# Patient Record
Sex: FEMALE | Race: WHITE | NOT HISPANIC OR LATINO | Employment: FULL TIME | URBAN - METROPOLITAN AREA
[De-identification: names, ages, dates, MRNs, and addresses within clinical notes are randomized per-mention and may not be internally consistent; named-entity substitution may affect disease eponyms.]

---

## 2019-10-07 ENCOUNTER — APPOINTMENT (OUTPATIENT)
Dept: CT IMAGING | Facility: HOSPITAL | Age: 34
End: 2019-10-07

## 2019-10-07 ENCOUNTER — HOSPITAL ENCOUNTER (EMERGENCY)
Facility: HOSPITAL | Age: 34
Discharge: HOME | End: 2019-10-07
Attending: EMERGENCY MEDICINE | Admitting: EMERGENCY MEDICINE

## 2019-10-07 VITALS
WEIGHT: 119.71 LBS | RESPIRATION RATE: 15 BRPM | TEMPERATURE: 98.5 F | DIASTOLIC BLOOD PRESSURE: 68 MMHG | SYSTOLIC BLOOD PRESSURE: 110 MMHG | HEIGHT: 62 IN | OXYGEN SATURATION: 100 % | BODY MASS INDEX: 22.03 KG/M2 | HEART RATE: 65 BPM

## 2019-10-07 DIAGNOSIS — N20.1 URETEROLITHIASIS: Primary | ICD-10-CM

## 2019-10-07 LAB
ALBUMIN SERPL-MCNC: 4.3 G/DL (ref 3.5–4.8)
ALBUMIN/GLOB SERPL: 1.7 G/DL (ref 1–1.7)
ALP SERPL-CCNC: 36 U/L (ref 32–91)
ALT SERPL W P-5'-P-CCNC: 16 U/L (ref 14–54)
ANION GAP SERPL CALCULATED.3IONS-SCNC: 12.6 MMOL/L (ref 5–15)
AST SERPL-CCNC: 21 U/L (ref 15–41)
B-HCG UR QL: NEGATIVE
BACTERIA UR QL AUTO: ABNORMAL /HPF
BASOPHILS # BLD AUTO: 0 10*3/MM3 (ref 0–0.2)
BASOPHILS NFR BLD AUTO: 0.4 % (ref 0–1.5)
BILIRUB SERPL-MCNC: 0.4 MG/DL (ref 0.3–1.2)
BILIRUB UR QL STRIP: NEGATIVE
BUN BLD-MCNC: 10 MG/DL (ref 8–20)
BUN/CREAT SERPL: 14.3 (ref 5.4–26.2)
CALCIUM SPEC-SCNC: 9.2 MG/DL (ref 8.9–10.3)
CHLORIDE SERPL-SCNC: 101 MMOL/L (ref 101–111)
CLARITY UR: ABNORMAL
CO2 SERPL-SCNC: 26 MMOL/L (ref 22–32)
COLOR UR: YELLOW
CREAT BLD-MCNC: 0.7 MG/DL (ref 0.4–1)
DEPRECATED RDW RBC AUTO: 42 FL (ref 37–54)
EOSINOPHIL # BLD AUTO: 0 10*3/MM3 (ref 0–0.4)
EOSINOPHIL NFR BLD AUTO: 0.5 % (ref 0.3–6.2)
ERYTHROCYTE [DISTWIDTH] IN BLOOD BY AUTOMATED COUNT: 13.9 % (ref 12.3–15.4)
GFR SERPL CREATININE-BSD FRML MDRD: 98 ML/MIN/1.73
GLOBULIN UR ELPH-MCNC: 2.6 GM/DL (ref 2.5–3.8)
GLUCOSE BLD-MCNC: 116 MG/DL (ref 65–99)
GLUCOSE UR STRIP-MCNC: NEGATIVE MG/DL
HCT VFR BLD AUTO: 36.1 % (ref 34–46.6)
HGB BLD-MCNC: 12.2 G/DL (ref 12–15.9)
HGB UR QL STRIP.AUTO: ABNORMAL
HYALINE CASTS UR QL AUTO: ABNORMAL /LPF
KETONES UR QL STRIP: NEGATIVE
LEUKOCYTE ESTERASE UR QL STRIP.AUTO: NEGATIVE
LIPASE SERPL-CCNC: 34 U/L (ref 22–51)
LYMPHOCYTES # BLD AUTO: 1.4 10*3/MM3 (ref 0.7–3.1)
LYMPHOCYTES NFR BLD AUTO: 13.5 % (ref 19.6–45.3)
MCH RBC QN AUTO: 29.4 PG (ref 26.6–33)
MCHC RBC AUTO-ENTMCNC: 33.7 G/DL (ref 31.5–35.7)
MCV RBC AUTO: 87.1 FL (ref 79–97)
MONOCYTES # BLD AUTO: 0.6 10*3/MM3 (ref 0.1–0.9)
MONOCYTES NFR BLD AUTO: 5.6 % (ref 5–12)
NEUTROPHILS # BLD AUTO: 8.2 10*3/MM3 (ref 1.7–7)
NEUTROPHILS NFR BLD AUTO: 80 % (ref 42.7–76)
NITRITE UR QL STRIP: NEGATIVE
NRBC BLD AUTO-RTO: 0.1 /100 WBC (ref 0–0.2)
PH UR STRIP.AUTO: 7.5 [PH] (ref 5–8)
PLATELET # BLD AUTO: 268 10*3/MM3 (ref 140–450)
PMV BLD AUTO: 7.6 FL (ref 6–12)
POTASSIUM BLD-SCNC: 3.6 MMOL/L (ref 3.6–5.1)
PROT SERPL-MCNC: 6.9 G/DL (ref 6.1–7.9)
PROT UR QL STRIP: NEGATIVE
RBC # BLD AUTO: 4.14 10*6/MM3 (ref 3.77–5.28)
RBC # UR: ABNORMAL /HPF
REF LAB TEST METHOD: ABNORMAL
SODIUM BLD-SCNC: 136 MMOL/L (ref 136–144)
SP GR UR STRIP: 1.02 (ref 1–1.03)
SQUAMOUS #/AREA URNS HPF: ABNORMAL /HPF
UROBILINOGEN UR QL STRIP: ABNORMAL
WBC NRBC COR # BLD: 10.2 10*3/MM3 (ref 3.4–10.8)
WBC UR QL AUTO: ABNORMAL /HPF

## 2019-10-07 PROCEDURE — 25010000002 KETOROLAC TROMETHAMINE PER 15 MG: Performed by: EMERGENCY MEDICINE

## 2019-10-07 PROCEDURE — 85025 COMPLETE CBC W/AUTO DIFF WBC: CPT | Performed by: EMERGENCY MEDICINE

## 2019-10-07 PROCEDURE — 96374 THER/PROPH/DIAG INJ IV PUSH: CPT

## 2019-10-07 PROCEDURE — 96376 TX/PRO/DX INJ SAME DRUG ADON: CPT

## 2019-10-07 PROCEDURE — 25010000002 CEFTRIAXONE PER 250 MG: Performed by: EMERGENCY MEDICINE

## 2019-10-07 PROCEDURE — 99283 EMERGENCY DEPT VISIT LOW MDM: CPT

## 2019-10-07 PROCEDURE — 25010000002 MORPHINE PER 10 MG: Performed by: EMERGENCY MEDICINE

## 2019-10-07 PROCEDURE — 96375 TX/PRO/DX INJ NEW DRUG ADDON: CPT

## 2019-10-07 PROCEDURE — 81001 URINALYSIS AUTO W/SCOPE: CPT | Performed by: EMERGENCY MEDICINE

## 2019-10-07 PROCEDURE — 74176 CT ABD & PELVIS W/O CONTRAST: CPT

## 2019-10-07 PROCEDURE — 80053 COMPREHEN METABOLIC PANEL: CPT | Performed by: EMERGENCY MEDICINE

## 2019-10-07 PROCEDURE — 83690 ASSAY OF LIPASE: CPT | Performed by: EMERGENCY MEDICINE

## 2019-10-07 PROCEDURE — 81025 URINE PREGNANCY TEST: CPT | Performed by: EMERGENCY MEDICINE

## 2019-10-07 PROCEDURE — 25010000002 ONDANSETRON PER 1 MG: Performed by: EMERGENCY MEDICINE

## 2019-10-07 RX ORDER — MORPHINE SULFATE 4 MG/ML
4 INJECTION, SOLUTION INTRAMUSCULAR; INTRAVENOUS ONCE
Status: COMPLETED | OUTPATIENT
Start: 2019-10-07 | End: 2019-10-07

## 2019-10-07 RX ORDER — CEFDINIR 300 MG/1
300 CAPSULE ORAL 2 TIMES DAILY
Qty: 10 CAPSULE | Refills: 0 | Status: SHIPPED | OUTPATIENT
Start: 2019-10-07 | End: 2019-11-18

## 2019-10-07 RX ORDER — ONDANSETRON 2 MG/ML
4 INJECTION INTRAMUSCULAR; INTRAVENOUS ONCE
Status: COMPLETED | OUTPATIENT
Start: 2019-10-07 | End: 2019-10-07

## 2019-10-07 RX ORDER — TAMSULOSIN HYDROCHLORIDE 0.4 MG/1
1 CAPSULE ORAL DAILY
Qty: 5 CAPSULE | Refills: 0 | Status: SHIPPED | OUTPATIENT
Start: 2019-10-07 | End: 2019-11-18

## 2019-10-07 RX ORDER — SODIUM CHLORIDE 0.9 % (FLUSH) 0.9 %
10 SYRINGE (ML) INJECTION AS NEEDED
Status: DISCONTINUED | OUTPATIENT
Start: 2019-10-07 | End: 2019-10-07 | Stop reason: HOSPADM

## 2019-10-07 RX ORDER — HYDROCODONE BITARTRATE AND ACETAMINOPHEN 5; 325 MG/1; MG/1
1 TABLET ORAL EVERY 6 HOURS PRN
Qty: 12 TABLET | Refills: 0 | Status: SHIPPED | OUTPATIENT
Start: 2019-10-07 | End: 2019-11-18

## 2019-10-07 RX ORDER — KETOROLAC TROMETHAMINE 15 MG/ML
15 INJECTION, SOLUTION INTRAMUSCULAR; INTRAVENOUS ONCE
Status: COMPLETED | OUTPATIENT
Start: 2019-10-07 | End: 2019-10-07

## 2019-10-07 RX ADMIN — KETOROLAC TROMETHAMINE 15 MG: 15 INJECTION, SOLUTION INTRAMUSCULAR; INTRAVENOUS at 12:04

## 2019-10-07 RX ADMIN — MORPHINE SULFATE 4 MG: 4 INJECTION INTRAVENOUS at 14:22

## 2019-10-07 RX ADMIN — ONDANSETRON 4 MG: 2 INJECTION INTRAMUSCULAR; INTRAVENOUS at 12:04

## 2019-10-07 RX ADMIN — MORPHINE SULFATE 4 MG: 4 INJECTION INTRAVENOUS at 12:04

## 2019-10-07 RX ADMIN — CEFTRIAXONE SODIUM 1 G: 10 INJECTION, POWDER, FOR SOLUTION INTRAVENOUS at 13:25

## 2019-10-07 NOTE — ED PROVIDER NOTES
Subjective   Chief complaint left flank pain    History of present illness 33-year-old female complains sudden onset left flank pain radiates to her left lower abdomen and started last night and is gradually gotten worse.  Admits to moderate to severe nausea but no vomiting no urinary problems.  Nothing makes it better nothing makes it worse continuous.  No injury.  No foreign travels leg pain or swelling no dysuria frequency no vaginal discharge.  Menstrual cycles have been normal.  No ill exposures.  No chest pain or shortness of breath.  Severe nothing makes better worse continuous since last night sharp and stabbing in nature            Review of Systems   Constitutional: Negative for chills and fever.   HENT: Negative for congestion and sinus pressure.    Eyes: Negative for photophobia and visual disturbance.   Respiratory: Negative for chest tightness and shortness of breath.    Cardiovascular: Negative for chest pain and leg swelling.   Gastrointestinal: Positive for abdominal pain and nausea.   Endocrine: Negative for cold intolerance and heat intolerance.   Genitourinary: Negative for difficulty urinating and dysuria.   Musculoskeletal: Positive for back pain. Negative for arthralgias.   Skin: Negative for color change and pallor.   Neurological: Negative for dizziness and headaches.   Psychiatric/Behavioral: Negative for agitation and behavioral problems.       No past medical history on file.  Negatives  No Known Allergies    No past surgical history on file.    No family history on file.    Social History     Socioeconomic History   • Marital status:      Spouse name: Not on file   • Number of children: Not on file   • Years of education: Not on file   • Highest education level: Not on file           Objective   Physical Exam  33-year-old awake alert no acute distress HEENT extraocular metastatic sclera clear pupils equal round reactive neck is supple no adenopathy no meningeal signs lungs clear  no retractions no CVA tenderness no spinal tenderness heart regular without murmur and was soft nontender good bowel sounds no peritoneal findings or masses.  Extremities no edema cords or Homans sign no evidence of DVT no cellulitic changes.  No rash throughout the flank area.  No signs of infection.  Patient's awake alert and follows commands motor strength normal without focal weakness.  Procedures        Prior to Admission medications    Not on File   Adderall        ED Course      Results for orders placed or performed during the hospital encounter of 10/07/19   Comprehensive Metabolic Panel   Result Value Ref Range    Glucose 116 (H) 65 - 99 mg/dL    BUN 10 8 - 20 mg/dL    Creatinine 0.70 0.40 - 1.00 mg/dL    Sodium 136 136 - 144 mmol/L    Potassium 3.6 3.6 - 5.1 mmol/L    Chloride 101 101 - 111 mmol/L    CO2 26.0 22.0 - 32.0 mmol/L    Calcium 9.2 8.9 - 10.3 mg/dL    Total Protein 6.9 6.1 - 7.9 g/dL    Albumin 4.30 3.50 - 4.80 g/dL    ALT (SGPT) 16 14 - 54 U/L    AST (SGOT) 21 15 - 41 U/L    Alkaline Phosphatase 36 32 - 91 U/L    Total Bilirubin 0.4 0.3 - 1.2 mg/dL    eGFR Non African Amer 98 >60 mL/min/1.73    Globulin 2.6 2.5 - 3.8 gm/dL    A/G Ratio 1.7 1.0 - 1.7 g/dL    BUN/Creatinine Ratio 14.3 5.4 - 26.2    Anion Gap 12.6 5.0 - 15.0 mmol/L   Lipase   Result Value Ref Range    Lipase 34 22 - 51 U/L   Urinalysis With Microscopic If Indicated (No Culture) - Urine, Clean Catch   Result Value Ref Range    Color, UA Yellow Yellow, Straw    Appearance, UA Hazy (A) Clear    pH, UA 7.5 5.0 - 8.0    Specific Gravity, UA 1.023 1.005 - 1.030    Glucose, UA Negative Negative    Ketones, UA Negative Negative    Bilirubin, UA Negative Negative    Blood, UA Trace (A) Negative    Protein, UA Negative Negative    Leuk Esterase, UA Negative Negative    Nitrite, UA Negative Negative    Urobilinogen, UA 0.2 E.U./dL 0.2 - 1.0 E.U./dL   Pregnancy, Urine - Urine, Clean Catch   Result Value Ref Range    HCG, Urine QL Negative  Negative   CBC Auto Differential   Result Value Ref Range    WBC 10.20 3.40 - 10.80 10*3/mm3    RBC 4.14 3.77 - 5.28 10*6/mm3    Hemoglobin 12.2 12.0 - 15.9 g/dL    Hematocrit 36.1 34.0 - 46.6 %    MCV 87.1 79.0 - 97.0 fL    MCH 29.4 26.6 - 33.0 pg    MCHC 33.7 31.5 - 35.7 g/dL    RDW 13.9 12.3 - 15.4 %    RDW-SD 42.0 37.0 - 54.0 fl    MPV 7.6 6.0 - 12.0 fL    Platelets 268 140 - 450 10*3/mm3    Neutrophil % 80.0 (H) 42.7 - 76.0 %    Lymphocyte % 13.5 (L) 19.6 - 45.3 %    Monocyte % 5.6 5.0 - 12.0 %    Eosinophil % 0.5 0.3 - 6.2 %    Basophil % 0.4 0.0 - 1.5 %    Neutrophils, Absolute 8.20 (H) 1.70 - 7.00 10*3/mm3    Lymphocytes, Absolute 1.40 0.70 - 3.10 10*3/mm3    Monocytes, Absolute 0.60 0.10 - 0.90 10*3/mm3    Eosinophils, Absolute 0.00 0.00 - 0.40 10*3/mm3    Basophils, Absolute 0.00 0.00 - 0.20 10*3/mm3    nRBC 0.1 0.0 - 0.2 /100 WBC   Urinalysis, Microscopic Only - Urine, Clean Catch   Result Value Ref Range    RBC, UA 6-12 (A) None Seen /HPF    WBC, UA 3-5 (A) None Seen /HPF    Bacteria, UA 1+ (A) None Seen /HPF    Squamous Epithelial Cells, UA 3-6 (A) None Seen, 0-2 /HPF    Hyaline Casts, UA 3-6 None Seen /LPF    Methodology Automated Microscopy      Ct Abdomen Pelvis Stone Protocol    Result Date: 10/7/2019   1. 3 mm stone at the left ureterovesical junction causing mild to moderate obstruction. Several very small nonobstructing bilateral upper tract renal stones are noted. 2. Constipation.  Electronically Signed By-Zoltan Lopez On:10/7/2019 2:13 PM This report was finalized on [id number] by  Zoltan Schrodt, .    Medications   sodium chloride 0.9 % flush 10 mL (not administered)   ketorolac (TORADOL) injection 15 mg (15 mg Intravenous Given 10/7/19 1204)   Morphine sulfate (PF) injection 4 mg (4 mg Intravenous Given 10/7/19 1204)   ondansetron (ZOFRAN) injection 4 mg (4 mg Intravenous Given 10/7/19 1204)   cefTRIAXone (ROCEPHIN) in SWFI 1 gram/10ml IV PUSH syringe (1 g Intravenous Given 10/7/19  1325)   Morphine sulfate (PF) injection 4 mg (4 mg Intravenous Given 10/7/19 1422)                   MDM  Number of Diagnoses or Management Options  Ureterolithiasis:   Diagnosis management comments: Medical decision making.  Patient had IV established placed on a monitor placed on saline patient was given morphine for IV Toradol 15 mg IV and Zofran 4 mg IV.  Had morphine repeated 4 mg IV.  CBC and electrolytes unremarkable.  Urine was 1+ bacteria 5 white cells.  The patient CT scan shows a 3 mm distal stone.  She remained stable she was pain-free on repeat exam.  We talked about the findings we talked about what to return for.  And she will be discharged home for outpatient follow-up.  Inspect reviewed       Amount and/or Complexity of Data Reviewed  Clinical lab tests: reviewed  Tests in the radiology section of CPT®: reviewed        Final diagnoses:   Ureterolithiasis              Kiran Barrientos MD  10/07/19 1501       Kiran Barrientos MD  10/07/19 1506

## 2019-10-07 NOTE — DISCHARGE INSTRUCTIONS
Rest plenty fluids drink a lot of water strain urine and save stone.  Follow-up with Dr. Moreno [id number] this week for recheck.   return for high fever vomiting unable to urinate uncontrolled pain or any other new responds or concerns.  Norco Omnicef Flomax

## 2019-11-18 ENCOUNTER — HOSPITAL ENCOUNTER (INPATIENT)
Facility: HOSPITAL | Age: 34
LOS: 2 days | Discharge: HOME | End: 2019-11-20
Attending: INTERNAL MEDICINE | Admitting: INTERNAL MEDICINE

## 2019-11-18 DIAGNOSIS — R23.3 PETECHIAL RASH: ICD-10-CM

## 2019-11-18 DIAGNOSIS — R58 ECCHYMOSIS: ICD-10-CM

## 2019-11-18 DIAGNOSIS — D69.6 THROMBOCYTOPENIA (HCC): Primary | ICD-10-CM

## 2019-11-18 LAB
ABO GROUP BLD: NORMAL
ALBUMIN SERPL-MCNC: 4.5 G/DL (ref 3.5–5.2)
ALBUMIN/GLOB SERPL: 1.7 G/DL
ALP SERPL-CCNC: 36 U/L (ref 39–117)
ALT SERPL W P-5'-P-CCNC: 13 U/L (ref 1–33)
ANION GAP SERPL CALCULATED.3IONS-SCNC: 9 MMOL/L (ref 5–15)
APTT PPP: 23.5 SECONDS (ref 24–31)
AST SERPL-CCNC: 17 U/L (ref 1–32)
BASOPHILS # BLD AUTO: 0 10*3/MM3 (ref 0–0.2)
BASOPHILS NFR BLD AUTO: 0.6 % (ref 0–1.5)
BILIRUB SERPL-MCNC: 0.3 MG/DL (ref 0.2–1.2)
BLD GP AB SCN SERPL QL: NEGATIVE
BUN BLD-MCNC: 16 MG/DL (ref 6–20)
BUN/CREAT SERPL: 26.7 (ref 7–25)
CALCIUM SPEC-SCNC: 9.5 MG/DL (ref 8.6–10.5)
CHLORIDE SERPL-SCNC: 105 MMOL/L (ref 98–107)
CO2 SERPL-SCNC: 26 MMOL/L (ref 22–29)
CREAT BLD-MCNC: 0.6 MG/DL (ref 0.57–1)
DEPRECATED RDW RBC AUTO: 41.6 FL (ref 37–54)
DEPRECATED RDW RBC AUTO: 42 FL (ref 37–54)
EOSINOPHIL # BLD AUTO: 0.1 10*3/MM3 (ref 0–0.4)
EOSINOPHIL NFR BLD AUTO: 0.9 % (ref 0.3–6.2)
ERYTHROCYTE [DISTWIDTH] IN BLOOD BY AUTOMATED COUNT: 13.9 % (ref 12.3–15.4)
ERYTHROCYTE [DISTWIDTH] IN BLOOD BY AUTOMATED COUNT: 14 % (ref 12.3–15.4)
GFR SERPL CREATININE-BSD FRML MDRD: 116 ML/MIN/1.73
GLOBULIN UR ELPH-MCNC: 2.6 GM/DL
GLUCOSE BLD-MCNC: 102 MG/DL (ref 65–99)
GLUCOSE BLDC GLUCOMTR-MCNC: 173 MG/DL (ref 70–105)
HCT VFR BLD AUTO: 36.5 % (ref 34–46.6)
HCT VFR BLD AUTO: 37.7 % (ref 34–46.6)
HGB BLD-MCNC: 12.5 G/DL (ref 12–15.9)
HGB BLD-MCNC: 12.8 G/DL (ref 12–15.9)
INR PPP: 1 (ref 0.9–1.1)
LAB AP CASE REPORT: NORMAL
LYMPHOCYTES # BLD AUTO: 1.7 10*3/MM3 (ref 0.7–3.1)
LYMPHOCYTES NFR BLD AUTO: 30 % (ref 19.6–45.3)
MCH RBC QN AUTO: 28.9 PG (ref 26.6–33)
MCH RBC QN AUTO: 29.1 PG (ref 26.6–33)
MCHC RBC AUTO-ENTMCNC: 33.9 G/DL (ref 31.5–35.7)
MCHC RBC AUTO-ENTMCNC: 34.1 G/DL (ref 31.5–35.7)
MCV RBC AUTO: 84.8 FL (ref 79–97)
MCV RBC AUTO: 85.8 FL (ref 79–97)
MONOCYTES # BLD AUTO: 0.5 10*3/MM3 (ref 0.1–0.9)
MONOCYTES NFR BLD AUTO: 9.3 % (ref 5–12)
NEUTROPHILS # BLD AUTO: 3.4 10*3/MM3 (ref 1.7–7)
NEUTROPHILS NFR BLD AUTO: 59.2 % (ref 42.7–76)
NRBC BLD AUTO-RTO: 0 /100 WBC (ref 0–0.2)
PATH REPORT.FINAL DX SPEC: NORMAL
PATHOLOGY REVIEW: YES
PLATELET # BLD AUTO: 5 10*3/MM3 (ref 140–450)
PLATELET # BLD AUTO: 9 10*3/MM3 (ref 140–450)
PMV BLD AUTO: 8.9 FL (ref 6–12)
PMV BLD AUTO: 9.4 FL (ref 6–12)
POTASSIUM BLD-SCNC: 3.8 MMOL/L (ref 3.5–5.2)
PROT SERPL-MCNC: 7.1 G/DL (ref 6–8.5)
PROTHROMBIN TIME: 10.5 SECONDS (ref 9.6–11.7)
RBC # BLD AUTO: 4.31 10*6/MM3 (ref 3.77–5.28)
RBC # BLD AUTO: 4.39 10*6/MM3 (ref 3.77–5.28)
RBC MORPH BLD: NORMAL
RH BLD: POSITIVE
SMALL PLATELETS BLD QL SMEAR: NORMAL
SODIUM BLD-SCNC: 140 MMOL/L (ref 136–145)
T&S EXPIRATION DATE: NORMAL
WBC MORPH BLD: NORMAL
WBC NRBC COR # BLD: 5.7 10*3/MM3 (ref 3.4–10.8)
WBC NRBC COR # BLD: 9.2 10*3/MM3 (ref 3.4–10.8)

## 2019-11-18 PROCEDURE — 85027 COMPLETE CBC AUTOMATED: CPT | Performed by: INTERNAL MEDICINE

## 2019-11-18 PROCEDURE — 85610 PROTHROMBIN TIME: CPT | Performed by: NURSE PRACTITIONER

## 2019-11-18 PROCEDURE — 63710000001 DEXAMETHASONE PER 0.25 MG: Performed by: NURSE PRACTITIONER

## 2019-11-18 PROCEDURE — 99255 IP/OBS CONSLTJ NEW/EST HI 80: CPT | Performed by: NURSE PRACTITIONER

## 2019-11-18 PROCEDURE — 99284 EMERGENCY DEPT VISIT MOD MDM: CPT

## 2019-11-18 PROCEDURE — 36415 COLL VENOUS BLD VENIPUNCTURE: CPT

## 2019-11-18 PROCEDURE — 86850 RBC ANTIBODY SCREEN: CPT | Performed by: NURSE PRACTITIONER

## 2019-11-18 PROCEDURE — P9035 PLATELET PHERES LEUKOREDUCED: HCPCS

## 2019-11-18 PROCEDURE — 85025 COMPLETE CBC W/AUTO DIFF WBC: CPT | Performed by: NURSE PRACTITIONER

## 2019-11-18 PROCEDURE — 80053 COMPREHEN METABOLIC PANEL: CPT | Performed by: NURSE PRACTITIONER

## 2019-11-18 PROCEDURE — 25010000002 METHYLPREDNISOLONE PER 125 MG: Performed by: NURSE PRACTITIONER

## 2019-11-18 PROCEDURE — 36430 TRANSFUSION BLD/BLD COMPNT: CPT

## 2019-11-18 PROCEDURE — 86900 BLOOD TYPING SEROLOGIC ABO: CPT

## 2019-11-18 PROCEDURE — 85007 BL SMEAR W/DIFF WBC COUNT: CPT | Performed by: NURSE PRACTITIONER

## 2019-11-18 PROCEDURE — 82962 GLUCOSE BLOOD TEST: CPT

## 2019-11-18 PROCEDURE — P9100 PATHOGEN TEST FOR PLATELETS: HCPCS

## 2019-11-18 PROCEDURE — 85730 THROMBOPLASTIN TIME PARTIAL: CPT | Performed by: NURSE PRACTITIONER

## 2019-11-18 PROCEDURE — 99223 1ST HOSP IP/OBS HIGH 75: CPT | Performed by: NURSE PRACTITIONER

## 2019-11-18 PROCEDURE — 86900 BLOOD TYPING SEROLOGIC ABO: CPT | Performed by: NURSE PRACTITIONER

## 2019-11-18 PROCEDURE — 86901 BLOOD TYPING SEROLOGIC RH(D): CPT | Performed by: NURSE PRACTITIONER

## 2019-11-18 PROCEDURE — 86901 BLOOD TYPING SEROLOGIC RH(D): CPT

## 2019-11-18 RX ORDER — NITROGLYCERIN 0.4 MG/1
0.4 TABLET SUBLINGUAL
Status: DISCONTINUED | OUTPATIENT
Start: 2019-11-18 | End: 2019-11-20 | Stop reason: HOSPADM

## 2019-11-18 RX ORDER — METHYLPREDNISOLONE SODIUM SUCCINATE 125 MG/2ML
125 INJECTION, POWDER, LYOPHILIZED, FOR SOLUTION INTRAMUSCULAR; INTRAVENOUS EVERY 6 HOURS
Status: DISCONTINUED | OUTPATIENT
Start: 2019-11-18 | End: 2019-11-20

## 2019-11-18 RX ORDER — BISACODYL 10 MG
10 SUPPOSITORY, RECTAL RECTAL DAILY PRN
Status: DISCONTINUED | OUTPATIENT
Start: 2019-11-18 | End: 2019-11-20 | Stop reason: HOSPADM

## 2019-11-18 RX ORDER — SODIUM CHLORIDE 0.9 % (FLUSH) 0.9 %
10 SYRINGE (ML) INJECTION AS NEEDED
Status: DISCONTINUED | OUTPATIENT
Start: 2019-11-18 | End: 2019-11-20 | Stop reason: HOSPADM

## 2019-11-18 RX ORDER — DEXAMETHASONE 4 MG/1
10 TABLET ORAL EVERY 6 HOURS
Status: DISCONTINUED | OUTPATIENT
Start: 2019-11-18 | End: 2019-11-18

## 2019-11-18 RX ORDER — ONDANSETRON 4 MG/1
4 TABLET, FILM COATED ORAL EVERY 6 HOURS PRN
Status: DISCONTINUED | OUTPATIENT
Start: 2019-11-18 | End: 2019-11-20 | Stop reason: HOSPADM

## 2019-11-18 RX ORDER — CHOLECALCIFEROL (VITAMIN D3) 125 MCG
5 CAPSULE ORAL NIGHTLY PRN
Status: DISCONTINUED | OUTPATIENT
Start: 2019-11-18 | End: 2019-11-20 | Stop reason: HOSPADM

## 2019-11-18 RX ORDER — ALUMINA, MAGNESIA, AND SIMETHICONE 2400; 2400; 240 MG/30ML; MG/30ML; MG/30ML
15 SUSPENSION ORAL EVERY 6 HOURS PRN
Status: DISCONTINUED | OUTPATIENT
Start: 2019-11-18 | End: 2019-11-20 | Stop reason: HOSPADM

## 2019-11-18 RX ORDER — PANTOPRAZOLE SODIUM 40 MG/1
40 TABLET, DELAYED RELEASE ORAL
Status: DISCONTINUED | OUTPATIENT
Start: 2019-11-19 | End: 2019-11-20 | Stop reason: HOSPADM

## 2019-11-18 RX ORDER — ACETAMINOPHEN 325 MG/1
650 TABLET ORAL EVERY 4 HOURS PRN
Status: DISCONTINUED | OUTPATIENT
Start: 2019-11-18 | End: 2019-11-20 | Stop reason: HOSPADM

## 2019-11-18 RX ORDER — ONDANSETRON 2 MG/ML
4 INJECTION INTRAMUSCULAR; INTRAVENOUS EVERY 6 HOURS PRN
Status: DISCONTINUED | OUTPATIENT
Start: 2019-11-18 | End: 2019-11-20 | Stop reason: HOSPADM

## 2019-11-18 RX ORDER — ACETAMINOPHEN 160 MG/5ML
650 SOLUTION ORAL EVERY 4 HOURS PRN
Status: DISCONTINUED | OUTPATIENT
Start: 2019-11-18 | End: 2019-11-20 | Stop reason: HOSPADM

## 2019-11-18 RX ORDER — ACETAMINOPHEN 650 MG/1
650 SUPPOSITORY RECTAL EVERY 4 HOURS PRN
Status: DISCONTINUED | OUTPATIENT
Start: 2019-11-18 | End: 2019-11-20 | Stop reason: HOSPADM

## 2019-11-18 RX ORDER — SODIUM CHLORIDE 0.9 % (FLUSH) 0.9 %
10 SYRINGE (ML) INJECTION EVERY 12 HOURS SCHEDULED
Status: DISCONTINUED | OUTPATIENT
Start: 2019-11-18 | End: 2019-11-20 | Stop reason: HOSPADM

## 2019-11-18 RX ADMIN — ACETAMINOPHEN 650 MG: 325 TABLET ORAL at 13:00

## 2019-11-18 RX ADMIN — METHYLPREDNISOLONE SODIUM SUCCINATE 125 MG: 125 INJECTION, POWDER, FOR SOLUTION INTRAMUSCULAR; INTRAVENOUS at 16:43

## 2019-11-18 RX ADMIN — Medication 10 ML: at 20:49

## 2019-11-18 RX ADMIN — DEXAMETHASONE 10 MG: 4 TABLET ORAL at 13:00

## 2019-11-18 RX ADMIN — METHYLPREDNISOLONE SODIUM SUCCINATE 125 MG: 125 INJECTION, POWDER, FOR SOLUTION INTRAMUSCULAR; INTRAVENOUS at 22:53

## 2019-11-18 RX ADMIN — ACETAMINOPHEN 650 MG: 325 TABLET ORAL at 18:29

## 2019-11-18 RX ADMIN — Medication 10 ML: at 12:31

## 2019-11-18 RX ADMIN — MELATONIN TAB 5 MG 5 MG: 5 TAB at 22:53

## 2019-11-18 NOTE — H&P
[facility]        Patient Name:  Tiny Akhtar  YOB: 1985  MRN:  [id number]  Admit Date:  11/18/2019    Patient Care Team:  Jose Ingram APRN as PCP - General (Family Medicine)            History Present Illness     Chief Complaint   Patient presents with   • Abnormal Lab       This is a 34-year-old  female with a history of iron deficiency anemia and kidney stone who presented to the ED on 11/18/2019 with complaint of low platelet count, petechial rash on her lower extremities, and diffuse bruising. The patient states she noticed the rash and bruising yesterday. 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 She was advised to go to the ER for further evaluation. She also reports bruising inside her mouth. She denies a history of thrombocytopenia. Review of records from her ER visit here on 10/07/19 showed normal platelet count of 268. She states she was on cefdinir for 10 days at that time, but has not been on any antibiotics or other new medicines recently. She states she takes a hormone supplement daily, but has been on it for over 1 year. She denies any exacerbating or alleviating factors. She denies any other complaints.     The patient is a nonsmoker. She denies alcohol or illicit drug use. She reports a family history of ITP in her nephew, but states it was believed to be caused by his MMR vaccination.     In the ER the patient had labs obtained. Her platelet count is 5. She will be transfused with 1 unit of platelets. Hematology was consulted. She will be admitted for further evaluation and close monitoring.         Review of Systems   Hematologic/Lymphatic:        Petechial rash on both lower extremities. Diffuse bruising on both legs, face, and inside mouth.    All other systems reviewed and are negative.          Personal  History     Past Medical History:   Diagnosis Date   • Anemia    • Kidney stone 10/2019     History reviewed. No pertinent surgical history.  Family History   Problem Relation Age of Onset   • Thrombocytopenia Nephew         ITP     Social History     Tobacco Use   • Smoking status: Never Smoker   • Smokeless tobacco: Never Used   Substance Use Topics   • Alcohol use: No     Frequency: Never   • Drug use: No     Prior to Admission medications    Medication Sig Start Date End Date Taking? Authorizing Provider   NON FORMULARY Take 1 tablet by mouth Daily. Balance OTC   Yes Provider, MD lE   cefdinir (OMNICEF) 300 MG capsule Take 1 capsule by mouth 2 (Two) Times a Day. 10/7/19 11/18/19  Kiran Barrientos MD   HYDROcodone-acetaminophen (NORCO) 5-325 MG per tablet Take 1 tablet by mouth Every 6 (Six) Hours As Needed for Severe Pain . 10/7/19 11/18/19  Kiran Barrientos MD   tamsulosin (FLOMAX) 0.4 MG capsule 24 hr capsule Take 1 capsule by mouth Daily. 10/7/19 11/18/19  Kiran Barrientos MD     Allergies:  No Known Allergies      Objective     Vital Signs  Temp:  [98.2 °F (36.8 °C)] 98.2 °F (36.8 °C)  Heart Rate:  [75-83] 79  Resp:  [16] 16  BP: (115-120)/(61-75) 120/75  SpO2:  [96 %-100 %] 100 %  on   ;   Device (Oxygen Therapy): room air  Body mass index is 21.21 kg/m².    Physical Exam   Constitutional: She is oriented to person, place, and time. She appears well-developed and well-nourished. No distress.   HENT:   Head: Normocephalic and atraumatic.   Bruising inside mouth   Eyes: Conjunctivae and EOM are normal. Pupils are equal, round, and reactive to light.   Neck: Normal range of motion. Neck supple.   Cardiovascular: Normal rate, regular rhythm, normal heart sounds and intact distal pulses.   Pulmonary/Chest: Effort normal and breath sounds normal.   Abdominal: Soft. Bowel sounds are normal. She exhibits no distension. There is no tenderness.   Musculoskeletal: Normal range of motion. She exhibits no edema.    Neurological: She is alert and oriented to person, place, and time.   Skin: Skin is warm and dry. Capillary refill takes less than 2 seconds. Bruising, ecchymosis and petechiae noted.   Petechial rash on BLE. Diffuse bruising/ecchymosis on BLE and face.   Psychiatric: She has a normal mood and affect. Her behavior is normal. Judgment and thought content normal.   Vitals reviewed.      Results Review:  I reviewed the patient's new clinical results.  I reviewed the patient's new imaging results and agree with the interpretation.  I reviewed the patient's other test results and agree with the interpretation  I personally viewed and interpreted the patient's EKG/Telemetry data  Discussed with ED provider.    Results from last 7 days   Lab Units 11/18/19  0937   WBC 10*3/mm3 5.70   HEMOGLOBIN g/dL 12.8   HEMATOCRIT % 37.7   PLATELETS 10*3/mm3 5*     Results from last 7 days   Lab Units 11/18/19  0918   SODIUM mmol/L 140   POTASSIUM mmol/L 3.8   CHLORIDE mmol/L 105   CO2 mmol/L 26.0   BUN mg/dL 16   CREATININE mg/dL 0.60   GLUCOSE mg/dL 102*   CALCIUM mg/dL 9.5     Results from last 7 days   Lab Units 11/18/19  0918   SODIUM mmol/L 140   POTASSIUM mmol/L 3.8   CHLORIDE mmol/L 105   CO2 mmol/L 26.0   BUN mg/dL 16   CREATININE mg/dL 0.60   CALCIUM mg/dL 9.5   BILIRUBIN mg/dL 0.3   ALK PHOS U/L 36*   ALT (SGPT) U/L 13   AST (SGOT) U/L 17   GLUCOSE mg/dL 102*         Lab Results   Component Value Date    CALCIUM 9.5 11/18/2019     No results found for: HGBA1C  No results found for: CHOL, CHLPL, TRIG, HDL, LDL, LDLDIRECT  Lab Results   Component Value Date    LIPASE 34 10/07/2019             Microbiology Results (last 10 days)     ** No results found for the last 240 hours. **          ECG/EMG Results (most recent)     None                    No radiology results for the last 7 days      Estimated Creatinine Clearance: 111.8 mL/min (by C-G formula based on SCr of 0.6 mg/dL).      Assessment/Plan     Active Hospital Problems     Diagnosis POA   • **Thrombocytopenia (CMS/HCC) [D69.6] Yes     Priority: High   • Petechial rash [R23.3] Yes     Priority: Medium   • Ecchymosis [R58] Yes     Priority: Medium       Thrombocytopenia   -etiology unclear  -Plt 268 on 10/7/19; Plt 5 on admission  -transfuse 1 unit Plt  -monitor CBC  -start Decadron PO  -consult hematology     Petechial rash and Ecchymosis secondary to thrombocytopenia      · I discussed the patient's findings and my recommendations with the patient and family at the bedside. They verbalize understanding and are agreeable to the plan of care.       VTE Prophylaxis - none (low risk), encourage ambulation      Code Status -  Code Status and Medical Interventions:   Ordered at: 11/18/19 1048     Level Of Support Discussed With:    Patient     Code Status:    CPR     Medical Interventions (Level of Support Prior to Arrest):    Full          FARZAD Johnson  [facility] Hospitalist Team  11/18/19  10:49 AM

## 2019-11-18 NOTE — ED PROVIDER NOTES
Subjective   34-year-old female presents with complaint of multiple bruises of her lower extremities with petechial rash on her ankles and on buccal mucosa she reports that she noticed this yesterday.  Denies known injury.  Patient denies abdominal pain nausea vomiting diarrhea.  Denies fever sweats or chills.  Denies any tick bites.  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  She does have a history of iron deficiency anemia.    1. Location: Denies pain  2. Quality: None  3. Severity: 0  4. Worsening factors: None  5. Alleviating factors: None  6. Onset: Yesterday  7. Radiation: None  8. Frequency: Constant  9. Co-morbidities: iron deficiency anemia  10. Source: pateint              Review of Systems   Constitutional: Negative for chills, diaphoresis, fatigue and fever.   Respiratory: Negative for chest tightness and shortness of breath.    Cardiovascular: Negative for chest pain and palpitations.   Gastrointestinal: Negative for abdominal pain, diarrhea, nausea and vomiting.   Genitourinary: Negative for flank pain and hematuria.   Musculoskeletal: Negative for arthralgias and myalgias.   Skin: Positive for rash. Negative for color change, pallor and wound.   Allergic/Immunologic: Negative for immunocompromised state.   Neurological: Negative for dizziness, weakness and headaches.   Hematological: Does not bruise/bleed easily.   All other systems reviewed and are negative.      Past Medical History:   Diagnosis Date   • Anemia        No Known Allergies    History reviewed. No pertinent surgical history.    History reviewed. No pertinent family history.    Social History     Socioeconomic History   • Marital status:      Spouse name: Not on file   • Number of children: Not on file   • Years of education: Not on file   • Highest education level: Not on file   Substance and Sexual Activity   • Alcohol use: No     Frequency: Never   • Drug  use: No           Objective   Physical Exam   Constitutional: She is oriented to person, place, and time. She appears well-developed and well-nourished. No distress.   HENT:   Head: Normocephalic and atraumatic.   Right Ear: External ear normal.   Left Ear: External ear normal.   Nose: Nose normal.   Mouth/Throat: Oropharynx is clear and moist.   Eyes: Conjunctivae and EOM are normal. Pupils are equal, round, and reactive to light.   Neck: Normal range of motion. Neck supple.   Cardiovascular: Normal rate, regular rhythm, normal heart sounds and intact distal pulses. Exam reveals no gallop and no friction rub.   No murmur heard.  Pulmonary/Chest: Effort normal and breath sounds normal. No stridor. No respiratory distress. She has no wheezes. She has no rales. She exhibits no tenderness.   Abdominal: Soft. Bowel sounds are normal. She exhibits no distension and no mass. There is no tenderness. There is no rebound and no guarding. No hernia.   Musculoskeletal: Normal range of motion.   Neurological: She is alert and oriented to person, place, and time.   Skin: Skin is warm and dry. Capillary refill takes less than 2 seconds. Petechiae and rash noted.   Patient noted to have widespread area of ecchymosis to the bilateral lower extremities with a petechial rash of her ankles.    Psychiatric: She has a normal mood and affect. Her behavior is normal. Judgment and thought content normal.   Nursing note and vitals reviewed.      Procedures           ED Course  ED Course as of Nov 18 0959   Mon Nov 18, 2019   0949 Awaiting CBC results.  [AL]   0951 Lab called these results @ this time.  Platelets: (!!) 5 [AL]      ED Course User Index  [AL] Dee Guevara, NP      No radiology results for the last day  Medications   sodium chloride 0.9 % flush 10 mL (not administered)     Labs Reviewed   COMPREHENSIVE METABOLIC PANEL - Abnormal; Notable for the following components:       Result Value    Glucose 102 (*)     Alkaline  Phosphatase 36 (*)     BUN/Creatinine Ratio 26.7 (*)     All other components within normal limits    Narrative:     GFR Normal >60  Chronic Kidney Disease <60  Kidney Failure <15   APTT - Abnormal; Notable for the following components:    PTT 23.5 (*)     All other components within normal limits   CBC WITH AUTO DIFFERENTIAL - Abnormal; Notable for the following components:    Platelets 5 (*)     All other components within normal limits   PROTIME-INR - Normal   SCAN SLIDE   PREPARE PLATELET PHERESIS   TYPE AND SCREEN   CBC AND DIFFERENTIAL    Narrative:     The following orders were created for panel order CBC & Differential.  Procedure                               Abnormality         Status                     ---------                               -----------         ------                     CBC Auto Differential[[id number]]        Abnormal            Preliminary result           Please view results for these tests on the individual orders.                 MDM  Number of Diagnoses or Management Options  Ecchymosis:   Petechial rash:   Thrombocytopenia (CMS/HCC):   Diagnosis management comments: Chart Review: 10/7/2019 patient was seen for flank pain.  At that time patient's platelets were 268.  Comorbidity: Iron deficiency anemia  Imaging: Was interpreted by physician and reviewed by myself:  Disposition/Treatment: Discussed results with patient, verbalized understanding.  Agreeable with plan of care.    Patient undressed and placed in gown for exam. 34-year-old female presents with complaint of multiple bruises of her lower extremities with petechial rash on her ankles and on buccal mucosa. she reports that she noticed this yesterday.  Denies known injury.  Patient denies abdominal pain nausea vomiting diarrhea.  Denies fever sweats or chills.  Denies any tick bites.  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  She does have a history of  iron deficiency anemia.  IV established and labs obtained.  Patient noted to have platelet count of 5.  Type and screen obtained and patient was prepared for platelet transfusion.  Hospitalist paged for admission.  Spoke with FARZAD Up who accepted admission on behalf of Dr. Batres.  Hematology consult has been requested.           Amount and/or Complexity of Data Reviewed  Clinical lab tests: reviewed        Final diagnoses:   Thrombocytopenia (CMS/HCC)   Petechial rash   Ecchymosis              Dee Guevara, NP  11/18/19 8280

## 2019-11-18 NOTE — CONSULTS
Hematology/Oncology Inpatient Consultation    Patient name: Tiny Akhtar  : 1985  MRN: [id number]  Referring Provider: Dr. Batres  Reason for Consultation: thrombocytopenia    Chief complaint: bruising    History of present illness:    34 y.o. female who was admitted through the ER on 2019 reporting bruising on her ankles and in her mouth.  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  CBC on arrival showed a platelet count of 5000.  She was ordered to receive 1 unit of platelets and started on dexamethasone.  CBC on ER visit in 2019 showed normal platelet count.    19  Hematology/Oncology was consulted acute thrombocytopenia.    PCP: Jose Ingram APRN    History:  Past Medical History:   Diagnosis Date   • Anemia    • Kidney stone 10/2019   , History reviewed. No pertinent surgical history.,   Family History   Problem Relation Age of Onset   • Thrombocytopenia Nephew         ITP   • Diabetes Mother    • Diabetes Father    • Cancer Paternal Grandmother    ,   Social History     Tobacco Use   • Smoking status: Never Smoker   • Smokeless tobacco: Never Used   Substance Use Topics   • Alcohol use: No     Frequency: Never   • Drug use: No   ,   Medications Prior to Admission   Medication Sig Dispense Refill Last Dose   • NON FORMULARY Take 1 tablet by mouth Daily. Balance OTC   2019 at Unknown time   , Scheduled Meds:      methylPREDNISolone sodium succinate 125 mg Intravenous Q6H   [START ON 2019] pantoprazole 40 mg Oral Q AM   sodium chloride 10 mL Intravenous Q12H   , Continuous Infusions:   , PRN Meds:  •  acetaminophen **OR** acetaminophen **OR** acetaminophen  •  aluminum-magnesium hydroxide-simethicone  •  bisacodyl  •  magnesium hydroxide  •  melatonin  •  nitroglycerin  •  ondansetron **OR** ondansetron  •  [COMPLETED] Insert peripheral IV **AND** sodium chloride  •  sodium chloride   Allergies:  Patient has no known  "allergies.    ROS:  Review of Systems   Constitutional: Positive for unexpected weight change (wt. loss of 8-10 lb in 2 weeks due to stress of moving and not eating very much. ). Negative for diaphoresis, fatigue and fever.        Denies any recent illnesses.    HENT: Positive for mouth sores. Negative for congestion and nosebleeds.    Eyes: Negative.    Respiratory: Negative for cough and shortness of breath.    Cardiovascular: Negative for chest pain and leg swelling.   Gastrointestinal: Negative for abdominal pain, blood in stool, constipation, diarrhea, nausea and vomiting.   Endocrine: Negative for cold intolerance and heat intolerance.   Genitourinary: Negative for dysuria and hematuria.   Musculoskeletal: Negative for arthralgias and joint swelling.   Skin: Negative for rash and wound.   Allergic/Immunologic: Negative.    Neurological: Negative for numbness and headaches.   Hematological: Bruises/bleeds easily.   Psychiatric/Behavioral: Negative for confusion. The patient is nervous/anxious.    All other systems reviewed and are negative.       Objective     Vital Signs:   /70 (BP Location: Left leg, Patient Position: Lying)   Pulse 75   Temp 98.4 °F (36.9 °C) (Oral)   Resp 15   Ht 157.5 cm (62\")   Wt 51.3 kg (113 lb 1.5 oz)   SpO2 100%   BMI 20.69 kg/m²     Physical Exam:  Physical Exam   Constitutional: She is oriented to person, place, and time. She appears well-developed and well-nourished.   Thin.    HENT:   Head: Normocephalic and atraumatic.   Mouth/Throat: Oropharynx is clear and moist.   Scattered purpura hard palate and buccal mucosa.    Eyes: Conjunctivae, EOM and lids are normal. Pupils are equal, round, and reactive to light.   Neck: Normal range of motion. Neck supple. No thyromegaly present.   Cardiovascular: Normal rate, regular rhythm and normal heart sounds.   No murmur heard.  Pulmonary/Chest: Effort normal and breath sounds normal. No respiratory distress.   Abdominal: Soft. " Normal appearance and bowel sounds are normal. She exhibits no distension.   Genitourinary:   Genitourinary Comments: Deferred.   Musculoskeletal: Normal range of motion. She exhibits no edema.   Lymphadenopathy:     She has no cervical adenopathy.     She has no axillary adenopathy.        Right: No supraclavicular adenopathy present.        Left: No supraclavicular adenopathy present.   Neurological: She is alert and oriented to person, place, and time.   Skin: Skin is warm and dry. Capillary refill takes less than 2 seconds. No bruising, no petechiae and no rash noted.   Few petechiae on lower legs and scattered ecchymosis on legs.    Psychiatric: She has a normal mood and affect. Her speech is normal and behavior is normal. Judgment and thought content normal. Cognition and memory are normal.   Nursing note and vitals reviewed.       Results Review:  Lab Results (last 48 hours)     Procedure Component Value Units Date/Time    Pathology Consultation [[id number]] Collected:  11/18/19 0937    Specimen:  Blood, Venous Line Updated:  11/18/19 1118    Scan Slide [[id number]] Collected:  11/18/19 0937    Specimen:  Blood from Arm, Right Updated:  11/18/19 1011     RBC Morphology Normal     WBC Morphology Normal     Platelet Estimate Decreased    Narrative:       Slide Reviewed    Path Consult Reflex [[id number]] Collected:  11/18/19 0937    Specimen:  Blood from Arm, Right Updated:  11/18/19 1011     Pathology Review Yes    CBC & Differential [[id number]] Collected:  11/18/19 0937    Specimen:  Blood Updated:  11/18/19 1011    Narrative:       The following orders were created for panel order CBC & Differential.  Procedure                               Abnormality         Status                     ---------                               -----------         ------                     CBC Auto Differential[[id number]]        Abnormal            Final result                 Please view results for these tests on the  individual orders.    CBC Auto Differential [[id number]]  (Abnormal) Collected:  11/18/19 0937    Specimen:  Blood from Arm, Right Updated:  11/18/19 1011     WBC 5.70 10*3/mm3      RBC 4.39 10*6/mm3      Hemoglobin 12.8 g/dL      Hematocrit 37.7 %      MCV 85.8 fL      MCH 29.1 pg      MCHC 33.9 g/dL      RDW 13.9 %      RDW-SD 41.6 fl      MPV 9.4 fL      Platelets 5 10*3/mm3      Neutrophil % 59.2 %      Lymphocyte % 30.0 %      Monocyte % 9.3 %      Eosinophil % 0.9 %      Basophil % 0.6 %      Neutrophils, Absolute 3.40 10*3/mm3      Lymphocytes, Absolute 1.70 10*3/mm3      Monocytes, Absolute 0.50 10*3/mm3      Eosinophils, Absolute 0.10 10*3/mm3      Basophils, Absolute 0.00 10*3/mm3      nRBC 0.0 /100 WBC     Narrative:       Appended report. These results have been appended to a previously verified report.    Comprehensive Metabolic Panel [[id number]]  (Abnormal) Collected:  11/18/19 0918    Specimen:  Blood Updated:  11/18/19 0946     Glucose 102 mg/dL      BUN 16 mg/dL      Creatinine 0.60 mg/dL      Sodium 140 mmol/L      Potassium 3.8 mmol/L      Chloride 105 mmol/L      CO2 26.0 mmol/L      Calcium 9.5 mg/dL      Total Protein 7.1 g/dL      Albumin 4.50 g/dL      ALT (SGPT) 13 U/L      AST (SGOT) 17 U/L      Alkaline Phosphatase 36 U/L      Total Bilirubin 0.3 mg/dL      eGFR Non African Amer 116 mL/min/1.73      Globulin 2.6 gm/dL      A/G Ratio 1.7 g/dL      BUN/Creatinine Ratio 26.7     Anion Gap 9.0 mmol/L     Narrative:       GFR Normal >60  Chronic Kidney Disease <60  Kidney Failure <15    aPTT [[id number]]  (Abnormal) Collected:  11/18/19 0918    Specimen:  Blood Updated:  11/18/19 0941     PTT 23.5 seconds     Protime-INR [[id number]]  (Normal) Collected:  11/18/19 0918    Specimen:  Blood Updated:  11/18/19 0941     Protime 10.5 Seconds      INR 1.00           Pending Results:     Imaging Reviewed:   No radiology results for the last 7 days    I have reviewed the patient's labs, imaging, reports,  and other clinician documentation.         Assessment/Plan       ASSESSMENT  1. Acute thrombocytopenia-likely due to ITP.  Coags and LFTs normal.  No history of alcohol abuse.  Transfused with 1 unit platelets on 11/18.  Continue dexamethasone-added Protonix.  2. History of HAILE-hemoglobin and MCV both normal.  3. Left renal stone- seen on CT A/P 1 month ago.  Asymptomatic.  4. Anxiety/wt. Loss-per PMD.     PLAN  1. Change dexamethasone to solumedrol 125 mg IV Q 6hr.  When plt start to rise will transition to prednisone 1mg/kg daily.   2. Start Protonix  3. Monitor platelet count and response to treatment.    Thank you for this consultation.  We will be glad to follow her along with you.   Electronically signed by FARZAD Ray, 11/18/19, 2:21 PM.    I have personally performed a face-to-face diagnostic evaluation on this patient.  I have reviewed and agreed with the care plan.  Patient seen and examined nurse practitioner note reviewed updated discussed with nurse practitioner.  Reviewed the chart reviewed the CBC results.  Etiology of thrombocytopenia is unclear.  No new medications has some upper respiratory infections recently.  Likely ITP.  Patient will be on high-dose steroids with the Solu-Medrol 125 mg every 6 hours.  When the platelets start trending up we will switch Solu-Medrol to prednisone 1 mg/kg daily.  WBC count and hemoglobin and differential count are normal but if there is any change in that we will obtain bone marrow biopsy  I discussed the patients findings and my recommendations with patient.    Thank you for this consult.  We will be happy to follow along in the care of this patient.     SAJAN Sagastume MD  11/18/19  7:42 PM

## 2019-11-18 NOTE — ED NOTES
Pt has multiple bruises on lower extremities and petechiae on legs and in mouth. Pt reports no injury or trauma. Pt reports she did a fingerstick CBC at work yesterday and her platelet count was unreadable.     Kerline Chaney RN  11/18/19 5063

## 2019-11-18 NOTE — PLAN OF CARE
Problem: Patient Care Overview  Goal: Discharge Needs Assessment  Outcome: Ongoing (interventions implemented as appropriate)      Problem: Health Knowledge, Opportunity to Enhance (Adult,Obstetrics,Pediatric)  Goal: Identify Related Risk Factors and Signs and Symptoms  Outcome: Outcome(s) achieved Date Met: 11/18/19    Goal: Knowledgeable about Health Subject/Topic  Outcome: Ongoing (interventions implemented as appropriate)

## 2019-11-19 ENCOUNTER — APPOINTMENT (OUTPATIENT)
Dept: INTERVENTIONAL RADIOLOGY/VASCULAR | Facility: HOSPITAL | Age: 34
End: 2019-11-19

## 2019-11-19 LAB
ABO + RH BLD: NORMAL
BASOPHILS # BLD AUTO: 0 10*3/MM3 (ref 0–0.2)
BASOPHILS NFR BLD AUTO: 0 % (ref 0–1.5)
BH BB BLOOD EXPIRATION DATE: NORMAL
BH BB BLOOD TYPE BARCODE: 6200
BH BB DISPENSE STATUS: NORMAL
BH BB PRODUCT CODE: NORMAL
BH BB UNIT NUMBER: NORMAL
DEPRECATED RDW RBC AUTO: 40.7 FL (ref 37–54)
DEPRECATED RDW RBC AUTO: 42 FL (ref 37–54)
EOSINOPHIL # BLD AUTO: 0 10*3/MM3 (ref 0–0.4)
EOSINOPHIL NFR BLD AUTO: 0 % (ref 0.3–6.2)
ERYTHROCYTE [DISTWIDTH] IN BLOOD BY AUTOMATED COUNT: 13.6 % (ref 12.3–15.4)
ERYTHROCYTE [DISTWIDTH] IN BLOOD BY AUTOMATED COUNT: 14 % (ref 12.3–15.4)
GLUCOSE BLDC GLUCOMTR-MCNC: 150 MG/DL (ref 70–105)
GLUCOSE BLDC GLUCOMTR-MCNC: 158 MG/DL (ref 70–105)
HCT VFR BLD AUTO: 33.2 % (ref 34–46.6)
HCT VFR BLD AUTO: 36.5 % (ref 34–46.6)
HGB BLD-MCNC: 11.3 G/DL (ref 12–15.9)
HGB BLD-MCNC: 12.3 G/DL (ref 12–15.9)
LYMPHOCYTES # BLD AUTO: 0.7 10*3/MM3 (ref 0.7–3.1)
LYMPHOCYTES NFR BLD AUTO: 6.6 % (ref 19.6–45.3)
MCH RBC QN AUTO: 28.9 PG (ref 26.6–33)
MCH RBC QN AUTO: 29.3 PG (ref 26.6–33)
MCHC RBC AUTO-ENTMCNC: 33.7 G/DL (ref 31.5–35.7)
MCHC RBC AUTO-ENTMCNC: 34.1 G/DL (ref 31.5–35.7)
MCV RBC AUTO: 85.8 FL (ref 79–97)
MCV RBC AUTO: 85.9 FL (ref 79–97)
MONOCYTES # BLD AUTO: 0.1 10*3/MM3 (ref 0.1–0.9)
MONOCYTES NFR BLD AUTO: 1.2 % (ref 5–12)
NEUTROPHILS # BLD AUTO: 9.3 10*3/MM3 (ref 1.7–7)
NEUTROPHILS NFR BLD AUTO: 92.2 % (ref 42.7–76)
NRBC BLD AUTO-RTO: 0 /100 WBC (ref 0–0.2)
PLATELET # BLD AUTO: 13 10*3/MM3 (ref 140–450)
PLATELET # BLD AUTO: 5 10*3/MM3 (ref 140–450)
PMV BLD AUTO: 11.1 FL (ref 6–12)
PMV BLD AUTO: 9.4 FL (ref 6–12)
RBC # BLD AUTO: 3.87 10*6/MM3 (ref 3.77–5.28)
RBC # BLD AUTO: 4.25 10*6/MM3 (ref 3.77–5.28)
UNIT  ABO: NORMAL
UNIT  RH: NORMAL
WBC NRBC COR # BLD: 10.1 10*3/MM3 (ref 3.4–10.8)
WBC NRBC COR # BLD: 17.8 10*3/MM3 (ref 3.4–10.8)

## 2019-11-19 PROCEDURE — 85097 BONE MARROW INTERPRETATION: CPT | Performed by: NURSE PRACTITIONER

## 2019-11-19 PROCEDURE — 99153 MOD SED SAME PHYS/QHP EA: CPT

## 2019-11-19 PROCEDURE — 77002 NEEDLE LOCALIZATION BY XRAY: CPT

## 2019-11-19 PROCEDURE — 07DR3ZX EXTRACTION OF ILIAC BONE MARROW, PERCUTANEOUS APPROACH, DIAGNOSTIC: ICD-10-PCS | Performed by: RADIOLOGY

## 2019-11-19 PROCEDURE — 85027 COMPLETE CBC AUTOMATED: CPT | Performed by: INTERNAL MEDICINE

## 2019-11-19 PROCEDURE — 88264 CHROMOSOME ANALYSIS 20-25: CPT

## 2019-11-19 PROCEDURE — 85025 COMPLETE CBC W/AUTO DIFF WBC: CPT | Performed by: NURSE PRACTITIONER

## 2019-11-19 PROCEDURE — 88185 FLOWCYTOMETRY/TC ADD-ON: CPT

## 2019-11-19 PROCEDURE — 25010000002 MIDAZOLAM PER 1 MG: Performed by: RADIOLOGY

## 2019-11-19 PROCEDURE — P9100 PATHOGEN TEST FOR PLATELETS: HCPCS

## 2019-11-19 PROCEDURE — 88311 DECALCIFY TISSUE: CPT | Performed by: NURSE PRACTITIONER

## 2019-11-19 PROCEDURE — P9035 PLATELET PHERES LEUKOREDUCED: HCPCS

## 2019-11-19 PROCEDURE — 36430 TRANSFUSION BLD/BLD COMPNT: CPT

## 2019-11-19 PROCEDURE — 25010000002 METHYLPREDNISOLONE PER 125 MG: Performed by: NURSE PRACTITIONER

## 2019-11-19 PROCEDURE — 99233 SBSQ HOSP IP/OBS HIGH 50: CPT | Performed by: INTERNAL MEDICINE

## 2019-11-19 PROCEDURE — 88313 SPECIAL STAINS GROUP 2: CPT | Performed by: NURSE PRACTITIONER

## 2019-11-19 PROCEDURE — 88184 FLOWCYTOMETRY/ TC 1 MARKER: CPT

## 2019-11-19 PROCEDURE — 88237 TISSUE CULTURE BONE MARROW: CPT

## 2019-11-19 PROCEDURE — 25010000002 FENTANYL CITRATE (PF) 100 MCG/2ML SOLUTION: Performed by: RADIOLOGY

## 2019-11-19 PROCEDURE — 99152 MOD SED SAME PHYS/QHP 5/>YRS: CPT

## 2019-11-19 PROCEDURE — P9037 PLATE PHERES LEUKOREDU IRRAD: HCPCS

## 2019-11-19 PROCEDURE — 63710000001 DIPHENHYDRAMINE PER 50 MG: Performed by: NURSE PRACTITIONER

## 2019-11-19 PROCEDURE — 82962 GLUCOSE BLOOD TEST: CPT

## 2019-11-19 PROCEDURE — 99232 SBSQ HOSP IP/OBS MODERATE 35: CPT | Performed by: INTERNAL MEDICINE

## 2019-11-19 PROCEDURE — 88305 TISSUE EXAM BY PATHOLOGIST: CPT | Performed by: NURSE PRACTITIONER

## 2019-11-19 RX ORDER — FENTANYL CITRATE 50 UG/ML
INJECTION, SOLUTION INTRAMUSCULAR; INTRAVENOUS
Status: COMPLETED | OUTPATIENT
Start: 2019-11-19 | End: 2019-11-19

## 2019-11-19 RX ORDER — MIDAZOLAM HYDROCHLORIDE 1 MG/ML
INJECTION INTRAMUSCULAR; INTRAVENOUS
Status: COMPLETED | OUTPATIENT
Start: 2019-11-19 | End: 2019-11-19

## 2019-11-19 RX ORDER — ACETAMINOPHEN 650 MG/1
650 SUPPOSITORY RECTAL ONCE
Status: COMPLETED | OUTPATIENT
Start: 2019-11-19 | End: 2019-11-19

## 2019-11-19 RX ORDER — ACETAMINOPHEN 325 MG/1
650 TABLET ORAL ONCE
Status: COMPLETED | OUTPATIENT
Start: 2019-11-19 | End: 2019-11-19

## 2019-11-19 RX ORDER — ALPRAZOLAM 0.5 MG/1
0.5 TABLET ORAL 2 TIMES DAILY PRN
Status: DISCONTINUED | OUTPATIENT
Start: 2019-11-19 | End: 2019-11-20 | Stop reason: HOSPADM

## 2019-11-19 RX ORDER — DIPHENHYDRAMINE HCL 25 MG
25 TABLET ORAL ONCE
Status: COMPLETED | OUTPATIENT
Start: 2019-11-19 | End: 2019-11-19

## 2019-11-19 RX ADMIN — FENTANYL CITRATE 100 MCG: 50 INJECTION, SOLUTION INTRAMUSCULAR; INTRAVENOUS at 15:10

## 2019-11-19 RX ADMIN — METHYLPREDNISOLONE SODIUM SUCCINATE 125 MG: 125 INJECTION, POWDER, FOR SOLUTION INTRAMUSCULAR; INTRAVENOUS at 04:57

## 2019-11-19 RX ADMIN — ACETAMINOPHEN 650 MG: 325 TABLET ORAL at 05:18

## 2019-11-19 RX ADMIN — MIDAZOLAM 2 MG: 1 INJECTION INTRAMUSCULAR; INTRAVENOUS at 15:00

## 2019-11-19 RX ADMIN — METHYLPREDNISOLONE SODIUM SUCCINATE 125 MG: 125 INJECTION, POWDER, FOR SOLUTION INTRAMUSCULAR; INTRAVENOUS at 22:02

## 2019-11-19 RX ADMIN — METHYLPREDNISOLONE SODIUM SUCCINATE 125 MG: 125 INJECTION, POWDER, FOR SOLUTION INTRAMUSCULAR; INTRAVENOUS at 10:40

## 2019-11-19 RX ADMIN — Medication 10 ML: at 22:02

## 2019-11-19 RX ADMIN — DIPHENHYDRAMINE HCL 25 MG: 25 TABLET ORAL at 05:19

## 2019-11-19 RX ADMIN — ALPRAZOLAM 0.5 MG: 0.5 TABLET ORAL at 12:09

## 2019-11-19 RX ADMIN — FENTANYL CITRATE 100 MCG: 50 INJECTION, SOLUTION INTRAMUSCULAR; INTRAVENOUS at 15:00

## 2019-11-19 RX ADMIN — Medication 10 ML: at 09:29

## 2019-11-19 RX ADMIN — MIDAZOLAM 1 MG: 1 INJECTION INTRAMUSCULAR; INTRAVENOUS at 15:10

## 2019-11-19 RX ADMIN — METHYLPREDNISOLONE SODIUM SUCCINATE 125 MG: 125 INJECTION, POWDER, FOR SOLUTION INTRAMUSCULAR; INTRAVENOUS at 16:34

## 2019-11-19 RX ADMIN — PANTOPRAZOLE SODIUM 40 MG: 40 TABLET, DELAYED RELEASE ORAL at 05:20

## 2019-11-19 RX ADMIN — ALPRAZOLAM 0.5 MG: 0.5 TABLET ORAL at 22:02

## 2019-11-19 NOTE — PLAN OF CARE
Problem: Patient Care Overview  Goal: Plan of Care Review  Outcome: Ongoing (interventions implemented as appropriate)   11/19/19 0840   Coping/Psychosocial   Plan of Care Reviewed With patient   Plan of Care Review   Progress no change       Problem: Health Knowledge, Opportunity to Enhance (Adult,Obstetrics,Pediatric)  Goal: Knowledgeable about Health Subject/Topic  Outcome: Ongoing (interventions implemented as appropriate)   11/19/19 0600   Health Knowledge, Opportunity to Enhance (Adult,Obstetrics,Pediatric)   Knowledgeable about Health Subject/Topic making progress toward outcome

## 2019-11-19 NOTE — NURSING NOTE
Flora Shell NP returned call and informed of Platelet level of 5 this morning with order noted to give one unit of Platelets now.

## 2019-11-19 NOTE — PROGRESS NOTES
[facility] Medicine Services Daily Progress Note      Hospitalist Team  LOS 1 days      Patient Care Team:  Jose Ingram APRN as PCP - General (Family Medicine)    Patient Location: 115/1    Chief Complaint / Subjective  Chief Complaint   Patient presents with   • Abnormal Lab       Present on Admission:  • Thrombocytopenia (CMS/HCC)  • Petechial rash  • Ecchymosis      Brief Synopsis of Hospital Course/HPI       34-year-old  female with a history of iron deficiency anemia and kidney stone who presented to the ED on 11/18/2019 with complaint of low platelet count, petechial rash on her lower extremities, and diffuse bruising. The patient states she noticed the rash and bruising yesterday. 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 She was advised to go to the ER for further evaluation. She also reports bruising inside her mouth. She denies a history of thrombocytopenia. Review of records from her ER visit here on 10/07/19 showed normal platelet count of 268. She states she was on cefdinir for 10 days at that time, but has not been on any antibiotics or other new medicines recently. She states she takes a hormone supplement daily, but has been on it for over 1 year. She denies any exacerbating or alleviating factors. She denies any other complaints.      The patient is a nonsmoker. She denies alcohol or illicit drug use. She reports a family history of ITP in her nephew, but states it was believed to be caused by his MMR vaccination.      In the ER the patient had labs obtained. Her platelet count is 5. She will be transfused with 1 unit of platelets. Hematology was consulted. She will be admitted for further evaluation and close monitoring.              Date: 11/19/19  :          Review of Systems   Constitution: Negative.   HENT: Negative.    Eyes: Negative.   "  Cardiovascular: Negative.    Respiratory: Negative.    Endocrine: Negative.    Hematologic/Lymphatic: Negative.    Skin: Negative.    Musculoskeletal: Negative.    Gastrointestinal: Negative.    Genitourinary: Negative.    Neurological: Negative.    Psychiatric/Behavioral: Negative.    Allergic/Immunologic: Negative.    All other systems reviewed and are negative.          11/19/2019.  Patient had worsening her platelets after transfusion.  Back to 5 again  Denies any new ecchymotic patches or bleeding  Going for bone marrow biopsy today and asking for an anxiety medicine for that.  Xanax given as needed      Objective      Vital Signs  Temp:  [98 °F (36.7 °C)-98.6 °F (37 °C)] 98.3 °F (36.8 °C)  Heart Rate:  [67-85] 85  Resp:  [14-16] 14  BP: (103-123)/(60-71) 123/64  Oxygen Therapy  SpO2: 100 %  Pulse Oximetry Type: Intermittent  Device (Oxygen Therapy): room air  Flowsheet Rows      First Filed Value   Admission Height  157.5 cm (62\") Documented at 11/18/2019 0831   Admission Weight  52.6 kg (115 lb 15.4 oz) Documented at 11/18/2019 0831        Intake & Output (last 3 days)       11/16 0701 - 11/17 0700 11/17 0701 - 11/18 0700 11/18 0701 - 11/19 0700 11/19 0701 - 11/20 0700    P.O.   960     Blood   677     Total Intake(mL/kg)   1637 (31.9)     Urine (mL/kg/hr)   1     Total Output   1     Net   +1636             Urine Unmeasured Occurrence   1 x         Lines, Drains & Airways    Active LDAs     Name:   Placement date:   Placement time:   Site:   Days:    Peripheral IV 11/18/19 0920 Right Antecubital   11/18/19 0920    Antecubital   1                  Physical Exam:    Physical Exam   Constitutional: She is oriented to person, place, and time. She appears well-developed and well-nourished. No distress.   HENT:   Head: Normocephalic and atraumatic.   Right Ear: External ear normal.   Left Ear: External ear normal.   Nose: Nose normal.   Mouth/Throat: Oropharynx is clear and moist. No oropharyngeal exudate. "   Eyes: Conjunctivae and EOM are normal. Pupils are equal, round, and reactive to light. Right eye exhibits no discharge. Left eye exhibits no discharge. No scleral icterus.   Neck: Normal range of motion. No JVD present. No tracheal deviation present. No thyromegaly present.   Cardiovascular: Normal rate, regular rhythm, normal heart sounds and intact distal pulses. Exam reveals no gallop and no friction rub.   No murmur heard.  Pulmonary/Chest: Effort normal and breath sounds normal. No stridor. No respiratory distress. She has no wheezes. She has no rales. She exhibits no tenderness.   Abdominal: Soft. Bowel sounds are normal. She exhibits no distension and no mass. There is no tenderness. There is no rebound and no guarding. No hernia.   Musculoskeletal: Normal range of motion. She exhibits no edema, tenderness or deformity.   Lymphadenopathy:     She has no cervical adenopathy.   Neurological: She is alert and oriented to person, place, and time. No cranial nerve deficit or sensory deficit. She exhibits normal muscle tone. Coordination normal.   Skin: Skin is warm and dry. Purpura and rash noted. She is not diaphoretic. No erythema.   Psychiatric: She has a normal mood and affect. Her behavior is normal.   Nursing note and vitals reviewed.        Procedures:              Results Review:     I reviewed the patient's new clinical results.      Lab Results (last 24 hours)     Procedure Component Value Units Date/Time    POC Glucose Once [[id number]]  (Abnormal) Collected:  11/19/19 1127    Specimen:  Blood Updated:  11/19/19 1134     Glucose 158 mg/dL      Comment: Serial Number: [id number]:  [id number]       POC Glucose Once [[id number]]  (Abnormal) Collected:  11/19/19 0756    Specimen:  Blood Updated:  11/19/19 0757     Glucose 150 mg/dL      Comment: Serial Number: [id number]:  [id number]       CBC & Differential [[id number]] Collected:  11/19/19 0350    Specimen:  Blood Updated:  11/19/19 0504     Narrative:       The following orders were created for panel order CBC & Differential.  Procedure                               Abnormality         Status                     ---------                               -----------         ------                     CBC Auto Differential[[id number]]        Abnormal            Final result                 Please view results for these tests on the individual orders.    CBC Auto Differential [[id number]]  (Abnormal) Collected:  11/19/19 0350    Specimen:  Blood Updated:  11/19/19 0504     WBC 10.10 10*3/mm3      RBC 4.25 10*6/mm3      Hemoglobin 12.3 g/dL      Hematocrit 36.5 %      MCV 85.8 fL      MCH 28.9 pg      MCHC 33.7 g/dL      RDW 13.6 %      RDW-SD 40.7 fl      MPV 11.1 fL      Platelets 5 10*3/mm3      Comment: Reviewed by Pathologist within the past 30 days on 11.18.19.         Neutrophil % 92.2 %      Lymphocyte % 6.6 %      Monocyte % 1.2 %      Eosinophil % 0.0 %      Basophil % 0.0 %      Neutrophils, Absolute 9.30 10*3/mm3      Lymphocytes, Absolute 0.70 10*3/mm3      Monocytes, Absolute 0.10 10*3/mm3      Eosinophils, Absolute 0.00 10*3/mm3      Basophils, Absolute 0.00 10*3/mm3      nRBC 0.0 /100 WBC     Narrative:       Appended report. These results have been appended to a previously verified report.    POC Glucose Once [[id number]]  (Abnormal) Collected:  11/18/19 2301    Specimen:  Blood Updated:  11/18/19 2302     Glucose 173 mg/dL      Comment: Serial Number: [id number]:  [id number]       CBC (No Diff) [[id number]]  (Abnormal) Collected:  11/18/19 1702    Specimen:  Blood Updated:  11/18/19 1739     WBC 9.20 10*3/mm3      RBC 4.31 10*6/mm3      Hemoglobin 12.5 g/dL      Hematocrit 36.5 %      MCV 84.8 fL      MCH 28.9 pg      MCHC 34.1 g/dL      RDW 14.0 %      RDW-SD 42.0 fl      MPV 8.9 fL      Platelets 9 10*3/mm3     Pathology Consultation [[id number]] Collected:  11/18/19 0937    Specimen:  Blood, Venous Line Updated:  11/18/19 1629     Final  Diagnosis --     Thrombocytopenia.  No blasts identified.       Case Report --     Surgical Pathology Report                         Case: [id number]                                  Authorizing Provider:  Dee Guevara NP         Collected:           11/18/2019 09:37 AM          Ordering Location:     [facility]       Received:            11/18/2019 11:18 AM                                 EMERGENCY DEPARTMENT                                                         Pathologist:           Reji Collado MD                                                           Specimen:    Blood, Venous Line                                                                             No results found for: HGBA1C  Results from last 7 days   Lab Units 11/18/19  0918   INR  1.00               Microbiology Results (last 10 days)     ** No results found for the last 240 hours. **          ECG/EMG Results (most recent)     None                    No radiology results for the last 7 days    Xrays, labs reviewed personally by physician.    Medication Review:   I have reviewed the patient's current medication list      Scheduled Meds    methylPREDNISolone sodium succinate 125 mg Intravenous Q6H   pantoprazole 40 mg Oral Q AM   sodium chloride 10 mL Intravenous Q12H       Meds Infusions       Meds PRN  •  acetaminophen **OR** acetaminophen **OR** acetaminophen  •  ALPRAZolam  •  aluminum-magnesium hydroxide-simethicone  •  bisacodyl  •  magnesium hydroxide  •  melatonin  •  nitroglycerin  •  ondansetron **OR** ondansetron  •  [COMPLETED] Insert peripheral IV **AND** sodium chloride  •  sodium chloride      Assessment / Plan    Active Hospital Problems    Diagnosis  POA   • **Thrombocytopenia (CMS/HCC) [D69.6]  Yes   • Petechial rash [R23.3]  Yes   • Ecchymosis [R58]  Yes      Resolved Hospital Problems   No resolved problems to display.         Thrombocytopenia -likely ITP  -Plt 268 on 10/7/19; Plt 5 on admission  -transfuse 1  unit Plt  -monitor CBC  -start Decadron PO  -consult hematology   Bone marrow pending      VTE Prophylaxis - SCDs.      Code Status -   Code Status and Medical Interventions:   Ordered at: 11/18/19 1048     Level Of Support Discussed With:    Patient     Code Status:    CPR     Medical Interventions (Level of Support Prior to Arrest):    Full       Discharge Planning    Destination      No service coordination in this encounter.      Durable Medical Equipment      No service coordination in this encounter.      Dialysis/Infusion      No service coordination in this encounter.      Home Medical Care      No service coordination in this encounter.      Therapy      No service coordination in this encounter.      Community Resources      No service coordination in this encounter.          Villa Batres MD  11/19/19  12:01 PM

## 2019-11-19 NOTE — PROGRESS NOTES
Discharge Planning Assessment  [facility]     Patient Name: Tiny Akhtar  MRN: [id number]  Today's Date: 11/19/2019    Admit Date: 11/18/2019    Discharge Needs Assessment     Row Name 11/19/19 1259       Living Environment    Lives With  significant other    Current Living Arrangements  home/apartment/condo    Primary Care Provided by  self    Able to Return to Prior Arrangements  yes       Resource/Environmental Concerns    Resource/Environmental Concerns  none    Transportation Concerns  car, none       Transition Planning    Patient/Family Anticipates Transition to  home with family    Patient/Family Anticipated Services at Transition  none    Transportation Anticipated  car, drives self;family or friend will provide       Discharge Needs Assessment    Readmission Within the Last 30 Days  no previous admission in last 30 days    Concerns to be Addressed  no discharge needs identified    Equipment Currently Used at Home  none    Anticipated Changes Related to Illness  none    Equipment Needed After Discharge  none        Discharge Plan     Row Name 11/19/19 1300       Plan    Plan  Routine d/c to home                Demographic Summary     Row Name 11/19/19 1258       General Information    Admission Type  inpatient    Arrived From  emergency department    Referral Source  admission list    Reason for Consult  discharge planning    Preferred Language  English        Functional Status     Row Name 11/19/19 1258       Functional Status, IADL    Medications  independent    Meal Preparation  independent    Housekeeping  independent    Laundry  independent    Shopping  independent        DC Barriers - Acute thrombocytopenia, IV Solumedrol. Platelet transfusion    Sherrie Barragan RN, CM  Office Phone [phone]  Cell [phone]

## 2019-11-19 NOTE — PROGRESS NOTES
Hematology/Oncology Inpatient Progress Note    PATIENT NAME: Tiny Akhtar  : 1985  MRN: [id number]    CHIEF COMPLAINT: thrombocytopenia    HISTORY OF PRESENT ILLNESS:  34 y.o. female who was admitted through the ER on 2019 reporting bruising on her ankles and in her mouth.  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  CBC on arrival showed a platelet count of 5000.  She was ordered to receive 1 unit of platelets and started on dexamethasone.  CBC on ER visit in 2019 showed normal platelet count.     19  Hematology/Oncology was consulted acute thrombocytopenia.     PCP: Jose Ingram APRN    Subjective   Tolerated bone marrow procedure well.  Was able to sleep with melatonin help last night.  ROS:  Review of Systems   Constitutional: Positive for unexpected weight change. Negative for diaphoresis, fatigue and fever.   HENT: Positive for mouth sores. Negative for congestion and nosebleeds.    Eyes: Negative.    Respiratory: Negative for cough and shortness of breath.    Cardiovascular: Negative for chest pain and leg swelling.   Gastrointestinal: Negative for abdominal pain, blood in stool, constipation, diarrhea, nausea and vomiting.   Endocrine: Negative for cold intolerance and heat intolerance.   Genitourinary: Negative for dysuria and hematuria.   Musculoskeletal: Negative for arthralgias and joint swelling.   Skin: Negative for rash and wound.   Neurological: Negative for numbness and headaches.   Hematological: Bruises/bleeds easily.   Psychiatric/Behavioral: Negative for confusion. The patient is nervous/anxious.    All other systems reviewed and are negative.     MEDICATIONS:    Scheduled Meds:    methylPREDNISolone sodium succinate 125 mg Intravenous Q6H   pantoprazole 40 mg Oral Q AM   sodium chloride 10 mL Intravenous Q12H      Continuous Infusions:      PRN Meds:  •  acetaminophen **OR** acetaminophen **OR** acetaminophen  •   "aluminum-magnesium hydroxide-simethicone  •  bisacodyl  •  magnesium hydroxide  •  melatonin  •  nitroglycerin  •  ondansetron **OR** ondansetron  •  [COMPLETED] Insert peripheral IV **AND** sodium chloride  •  sodium chloride     ALLERGIES:  No Known Allergies    Objective    VITALS:   /64 (BP Location: Left arm, Patient Position: Sitting)   Pulse 85   Temp 98.3 °F (36.8 °C) (Oral)   Resp 14   Ht 157.5 cm (62\")   Wt 51.3 kg (113 lb 1.5 oz)   SpO2 100%   BMI 20.69 kg/m²     PHYSICAL EXAM:  Physical Exam   Constitutional: She is oriented to person, place, and time.   thin   HENT:   Head: Normocephalic and atraumatic.   Mouth/Throat: Oropharynx is clear and moist.   Scattered purpura on hard palate and buccal mucosa.    Eyes: Conjunctivae, EOM and lids are normal. Pupils are equal, round, and reactive to light.   Neck: Normal range of motion. Neck supple. No thyromegaly present.   Cardiovascular: Normal rate, regular rhythm and normal heart sounds.   No murmur heard.  Pulmonary/Chest: Effort normal and breath sounds normal. No respiratory distress.   Abdominal: Soft. Normal appearance and bowel sounds are normal. She exhibits no distension.   Genitourinary:   Genitourinary Comments: Deferred.   Musculoskeletal: Normal range of motion. She exhibits no edema.   Lymphadenopathy:     She has no cervical adenopathy.     She has no axillary adenopathy.        Right: No supraclavicular adenopathy present.        Left: No supraclavicular adenopathy present.   Neurological: She is alert and oriented to person, place, and time.   Skin: Skin is warm and dry. Capillary refill takes less than 2 seconds. No bruising, no petechiae and no rash noted.   Few petechiae on lower legs and scattered ecchymosis on legs.    Psychiatric: She has a normal mood and affect. Her speech is normal and behavior is normal. Judgment and thought content normal. Cognition and memory are normal.   Nursing note and vitals reviewed.    "   RECENT LABS:  Lab Results (last 24 hours)     Procedure Component Value Units Date/Time    POC Glucose Once [[id number]]  (Abnormal) Collected:  11/19/19 0756    Specimen:  Blood Updated:  11/19/19 0757     Glucose 150 mg/dL      Comment: Serial Number: [id number]:  [id number]       CBC & Differential [[id number]] Collected:  11/19/19 0350    Specimen:  Blood Updated:  11/19/19 0504    Narrative:       The following orders were created for panel order CBC & Differential.  Procedure                               Abnormality         Status                     ---------                               -----------         ------                     CBC Auto Differential[[id number]]        Abnormal            Final result                 Please view results for these tests on the individual orders.    CBC Auto Differential [[id number]]  (Abnormal) Collected:  11/19/19 0350    Specimen:  Blood Updated:  11/19/19 0504     WBC 10.10 10*3/mm3      RBC 4.25 10*6/mm3      Hemoglobin 12.3 g/dL      Hematocrit 36.5 %      MCV 85.8 fL      MCH 28.9 pg      MCHC 33.7 g/dL      RDW 13.6 %      RDW-SD 40.7 fl      MPV 11.1 fL      Platelets 5 10*3/mm3      Comment: Reviewed by Pathologist within the past 30 days on 11.18.19.         Neutrophil % 92.2 %      Lymphocyte % 6.6 %      Monocyte % 1.2 %      Eosinophil % 0.0 %      Basophil % 0.0 %      Neutrophils, Absolute 9.30 10*3/mm3      Lymphocytes, Absolute 0.70 10*3/mm3      Monocytes, Absolute 0.10 10*3/mm3      Eosinophils, Absolute 0.00 10*3/mm3      Basophils, Absolute 0.00 10*3/mm3      nRBC 0.0 /100 WBC     Narrative:       Appended report. These results have been appended to a previously verified report.    POC Glucose Once [[id number]]  (Abnormal) Collected:  11/18/19 2301    Specimen:  Blood Updated:  11/18/19 2302     Glucose 173 mg/dL      Comment: Serial Number: [id number]:  [id number]       CBC (No Diff) [[id number]]  (Abnormal) Collected:  11/18/19 1702     Specimen:  Blood Updated:  11/18/19 1739     WBC 9.20 10*3/mm3      RBC 4.31 10*6/mm3      Hemoglobin 12.5 g/dL      Hematocrit 36.5 %      MCV 84.8 fL      MCH 28.9 pg      MCHC 34.1 g/dL      RDW 14.0 %      RDW-SD 42.0 fl      MPV 8.9 fL      Platelets 9 10*3/mm3     Pathology Consultation [[id number]] Collected:  11/18/19 0937    Specimen:  Blood, Venous Line Updated:  11/18/19 1625     Final Diagnosis --     Thrombocytopenia.  No blasts identified.       Case Report --     Surgical Pathology Report                         Case: [id number]                                  Authorizing Provider:  Dee Guevara NP         Collected:           11/18/2019 09:37 AM          Ordering Location:     [facility]       Received:            11/18/2019 11:18 AM                                 EMERGENCY DEPARTMENT                                                         Pathologist:           Reji Collado MD                                                           Specimen:    Blood, Venous Line                                                                         Scan Slide [[id number]] Collected:  11/18/19 0937    Specimen:  Blood from Arm, Right Updated:  11/18/19 1011     RBC Morphology Normal     WBC Morphology Normal     Platelet Estimate Decreased    Narrative:       Slide Reviewed    Path Consult Reflex [[id number]] Collected:  11/18/19 0937    Specimen:  Blood from Arm, Right Updated:  11/18/19 1011     Pathology Review Yes    CBC & Differential [[id number]] Collected:  11/18/19 0937    Specimen:  Blood Updated:  11/18/19 1011    Narrative:       The following orders were created for panel order CBC & Differential.  Procedure                               Abnormality         Status                     ---------                               -----------         ------                     CBC Auto Differential[[id number]]        Abnormal            Final result                 Please view results for these  tests on the individual orders.    CBC Auto Differential [[id number]]  (Abnormal) Collected:  11/18/19 0937    Specimen:  Blood from Arm, Right Updated:  11/18/19 1011     WBC 5.70 10*3/mm3      RBC 4.39 10*6/mm3      Hemoglobin 12.8 g/dL      Hematocrit 37.7 %      MCV 85.8 fL      MCH 29.1 pg      MCHC 33.9 g/dL      RDW 13.9 %      RDW-SD 41.6 fl      MPV 9.4 fL      Platelets 5 10*3/mm3      Neutrophil % 59.2 %      Lymphocyte % 30.0 %      Monocyte % 9.3 %      Eosinophil % 0.9 %      Basophil % 0.6 %      Neutrophils, Absolute 3.40 10*3/mm3      Lymphocytes, Absolute 1.70 10*3/mm3      Monocytes, Absolute 0.50 10*3/mm3      Eosinophils, Absolute 0.10 10*3/mm3      Basophils, Absolute 0.00 10*3/mm3      nRBC 0.0 /100 WBC     Narrative:       Appended report. These results have been appended to a previously verified report.    Comprehensive Metabolic Panel [[id number]]  (Abnormal) Collected:  11/18/19 0918    Specimen:  Blood Updated:  11/18/19 0946     Glucose 102 mg/dL      BUN 16 mg/dL      Creatinine 0.60 mg/dL      Sodium 140 mmol/L      Potassium 3.8 mmol/L      Chloride 105 mmol/L      CO2 26.0 mmol/L      Calcium 9.5 mg/dL      Total Protein 7.1 g/dL      Albumin 4.50 g/dL      ALT (SGPT) 13 U/L      AST (SGOT) 17 U/L      Alkaline Phosphatase 36 U/L      Total Bilirubin 0.3 mg/dL      eGFR Non African Amer 116 mL/min/1.73      Globulin 2.6 gm/dL      A/G Ratio 1.7 g/dL      BUN/Creatinine Ratio 26.7     Anion Gap 9.0 mmol/L     Narrative:       GFR Normal >60  Chronic Kidney Disease <60  Kidney Failure <15    aPTT [[id number]]  (Abnormal) Collected:  11/18/19 0918    Specimen:  Blood Updated:  11/18/19 0941     PTT 23.5 seconds     Protime-INR [[id number]]  (Normal) Collected:  11/18/19 0918    Specimen:  Blood Updated:  11/18/19 0941     Protime 10.5 Seconds      INR 1.00          PENDING RESULTS: BM results.    IMAGING REVIEWED:  No radiology results for the last day    I have reviewed the patient's labs,  imaging, reports, and other clinician documentation.    Assessment/Plan   ASSESSMENT:  1. Acute thrombocytopenia-likely due to ITP.  Coags and LFTs normal.  No history of alcohol abuse.  Transfused with 1 unit platelets on 11/18 and 11/19.  Started HD solumedrol on 11/18 with plan to transition to Prednisone 1mg/kg daily when response noted.  Too early in treatment to expect rise in plt ct.  BM per IR today to evaluate for other etiologies. On protonix.   2. History of HAILE-hemoglobin and MCV both normal.  3. Left renal stone- seen on CT A/P 1 month ago.  Asymptomatic.  4. Anxiety/wt. Loss-per PMD.      PLAN  1. Continue solumedrol 125 mg IV Q 6hr.  When plt start to rise will transition to prednisone 1mg/kg daily.   2. Continue Protonix  3. Monitor platelet count and response to treatment.  4. BM in IR.   5. Likely will not respond to plt transfusions.      Electronically signed by FARZAD Ray, 11/19/19, 9:24 AM.      Patient seen and examined nurse practitioner note reviewed updated discussed with nurse practitioner platelet count improved after transfusions.  Continue Solu-Medrol 125 mg every 6 hours at this time.  Continue melatonin at bedtime to help her sleep.  Follow CBC if the platelet count trending up we will discharge her home on prednisone 1 mg/kg await bone marrow biopsy report  I have personally performed a face-to-face diagnostic evaluation on this patient.  I have reviewed and agree with the care plan.    I discussed the patients findings and my recommendations with patient    SAJAN Sagastume MD  11/19/19  9:21 AM

## 2019-11-20 VITALS
SYSTOLIC BLOOD PRESSURE: 109 MMHG | TEMPERATURE: 97.8 F | DIASTOLIC BLOOD PRESSURE: 64 MMHG | WEIGHT: 113.1 LBS | OXYGEN SATURATION: 98 % | BODY MASS INDEX: 20.81 KG/M2 | RESPIRATION RATE: 14 BRPM | HEIGHT: 62 IN | HEART RATE: 50 BPM

## 2019-11-20 LAB
ABO + RH BLD: NORMAL
BASOPHILS # BLD AUTO: 0 10*3/MM3 (ref 0–0.2)
BASOPHILS NFR BLD AUTO: 0 % (ref 0–1.5)
BH BB BLOOD EXPIRATION DATE: NORMAL
BH BB BLOOD TYPE BARCODE: 6200
BH BB DISPENSE STATUS: NORMAL
BH BB PRODUCT CODE: NORMAL
BH BB UNIT NUMBER: NORMAL
DEPRECATED RDW RBC AUTO: 42.4 FL (ref 37–54)
EOSINOPHIL # BLD AUTO: 0 10*3/MM3 (ref 0–0.4)
EOSINOPHIL NFR BLD AUTO: 0 % (ref 0.3–6.2)
ERYTHROCYTE [DISTWIDTH] IN BLOOD BY AUTOMATED COUNT: 14 % (ref 12.3–15.4)
GLUCOSE BLDC GLUCOMTR-MCNC: 139 MG/DL (ref 70–105)
GLUCOSE BLDC GLUCOMTR-MCNC: 149 MG/DL (ref 70–105)
GLUCOSE BLDC GLUCOMTR-MCNC: 156 MG/DL (ref 70–105)
HCT VFR BLD AUTO: 32.7 % (ref 34–46.6)
HGB BLD-MCNC: 11 G/DL (ref 12–15.9)
LYMPHOCYTES # BLD AUTO: 0.8 10*3/MM3 (ref 0.7–3.1)
LYMPHOCYTES NFR BLD AUTO: 5.5 % (ref 19.6–45.3)
MCH RBC QN AUTO: 29.1 PG (ref 26.6–33)
MCHC RBC AUTO-ENTMCNC: 33.6 G/DL (ref 31.5–35.7)
MCV RBC AUTO: 86.4 FL (ref 79–97)
MONOCYTES # BLD AUTO: 0.4 10*3/MM3 (ref 0.1–0.9)
MONOCYTES NFR BLD AUTO: 2.8 % (ref 5–12)
NEUTROPHILS # BLD AUTO: 12.4 10*3/MM3 (ref 1.7–7)
NEUTROPHILS NFR BLD AUTO: 91.7 % (ref 42.7–76)
NRBC BLD AUTO-RTO: 0 /100 WBC (ref 0–0.2)
PLATELET # BLD AUTO: 17 10*3/MM3 (ref 140–450)
PMV BLD AUTO: 9.8 FL (ref 6–12)
RBC # BLD AUTO: 3.79 10*6/MM3 (ref 3.77–5.28)
RBC MORPH BLD: NORMAL
SMALL PLATELETS BLD QL SMEAR: NORMAL
TOXIC GRANULATION: NORMAL
UNIT  ABO: NORMAL
UNIT  RH: NORMAL
WBC NRBC COR # BLD: 13.6 10*3/MM3 (ref 3.4–10.8)

## 2019-11-20 PROCEDURE — 85007 BL SMEAR W/DIFF WBC COUNT: CPT | Performed by: NURSE PRACTITIONER

## 2019-11-20 PROCEDURE — 99233 SBSQ HOSP IP/OBS HIGH 50: CPT | Performed by: INTERNAL MEDICINE

## 2019-11-20 PROCEDURE — 99239 HOSP IP/OBS DSCHRG MGMT >30: CPT | Performed by: INTERNAL MEDICINE

## 2019-11-20 PROCEDURE — 25010000002 METHYLPREDNISOLONE PER 125 MG: Performed by: NURSE PRACTITIONER

## 2019-11-20 PROCEDURE — 82962 GLUCOSE BLOOD TEST: CPT

## 2019-11-20 PROCEDURE — 85025 COMPLETE CBC W/AUTO DIFF WBC: CPT | Performed by: NURSE PRACTITIONER

## 2019-11-20 RX ORDER — PREDNISONE 50 MG/1
50 TABLET ORAL
Status: DISCONTINUED | OUTPATIENT
Start: 2019-11-21 | End: 2019-11-20 | Stop reason: HOSPADM

## 2019-11-20 RX ORDER — PREDNISONE 50 MG/1
50 TABLET ORAL
Qty: 30 TABLET | Refills: 0 | Status: SHIPPED | OUTPATIENT
Start: 2019-11-21 | End: 2019-12-20

## 2019-11-20 RX ORDER — PANTOPRAZOLE SODIUM 20 MG/1
20 TABLET, DELAYED RELEASE ORAL DAILY
Qty: 30 TABLET | Refills: 3 | Status: SHIPPED | OUTPATIENT
Start: 2019-11-20 | End: 2019-12-20

## 2019-11-20 RX ORDER — PREDNISONE 10 MG/1
50 TABLET ORAL DAILY
Qty: 150 TABLET | Refills: 1 | Status: SHIPPED | OUTPATIENT
Start: 2019-11-20 | End: 2019-11-20 | Stop reason: HOSPADM

## 2019-11-20 RX ORDER — OXYCODONE HYDROCHLORIDE 5 MG/1
5 TABLET ORAL EVERY 6 HOURS PRN
Status: DISCONTINUED | OUTPATIENT
Start: 2019-11-20 | End: 2019-11-20 | Stop reason: HOSPADM

## 2019-11-20 RX ADMIN — METHYLPREDNISOLONE SODIUM SUCCINATE 125 MG: 125 INJECTION, POWDER, FOR SOLUTION INTRAMUSCULAR; INTRAVENOUS at 10:20

## 2019-11-20 RX ADMIN — METHYLPREDNISOLONE SODIUM SUCCINATE 125 MG: 125 INJECTION, POWDER, FOR SOLUTION INTRAMUSCULAR; INTRAVENOUS at 04:08

## 2019-11-20 RX ADMIN — Medication 10 ML: at 08:45

## 2019-11-20 RX ADMIN — ACETAMINOPHEN 650 MG: 325 TABLET ORAL at 12:36

## 2019-11-20 RX ADMIN — PANTOPRAZOLE SODIUM 40 MG: 40 TABLET, DELAYED RELEASE ORAL at 06:31

## 2019-11-20 RX ADMIN — OXYCODONE HYDROCHLORIDE 5 MG: 5 TABLET ORAL at 11:53

## 2019-11-20 NOTE — NURSING NOTE
Sent message to Talisha Araujo NP with oncology group to see if patient was ok to discharge. She said it was ok to discharge patient now on PO prednisone and protonix. Will discharge patient per orders.

## 2019-11-20 NOTE — DISCHARGE SUMMARY
[facility] Medicine Services  DISCHARGE SUMMARY        Prepared For PCP:  Jose Ingram APRN    Patient Name: Tiny Akhtar  : 1985  MRN: [id number]      Date of Admission:   2019    Date of Discharge:  2019    Length of stay:  LOS: 2 days     Hospital Course     Presenting Problem:   Ecchymosis [R58]  Thrombocytopenia (CMS/HCC) [D69.6]  Petechial rash [R23.3]    Active Hospital Problems:  No notes have been filed under this hospital service.  Service: Hospitalist      Resolved Hospital Problems:  No notes have been filed under this hospital service.  Service: Hospitalist        Hospital Course:  Tiny Akhtar is a 34 y.o. female petechiae and ecchymosis and found to be thrombocytopenic thought to be related to ITP.  Seen by hematology was received steroids and platelets.  May discharge later today for repeat platelets improving.  She had some improvement in her platelet level from 5- on admission to 17 this morning.  Patient had bone marrow biopsy and other immunologic studies pending  May discharge if above 20 or okay with heme-onc.            Recommendation for Outpatient Providers:     Hematologist with CBC in 1 week  Primary care physician        Reasons For Change In Medications and Indications for New Medications:        Day of Discharge     HPI:       Patient is sore at the pulmonary biopsy site    Vital Signs:   Temp:  [98.2 °F (36.8 °C)-98.5 °F (36.9 °C)] 98.3 °F (36.8 °C)  Heart Rate:  [] 58  Resp:  [10-20] 20  BP: ()/(40-60) 101/50     Physical Exam:  Mild ecchymotic patches      Pertinent  and/or Most Recent Results     Results from last 7 days   Lab Units 19  0349 19  1716 19  0350 19  1702 19  0937 19  0918   WBC 10*3/mm3 13.60* 17.80* 10.10 9.20 5.70  --    HEMOGLOBIN g/dL 11.0* 11.3* 12.3 12.5 12.8  --    HEMATOCRIT % 32.7* 33.2* 36.5 36.5 37.7  --    PLATELETS 10*3/mm3 17* 13* 5* 9* 5*  --     SODIUM mmol/L  --   --   --   --   --  140   POTASSIUM mmol/L  --   --   --   --   --  3.8   CHLORIDE mmol/L  --   --   --   --   --  105   CO2 mmol/L  --   --   --   --   --  26.0   BUN mg/dL  --   --   --   --   --  16   CREATININE mg/dL  --   --   --   --   --  0.60   GLUCOSE mg/dL  --   --   --   --   --  102*   CALCIUM mg/dL  --   --   --   --   --  9.5     Results from last 7 days   Lab Units 11/18/19  0918   BILIRUBIN mg/dL 0.3   ALK PHOS U/L 36*   ALT (SGPT) U/L 13   AST (SGOT) U/L 17   PROTIME Seconds 10.5   INR  1.00   APTT seconds 23.5*           Invalid input(s): TG, LDLCALC, LDLREALC        Brief Urine Lab Results  (Last result in the past 365 days)      Color   Clarity   Blood   Leuk Est   Nitrite   Protein   CREAT   Urine HCG        10/07/19 1209 Yellow Hazy  Comment:  Result checked  Trace Negative Negative Negative         10/07/19 1209               Negative           Microbiology Results Abnormal     None          Ir Bone Marrow Biopsy And Aspiration    Result Date: 11/19/2019  Impression: IMPRESSION : Technically successful bone marrow aspiration and biopsy under fluoroscopy  Electronically Signed By-Kiran Lopez On:11/19/2019 3:33 PM This report was finalized on [id number] by  Kiran Lopez, .                     Home or Self Care          Test Results Pending at Discharge   Order Current Status    Bone Marrow Exam In process    Bone Marrow Exam In process    Chromosome Analysis, Bone Marrow In process    Flow Cytometry In process            Procedures Performed           Consults:   Consults     Date and Time Order Name Status Description    11/18/2019 0955 Hematology and Oncology (on-call MD unless specified) Completed     11/18/2019 0955 Hospitalist (on-call MD unless specified) Completed             Discharge Details        Discharge Medications      New Medications      Instructions Start Date   predniSONE 50 MG tablet  Commonly known as:  DELTASONE   50 mg, Oral, Daily With Breakfast    Start Date:  11/21/2019        Continue These Medications      Instructions Start Date   NON FORMULARY   1 tablet, Oral, Daily, Balance OTC              No Known Allergies      Discharge Disposition:  Home or Self Care    Diet:  Hospital:  Diet Order   Procedures   • Diet Regular         Discharge Activity:         CODE STATUS:    Code Status and Medical Interventions:   Ordered at: 11/18/19 1048     Level Of Support Discussed With:    Patient     Code Status:    CPR     Medical Interventions (Level of Support Prior to Arrest):    Full         No future appointments.    Additional Instructions for the Follow-ups that You Need to Schedule     Discharge Follow-up with Specialty: Hematology; 1 Week   As directed      Specialty:  Hematology    Follow Up:  1 Week                 Condition on Discharge:      Stable      Follow-up Appointments  No future appointments.  Additional Instructions for the Follow-ups that You Need to Schedule     Discharge Follow-up with Specialty: Hematology; 1 Week   As directed      Specialty:  Hematology    Follow Up:  1 Week                 Electronically signed by Villa Batres MD, 11/20/19, 2:46 PM.    Time: I spent 35minutes on this discharge activity which included face-to-face encounter with the patient/reviewing the data in the system/coordination of the care with the nursing staff as well as consultants/documentation/entering orders.

## 2019-11-20 NOTE — PLAN OF CARE
Problem: Patient Care Overview  Goal: Plan of Care Review  Outcome: Ongoing (interventions implemented as appropriate)   11/20/19 0335   Coping/Psychosocial   Plan of Care Reviewed With patient;mother   Plan of Care Review   Progress no change       Problem: Health Knowledge, Opportunity to Enhance (Adult,Obstetrics,Pediatric)  Goal: Knowledgeable about Health Subject/Topic  Outcome: Ongoing (interventions implemented as appropriate)   11/20/19 0335   Health Knowledge, Opportunity to Enhance (Adult,Obstetrics,Pediatric)   Knowledgeable about Health Subject/Topic making progress toward outcome

## 2019-11-20 NOTE — PROGRESS NOTES
Hematology/Oncology Inpatient Progress Note    PATIENT NAME: Tiny Akhtar  : 1985  MRN: [id number]    CHIEF COMPLAINT: thrombocytopenia    HISTORY OF PRESENT ILLNESS:  34 y.o. female who was admitted through the ER on 2019 reporting bruising on her ankles and in her mouth.  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  CBC on arrival showed a platelet count of 5000.  She was ordered to receive 1 unit of platelets and started on dexamethasone.  CBC on ER visit in 2019 showed normal platelet count.     19  Hematology/Oncology was consulted acute thrombocytopenia.     PCP: Jose Ingram APRN    Subjective   Tolerated bone marrow procedure well.  Was able to sleep with melatonin help last night.  ROS:  Review of Systems   Constitutional: Positive for unexpected weight change. Negative for diaphoresis, fatigue and fever.   HENT: Positive for mouth sores. Negative for congestion and nosebleeds.    Eyes: Negative.    Respiratory: Negative for cough and shortness of breath.    Cardiovascular: Negative for chest pain and leg swelling.   Gastrointestinal: Negative for abdominal pain, blood in stool, constipation, diarrhea, nausea and vomiting.   Endocrine: Negative for cold intolerance and heat intolerance.   Genitourinary: Negative for dysuria and hematuria.   Musculoskeletal: Negative for arthralgias and joint swelling.   Skin: Negative for rash and wound.   Neurological: Negative for numbness and headaches.   Hematological: Bruises/bleeds easily.   Psychiatric/Behavioral: Negative for confusion. The patient is nervous/anxious.    All other systems reviewed and are negative.     MEDICATIONS:    Scheduled Meds:      pantoprazole 40 mg Oral Q AM   [START ON 2019] predniSONE 50 mg Oral Daily With Breakfast   sodium chloride 10 mL Intravenous Q12H      Continuous Infusions:      PRN Meds:  •  acetaminophen **OR** acetaminophen **OR**  "acetaminophen  •  ALPRAZolam  •  aluminum-magnesium hydroxide-simethicone  •  bisacodyl  •  magnesium hydroxide  •  melatonin  •  nitroglycerin  •  ondansetron **OR** ondansetron  •  oxyCODONE  •  [COMPLETED] Insert peripheral IV **AND** sodium chloride  •  sodium chloride     ALLERGIES:  No Known Allergies    Objective    VITALS:   /64 (BP Location: Left arm, Patient Position: Lying)   Pulse 50   Temp 97.8 °F (36.6 °C) (Oral)   Resp 14   Ht 157.5 cm (62\")   Wt 51.3 kg (113 lb 1.5 oz)   SpO2 98%   BMI 20.69 kg/m²     PHYSICAL EXAM:  Physical Exam   Constitutional: She is oriented to person, place, and time.   thin   HENT:   Head: Normocephalic and atraumatic.   Mouth/Throat: Oropharynx is clear and moist.   Scattered purpura on hard palate and buccal mucosa.    Eyes: Conjunctivae, EOM and lids are normal. Pupils are equal, round, and reactive to light.   Neck: Normal range of motion. Neck supple. No thyromegaly present.   Cardiovascular: Normal rate, regular rhythm and normal heart sounds.   No murmur heard.  Pulmonary/Chest: Effort normal and breath sounds normal. No respiratory distress.   Abdominal: Soft. Normal appearance and bowel sounds are normal. She exhibits no distension.   Genitourinary:   Genitourinary Comments: Deferred.   Musculoskeletal: Normal range of motion. She exhibits no edema.   Lymphadenopathy:     She has no cervical adenopathy.     She has no axillary adenopathy.        Right: No supraclavicular adenopathy present.        Left: No supraclavicular adenopathy present.   Neurological: She is alert and oriented to person, place, and time.   Skin: Skin is warm and dry. Capillary refill takes less than 2 seconds. No bruising, no petechiae and no rash noted.   Few petechiae on lower legs and scattered ecchymosis on legs.    Psychiatric: She has a normal mood and affect. Her speech is normal and behavior is normal. Judgment and thought content normal. Cognition and memory are normal. "   Nursing note and vitals reviewed.      RECENT LABS:  Lab Results (last 24 hours)     Procedure Component Value Units Date/Time    POC Glucose Once [[id number]]  (Abnormal) Collected:  11/20/19 1621    Specimen:  Blood Updated:  11/20/19 1623     Glucose 149 mg/dL      Comment: Serial Number: [id number]:  [id number]       POC Glucose Once [[id number]]  (Abnormal) Collected:  11/20/19 1123    Specimen:  Blood Updated:  11/20/19 1126     Glucose 156 mg/dL      Comment: Serial Number: [id number]:  [id number]       POC Glucose Once [[id number]]  (Abnormal) Collected:  11/20/19 0745    Specimen:  Blood Updated:  11/20/19 0745     Glucose 139 mg/dL      Comment: Serial Number: [id number]:  [id number]       Scan Slide [[id number]] Collected:  11/20/19 0349    Specimen:  Blood Updated:  11/20/19 0538     RBC Morphology Normal     Toxic Granulation Slight/1+     Platelet Estimate Decreased    CBC & Differential [[id number]] Collected:  11/20/19 0349    Specimen:  Blood Updated:  11/20/19 0538    Narrative:       The following orders were created for panel order CBC & Differential.  Procedure                               Abnormality         Status                     ---------                               -----------         ------                     CBC Auto Differential[[id number]]        Abnormal            Final result                 Please view results for these tests on the individual orders.    CBC Auto Differential [[id number]]  (Abnormal) Collected:  11/20/19 0349    Specimen:  Blood Updated:  11/20/19 0538     WBC 13.60 10*3/mm3      RBC 3.79 10*6/mm3      Hemoglobin 11.0 g/dL      Hematocrit 32.7 %      MCV 86.4 fL      MCH 29.1 pg      MCHC 33.6 g/dL      RDW 14.0 %      RDW-SD 42.4 fl      MPV 9.8 fL      Platelets 17 10*3/mm3      Comment: Reviewed by Pathologist within the past 30 days on 11/18/19 .         Neutrophil % 91.7 %      Lymphocyte % 5.5 %      Monocyte % 2.8 %      Eosinophil % 0.0 %       Basophil % 0.0 %      Neutrophils, Absolute 12.40 10*3/mm3      Lymphocytes, Absolute 0.80 10*3/mm3      Monocytes, Absolute 0.40 10*3/mm3      Eosinophils, Absolute 0.00 10*3/mm3      Basophils, Absolute 0.00 10*3/mm3      nRBC 0.0 /100 WBC     Narrative:       Appended report. These results have been appended to a previously verified report.    CBC (No Diff) [[id number]]  (Abnormal) Collected:  11/19/19 1716    Specimen:  Blood Updated:  11/19/19 1838     WBC 17.80 10*3/mm3      RBC 3.87 10*6/mm3      Hemoglobin 11.3 g/dL      Hematocrit 33.2 %      MCV 85.9 fL      MCH 29.3 pg      MCHC 34.1 g/dL      RDW 14.0 %      RDW-SD 42.0 fl      MPV 9.4 fL      Platelets 13 10*3/mm3           PENDING RESULTS: BM results.    IMAGING REVIEWED:  Ir Bone Marrow Biopsy And Aspiration    Result Date: 11/19/2019  IMPRESSION : Technically successful bone marrow aspiration and biopsy under fluoroscopy  Electronically Signed By-Kiran Lopez On:11/19/2019 3:33 PM This report was finalized on [id number] by  Kiran Lopez, .      I have reviewed the patient's labs, imaging, reports, and other clinician documentation.    Assessment/Plan   ASSESSMENT:  1. Acute thrombocytopenia-due to ITP.  Coags and LFTs normal.  No history of alcohol abuse.  Transfused with 1 unit platelets on 11/18 and 11/19.  Started HD solumedrol on 11/18 with plan to transition to Prednisone 1mg/kg daily when response noted.  BM per IR on today to evaluate for other etiologies. On protonix.   2. History of HAILE-hemoglobin and MCV both normal.  3. Left renal stone- seen on CT A/P 1 month ago.  Asymptomatic.  4. Anxiety/wt. Loss-per PMD.      PLAN  1. Will change solumedrol to prednisone 1mg/kg daily-RX escribed.   2. Continue Protonix  3. Monitor platelet count and response to treatment.  4. Await BM results.      Electronically signed by FARZAD Ray, 11/20/19, 2:06 PM.        Patient seen and examined nurse practitioner note reviewed updated discussed  with nurse practitioner platelet count improved after transfusions.    will discharge her home on prednisone 1 mg/kg await bone marrow biopsy report  I have personally performed a face-to-face diagnostic evaluation on this patient.  I have reviewed and agree with the care plan.  Follow-up at [facility] on 11/22  [facility] will call her with appointment time  I discussed the patients findings and my recommendations with patient    SAJAN Sagastume MD  11/20/19  6:35 PM

## 2019-11-20 NOTE — PLAN OF CARE
Problem: Patient Care Overview  Goal: Plan of Care Review  Outcome: Ongoing (interventions implemented as appropriate)   11/20/19 9833   Coping/Psychosocial   Plan of Care Reviewed With patient;family   Plan of Care Review   Progress improving   OTHER   Outcome Summary Platelet count up to 17 this morning. IV solu-medrol continued. Will reassess platelet count later today. Awaiting Hemo/Onc. to visit patient.     Goal: Individualization and Mutuality  Outcome: Ongoing (interventions implemented as appropriate)    Goal: Discharge Needs Assessment  Outcome: Ongoing (interventions implemented as appropriate)      Problem: Health Knowledge, Opportunity to Enhance (Adult,Obstetrics,Pediatric)  Goal: Knowledgeable about Health Subject/Topic  Outcome: Ongoing (interventions implemented as appropriate)

## 2019-11-21 ENCOUNTER — TELEPHONE (OUTPATIENT)
Dept: ONCOLOGY | Facility: CLINIC | Age: 34
End: 2019-11-21

## 2019-11-21 ENCOUNTER — TRANSCRIBE ORDERS (OUTPATIENT)
Dept: ADMINISTRATIVE | Facility: HOSPITAL | Age: 34
End: 2019-11-21

## 2019-11-21 ENCOUNTER — LAB (OUTPATIENT)
Dept: LAB | Facility: HOSPITAL | Age: 34
End: 2019-11-21

## 2019-11-21 ENCOUNTER — READMISSION MANAGEMENT (OUTPATIENT)
Dept: CALL CENTER | Facility: HOSPITAL | Age: 34
End: 2019-11-21

## 2019-11-21 DIAGNOSIS — D69.6 THROMBOCYTOPENIA, UNSPECIFIED (HCC): ICD-10-CM

## 2019-11-21 DIAGNOSIS — D69.6 THROMBOCYTOPENIA, UNSPECIFIED (HCC): Primary | ICD-10-CM

## 2019-11-21 LAB
DEPRECATED RDW RBC AUTO: 43.3 FL (ref 37–54)
ERYTHROCYTE [DISTWIDTH] IN BLOOD BY AUTOMATED COUNT: 13.4 % (ref 12.3–15.4)
HCT VFR BLD AUTO: 35.4 % (ref 34–46.6)
HGB BLD-MCNC: 11.3 G/DL (ref 12–15.9)
LYMPHOCYTES # BLD MANUAL: 3.06 10*3/MM3 (ref 0.7–3.1)
LYMPHOCYTES NFR BLD MANUAL: 24 % (ref 19.6–45.3)
LYMPHOCYTES NFR BLD MANUAL: 3 % (ref 5–12)
MCH RBC QN AUTO: 28.8 PG (ref 26.6–33)
MCHC RBC AUTO-ENTMCNC: 31.9 G/DL (ref 31.5–35.7)
MCV RBC AUTO: 90.1 FL (ref 79–97)
MONOCYTES # BLD AUTO: 0.38 10*3/MM3 (ref 0.1–0.9)
NEUTROPHILS # BLD AUTO: 9.29 10*3/MM3 (ref 1.7–7)
NEUTROPHILS NFR BLD MANUAL: 73 % (ref 42.7–76)
PLAT MORPH BLD: NORMAL
PLATELET # BLD AUTO: 17 10*3/MM3 (ref 140–450)
PMV BLD AUTO: 12.2 FL (ref 6–12)
RBC # BLD AUTO: 3.93 10*6/MM3 (ref 3.77–5.28)
RBC MORPH BLD: NORMAL
REF LAB TEST METHOD: NORMAL
WBC MORPH BLD: NORMAL
WBC NRBC COR # BLD: 12.73 10*3/MM3 (ref 3.4–10.8)

## 2019-11-21 PROCEDURE — 85025 COMPLETE CBC W/AUTO DIFF WBC: CPT

## 2019-11-21 PROCEDURE — 36415 COLL VENOUS BLD VENIPUNCTURE: CPT

## 2019-11-21 PROCEDURE — 85007 BL SMEAR W/DIFF WBC COUNT: CPT

## 2019-11-21 NOTE — TELEPHONE ENCOUNTER
Dee w/ [facility] left message 11/20 to schedule one week follow up visit w/ Dr. Loyola  Called patient today to schedule appt.

## 2019-11-22 ENCOUNTER — TRANSCRIBE ORDERS (OUTPATIENT)
Dept: ADMINISTRATIVE | Facility: HOSPITAL | Age: 34
End: 2019-11-22

## 2019-11-22 ENCOUNTER — HOSPITAL ENCOUNTER (OUTPATIENT)
Facility: HOSPITAL | Age: 34
Setting detail: OBSERVATION
Discharge: HOME | End: 2019-11-25
Attending: EMERGENCY MEDICINE | Admitting: INTERNAL MEDICINE

## 2019-11-22 ENCOUNTER — LAB (OUTPATIENT)
Dept: LAB | Facility: HOSPITAL | Age: 34
End: 2019-11-22

## 2019-11-22 DIAGNOSIS — D69.6 ANEMIA WITH LOW PLATELET COUNT (HCC): ICD-10-CM

## 2019-11-22 DIAGNOSIS — D69.6 ANEMIA WITH LOW PLATELET COUNT (HCC): Primary | ICD-10-CM

## 2019-11-22 DIAGNOSIS — D69.6 THROMBOCYTOPENIA (HCC): Primary | ICD-10-CM

## 2019-11-22 LAB
ABO GROUP BLD: NORMAL
ALBUMIN SERPL-MCNC: 4.1 G/DL (ref 3.5–5.2)
ALBUMIN/GLOB SERPL: 1.6 G/DL
ALP SERPL-CCNC: 36 U/L (ref 39–117)
ALT SERPL W P-5'-P-CCNC: 38 U/L (ref 1–33)
ANION GAP SERPL CALCULATED.3IONS-SCNC: 13 MMOL/L (ref 5–15)
AST SERPL-CCNC: 16 U/L (ref 1–32)
B-HCG UR QL: NEGATIVE
BASOPHILS # BLD AUTO: 0 10*3/MM3 (ref 0–0.2)
BASOPHILS NFR BLD AUTO: 0.1 % (ref 0–1.5)
BILIRUB SERPL-MCNC: 0.3 MG/DL (ref 0.2–1.2)
BLD GP AB SCN SERPL QL: NEGATIVE
BUN BLD-MCNC: 17 MG/DL (ref 6–20)
BUN/CREAT SERPL: 31.5 (ref 7–25)
CALCIUM SPEC-SCNC: 9.1 MG/DL (ref 8.6–10.5)
CHLORIDE SERPL-SCNC: 103 MMOL/L (ref 98–107)
CO2 SERPL-SCNC: 23 MMOL/L (ref 22–29)
CREAT BLD-MCNC: 0.54 MG/DL (ref 0.57–1)
DEPRECATED RDW RBC AUTO: 44.2 FL (ref 37–54)
EOSINOPHIL # BLD AUTO: 0 10*3/MM3 (ref 0–0.4)
EOSINOPHIL NFR BLD AUTO: 0.1 % (ref 0.3–6.2)
ERYTHROCYTE [DISTWIDTH] IN BLOOD BY AUTOMATED COUNT: 14.4 % (ref 12.3–15.4)
GFR SERPL CREATININE-BSD FRML MDRD: 131 ML/MIN/1.73
GLOBULIN UR ELPH-MCNC: 2.5 GM/DL
GLUCOSE BLD-MCNC: 153 MG/DL (ref 65–99)
HCT VFR BLD AUTO: 36.7 % (ref 34–46.6)
HGB BLD-MCNC: 12.4 G/DL (ref 12–15.9)
LYMPHOCYTES # BLD AUTO: 1 10*3/MM3 (ref 0.7–3.1)
LYMPHOCYTES NFR BLD AUTO: 12.3 % (ref 19.6–45.3)
MCH RBC QN AUTO: 29.3 PG (ref 26.6–33)
MCHC RBC AUTO-ENTMCNC: 33.6 G/DL (ref 31.5–35.7)
MCV RBC AUTO: 87.3 FL (ref 79–97)
MONOCYTES # BLD AUTO: 0.1 10*3/MM3 (ref 0.1–0.9)
MONOCYTES NFR BLD AUTO: 1.4 % (ref 5–12)
NEUTROPHILS # BLD AUTO: 6.7 10*3/MM3 (ref 1.7–7)
NEUTROPHILS NFR BLD AUTO: 86.1 % (ref 42.7–76)
NRBC BLD AUTO-RTO: 0 /100 WBC (ref 0–0.2)
PLATELET # BLD AUTO: 9 10*3/MM3 (ref 140–450)
PMV BLD AUTO: 9.6 FL (ref 6–12)
POTASSIUM BLD-SCNC: 3.5 MMOL/L (ref 3.5–5.2)
PROT SERPL-MCNC: 6.6 G/DL (ref 6–8.5)
RBC # BLD AUTO: 4.21 10*6/MM3 (ref 3.77–5.28)
RETICS # AUTO: 0.06 10*6/MM3 (ref 0.02–0.13)
RETICS/RBC NFR AUTO: 1.53 % (ref 0.7–1.9)
RH BLD: POSITIVE
SODIUM BLD-SCNC: 139 MMOL/L (ref 136–145)
T&S EXPIRATION DATE: NORMAL
WBC NRBC COR # BLD: 7.8 10*3/MM3 (ref 3.4–10.8)

## 2019-11-22 PROCEDURE — 85025 COMPLETE CBC W/AUTO DIFF WBC: CPT

## 2019-11-22 PROCEDURE — P9035 PLATELET PHERES LEUKOREDUCED: HCPCS

## 2019-11-22 PROCEDURE — G0378 HOSPITAL OBSERVATION PER HR: HCPCS

## 2019-11-22 PROCEDURE — 80053 COMPREHEN METABOLIC PANEL: CPT | Performed by: NURSE PRACTITIONER

## 2019-11-22 PROCEDURE — 85045 AUTOMATED RETICULOCYTE COUNT: CPT | Performed by: EMERGENCY MEDICINE

## 2019-11-22 PROCEDURE — 81025 URINE PREGNANCY TEST: CPT | Performed by: NURSE PRACTITIONER

## 2019-11-22 PROCEDURE — 99218 PR INITIAL OBSERVATION CARE/DAY 30 MINUTES: CPT | Performed by: NURSE PRACTITIONER

## 2019-11-22 PROCEDURE — 86901 BLOOD TYPING SEROLOGIC RH(D): CPT | Performed by: EMERGENCY MEDICINE

## 2019-11-22 PROCEDURE — 86900 BLOOD TYPING SEROLOGIC ABO: CPT | Performed by: EMERGENCY MEDICINE

## 2019-11-22 PROCEDURE — 25010000002 DIPHENHYDRAMINE PER 50 MG: Performed by: EMERGENCY MEDICINE

## 2019-11-22 PROCEDURE — 96375 TX/PRO/DX INJ NEW DRUG ADDON: CPT

## 2019-11-22 PROCEDURE — 86850 RBC ANTIBODY SCREEN: CPT | Performed by: EMERGENCY MEDICINE

## 2019-11-22 PROCEDURE — 36415 COLL VENOUS BLD VENIPUNCTURE: CPT

## 2019-11-22 PROCEDURE — 99284 EMERGENCY DEPT VISIT MOD MDM: CPT

## 2019-11-22 PROCEDURE — 36430 TRANSFUSION BLD/BLD COMPNT: CPT

## 2019-11-22 PROCEDURE — 25010000002 METHYLPREDNISOLONE PER 125 MG: Performed by: EMERGENCY MEDICINE

## 2019-11-22 RX ORDER — SODIUM CHLORIDE 9 MG/ML
50 INJECTION, SOLUTION INTRAVENOUS CONTINUOUS
Status: DISCONTINUED | OUTPATIENT
Start: 2019-11-22 | End: 2019-11-25 | Stop reason: HOSPADM

## 2019-11-22 RX ORDER — SODIUM CHLORIDE 0.9 % (FLUSH) 0.9 %
10 SYRINGE (ML) INJECTION EVERY 12 HOURS SCHEDULED
Status: DISCONTINUED | OUTPATIENT
Start: 2019-11-23 | End: 2019-11-25 | Stop reason: HOSPADM

## 2019-11-22 RX ORDER — PANTOPRAZOLE SODIUM 20 MG/1
20 TABLET, DELAYED RELEASE ORAL EVERY MORNING
Status: DISCONTINUED | OUTPATIENT
Start: 2019-11-23 | End: 2019-11-22

## 2019-11-22 RX ORDER — PANTOPRAZOLE SODIUM 40 MG/1
40 TABLET, DELAYED RELEASE ORAL EVERY MORNING
Status: DISCONTINUED | OUTPATIENT
Start: 2019-11-23 | End: 2019-11-25 | Stop reason: HOSPADM

## 2019-11-22 RX ORDER — SODIUM CHLORIDE 0.9 % (FLUSH) 0.9 %
10 SYRINGE (ML) INJECTION AS NEEDED
Status: DISCONTINUED | OUTPATIENT
Start: 2019-11-22 | End: 2019-11-25 | Stop reason: HOSPADM

## 2019-11-22 RX ORDER — ONDANSETRON 2 MG/ML
4 INJECTION INTRAMUSCULAR; INTRAVENOUS EVERY 6 HOURS PRN
Status: DISCONTINUED | OUTPATIENT
Start: 2019-11-22 | End: 2019-11-25 | Stop reason: HOSPADM

## 2019-11-22 RX ORDER — ONDANSETRON 4 MG/1
4 TABLET, FILM COATED ORAL EVERY 6 HOURS PRN
Status: DISCONTINUED | OUTPATIENT
Start: 2019-11-22 | End: 2019-11-25 | Stop reason: HOSPADM

## 2019-11-22 RX ORDER — CHOLECALCIFEROL (VITAMIN D3) 125 MCG
5 CAPSULE ORAL NIGHTLY PRN
Status: DISCONTINUED | OUTPATIENT
Start: 2019-11-22 | End: 2019-11-25 | Stop reason: HOSPADM

## 2019-11-22 RX ORDER — HYDROCODONE BITARTRATE AND ACETAMINOPHEN 5; 325 MG/1; MG/1
1 TABLET ORAL ONCE AS NEEDED
Status: COMPLETED | OUTPATIENT
Start: 2019-11-22 | End: 2019-11-23

## 2019-11-22 RX ORDER — METHYLPREDNISOLONE SODIUM SUCCINATE 125 MG/2ML
125 INJECTION, POWDER, LYOPHILIZED, FOR SOLUTION INTRAMUSCULAR; INTRAVENOUS EVERY 6 HOURS
Status: DISCONTINUED | OUTPATIENT
Start: 2019-11-23 | End: 2019-11-25 | Stop reason: HOSPADM

## 2019-11-22 RX ORDER — METHYLPREDNISOLONE SODIUM SUCCINATE 125 MG/2ML
125 INJECTION, POWDER, LYOPHILIZED, FOR SOLUTION INTRAMUSCULAR; INTRAVENOUS ONCE
Status: COMPLETED | OUTPATIENT
Start: 2019-11-22 | End: 2019-11-22

## 2019-11-22 RX ORDER — DIPHENHYDRAMINE HYDROCHLORIDE 50 MG/ML
25 INJECTION INTRAMUSCULAR; INTRAVENOUS ONCE
Status: COMPLETED | OUTPATIENT
Start: 2019-11-22 | End: 2019-11-22

## 2019-11-22 RX ADMIN — DIPHENHYDRAMINE HYDROCHLORIDE 25 MG: 50 INJECTION, SOLUTION INTRAMUSCULAR; INTRAVENOUS at 18:29

## 2019-11-22 RX ADMIN — METHYLPREDNISOLONE SODIUM SUCCINATE 125 MG: 125 INJECTION, POWDER, FOR SOLUTION INTRAMUSCULAR; INTRAVENOUS at 18:29

## 2019-11-22 RX ADMIN — SODIUM CHLORIDE 50 ML/HR: 900 INJECTION, SOLUTION INTRAVENOUS at 22:59

## 2019-11-22 RX ADMIN — SODIUM CHLORIDE 500 ML: 900 INJECTION, SOLUTION INTRAVENOUS at 18:31

## 2019-11-22 NOTE — OUTREACH NOTE
Prep Survey      Responses   Facility patient discharged from?  [facility]   Is patient eligible?  Yes   Discharge diagnosis  Thrombocytopenia   petechiae and ecchymosis    Does the patient have one of the following disease processes/diagnoses(primary or secondary)?  Other   Does the patient have Home health ordered?  No   Is there a DME ordered?  No   Prep survey completed?  Yes          Addis Jain RN

## 2019-11-23 LAB
ANION GAP SERPL CALCULATED.3IONS-SCNC: 12 MMOL/L (ref 5–15)
BASOPHILS # BLD AUTO: 0 10*3/MM3 (ref 0–0.2)
BASOPHILS NFR BLD AUTO: 0 % (ref 0–1.5)
BUN BLD-MCNC: 16 MG/DL (ref 6–20)
BUN/CREAT SERPL: 30.8 (ref 7–25)
CALCIUM SPEC-SCNC: 9.7 MG/DL (ref 8.6–10.5)
CHLORIDE SERPL-SCNC: 102 MMOL/L (ref 98–107)
CO2 SERPL-SCNC: 25 MMOL/L (ref 22–29)
CREAT BLD-MCNC: 0.52 MG/DL (ref 0.57–1)
DEPRECATED RDW RBC AUTO: 42.9 FL (ref 37–54)
EOSINOPHIL # BLD AUTO: 0 10*3/MM3 (ref 0–0.4)
EOSINOPHIL NFR BLD AUTO: 0 % (ref 0.3–6.2)
ERYTHROCYTE [DISTWIDTH] IN BLOOD BY AUTOMATED COUNT: 14.1 % (ref 12.3–15.4)
FERRITIN SERPL-MCNC: 15.38 NG/ML (ref 13–150)
GFR SERPL CREATININE-BSD FRML MDRD: 137 ML/MIN/1.73
GLUCOSE BLD-MCNC: 137 MG/DL (ref 65–99)
HAV IGM SERPL QL IA: NORMAL
HBV CORE IGM SERPL QL IA: NORMAL
HBV SURFACE AG SERPL QL IA: NORMAL
HBV SURFACE AG SERPL QL IA: NORMAL
HCT VFR BLD AUTO: 34 % (ref 34–46.6)
HCV AB SER DONR QL: NORMAL
HGB BLD-MCNC: 11.4 G/DL (ref 12–15.9)
HIV1+2 AB SER QL: NORMAL
IRON 24H UR-MRATE: 40 MCG/DL (ref 37–145)
IRON SATN MFR SERPL: 9 % (ref 20–50)
LDH SERPL-CCNC: 123 U/L (ref 135–214)
LYMPHOCYTES # BLD AUTO: 0.6 10*3/MM3 (ref 0.7–3.1)
LYMPHOCYTES NFR BLD AUTO: 8.4 % (ref 19.6–45.3)
MCH RBC QN AUTO: 29 PG (ref 26.6–33)
MCHC RBC AUTO-ENTMCNC: 33.6 G/DL (ref 31.5–35.7)
MCV RBC AUTO: 86.1 FL (ref 79–97)
MONOCYTES # BLD AUTO: 0.2 10*3/MM3 (ref 0.1–0.9)
MONOCYTES NFR BLD AUTO: 2.5 % (ref 5–12)
NEUTROPHILS # BLD AUTO: 6.2 10*3/MM3 (ref 1.7–7)
NEUTROPHILS NFR BLD AUTO: 89.1 % (ref 42.7–76)
NRBC BLD AUTO-RTO: 0 /100 WBC (ref 0–0.2)
PLATELET # BLD AUTO: 13 10*3/MM3 (ref 140–450)
PMV BLD AUTO: 9.4 FL (ref 6–12)
POTASSIUM BLD-SCNC: 3.9 MMOL/L (ref 3.5–5.2)
RBC # BLD AUTO: 3.95 10*6/MM3 (ref 3.77–5.28)
SODIUM BLD-SCNC: 139 MMOL/L (ref 136–145)
TIBC SERPL-MCNC: 462 MCG/DL (ref 298–536)
TRANSFERRIN SERPL-MCNC: 310 MG/DL (ref 200–360)
TSH SERPL DL<=0.05 MIU/L-ACNC: 0.34 UIU/ML (ref 0.27–4.2)
WBC NRBC COR # BLD: 7 10*3/MM3 (ref 3.4–10.8)

## 2019-11-23 PROCEDURE — 86038 ANTINUCLEAR ANTIBODIES: CPT | Performed by: NURSE PRACTITIONER

## 2019-11-23 PROCEDURE — 83540 ASSAY OF IRON: CPT | Performed by: NURSE PRACTITIONER

## 2019-11-23 PROCEDURE — 83615 LACTATE (LD) (LDH) ENZYME: CPT | Performed by: NURSE PRACTITIONER

## 2019-11-23 PROCEDURE — 86704 HEP B CORE ANTIBODY TOTAL: CPT | Performed by: NURSE PRACTITIONER

## 2019-11-23 PROCEDURE — 80048 BASIC METABOLIC PNL TOTAL CA: CPT | Performed by: NURSE PRACTITIONER

## 2019-11-23 PROCEDURE — 84443 ASSAY THYROID STIM HORMONE: CPT | Performed by: NURSE PRACTITIONER

## 2019-11-23 PROCEDURE — 90472 IMMUNIZATION ADMIN EACH ADD: CPT

## 2019-11-23 PROCEDURE — 25010000002 MENINGOCOCCAL B SUSPENSION PREFILLED SYRINGE: Performed by: NURSE PRACTITIONER

## 2019-11-23 PROCEDURE — 84466 ASSAY OF TRANSFERRIN: CPT | Performed by: NURSE PRACTITIONER

## 2019-11-23 PROCEDURE — 82607 VITAMIN B-12: CPT | Performed by: NURSE PRACTITIONER

## 2019-11-23 PROCEDURE — 83010 ASSAY OF HAPTOGLOBIN QUANT: CPT | Performed by: NURSE PRACTITIONER

## 2019-11-23 PROCEDURE — 86677 HELICOBACTER PYLORI ANTIBODY: CPT | Performed by: NURSE PRACTITIONER

## 2019-11-23 PROCEDURE — 25010000002 METHYLPREDNISOLONE PER 125 MG: Performed by: NURSE PRACTITIONER

## 2019-11-23 PROCEDURE — 90620 MENB-4C VACCINE IM: CPT | Performed by: NURSE PRACTITIONER

## 2019-11-23 PROCEDURE — 25010000002 PNEUMOCOCCAL CONJ. 13-VALENT SUSPENSION: Performed by: NURSE PRACTITIONER

## 2019-11-23 PROCEDURE — 82746 ASSAY OF FOLIC ACID SERUM: CPT | Performed by: NURSE PRACTITIONER

## 2019-11-23 PROCEDURE — 25010000002 IRON SUCROSE PER 1 MG: Performed by: NURSE PRACTITIONER

## 2019-11-23 PROCEDURE — G0378 HOSPITAL OBSERVATION PER HR: HCPCS

## 2019-11-23 PROCEDURE — 90471 IMMUNIZATION ADMIN: CPT

## 2019-11-23 PROCEDURE — 86706 HEP B SURFACE ANTIBODY: CPT | Performed by: NURSE PRACTITIONER

## 2019-11-23 PROCEDURE — 87340 HEPATITIS B SURFACE AG IA: CPT | Performed by: NURSE PRACTITIONER

## 2019-11-23 PROCEDURE — 90734 MENACWYD/MENACWYCRM VACC IM: CPT | Performed by: NURSE PRACTITIONER

## 2019-11-23 PROCEDURE — 85025 COMPLETE CBC W/AUTO DIFF WBC: CPT | Performed by: NURSE PRACTITIONER

## 2019-11-23 PROCEDURE — 80074 ACUTE HEPATITIS PANEL: CPT | Performed by: NURSE PRACTITIONER

## 2019-11-23 PROCEDURE — 96365 THER/PROPH/DIAG IV INF INIT: CPT

## 2019-11-23 PROCEDURE — 84165 PROTEIN E-PHORESIS SERUM: CPT | Performed by: NURSE PRACTITIONER

## 2019-11-23 PROCEDURE — 82728 ASSAY OF FERRITIN: CPT | Performed by: NURSE PRACTITIONER

## 2019-11-23 PROCEDURE — 96376 TX/PRO/DX INJ SAME DRUG ADON: CPT

## 2019-11-23 PROCEDURE — 25010000002 MENINGOCOCCAL RECONSTITUTED SOLUTION: Performed by: NURSE PRACTITIONER

## 2019-11-23 PROCEDURE — 99225 PR SBSQ OBSERVATION CARE/DAY 25 MINUTES: CPT | Performed by: HOSPITALIST

## 2019-11-23 PROCEDURE — 84155 ASSAY OF PROTEIN SERUM: CPT | Performed by: NURSE PRACTITIONER

## 2019-11-23 PROCEDURE — G0008 ADMIN INFLUENZA VIRUS VAC: HCPCS | Performed by: NURSE PRACTITIONER

## 2019-11-23 PROCEDURE — 99220 PR INITIAL OBSERVATION CARE/DAY 70 MINUTES: CPT | Performed by: INTERNAL MEDICINE

## 2019-11-23 PROCEDURE — G0432 EIA HIV-1/HIV-2 SCREEN: HCPCS | Performed by: NURSE PRACTITIONER

## 2019-11-23 PROCEDURE — G0009 ADMIN PNEUMOCOCCAL VACCINE: HCPCS | Performed by: NURSE PRACTITIONER

## 2019-11-23 PROCEDURE — 90670 PCV13 VACCINE IM: CPT | Performed by: NURSE PRACTITIONER

## 2019-11-23 RX ORDER — DIPHENHYDRAMINE HYDROCHLORIDE 50 MG/ML
50 INJECTION INTRAMUSCULAR; INTRAVENOUS AS NEEDED
Status: DISCONTINUED | OUTPATIENT
Start: 2019-11-23 | End: 2019-11-25 | Stop reason: HOSPADM

## 2019-11-23 RX ORDER — MEGESTROL ACETATE 40 MG/ML
40 SUSPENSION ORAL 2 TIMES DAILY
Status: DISCONTINUED | OUTPATIENT
Start: 2019-11-30 | End: 2019-11-23

## 2019-11-23 RX ORDER — MEGESTROL ACETATE 40 MG/ML
40 SUSPENSION ORAL DAILY
Status: DISCONTINUED | OUTPATIENT
Start: 2019-12-07 | End: 2019-11-23

## 2019-11-23 RX ORDER — MEGESTROL ACETATE 40 MG/ML
40 SUSPENSION ORAL DAILY
Status: DISCONTINUED | OUTPATIENT
Start: 2019-12-08 | End: 2019-11-25 | Stop reason: HOSPADM

## 2019-11-23 RX ORDER — FAMOTIDINE 10 MG/ML
20 INJECTION, SOLUTION INTRAVENOUS AS NEEDED
Status: DISCONTINUED | OUTPATIENT
Start: 2019-11-23 | End: 2019-11-25 | Stop reason: HOSPADM

## 2019-11-23 RX ORDER — HYDROCODONE BITARTRATE AND ACETAMINOPHEN 5; 325 MG/1; MG/1
1 TABLET ORAL ONCE AS NEEDED
Status: COMPLETED | OUTPATIENT
Start: 2019-11-23 | End: 2019-11-23

## 2019-11-23 RX ORDER — MEGESTROL ACETATE 40 MG/ML
40 SUSPENSION ORAL 4 TIMES DAILY
Status: DISCONTINUED | OUTPATIENT
Start: 2019-11-23 | End: 2019-11-23

## 2019-11-23 RX ORDER — MEGESTROL ACETATE 40 MG/ML
40 SUSPENSION ORAL 2 TIMES DAILY
Status: DISCONTINUED | OUTPATIENT
Start: 2019-12-01 | End: 2019-11-25 | Stop reason: HOSPADM

## 2019-11-23 RX ORDER — SODIUM CHLORIDE 9 MG/ML
250 INJECTION, SOLUTION INTRAVENOUS ONCE
Status: DISCONTINUED | OUTPATIENT
Start: 2019-11-23 | End: 2019-11-24

## 2019-11-23 RX ORDER — MEGESTROL ACETATE 40 MG/ML
40 SUSPENSION ORAL 4 TIMES DAILY
Status: DISCONTINUED | OUTPATIENT
Start: 2019-11-23 | End: 2019-11-25 | Stop reason: HOSPADM

## 2019-11-23 RX ORDER — ACETAMINOPHEN 325 MG/1
650 TABLET ORAL ONCE
Status: DISCONTINUED | OUTPATIENT
Start: 2019-11-23 | End: 2019-11-24

## 2019-11-23 RX ORDER — MEPERIDINE HYDROCHLORIDE 50 MG/ML
25 INJECTION INTRAMUSCULAR; INTRAVENOUS; SUBCUTANEOUS
Status: ACTIVE | OUTPATIENT
Start: 2019-11-23 | End: 2019-11-24

## 2019-11-23 RX ADMIN — MELATONIN TAB 5 MG 5 MG: 5 TAB at 00:38

## 2019-11-23 RX ADMIN — HAEMOPHILUS B POLYSACCHARIDE CONJUGATE VACCINE FOR INJ 0.5 ML: RECON SOLN at 16:47

## 2019-11-23 RX ADMIN — METHYLPREDNISOLONE SODIUM SUCCINATE 125 MG: 125 INJECTION, POWDER, FOR SOLUTION INTRAMUSCULAR; INTRAVENOUS at 06:37

## 2019-11-23 RX ADMIN — PNEUMOCOCCAL 13-VALENT CONJUGATE VACCINE 0.5 ML: 2.2; 2.2; 2.2; 2.2; 2.2; 4.4; 2.2; 2.2; 2.2; 2.2; 2.2; 2.2; 2.2 INJECTION, SUSPENSION INTRAMUSCULAR at 16:36

## 2019-11-23 RX ADMIN — METHYLPREDNISOLONE SODIUM SUCCINATE 125 MG: 125 INJECTION, POWDER, FOR SOLUTION INTRAMUSCULAR; INTRAVENOUS at 00:38

## 2019-11-23 RX ADMIN — HYDROCODONE BITARTRATE AND ACETAMINOPHEN 1 TABLET: 5; 325 TABLET ORAL at 00:38

## 2019-11-23 RX ADMIN — MENINGOCOCCAL CYW-135 LIQUID DILUENT 0.5 ML: RECON SOLN at 16:49

## 2019-11-23 RX ADMIN — NEISSERIA MENINGITIDIS SEROGROUP B NHBA FUSION PROTEIN ANTIGEN, NEISSERIA MENINGITIDIS SEROGROUP B FHBP FUSION PROTEIN ANTIGEN AND NEISSERIA MENINGITIDIS SEROGROUP B NADA PROTEIN ANTIGEN 0.5 ML: 50; 50; 50; 25 INJECTION, SUSPENSION INTRAMUSCULAR at 16:41

## 2019-11-23 RX ADMIN — MEGESTROL ACETATE 40 MG: 40 SUSPENSION ORAL at 20:42

## 2019-11-23 RX ADMIN — HYDROCODONE BITARTRATE AND ACETAMINOPHEN 1 TABLET: 5; 325 TABLET ORAL at 20:41

## 2019-11-23 RX ADMIN — MELATONIN TAB 5 MG 5 MG: 5 TAB at 23:33

## 2019-11-23 RX ADMIN — IRON SUCROSE 400 MG: 20 INJECTION, SOLUTION INTRAVENOUS at 18:02

## 2019-11-23 RX ADMIN — PANTOPRAZOLE SODIUM 40 MG: 40 TABLET, DELAYED RELEASE ORAL at 06:38

## 2019-11-23 RX ADMIN — METHYLPREDNISOLONE SODIUM SUCCINATE 125 MG: 125 INJECTION, POWDER, FOR SOLUTION INTRAMUSCULAR; INTRAVENOUS at 12:28

## 2019-11-23 RX ADMIN — METHYLPREDNISOLONE SODIUM SUCCINATE 125 MG: 125 INJECTION, POWDER, FOR SOLUTION INTRAMUSCULAR; INTRAVENOUS at 23:33

## 2019-11-23 RX ADMIN — Medication 10 ML: at 00:45

## 2019-11-23 RX ADMIN — METHYLPREDNISOLONE SODIUM SUCCINATE 125 MG: 125 INJECTION, POWDER, FOR SOLUTION INTRAMUSCULAR; INTRAVENOUS at 19:46

## 2019-11-23 RX ADMIN — SODIUM CHLORIDE 50 ML/HR: 900 INJECTION, SOLUTION INTRAVENOUS at 23:32

## 2019-11-23 NOTE — PROGRESS NOTES
"      [facility] Medicine Services Daily Progress Note      Hospitalist Team  LOS 0 days      Patient Care Team:  Jose Ingram APRN as PCP - General (Family Medicine)    Patient Location: 102/1      Subjective   Subjective     Chief Complaint / Subjective  Chief Complaint   Patient presents with   • abnormal       Examined the patient in the morning of 11/23/2019 in the presence of her family.  She denies bleeding.  Went to her PCP on 11/22/2019 before because of fatigue.    Present on Admission:  • Thrombocytopenia (CMS/HCC)  • Ecchymosis      Brief Synopsis of Hospital Course/HPI    The patient is a 34 years old female with recent admission for work-up of thrombocytopenia and was diagnosed with ITP. She was admitted 11/18/19 with PLTS of 5 and was followed by hematology and had a bone marrow biopsy. The patient was discharged on 50 mg of prednisone.  She followed up with her PCP on 11/22/2019 and Platelets were found to be 9 thus was sent to the ED. She was given 1 unit platelets and a dose of IV solu medrol in ED and hematology was consulted. She was here in October in ED for a kidney stone and PLTS were normal.       Review of Systems   All other systems reviewed and are negative.        Objective   Objective      Vital Signs  Temp:  [97.6 °F (36.4 °C)-98.4 °F (36.9 °C)] 97.9 °F (36.6 °C)  Heart Rate:  [50-88] 50  Resp:  [15-18] 15  BP: (109-134)/(52-80) 109/52  Oxygen Therapy  SpO2: 98 %  Pulse Oximetry Type: Intermittent  Device (Oxygen Therapy): room air  Flowsheet Rows      First Filed Value   Admission Height  157.5 cm (62\") Documented at 11/22/2019 1743   Admission Weight  51.3 kg (113 lb) Documented at 11/22/2019 1743        Intake & Output (last 3 days)       11/20 0701 - 11/21 0700 11/21 0701 - 11/22 0700 11/22 0701 - 11/23 0700 11/23 0701 - 11/24 0700    P.O.    240    Blood   459.5     IV Piggyback   500     Total Intake(mL/kg)   959.5 (19.9) 240 (5)    Net   +959.5 +240 "            Urine Unmeasured Occurrence    1 x        Lines, Drains & Airways    Active LDAs     Name:   Placement date:   Placement time:   Site:   Days:    Peripheral IV 11/22/19 1834 Right Antecubital   11/22/19 1834    Antecubital   less than 1              Physical Exam:    Physical Exam   Constitutional: She is oriented to person, place, and time. She appears well-developed and well-nourished.   Appears younger than stated age.   HENT:   Head: Normocephalic and atraumatic.   Mouth/Throat: Oropharynx is clear and moist and mucous membranes are normal. Normal dentition.   Eyes: Conjunctivae and EOM are normal. Pupils are equal, round, and reactive to light. No scleral icterus.   Neck: Normal range of motion. Neck supple.   Cardiovascular: Normal rate and normal heart sounds.   Pulmonary/Chest: Effort normal and breath sounds normal.   Abdominal: Soft. Bowel sounds are normal.   Musculoskeletal: Normal range of motion.   Neurological: She is alert and oriented to person, place, and time.   Skin: Skin is warm and dry. Ecchymosis noted.        Psychiatric: She has a normal mood and affect.     Procedures: none    Results Review:     I reviewed the patient's new clinical results.      Lab Results (last 24 hours)     Procedure Component Value Units Date/Time    Iron Profile [[id number]] Collected:  11/23/19 0618    Specimen:  Blood Updated:  11/23/19 1015    Ferritin [[id number]] Collected:  11/23/19 0618    Specimen:  Blood Updated:  11/23/19 1015    Lactate Dehydrogenase [[id number]] Collected:  11/23/19 0618    Specimen:  Blood Updated:  11/23/19 1015    Basic Metabolic Panel [[id number]]  (Abnormal) Collected:  11/23/19 0618    Specimen:  Blood Updated:  11/23/19 0726     Glucose 137 mg/dL      BUN 16 mg/dL      Creatinine 0.52 mg/dL      Sodium 139 mmol/L      Potassium 3.9 mmol/L      Chloride 102 mmol/L      CO2 25.0 mmol/L      Calcium 9.7 mg/dL      eGFR Non African Amer 137 mL/min/1.73      BUN/Creatinine  Ratio 30.8     Anion Gap 12.0 mmol/L     Narrative:       GFR Normal >60  Chronic Kidney Disease <60  Kidney Failure <15    CBC Auto Differential [[id number]]  (Abnormal) Collected:  11/23/19 0618    Specimen:  Blood Updated:  11/23/19 0708     WBC 7.00 10*3/mm3      RBC 3.95 10*6/mm3      Hemoglobin 11.4 g/dL      Hematocrit 34.0 %      MCV 86.1 fL      MCH 29.0 pg      MCHC 33.6 g/dL      RDW 14.1 %      RDW-SD 42.9 fl      MPV 9.4 fL      Platelets 13 10*3/mm3      Comment: Reviewed by Pathologist within the past 30 days on 11.18.19.        Neutrophil % 89.1 %      Lymphocyte % 8.4 %      Monocyte % 2.5 %      Eosinophil % 0.0 %      Basophil % 0.0 %      Neutrophils, Absolute 6.20 10*3/mm3      Lymphocytes, Absolute 0.60 10*3/mm3      Monocytes, Absolute 0.20 10*3/mm3      Eosinophils, Absolute 0.00 10*3/mm3      Basophils, Absolute 0.00 10*3/mm3      nRBC 0.0 /100 WBC     Comprehensive Metabolic Panel [[id number]]  (Abnormal) Collected:  11/22/19 2248    Specimen:  Blood from Cannula Updated:  11/22/19 2341     Glucose 153 mg/dL      BUN 17 mg/dL      Creatinine 0.54 mg/dL      Sodium 139 mmol/L      Potassium 3.5 mmol/L      Chloride 103 mmol/L      CO2 23.0 mmol/L      Calcium 9.1 mg/dL      Total Protein 6.6 g/dL      Albumin 4.10 g/dL      ALT (SGPT) 38 U/L      AST (SGOT) 16 U/L      Alkaline Phosphatase 36 U/L      Total Bilirubin 0.3 mg/dL      eGFR Non African Amer 131 mL/min/1.73      Globulin 2.5 gm/dL      A/G Ratio 1.6 g/dL      BUN/Creatinine Ratio 31.5     Anion Gap 13.0 mmol/L     Narrative:       GFR Normal >60  Chronic Kidney Disease <60  Kidney Failure <15    Pregnancy, Urine - Urine, Clean Catch [[id number]]  (Normal) Collected:  11/22/19 2153    Specimen:  Urine, Clean Catch Updated:  11/22/19 2205     HCG, Urine QL Negative    Reticulocytes [[id number]]  (Normal) Collected:  11/22/19 1834    Specimen:  Blood Updated:  11/22/19 1842     Reticulocyte % 1.53 %      Reticulocyte Absolute 0.0633  10*6/mm3         No results found for: HGBA1C  Results from last 7 days   Lab Units 11/18/19  0918   INR  1.00             Microbiology Results (last 10 days)     ** No results found for the last 240 hours. **          ECG/EMG Results (most recent)     None                  Ir Bone Marrow Biopsy And Aspiration    Result Date: 11/19/2019  IMPRESSION : Technically successful bone marrow aspiration and biopsy under fluoroscopy  Electronically Signed By-Kiran Lopez On:11/19/2019 3:33 PM This report was finalized on [id number] by  Kiran Lopez, .      Xrays, labs reviewed personally by physician.    Medication Review:   I have reviewed the patient's current medication list      Scheduled Meds    methylPREDNISolone sodium succinate 125 mg Intravenous Q6H   pantoprazole 40 mg Oral QAM   sodium chloride 10 mL Intravenous Q12H       Meds Infusions    sodium chloride 50 mL/hr Last Rate: 50 mL/hr (11/23/19 1011)       Meds PRN  •  haemophilus B polysac-tetanus toxoid (ActHIB)  •  influenza vaccine  •  melatonin  •  Meningococcal A C Y&W-135 Poly  •  ondansetron **OR** ondansetron  •  pneumococcal conj. 13-valent  •  sodium chloride      Assessment/Plan   Assessment/Plan     Active Hospital Problems:  Ecchymosis- (present on admission)    Secondary to above - Bone marrow biopsy site bruised and patient  reports pain. No erythema or discharge 1x dose Norco 5 so patient can lie on her back to sleep tonight       Thrombocytopenia (CMS/HCC)- (present on admission)    Etiology yet undetermined - followed by Hematology.  Received 1 unit PLTS in ED and IV steroids, repeat CBC in am Hold PO prednisone add IV Solumedrol 125 q 6 hrs            VTE Prophylaxis - SCDs.      Code Status -   Code Status and Medical Interventions:   Ordered at: 11/22/19 2135     Code Status:    CPR     Medical Interventions (Level of Support Prior to Arrest):    Full       Discharge Planning    Destination      No service coordination in this encounter.       Durable Medical Equipment      No service coordination in this encounter.      Dialysis/Infusion      No service coordination in this encounter.      Home Medical Care      No service coordination in this encounter.      Therapy      No service coordination in this encounter.      Community Resources      No service coordination in this encounter.              Tony Rider DO, 11/23/19 10:42 AM

## 2019-11-23 NOTE — NURSING NOTE
Call out to Dr Llanes in regards to what med he is planning on starting pt on and if ok to give immunizations that were ordered or if needed to put on hold til treatment completed. Waiting on call back

## 2019-11-23 NOTE — CONSULTS
Hematology/Oncology Inpatient Consultation    Patient name: Tiny Akhtar  : 1985  MRN: [id number]  Referring Provider: Flora Shell APRN  Reason for Consultation: Thrombocytopenia    Chief complaint: Thrombocytopenia    History of present illness:    Tiny Akhtar is a 34 y.o. female with a past medical history of thrombocytopenia, anemia, and kidney stones.  She presented to [facility] Emergency Department on 2019 after she had blood work drawn earlier in the day that showed a platelet count of 9,000 at this facility.  She had previously been admitted through the ED on 2019 reporting bruising on her ankles and in her mouth.  She received 2 units of platelets and started on steroids. She was discharged on 19.The patient's last platelet count was 17,000 on 19 after a platelet transfusion.  She had been on steroids for four days.  The patient reported continued  subcutaneous bruising. She denied any headaches, nosebleeds, or gingival bleeding.  She was admitted for IV steroids and further management.     19  Hematology/Oncology was consulted on this patient known to our service and previously consulted for acute thrombocytopenia on 19. She was followed by Uri Sagastume MD for ITP. The patient had been transfused and started on IV steroids while inpatient 19 to 19.  She was transitioned to oral steroids at discharge.   · 19  Bone marrow biopsy - Smears of bone marrow aspirate with cell block (clot) preparation and marrow biopsy:  Hypocellular for age bone marrow (50%),  Adequate numbers of unremarkable megakaryocytes,  Absent iron stores,  Negative for involvement by malignant lymphoma.  Flow cytometry-CD 56 expression on granulocytes and monocytes.  Chromosomal analysis - pending.    PCP: Jose Ingram APRN    History:  Past Medical History:   Diagnosis Date   • Anxiety    • Iron deficiency anemia    • Kidney stone  10/2019   , History reviewed. No pertinent surgical history.,   Family History   Problem Relation Age of Onset   • Thrombocytopenia Nephew         ITP   • Diabetes Mother    • Diabetes Father    • Cancer Paternal Grandmother    ,   Social History     Tobacco Use   • Smoking status: Never Smoker   • Smokeless tobacco: Never Used   Substance Use Topics   • Alcohol use: No     Frequency: Never   • Drug use: No   ,   Medications Prior to Admission   Medication Sig Dispense Refill Last Dose   • NON FORMULARY Take 1 tablet by mouth Daily. Balance OTC   11/17/2019 at Unknown time   • pantoprazole (PROTONIX) 20 MG EC tablet Take 1 tablet by mouth Daily. 30 tablet 3    • predniSONE (DELTASONE) 50 MG tablet Take 1 tablet by mouth Daily With Breakfast for 30 days. 30 tablet 0    , Scheduled Meds:      acetaminophen 650 mg Oral Once   diphenhydrAMINE 50 mg Intravenous Once   iron sucrose 400 mg Intravenous Once   [START ON 12/1/2019] megestrol acetate 40 mg Oral BID   Followed by      megestrol acetate 40 mg Oral 4x Daily   Followed by      [START ON 12/8/2019] megestrol acetate 40 mg Oral Daily   methylPREDNISolone sodium succinate 125 mg Intravenous Q6H   pantoprazole 40 mg Oral QAM   riTUXimab (RITUXAN) IVPB 375 mg/m2 (Treatment Plan Recorded) Intravenous Once   sodium chloride 10 mL Intravenous Q12H   sodium chloride 250 mL Intravenous Once   , Continuous Infusions:      sodium chloride 50 mL/hr Last Rate: 50 mL/hr (11/23/19 1430)   , PRN Meds:  diphenhydrAMINE  •  famotidine  •  hydrocortisone sodium succinate  •  influenza vaccine  •  melatonin  •  meperidine  •  ondansetron **OR** ondansetron  •  sodium chloride   Allergies:  Patient has no known allergies.    ROS:  Review of Systems   Constitutional: Negative for chills and fever.   HENT: Negative for ear pain, mouth sores, nosebleeds and sore throat.    Eyes: Positive for itching. Negative for photophobia and visual disturbance.   Respiratory: Negative for wheezing and  "stridor.    Cardiovascular: Negative for chest pain and palpitations.   Gastrointestinal: Negative for abdominal pain, diarrhea, nausea and vomiting.   Endocrine: Negative for cold intolerance and heat intolerance.   Genitourinary: Positive for vaginal bleeding (menses started ). Negative for dysuria and hematuria.   Musculoskeletal: Negative for joint swelling and neck stiffness.   Skin: Negative for color change and rash.   Neurological: Negative for seizures and syncope.   Hematological: Negative for adenopathy. Bruises/bleeds easily (bruising ).        No obvious bleeding   Psychiatric/Behavioral: Negative for agitation, confusion and hallucinations.   All other systems reviewed and are negative.     Subjective:  Patient has started her menstrual cycle today.     Objective     Vital Signs:   /55 (Patient Position: Lying)   Pulse 61   Temp 98.1 °F (36.7 °C) (Oral)   Resp 14   Ht 157.5 cm (62\")   Wt 48.1 kg (106 lb 0.7 oz)   SpO2 99%   BMI 19.40 kg/m²     Physical Exam:  Physical Exam   Constitutional: She is oriented to person, place, and time. No distress.   HENT:   Head: Normocephalic and atraumatic.   Eyes: Conjunctivae and EOM are normal. Right eye exhibits no discharge. Left eye exhibits no discharge. No scleral icterus.   Neck: Normal range of motion. Neck supple. No thyromegaly present.   Cardiovascular: Normal rate, regular rhythm and normal heart sounds. Exam reveals no gallop and no friction rub.   Pulmonary/Chest: Effort normal. No stridor. No respiratory distress. She has no wheezes.   Abdominal: Soft. Bowel sounds are normal. She exhibits no mass. There is no tenderness. There is no rebound and no guarding.   Musculoskeletal: Normal range of motion. She exhibits no tenderness.   Peripheral IV right antecubital   Lymphadenopathy:     She has no cervical adenopathy.   Neurological: She is alert and oriented to person, place, and time. She exhibits normal muscle tone.   Skin: Skin is warm. " No rash noted. She is not diaphoretic. No erythema.   Psychiatric: She has a normal mood and affect. Her behavior is normal.   Nursing note and vitals reviewed.  Scattered bruises on her skin     Results Review:  Lab Results (last 48 hours)     Procedure Component Value Units Date/Time    Basic Metabolic Panel [[id number]]  (Abnormal) Collected:  11/23/19 0618    Specimen:  Blood Updated:  11/23/19 0726     Glucose 137 mg/dL      BUN 16 mg/dL      Creatinine 0.52 mg/dL      Sodium 139 mmol/L      Potassium 3.9 mmol/L      Chloride 102 mmol/L      CO2 25.0 mmol/L      Calcium 9.7 mg/dL      eGFR Non African Amer 137 mL/min/1.73      BUN/Creatinine Ratio 30.8     Anion Gap 12.0 mmol/L     Narrative:       GFR Normal >60  Chronic Kidney Disease <60  Kidney Failure <15    CBC Auto Differential [[id number]]  (Abnormal) Collected:  11/23/19 0618    Specimen:  Blood Updated:  11/23/19 0708     WBC 7.00 10*3/mm3      RBC 3.95 10*6/mm3      Hemoglobin 11.4 g/dL      Hematocrit 34.0 %      MCV 86.1 fL      MCH 29.0 pg      MCHC 33.6 g/dL      RDW 14.1 %      RDW-SD 42.9 fl      MPV 9.4 fL      Platelets 13 10*3/mm3      Comment: Reviewed by Pathologist within the past 30 days on 11.18.19.        Neutrophil % 89.1 %      Lymphocyte % 8.4 %      Monocyte % 2.5 %      Eosinophil % 0.0 %      Basophil % 0.0 %      Neutrophils, Absolute 6.20 10*3/mm3      Lymphocytes, Absolute 0.60 10*3/mm3      Monocytes, Absolute 0.20 10*3/mm3      Eosinophils, Absolute 0.00 10*3/mm3      Basophils, Absolute 0.00 10*3/mm3      nRBC 0.0 /100 WBC     Comprehensive Metabolic Panel [[id number]]  (Abnormal) Collected:  11/22/19 2248    Specimen:  Blood from Cannula Updated:  11/22/19 2341     Glucose 153 mg/dL      BUN 17 mg/dL      Creatinine 0.54 mg/dL      Sodium 139 mmol/L      Potassium 3.5 mmol/L      Chloride 103 mmol/L      CO2 23.0 mmol/L      Calcium 9.1 mg/dL      Total Protein 6.6 g/dL      Albumin 4.10 g/dL      ALT (SGPT) 38 U/L      AST  (SGOT) 16 U/L      Alkaline Phosphatase 36 U/L      Total Bilirubin 0.3 mg/dL      eGFR Non African Amer 131 mL/min/1.73      Globulin 2.5 gm/dL      A/G Ratio 1.6 g/dL      BUN/Creatinine Ratio 31.5     Anion Gap 13.0 mmol/L     Narrative:       GFR Normal >60  Chronic Kidney Disease <60  Kidney Failure <15    Pregnancy, Urine - Urine, Clean Catch [[id number]]  (Normal) Collected:  11/22/19 2153    Specimen:  Urine, Clean Catch Updated:  11/22/19 2205     HCG, Urine QL Negative    Reticulocytes [[id number]]  (Normal) Collected:  11/22/19 1834    Specimen:  Blood Updated:  11/22/19 1842     Reticulocyte % 1.53 %      Reticulocyte Absolute 0.0633 10*6/mm3            Pending Results: Chromosomal analysis bone marrow biopsy on 11/19/2019, reticulocytes, LDH, haptoglobin, SPEP, occult blood x1 stool, iron profile, ferritin, folate, vitamin B12, HIV testing, acute hepatitis panel, JOSE, H plyori antibodies, TSH    Pathology Reviewed:   Bone Marrow Exam: [id number]   Order: [id number] - Part of Panel Order [id number]   Status:  Final result   Visible to patient:  Yes (MyChart) Next appt:  11/25/2019 at 08:30 AM in Lab (LAB MD [facility] ONC LAB NA)   Component    Case Report   Surgical Pathology Report                         Case: [id number]                                   Authorizing Provider:  Talisha Araujo APRN       Collected:           11/19/2019 03:14 PM           Ordering Location:     [facility]       Received:            11/19/2019 03:25 PM                                  OBSERVATION                                                                   Pathologist:           David Gillespie MD                                                              Specimens:   1) - Iliac, Left                                                                                     2) - Iliac Crest, Left - Aspirate                                                                    3) - Iliac Crest, Left - Biopsy                                                             Clinical Information    Final Diagnosis   Smears of bone marrow aspirate with cell block (clot) preparation and marrow biopsy:    Hypocellular for age bone marrow (50%)    Adequate numbers of unremarkable megakaryocytes    Absent iron stores    Negative for involvement by malignant lymphoma, see attached flow cytometry report from [facility]     JPR/sms            Imaging Reviewed:   Ir Bone Marrow Biopsy And Aspiration    Result Date: 11/19/2019  IMPRESSION : Technically successful bone marrow aspiration and biopsy under fluoroscopy  Electronically Signed By-Kiran Lopez On:11/19/2019 3:33 PM This report was finalized on [id number] by  Kiran Lopez, .    I have reviewed the patient's labs, imaging, reports, and other clinician documentation.         Assessment/Plan   ASSESSMENT  1. Acute thrombocytopenia, refractory to steroids: likely due to ITP, began on 11/18/19, normal platelet count 10/7/19. Coags and LFTs normal.  No history of alcohol abuse. S/p bone marrow biopsy 11/18/19 - hypocellular for age bone marrow (50%),  adequate numbers of unremarkable megakaryocytes,  absent iron stores,  negative for involvement by malignant lymphoma.  Flow cytometry-CD 56 expression on granulocytes and monocytes.  Chromosomal analysis - pending. Continue Solumedrol and Protonix. Platelets 9,000 on admission, plt count improved to 13,000 today.   · 1 unit SDP 11/18/19   · 1 unit SDP 11/19/19  · 1 unit SDP 11/22/19   2. Anemia: History of HAILE:  Bone marrow biopsy on 11/18/19 showed absent iron stores. Anemia work-up: reticulocytes 1.53. S/p Venofer 400 mg IV x 1 dose.   3. Left renal stone: 3 mm kidney stone visualized on CT A/P on 10/7/19.  Asymptomatic.  4. Anxiety/weight loss:  management per Primary team     PLAN  1. CBC daily   2. Anemia work-up  3. JOSE, HIV testing, and acute hepatitis panel, hep B testing for Rituxan, TSH, H plyori antibodies   4. Transfuse 1 unit single  donor platelets for platelet count less than 15,000 or with bleeding  5. Give Venofer  mg x 1   6. Start Rituxan inpatient 375 mg/m2 on days 1,8,15, and 22 x 1 cycle, notified Pharmacy and RN   7. Continue Solu-Medrol 125 mg every 6 hours  8. Continue Protonix  9. Consider treatment with Rituxan  10. Vaccinations:   · Pneumococcal vaccination with PCV 13 followed by PPSV23  8 weeks later  · Haemophilus influenza type b  · Meningococcal vaccination with Menveo 2 doses 8 weeks apart  · Bexsero-  two doses spaced at least one month apart   · Influenza vaccination   11. Consult to GYN - STAT, patient has menstrual cycle, needs suppressive agent   · Start Megace 40 mg PO QID x 7 days, then BID x 7 days, then daily if amenorrhea   12. [facility] 11/25/19      Electronically signed by FARZAD Banegas, 11/23/19, 9:43 AM.    I have personally performed a face-to-face diagnostic evaluation on this patient.  I have reviewed and agreed with the care plan.  Physical exam reveals reveal scattered bruising.  Patient is a pleasant 34-year-old female who was diagnosed with the ITP on 11/18/2019.  She presented with severe thrombocytopenia with platelets around 9000.  She was started on high-dose steroids but did not have any response.  She continued to require transfusions during this past 5 days.  She was discharged on Wednesday and has come back last evening to the emergency room due to increasing bruising and persistent thrombocytopenia and platelets were recorded at 9000.    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  She will need second line therapy.  Treatment options discussed.  Additional work-up ordered to rule out lupus, also H. pylori testing, TSH, etc.  Recent bone marrow biopsy report reviewed and it does not show any obvious abnormalities other than hypocellular marrow.    Discussed about starting the rituximab while the patient is here for refractory  ITP.  Splenectomy considered, but would like to defer it to later options if needed.  Thrombopoietin receptor agonist could also be considered, but the treatment would be much more protracted, expensive as well as less likely to induce long-term remissions.  \  Orders written to start rituximab.  We will also correct iron deficiency with IV iron.  We will also start patient on vaccinations for pneumococcus, Haemophilus and meningococcus in anticipation of potential future splenectomy.    I discussed the patients findings and my recommendations with patient.     Thank you for this consult.  We will be happy to follow along in the care of this patient.     Forrest Llanes MD  11/23/19  6:01 PM

## 2019-11-23 NOTE — NURSING NOTE
Called Nurse Practitioner Izabela Balderas about the Platelets to be released from the Blood Bank for Ms Akhtar and she informed me to not release the platelets until the hematologist has seen the patient.

## 2019-11-23 NOTE — PLAN OF CARE
Problem: Patient Care Overview  Goal: Plan of Care Review  Outcome: Ongoing (interventions implemented as appropriate)   11/23/19 1306   Coping/Psychosocial   Plan of Care Reviewed With patient   Plan of Care Review   Progress improving   OTHER   Outcome Summary pt platelet counts has increased     Goal: Individualization and Mutuality  Outcome: Ongoing (interventions implemented as appropriate)    Goal: Discharge Needs Assessment  Outcome: Ongoing (interventions implemented as appropriate)    Goal: Interprofessional Rounds/Family Conf  Outcome: Ongoing (interventions implemented as appropriate)      Problem: Pain, Acute (Adult)  Goal: Identify Related Risk Factors and Signs and Symptoms  Outcome: Ongoing (interventions implemented as appropriate)    Goal: Acceptable Pain Control/Comfort Level  Outcome: Ongoing (interventions implemented as appropriate)

## 2019-11-23 NOTE — H&P
[facility] Medicine Services      Patient Name: Tiny Akhtar  : 1985  MRN: [id number]  Primary Care Physician: Jose Ingram APRN  Date of admission: 2019    Patient Care Team:  Jose Ingram APRN as PCP - General (Family Medicine)          Subjective   History Present Illness     Chief Complaint:   Chief Complaint   Patient presents with   • abnormal            Ms Akhtar is a 34 year old female with recent PMH and admission for idiopathic thrombocytopenia . She was admitted 19 with PLTS of 5. She is being followed by hematology and had a bone marrow biopsy which pathology reports as negative for malignancy. Aspirate was apparently sent out for further study. She followed up with her PCP today and Platelets today were found to be 9. She was given 1 unit platelets in ED and a dose of IV solu medrol and hematology was consulted. HCG negative . She denies any chronic illness. She was here in October in ED for a kidney stone and PLTS were normal. She reports family history of several autoimmune diseases- type unknown. She reports bruising but no hematuria , melan or hematochezia . She will be admitted for further evaluation and treatment.       Review of Systems   Constitution: Negative.   HENT: Negative.    Eyes: Negative.    Cardiovascular: Negative.    Respiratory: Negative.    Endocrine: Negative.    Hematologic/Lymphatic: Bruises/bleeds easily.   Skin: Positive for color change.        Bruising lower back from recent biopsy    Musculoskeletal: Negative.    Gastrointestinal: Negative.    Genitourinary: Negative.    Neurological: Negative.    Psychiatric/Behavioral: Negative.            Personal History     Past Medical History:   Past Medical History:   Diagnosis Date   • Anemia    • Kidney stone 10/2019       Surgical History:    History reviewed. No pertinent surgical history.        Family History: family history includes Cancer in her paternal  grandmother; Diabetes in her father and mother; Thrombocytopenia in her nephew. Otherwise pertinent FHx was reviewed and unremarkable.     Social History:  reports that she has never smoked. She has never used smokeless tobacco. She reports that she does not drink alcohol or use drugs.      Medications:  Prior to Admission medications    Medication Sig Start Date End Date Taking? Authorizing Provider   NON FORMULARY Take 1 tablet by mouth Daily. Balance OTC    Provider, MD El   pantoprazole (PROTONIX) 20 MG EC tablet Take 1 tablet by mouth Daily. 11/20/19   Talisha Araujo APRN   predniSONE (DELTASONE) 50 MG tablet Take 1 tablet by mouth Daily With Breakfast for 30 days. 11/21/19 12/21/19  Villa Batres MD       Allergies:  No Known Allergies    Objective   Objective     Vital Signs  Temp:  [97.6 °F (36.4 °C)-98.4 °F (36.9 °C)] 98.4 °F (36.9 °C)  Heart Rate:  [59-88] 59  Resp:  [16-18] 16  BP: (110-134)/(53-80) 128/73  SpO2:  [99 %-100 %] 100 %  on   ;   Device (Oxygen Therapy): room air  Body mass index is 19.4 kg/m².    Physical Exam   Constitutional: She is oriented to person, place, and time. She appears well-developed and well-nourished.   HENT:   Head: Normocephalic and atraumatic.   Eyes: EOM are normal.   Neck: Normal range of motion. Neck supple.   Cardiovascular: Normal rate, regular rhythm, normal heart sounds and intact distal pulses.   Pulmonary/Chest: Effort normal and breath sounds normal.   Abdominal: Soft. Bowel sounds are normal.   Musculoskeletal: Normal range of motion.   Neurological: She is alert and oriented to person, place, and time.   Skin:   Bruising lower back from recent biopsy    Psychiatric: She has a normal mood and affect. Her behavior is normal. Judgment and thought content normal.   Vitals reviewed.      Results Review:  I have personally reviewed most recent lab results and agree with findings, most notably: CBC.    Results from last 7 days   Lab Units  11/22/19  1454  11/18/19  0918   WBC 10*3/mm3 7.80   < >  --    HEMOGLOBIN g/dL 12.4   < >  --    HEMATOCRIT % 36.7   < >  --    PLATELETS 10*3/mm3 9*   < >  --    INR   --   --  1.00    < > = values in this interval not displayed.     Results from last 7 days   Lab Units 11/18/19  0918   SODIUM mmol/L 140   POTASSIUM mmol/L 3.8   CHLORIDE mmol/L 105   CO2 mmol/L 26.0   BUN mg/dL 16   CREATININE mg/dL 0.60   GLUCOSE mg/dL 102*   CALCIUM mg/dL 9.5   ALT (SGPT) U/L 13   AST (SGOT) U/L 17     Estimated Creatinine Clearance: 102.2 mL/min (by C-G formula based on SCr of 0.6 mg/dL).  Brief Urine Lab Results  (Last result in the past 365 days)      Color   Clarity   Blood   Leuk Est   Nitrite   Protein   CREAT   Urine HCG        11/22/19 2153               Negative         Imaging Results (Last 24 Hours)     ** No results found for the last 24 hours. **            Microbiology Results (last 10 days)     ** No results found for the last 240 hours. **          ECG/EMG Results (most recent)     None                    Ir Bone Marrow Biopsy And Aspiration    Result Date: 11/19/2019  IMPRESSION : Technically successful bone marrow aspiration and biopsy under fluoroscopy  Electronically Signed By-Kiran Lopez On:11/19/2019 3:33 PM This report was finalized on [id number] by  Kiran Lopez, .        Estimated Creatinine Clearance: 102.2 mL/min (by C-G formula based on SCr of 0.6 mg/dL).    Assessment/Plan   Assessment/Plan       Active Hospital Problems:  Ecchymosis- (present on admission)    Secondary to above - Bone marrow biopsy site bruised and patient  reports pain. No erythema or discharge 1x dose Norco 5 so patient can lie on her back to sleep tonight       Thrombocytopenia (CMS/HCC)- (present on admission)    Etiology yet undetermined - followed by Hematology.  Received 1 unit PLTS in ED and IV steroids, repeat CBC in am Hold PO prednisone add IV Solumedrol 125 q 6 hrs                  VTE Prophylaxis - SCDs.    CODE  STATUS:    Code Status and Medical Interventions:   Ordered at: 11/22/19 2135     Code Status:    CPR     Medical Interventions (Level of Support Prior to Arrest):    Full       Admission Status:  I believe this patient meets observation  criteria.      I discussed the patients findings and my recommendations with patient and family at bedside .        Electronically signed by FARZAD Garsia, 11/22/19, 9:35 PM.  [facility]

## 2019-11-23 NOTE — ED PROVIDER NOTES
Subjective    34-year-old female history of recent low platelet count status post 2 recent platelet infusions returns today.  The patient states that he had blood work drawn earlier in the day that showed a platelet count of 9 at this facility.  The patient's last platelet count have been 17,000 on the 21st after transfusion.  The patient has been on steroids for 4 days.  The patient states that she continues to have subcutaneous bruising but denies headache or nosebleed or gum bleeding.  She denies hemoptysis or night sweats.  She denies melena hematemesis or hematochezia.  Is been no reports of dysuria or hematuria.  She does not report paresthesias or focal neurologic deficits..  No change in vision or scotomata            Review of Systems   Constitutional: Positive for fatigue. Negative for chills and fever.   All other systems reviewed and are negative.      Past Medical History:   Diagnosis Date   • Anemia    • Kidney stone 10/2019       No Known Allergies    History reviewed. No pertinent surgical history.    Family History   Problem Relation Age of Onset   • Thrombocytopenia Nephew         ITP   • Diabetes Mother    • Diabetes Father    • Cancer Paternal Grandmother        Social History     Socioeconomic History   • Marital status:      Spouse name: Not on file   • Number of children: Not on file   • Years of education: Not on file   • Highest education level: Not on file   Tobacco Use   • Smoking status: Never Smoker   • Smokeless tobacco: Never Used   Substance and Sexual Activity   • Alcohol use: No     Frequency: Never   • Drug use: No   • Sexual activity: Defer           Objective   Physical Exam  Alert Gurpreet Coma Scale 15   HEENT: Pupils equal and reactive to light. Conjunctivae are not injected. normal tympanic membranes. Oropharynx and nares are normal.   Neck: Supple. Midline trachea. No JVD. No goiter.   Chest: Clear and equal breath sounds bilaterally regular rate and rhythm without  murmur or rub.   Abdomen: Positive bowel sounds nontender nondistended. No rebound or peritoneal signs. No CVA tenderness.   Extremities no clubbing cyanosis or edema motor sensory exam is normal the full range of motion is intact   skin: Warm and dry, no rashes or petechia.  There are ecchymotic areas noted of several days age on predominant lower extremities no urticaria or erythema multiforme  Lymphatic: No regional lymphadenopathy. No calf pain, swelling or Precious's sign    Procedures           ED Course        Labs Reviewed   RETICULOCYTES - Normal   PREGNANCY, URINE   COMPREHENSIVE METABOLIC PANEL   TYPE AND SCREEN   BB ARMBAND CHECK   PREPARE PLATELET PHERESIS     Medications   diphenhydrAMINE (BENADRYL) injection 25 mg (25 mg Intravenous Given 11/22/19 1829)   sodium chloride 0.9 % bolus 500 mL (0 mL Intravenous Stopped 11/22/19 1901)   methylPREDNISolone sodium succinate (SOLU-Medrol) injection 125 mg (125 mg Intravenous Given 11/22/19 1829)     No radiology results for the last day            MDM  Number of Diagnoses or Management Options     Amount and/or Complexity of Data Reviewed  Clinical lab tests: reviewed   Obtain history from someone other than the patient: yes   Discuss the patient with other providers: yes   Independent visualization of images, tracings, or specimens: yes    Risk of Complications, Morbidity, and/or Mortality  Presenting problems: high   Diagnostic procedures: high   Management options: high   General comments: Family also assisted in giving history.  The case was discussed with the hematologist as well as the hospitalist practitioner.  The patient was given a dose of Solu-Medrol here as well as IV fluids.  The patient received a unit of platelets that was transfused without problem.  The patient will be kept overnight for observation and hydration at the hematologist request and will have follow-up CBC in the morning.  Other bone marrow testing was currently unavailable however  the CD 56 was abnormal      The patient reports that she has an extensive family history of autoimmune problems  Final diagnoses:   Thrombocytopenia (CMS/HCC)             Hans Rodríguez MD  11/22/19 7440

## 2019-11-23 NOTE — ASSESSMENT & PLAN NOTE
--Secondary to ITP   --Discharged home on prednisone 50 mg daily   --Transfused 1 unit platelet 11/22/2019 but an appropriate response  --s/p work-up for infectious causes  --Megace started 11/23/2019 because menstrual bleeding  --s/p bone marrow biopsy last admission   --Hematology/oncology has not decided on starting Rituxan  --Keep overnight 11/24/2019 to observe platelets

## 2019-11-24 ENCOUNTER — READMISSION MANAGEMENT (OUTPATIENT)
Dept: CALL CENTER | Facility: HOSPITAL | Age: 34
End: 2019-11-24

## 2019-11-24 LAB
ABO + RH BLD: NORMAL
BASOPHILS # BLD AUTO: 0 10*3/MM3 (ref 0–0.2)
BASOPHILS # BLD AUTO: 0 10*3/MM3 (ref 0–0.2)
BASOPHILS NFR BLD AUTO: 0.1 % (ref 0–1.5)
BASOPHILS NFR BLD AUTO: 0.1 % (ref 0–1.5)
BH BB BLOOD EXPIRATION DATE: NORMAL
BH BB BLOOD TYPE BARCODE: 6200
BH BB DISPENSE STATUS: NORMAL
BH BB PRODUCT CODE: NORMAL
BH BB UNIT NUMBER: NORMAL
DEPRECATED RDW RBC AUTO: 42 FL (ref 37–54)
DEPRECATED RDW RBC AUTO: 42.9 FL (ref 37–54)
EOSINOPHIL # BLD AUTO: 0 10*3/MM3 (ref 0–0.4)
EOSINOPHIL # BLD AUTO: 0 10*3/MM3 (ref 0–0.4)
EOSINOPHIL NFR BLD AUTO: 0 % (ref 0.3–6.2)
EOSINOPHIL NFR BLD AUTO: 0 % (ref 0.3–6.2)
ERYTHROCYTE [DISTWIDTH] IN BLOOD BY AUTOMATED COUNT: 13.9 % (ref 12.3–15.4)
ERYTHROCYTE [DISTWIDTH] IN BLOOD BY AUTOMATED COUNT: 14.2 % (ref 12.3–15.4)
FOLATE SERPL-MCNC: 11.2 NG/ML (ref 4.78–24.2)
HAPTOGLOB SERPL-MCNC: 136 MG/DL (ref 30–200)
HBV SURFACE AB SER RIA-ACNC: REACTIVE
HCT VFR BLD AUTO: 31.1 % (ref 34–46.6)
HCT VFR BLD AUTO: 32.7 % (ref 34–46.6)
HGB BLD-MCNC: 10.6 G/DL (ref 12–15.9)
HGB BLD-MCNC: 11.2 G/DL (ref 12–15.9)
LYMPHOCYTES # BLD AUTO: 0.8 10*3/MM3 (ref 0.7–3.1)
LYMPHOCYTES # BLD AUTO: 1 10*3/MM3 (ref 0.7–3.1)
LYMPHOCYTES NFR BLD AUTO: 8.7 % (ref 19.6–45.3)
LYMPHOCYTES NFR BLD AUTO: 9.1 % (ref 19.6–45.3)
MCH RBC QN AUTO: 29.3 PG (ref 26.6–33)
MCH RBC QN AUTO: 29.4 PG (ref 26.6–33)
MCHC RBC AUTO-ENTMCNC: 34.1 G/DL (ref 31.5–35.7)
MCHC RBC AUTO-ENTMCNC: 34.1 G/DL (ref 31.5–35.7)
MCV RBC AUTO: 85.9 FL (ref 79–97)
MCV RBC AUTO: 86.4 FL (ref 79–97)
MONOCYTES # BLD AUTO: 0.5 10*3/MM3 (ref 0.1–0.9)
MONOCYTES # BLD AUTO: 0.5 10*3/MM3 (ref 0.1–0.9)
MONOCYTES NFR BLD AUTO: 4.6 % (ref 5–12)
MONOCYTES NFR BLD AUTO: 5.8 % (ref 5–12)
NEUTROPHILS # BLD AUTO: 7.7 10*3/MM3 (ref 1.7–7)
NEUTROPHILS # BLD AUTO: 9.3 10*3/MM3 (ref 1.7–7)
NEUTROPHILS NFR BLD AUTO: 85.4 % (ref 42.7–76)
NEUTROPHILS NFR BLD AUTO: 86.2 % (ref 42.7–76)
NRBC BLD AUTO-RTO: 0 /100 WBC (ref 0–0.2)
NRBC BLD AUTO-RTO: 0.1 /100 WBC (ref 0–0.2)
PLATELET # BLD AUTO: 29 10*3/MM3 (ref 140–450)
PLATELET # BLD AUTO: 52 10*3/MM3 (ref 140–450)
PMV BLD AUTO: 9.3 FL (ref 6–12)
PMV BLD AUTO: 9.7 FL (ref 6–12)
RBC # BLD AUTO: 3.6 10*6/MM3 (ref 3.77–5.28)
RBC # BLD AUTO: 3.81 10*6/MM3 (ref 3.77–5.28)
UNIT  ABO: NORMAL
UNIT  RH: NORMAL
VIT B12 BLD-MCNC: 461 PG/ML (ref 211–946)
WBC NRBC COR # BLD: 10.8 10*3/MM3 (ref 3.4–10.8)
WBC NRBC COR # BLD: 9 10*3/MM3 (ref 3.4–10.8)

## 2019-11-24 PROCEDURE — 25010000002 DIPHENHYDRAMINE PER 50 MG: Performed by: HOSPITALIST

## 2019-11-24 PROCEDURE — 85025 COMPLETE CBC W/AUTO DIFF WBC: CPT | Performed by: INTERNAL MEDICINE

## 2019-11-24 PROCEDURE — 25010000002 METHYLPREDNISOLONE PER 125 MG: Performed by: NURSE PRACTITIONER

## 2019-11-24 PROCEDURE — 85025 COMPLETE CBC W/AUTO DIFF WBC: CPT | Performed by: NURSE PRACTITIONER

## 2019-11-24 PROCEDURE — 99226 PR SBSQ OBSERVATION CARE/DAY 35 MINUTES: CPT | Performed by: NURSE PRACTITIONER

## 2019-11-24 PROCEDURE — 99225 PR SBSQ OBSERVATION CARE/DAY 25 MINUTES: CPT | Performed by: HOSPITALIST

## 2019-11-24 PROCEDURE — 96376 TX/PRO/DX INJ SAME DRUG ADON: CPT

## 2019-11-24 PROCEDURE — G0378 HOSPITAL OBSERVATION PER HR: HCPCS

## 2019-11-24 RX ORDER — ACETAMINOPHEN 325 MG/1
650 TABLET ORAL ONCE
Status: COMPLETED | OUTPATIENT
Start: 2019-11-24 | End: 2019-11-24

## 2019-11-24 RX ORDER — SODIUM CHLORIDE 9 MG/ML
250 INJECTION, SOLUTION INTRAVENOUS ONCE
Status: DISCONTINUED | OUTPATIENT
Start: 2019-11-24 | End: 2019-11-25 | Stop reason: HOSPADM

## 2019-11-24 RX ORDER — DIPHENHYDRAMINE HYDROCHLORIDE 50 MG/ML
50 INJECTION INTRAMUSCULAR; INTRAVENOUS ONCE
Status: COMPLETED | OUTPATIENT
Start: 2019-11-24 | End: 2019-11-24

## 2019-11-24 RX ADMIN — MEGESTROL ACETATE 40 MG: 40 SUSPENSION ORAL at 08:36

## 2019-11-24 RX ADMIN — DIPHENHYDRAMINE HYDROCHLORIDE 50 MG: 50 INJECTION, SOLUTION INTRAMUSCULAR; INTRAVENOUS at 11:59

## 2019-11-24 RX ADMIN — MEGESTROL ACETATE 40 MG: 40 SUSPENSION ORAL at 17:26

## 2019-11-24 RX ADMIN — METHYLPREDNISOLONE SODIUM SUCCINATE 125 MG: 125 INJECTION, POWDER, FOR SOLUTION INTRAMUSCULAR; INTRAVENOUS at 17:31

## 2019-11-24 RX ADMIN — METHYLPREDNISOLONE SODIUM SUCCINATE 125 MG: 125 INJECTION, POWDER, FOR SOLUTION INTRAMUSCULAR; INTRAVENOUS at 06:05

## 2019-11-24 RX ADMIN — Medication 10 ML: at 08:49

## 2019-11-24 RX ADMIN — MEGESTROL ACETATE 40 MG: 40 SUSPENSION ORAL at 12:00

## 2019-11-24 RX ADMIN — MEGESTROL ACETATE 40 MG: 40 SUSPENSION ORAL at 21:15

## 2019-11-24 RX ADMIN — PANTOPRAZOLE SODIUM 40 MG: 40 TABLET, DELAYED RELEASE ORAL at 06:05

## 2019-11-24 RX ADMIN — SODIUM CHLORIDE 50 ML/HR: 900 INJECTION, SOLUTION INTRAVENOUS at 08:40

## 2019-11-24 RX ADMIN — Medication 10 ML: at 21:16

## 2019-11-24 RX ADMIN — ACETAMINOPHEN 650 MG: 325 TABLET ORAL at 11:59

## 2019-11-24 RX ADMIN — METHYLPREDNISOLONE SODIUM SUCCINATE 125 MG: 125 INJECTION, POWDER, FOR SOLUTION INTRAMUSCULAR; INTRAVENOUS at 13:00

## 2019-11-24 NOTE — PROGRESS NOTES
Hematology/Oncology Inpatient Progress Note    PATIENT NAME: Tiny Akhtar  : 1985  MRN: [id number]    CHIEF COMPLAINT:  Thrombocytopenia    HISTORY OF PRESENT ILLNESS:    Tiny Akhtar is a 34 y.o. female with a past medical history of thrombocytopenia, anemia, and kidney stones.  She presented to [facility] Emergency Department on 2019 after she had blood work drawn earlier in the day that showed a platelet count of 9,000 at this facility.  She had previously been admitted through the ED on 2019 reporting bruising on her ankles and in her mouth.  She received 2 units of platelets and started on steroids. She was discharged on 19.The patient's last platelet count was 17,000 on 19 after a platelet transfusion.  She had been on steroids for four days.  The patient reported continued  subcutaneous bruising. She denied any headaches, nosebleeds, or gingival bleeding.  She was admitted for IV steroids and further management.     19  Hematology/Oncology was consulted on this patient known to our service and previously consulted for acute thrombocytopenia on 19. She was followed by Uri Sagastume MD for ITP. The patient had been transfused and started on IV steroids while inpatient 19 to 19.  She was transitioned to oral steroids at discharge.   · 19  Bone marrow biopsy - Smears of bone marrow aspirate with cell block (clot) preparation and marrow biopsy:  Hypocellular for age bone marrow (50%),  Adequate numbers of unremarkable megakaryocytes,  Absent iron stores,  Negative for involvement by malignant lymphoma.  Flow cytometry-CD 56 expression on granulocytes and monocytes.  Chromosomal analysis - pending.     PCP: Jose Ingram APRN    Subjective  Patient does not report any bleeding.  No new bruises.  Her platelets actually started improving.    ROS:  Review of Systems   Constitutional: Negative for chills and fever.  "  HENT: Negative for ear pain, mouth sores, nosebleeds and sore throat.    Eyes: Positive for itching. Negative for photophobia and visual disturbance.   Respiratory: Negative for wheezing and stridor.    Cardiovascular: Negative for chest pain and palpitations.   Gastrointestinal: Negative for abdominal pain, diarrhea, nausea and vomiting.   Endocrine: Negative for cold intolerance and heat intolerance.   Genitourinary: Positive for vaginal bleeding ( menses). Negative for dysuria and hematuria.   Musculoskeletal: Negative for joint swelling and neck stiffness.   Skin: Negative for color change and rash.   Neurological: Negative for seizures and syncope.   Hematological: Negative for adenopathy. Bruises/bleeds easily ( Bruising).        No obvious bleeding   Psychiatric/Behavioral: Negative for agitation, confusion and hallucinations.   All other systems reviewed and are negative.     MEDICATIONS:    Scheduled Meds:      [START ON 12/1/2019] megestrol acetate 40 mg Oral BID   Followed by      megestrol acetate 40 mg Oral 4x Daily   Followed by      [START ON 12/8/2019] megestrol acetate 40 mg Oral Daily   methylPREDNISolone sodium succinate 125 mg Intravenous Q6H   pantoprazole 40 mg Oral QAM   riTUXimab (RITUXAN) IVPB 570 mg Intravenous Once   sodium chloride 10 mL Intravenous Q12H   sodium chloride 250 mL Intravenous Once      Continuous Infusions:      sodium chloride 50 mL/hr Last Rate: 50 mL/hr (11/24/19 1045)      PRN Meds:  •  diphenhydrAMINE  •  famotidine  •  hydrocortisone sodium succinate  •  melatonin  •  meperidine  •  ondansetron **OR** ondansetron  •  sodium chloride     ALLERGIES:  No Known Allergies    Objective    VITALS:   /56 (BP Location: Left arm, Patient Position: Lying)   Pulse 56   Temp 98.6 °F (37 °C) (Oral)   Resp 16   Ht 157.5 cm (62\")   Wt 54.1 kg (119 lb 3.2 oz)   SpO2 98%   BMI 21.80 kg/m²     PHYSICAL EXAM:  Physical Exam   Constitutional: She is oriented to person, " place, and time. She appears well-developed and well-nourished. No distress.   HENT:   Head: Normocephalic and atraumatic.   Eyes: Conjunctivae and EOM are normal. Right eye exhibits no discharge. Left eye exhibits no discharge. No scleral icterus.   Neck: Normal range of motion. Neck supple. No thyromegaly present.   Cardiovascular: Normal rate, regular rhythm and normal heart sounds. Exam reveals no gallop and no friction rub.   Pulmonary/Chest: Effort normal and breath sounds normal. No stridor. No respiratory distress. She has no wheezes.   Abdominal: Soft. Bowel sounds are normal. She exhibits no mass. There is no tenderness. There is no rebound and no guarding.   Musculoskeletal: Normal range of motion. She exhibits no tenderness.   Peripheral IV right antecubital    Lymphadenopathy:     She has no cervical adenopathy.   Neurological: She is alert and oriented to person, place, and time. She exhibits normal muscle tone.   Skin: Skin is warm, dry and intact. Ecchymosis ( scattered) noted. No rash noted. She is not diaphoretic. No erythema.   Psychiatric: She has a normal mood and affect. Her behavior is normal.   Nursing note and vitals reviewed.        RECENT LABS:  Lab Results (last 24 hours)     Procedure Component Value Units Date/Time    CBC & Differential [[id number]] Collected:  11/24/19 1358    Specimen:  Blood Updated:  11/24/19 1415    Narrative:       The following orders were created for panel order CBC & Differential.  Procedure                               Abnormality         Status                     ---------                               -----------         ------                     CBC Auto Differential[[id number]]        Abnormal            Final result                 Please view results for these tests on the individual orders.    CBC Auto Differential [[id number]]  (Abnormal) Collected:  11/24/19 1358    Specimen:  Blood Updated:  11/24/19 1415     WBC 10.80 10*3/mm3      RBC 3.81 10*6/mm3       Hemoglobin 11.2 g/dL      Hematocrit 32.7 %      MCV 85.9 fL      MCH 29.3 pg      MCHC 34.1 g/dL      RDW 14.2 %      RDW-SD 42.9 fl      MPV 9.7 fL      Platelets 52 10*3/mm3      Neutrophil % 86.2 %      Lymphocyte % 9.1 %      Monocyte % 4.6 %      Eosinophil % 0.0 %      Basophil % 0.1 %      Neutrophils, Absolute 9.30 10*3/mm3      Lymphocytes, Absolute 1.00 10*3/mm3      Monocytes, Absolute 0.50 10*3/mm3      Eosinophils, Absolute 0.00 10*3/mm3      Basophils, Absolute 0.00 10*3/mm3      nRBC 0.0 /100 WBC     Narrative:       Reviewed by Pathologist within the past 30 days on 11/18/19 .    Folate [[id number]]  (Normal) Collected:  11/23/19 2212    Specimen:  Blood Updated:  11/24/19 1201     Folate 11.20 ng/mL     Vitamin B12 [[id number]]  (Normal) Collected:  11/23/19 2212    Specimen:  Blood Updated:  11/24/19 1156     Vitamin B-12 461 pg/mL     Hepatitis B Surface Antibody [[id number]]  (Abnormal) Collected:  11/23/19 2212    Specimen:  Blood Updated:  11/24/19 1156     Hep B S Ab Reactive    Haptoglobin [[id number]]  (Normal) Collected:  11/23/19 2213    Specimen:  Blood Updated:  11/24/19 1145     Haptoglobin 136 mg/dL      Comment: Specimen hemolyzed. Results may be affected.       CBC & Differential [[id number]] Collected:  11/24/19 0605    Specimen:  Blood Updated:  11/24/19 0657    Narrative:       The following orders were created for panel order CBC & Differential.  Procedure                               Abnormality         Status                     ---------                               -----------         ------                     CBC Auto Differential[[id number]]        Abnormal            Final result                 Please view results for these tests on the individual orders.    CBC Auto Differential [[id number]]  (Abnormal) Collected:  11/24/19 0605    Specimen:  Blood Updated:  11/24/19 0657     WBC 9.00 10*3/mm3      RBC 3.60 10*6/mm3      Hemoglobin 10.6 g/dL      Hematocrit 31.1 %       MCV 86.4 fL      MCH 29.4 pg      MCHC 34.1 g/dL      RDW 13.9 %      RDW-SD 42.0 fl      MPV 9.3 fL      Platelets 29 10*3/mm3      Comment: Reviewed by Pathologist within the past 30 days on 11.18.19.         Neutrophil % 85.4 %      Lymphocyte % 8.7 %      Monocyte % 5.8 %      Eosinophil % 0.0 %      Basophil % 0.1 %      Neutrophils, Absolute 7.70 10*3/mm3      Lymphocytes, Absolute 0.80 10*3/mm3      Monocytes, Absolute 0.50 10*3/mm3      Eosinophils, Absolute 0.00 10*3/mm3      Basophils, Absolute 0.00 10*3/mm3      nRBC 0.1 /100 WBC     HIV-1 & HIV-2 Antibodies [[id number]] Collected:  11/23/19 2212    Specimen:  Blood Updated:  11/23/19 2257    Narrative:       The following orders were created for panel order HIV-1 & HIV-2 Antibodies.  Procedure                               Abnormality         Status                     ---------                               -----------         ------                     HIV-1 / O / 2 Ag / Antib...[[id number]]  Normal              Final result                 Please view results for these tests on the individual orders.    HIV-1 / O / 2 Ag / Antibody 4th Generation [[id number]]  (Normal) Collected:  11/23/19 2212    Specimen:  Blood Updated:  11/23/19 2257     HIV-1/ HIV-2 Non-Reactive     Comment: A non-reactive test result does not preclude the possibility of exposure to HIV or infection with HIV. An antibody response to recent exposure may take several months to reach detectable levels.       Narrative:       The HIV antibody/antigen combo assay is a qualitative assay for HIV that includes the p24 antigen as well as antibodies to HIV types 1 and 2. This test is intended to be used as a screening assay in the diagnosis of HIV infection in patients over the age of 2.    Hepatitis B Surface Antigen [[id number]] Collected:  11/23/19 2212    Specimen:  Blood Updated:  11/23/19 2257    Narrative:       The following orders were created for panel order Hepatitis B Surface  Antigen.  Procedure                               Abnormality         Status                     ---------                               -----------         ------                     Hepatitis B Surface Antigen[[id number]]  Normal              Final result                 Please view results for these tests on the individual orders.    Hepatitis B Surface Antigen [[id number]]  (Normal) Collected:  11/23/19 2212    Specimen:  Blood Updated:  11/23/19 2257     Hepatitis B Surface Ag Non-Reactive    TSH [[id number]]  (Normal) Collected:  11/23/19 2212    Specimen:  Blood Updated:  11/23/19 2257     TSH 0.337 uIU/mL     Hepatitis Panel, Acute [[id number]]  (Normal) Collected:  11/23/19 2150    Specimen:  Blood from Arm, Right Updated:  11/23/19 2248     Hepatitis B Surface Ag Non-Reactive     Hep A IgM Non-Reactive     Hep B C IgM Non-Reactive     Hepatitis C Ab Non-Reactive    Hepatitis B Core Antibody, Total [[id number]] Collected:  11/23/19 2212    Specimen:  Blood Updated:  11/23/19 2214    JOSE by IFA, Reflex 9-biomarkers profile [[id number]] Collected:  11/23/19 2212    Specimen:  Blood Updated:  11/23/19 2214    Helicobacter Pylori, IgA IgG IgM [[id number]] Collected:  11/23/19 2212    Specimen:  Blood Updated:  11/23/19 2214    Protein Electrophoresis, Total [[id number]] Collected:  11/23/19 2212    Specimen:  Blood from Arm, Left Updated:  11/23/19 2213          PENDING RESULTS: Chromosomal analysis bone marrow biopsy on 11/19/2019, reticulocytes, LDH, haptoglobin, SPEP, occult blood x1 stool, folate, vitamin B12, JOSE, H plyori antibodies    IMAGING REVIEWED:  No radiology results for the last day    I have reviewed the patient's labs, imaging, reports, and other clinician documentation.    Assessment/Plan   ASSESSMENT  1. Acute thrombocytopenia, refractory to steroids: likely due to ITP, began on 11/18/19, normal platelet count 10/7/19. Coags and LFTs normal.  No history of alcohol abuse. S/p bone marrow biopsy  11/18/19 - hypocellular for age bone marrow (50%),  adequate numbers of unremarkable megakaryocytes,  absent iron stores,  negative for involvement by malignant lymphoma.  Flow cytometry-CD 56 expression on granulocytes and monocytes.  Chromosomal analysis - pending. Continue Solumedrol and Protonix. Platelets 9,000 on admission, plt count improved to 29,000 today. HIV testing negative. TSH normal.   · 1 unit SDP 11/18/19   · 1 unit SDP 11/19/19  · 1 unit SDP 11/22/19   2. Anemia: History of HAILE:  Bone marrow biopsy on 11/18/19 showed absent iron stores. Anemia work-up: reticulocytes 1.53, ferritin 15.38, iron 40, iron saturation 9, transferrin 310, TIBC 462,   · Iron deficiency anemia - S/p Venofer 400 mg IV x 1 dose on 11/23/19  3. Left renal stone: 3 mm kidney stone visualized on CT A/P on 10/7/19.  Asymptomatic.  4. Anxiety/weight loss:  management per Primary team   5. Menstrual bleeding: LMPD 11/23/19 - started Megace per recommendation from GYN to suppress menstrual cycle      PLAN  1. CBC daily   2. CBC   3. Transfuse 1 unit single donor platelets for platelet count less than 15,000 or with bleeding  4. HOLD on starting Rituxan inpatient 375 mg/m2 on days 1,8,15, and 22 x 1 cycle, notified Pharmacy and RN - patient's platelet count is improving   5. Consider thrombopoietin receptor agonist   6. Continue Solu-Medrol 125 mg every 6 hours  7. Continue Megace 40 mg PO QID x 7 days, then BID x 7 days, then daily if amenorrhea   8. Continue Protonix  9. Consider splenectomy   10. Vaccinations: message sent to María Elena Dubon RN for plan in EPIC  · PPSV23 in 8 weeks   · Meningococcal vaccination with Menveo - 2nd dose in 8 weeks   · Bexsero-  2nd dose in 4 weeks   12. [facility] - cancel appt if pt not discharged     Electronically signed by FARZAD Banegas, 11/24/19, 8:41 AM.        I have personally performed a face-to-face diagnostic evaluation on this patient.   I have reviewed and agreed with the care plan.  Physical exam reveals reveal scattered bruising.  Patient is a pleasant 34-year-old female who was diagnosed with the ITP on 11/18/2019.  She presented with severe thrombocytopenia with platelets around 9000.  She was started on high-dose steroids but did not have any response.  She continued to require transfusions during this past 5 days.  She was discharged on Wednesday and has come back last Friday to the emergency room due to increasing bruising and persistent thrombocytopenia and platelets were recorded at 9000.      Discussed about starting the rituximab while the patient is here for refractory ITP. Orders written to start rituximab.     However overnight patient started to respond to steroids.  Platelets have increased up to 52 by afternoon..  She is experiencing delayed response to steroids.  I have therefore made a decision to hold off on rituximab infusion today.  If she continues to have improvement, we will cancel her rituximab therapy.. We will also start patient on vaccinations for pneumococcus, Haemophilus and meningococcus in anticipation of potential future splenectomy.    At this point I feel she could be discharged and be seen tomorrow with Dr. Rodríguez.  She can even get rituximab in our office if she needs.     I discussed the patients findings and my recommendations with patient.     Forrest Llanes MD  11/24/19  3:18 PM

## 2019-11-24 NOTE — PROGRESS NOTES
[facility] Medicine Services Daily Progress Note      Hospitalist Team  LOS 0 days      Patient Care Team:  Jose Ingram APRN as PCP - General (Family Medicine)    Patient Location: [facility]      Subjective   Subjective     Chief Complaint / Subjective  Chief Complaint   Patient presents with   • abnormal       Examined the patient in the morning of 11/24/2019 in the presence of her family.  Denies mucosal bleed. started menstrual cycle 11/23/2019 therefore started on Megace.  Inappropriate response to platelet transfusion and did not increase by expected number therefore ITP still suspected.  The patient was on 1 mg/per kilogram steroid at home and during recent hospitalization thus failed steroid treatment.   Hematology/oncology would like to monitor response and have not decided yet on starting Rituxan. Infectious causes for thrombocytopenia (HIV, viral hepatitis panel , H. pylori  Etc) checked.  OB/GYN consult discontinued.      Present on Admission:  • Thrombocytopenia (CMS/HCC)  • Ecchymosis  • Iron deficiency anemia      Brief Synopsis of Hospital Course/HPI    The patient is a 34 years old female with recent admission for work-up of thrombocytopenia and was diagnosed with ITP. She was admitted 11/18/19 with platelets of 5 and was followed by hematology and had a bone marrow biopsy. The patient was discharged on 50 mg of prednisone.  She followed up with her PCP on 11/22/2019 and Platelets were found to be 9 thus was sent to the ED. She was given 1 unit platelets and a dose of IV solu medrol in ED and hematology was consulted. She was here in October in ED for a kidney stone and platelets were normal.       Review of Systems   All other systems reviewed and are negative.        Objective   Objective      Vital Signs  Temp:  [97.6 °F (36.4 °C)-98.6 °F (37 °C)] 98.6 °F (37 °C)  Heart Rate:  [53-56] 56  Resp:  [16-17] 16  BP: (103-143)/(56-78) 103/56  Oxygen Therapy  SpO2: 98  "%  Pulse Oximetry Type: Intermittent  Device (Oxygen Therapy): room air  Flowsheet Rows      First Filed Value   Admission Height  157.5 cm (62\") Documented at 11/22/2019 1743   Admission Weight  51.3 kg (113 lb) Documented at 11/22/2019 1743        Intake & Output (last 3 days)       11/21 0701 - 11/22 0700 11/22 0701 - 11/23 0700 11/23 0701 - 11/24 0700 11/24 0701 - 11/25 0700    P.O.   480 240    Blood  459.5      IV Piggyback  500      Total Intake(mL/kg)  959.5 (19.9) 480 (8.9) 240 (4.4)    Net  +959.5 +480 +240            Urine Unmeasured Occurrence   3 x         Lines, Drains & Airways    Active LDAs     Name:   Placement date:   Placement time:   Site:   Days:    Peripheral IV 11/22/19 1834 Right Antecubital   11/22/19 1834    Antecubital   less than 1              Physical Exam:    Physical Exam   Constitutional: She is oriented to person, place, and time. She appears well-developed and well-nourished.   Appears younger than stated age.   HENT:   Head: Normocephalic and atraumatic.   Mouth/Throat: Oropharynx is clear and moist and mucous membranes are normal. Normal dentition.   Eyes: Conjunctivae and EOM are normal. Pupils are equal, round, and reactive to light. No scleral icterus.   Neck: Normal range of motion. Neck supple.   Cardiovascular: Normal rate and normal heart sounds.   Pulmonary/Chest: Effort normal and breath sounds normal.   Abdominal: Soft. Bowel sounds are normal.   Musculoskeletal: Normal range of motion.   Neurological: She is alert and oriented to person, place, and time.   Skin: Skin is warm and dry. Ecchymosis noted.        Psychiatric: She has a normal mood and affect.     Procedures: none    Results Review:     I reviewed the patient's new clinical results.      Lab Results (last 24 hours)     Procedure Component Value Units Date/Time    CBC & Differential [[id number]] Collected:  11/24/19 1358    Specimen:  Blood Updated:  11/24/19 1416    Narrative:       The following orders " were created for panel order CBC & Differential.  Procedure                               Abnormality         Status                     ---------                               -----------         ------                     CBC Auto Differential[[id number]]        Abnormal            Final result                 Please view results for these tests on the individual orders.    CBC Auto Differential [[id number]]  (Abnormal) Collected:  11/24/19 1358    Specimen:  Blood Updated:  11/24/19 1415     WBC 10.80 10*3/mm3      RBC 3.81 10*6/mm3      Hemoglobin 11.2 g/dL      Hematocrit 32.7 %      MCV 85.9 fL      MCH 29.3 pg      MCHC 34.1 g/dL      RDW 14.2 %      RDW-SD 42.9 fl      MPV 9.7 fL      Platelets 52 10*3/mm3      Neutrophil % 86.2 %      Lymphocyte % 9.1 %      Monocyte % 4.6 %      Eosinophil % 0.0 %      Basophil % 0.1 %      Neutrophils, Absolute 9.30 10*3/mm3      Lymphocytes, Absolute 1.00 10*3/mm3      Monocytes, Absolute 0.50 10*3/mm3      Eosinophils, Absolute 0.00 10*3/mm3      Basophils, Absolute 0.00 10*3/mm3      nRBC 0.0 /100 WBC     Narrative:       Reviewed by Pathologist within the past 30 days on 11/18/19 .    Folate [[id number]]  (Normal) Collected:  11/23/19 2212    Specimen:  Blood Updated:  11/24/19 1201     Folate 11.20 ng/mL     Vitamin B12 [[id number]]  (Normal) Collected:  11/23/19 2212    Specimen:  Blood Updated:  11/24/19 1156     Vitamin B-12 461 pg/mL     Hepatitis B Surface Antibody [[id number]]  (Abnormal) Collected:  11/23/19 2212    Specimen:  Blood Updated:  11/24/19 1156     Hep B S Ab Reactive    Haptoglobin [[id number]]  (Normal) Collected:  11/23/19 2213    Specimen:  Blood Updated:  11/24/19 1145     Haptoglobin 136 mg/dL      Comment: Specimen hemolyzed. Results may be affected.       CBC & Differential [[id number]] Collected:  11/24/19 0605    Specimen:  Blood Updated:  11/24/19 0657    Narrative:       The following orders were created for panel order CBC &  Differential.  Procedure                               Abnormality         Status                     ---------                               -----------         ------                     CBC Auto Differential[[id number]]        Abnormal            Final result                 Please view results for these tests on the individual orders.    CBC Auto Differential [[id number]]  (Abnormal) Collected:  11/24/19 0605    Specimen:  Blood Updated:  11/24/19 0657     WBC 9.00 10*3/mm3      RBC 3.60 10*6/mm3      Hemoglobin 10.6 g/dL      Hematocrit 31.1 %      MCV 86.4 fL      MCH 29.4 pg      MCHC 34.1 g/dL      RDW 13.9 %      RDW-SD 42.0 fl      MPV 9.3 fL      Platelets 29 10*3/mm3      Comment: Reviewed by Pathologist within the past 30 days on 11.18.19.         Neutrophil % 85.4 %      Lymphocyte % 8.7 %      Monocyte % 5.8 %      Eosinophil % 0.0 %      Basophil % 0.1 %      Neutrophils, Absolute 7.70 10*3/mm3      Lymphocytes, Absolute 0.80 10*3/mm3      Monocytes, Absolute 0.50 10*3/mm3      Eosinophils, Absolute 0.00 10*3/mm3      Basophils, Absolute 0.00 10*3/mm3      nRBC 0.1 /100 WBC     HIV-1 & HIV-2 Antibodies [[id number]] Collected:  11/23/19 2212    Specimen:  Blood Updated:  11/23/19 2257    Narrative:       The following orders were created for panel order HIV-1 & HIV-2 Antibodies.  Procedure                               Abnormality         Status                     ---------                               -----------         ------                     HIV-1 / O / 2 Ag / Antib...[[id number]]  Normal              Final result                 Please view results for these tests on the individual orders.    HIV-1 / O / 2 Ag / Antibody 4th Generation [[id number]]  (Normal) Collected:  11/23/19 2212    Specimen:  Blood Updated:  11/23/19 2257     HIV-1/ HIV-2 Non-Reactive     Comment: A non-reactive test result does not preclude the possibility of exposure to HIV or infection with HIV. An antibody response to  recent exposure may take several months to reach detectable levels.       Narrative:       The HIV antibody/antigen combo assay is a qualitative assay for HIV that includes the p24 antigen as well as antibodies to HIV types 1 and 2. This test is intended to be used as a screening assay in the diagnosis of HIV infection in patients over the age of 2.    Hepatitis B Surface Antigen [[id number]] Collected:  11/23/19 2212    Specimen:  Blood Updated:  11/23/19 2257    Narrative:       The following orders were created for panel order Hepatitis B Surface Antigen.  Procedure                               Abnormality         Status                     ---------                               -----------         ------                     Hepatitis B Surface Antigen[[id number]]  Normal              Final result                 Please view results for these tests on the individual orders.    Hepatitis B Surface Antigen [[id number]]  (Normal) Collected:  11/23/19 2212    Specimen:  Blood Updated:  11/23/19 2257     Hepatitis B Surface Ag Non-Reactive    TSH [[id number]]  (Normal) Collected:  11/23/19 2212    Specimen:  Blood Updated:  11/23/19 2257     TSH 0.337 uIU/mL     Hepatitis Panel, Acute [[id number]]  (Normal) Collected:  11/23/19 2150    Specimen:  Blood from Arm, Right Updated:  11/23/19 2248     Hepatitis B Surface Ag Non-Reactive     Hep A IgM Non-Reactive     Hep B C IgM Non-Reactive     Hepatitis C Ab Non-Reactive    Hepatitis B Core Antibody, Total [[id number]] Collected:  11/23/19 2212    Specimen:  Blood Updated:  11/23/19 2214    JOSE by IFA, Reflex 9-biomarkers profile [[id number]] Collected:  11/23/19 2212    Specimen:  Blood Updated:  11/23/19 2214    Helicobacter Pylori, IgA IgG IgM [[id number]] Collected:  11/23/19 2212    Specimen:  Blood Updated:  11/23/19 2214    Protein Electrophoresis, Total [[id number]] Collected:  11/23/19 2212    Specimen:  Blood from Arm, Left Updated:  11/23/19 2213        No results  found for: HGBA1C  Results from last 7 days   Lab Units 11/18/19  0918   INR  1.00             Microbiology Results (last 10 days)     ** No results found for the last 240 hours. **          ECG/EMG Results (most recent)     None                  Ir Bone Marrow Biopsy And Aspiration    Result Date: 11/19/2019  IMPRESSION : Technically successful bone marrow aspiration and biopsy under fluoroscopy  Electronically Signed By-Kiran Lopez On:11/19/2019 3:33 PM This report was finalized on [id number] by  Kiran Lopez, .      Xrays, labs reviewed personally by physician.    Medication Review:   I have reviewed the patient's current medication list      Scheduled Meds    [START ON 12/1/2019] megestrol acetate 40 mg Oral BID   Followed by      megestrol acetate 40 mg Oral 4x Daily   Followed by      [START ON 12/8/2019] megestrol acetate 40 mg Oral Daily   methylPREDNISolone sodium succinate 125 mg Intravenous Q6H   pantoprazole 40 mg Oral QAM   riTUXimab (RITUXAN) IVPB 570 mg Intravenous Once   sodium chloride 10 mL Intravenous Q12H   sodium chloride 250 mL Intravenous Once       Meds Infusions    sodium chloride 50 mL/hr Last Rate: 50 mL/hr (11/24/19 1045)       Meds PRN  •  diphenhydrAMINE  •  famotidine  •  hydrocortisone sodium succinate  •  melatonin  •  meperidine  •  ondansetron **OR** ondansetron  •  sodium chloride      Assessment/Plan   Assessment/Plan     Active Hospital Problems:  * Thrombocytopenia (CMS/HCC)- (present on admission)    --Secondary to ITP   --Discharged home on prednisone 50 mg daily   --Transfused 1 unit platelet 11/22/2019 but an appropriate response  --s/p work-up for infectious causes  --Megace started 11/23/2019 because menstrual bleeding  --s/p bone marrow biopsy last admission   --Hematology/oncology has not decided on starting Rituxan  --Keep overnight 11/24/2019 to observe platelets       Iron deficiency anemia- (present on admission)    -- Low ferritin level.  --HAILE possibly through  menstrual loss  --Hematology/oncology following     Ecchymosis- (present on admission)    --Secondary to thrombocytopenia.         VTE Prophylaxis - SCDs.      Code Status -   Code Status and Medical Interventions:   Ordered at: 11/22/19 6840     Code Status:    CPR     Medical Interventions (Level of Support Prior to Arrest):    Full       Discharge Planning:  Discharge home 1 to 2 days based on hematology/oncology recommendations    Destination      No service coordination in this encounter.      Durable Medical Equipment      No service coordination in this encounter.      Dialysis/Infusion      No service coordination in this encounter.      Home Medical Care      No service coordination in this encounter.      Therapy      No service coordination in this encounter.      Community Resources      No service coordination in this encounter.              Tony Rider DO, 11/23/19 10:42 AM

## 2019-11-24 NOTE — PLAN OF CARE
Problem: Patient Care Overview  Goal: Plan of Care Review  Outcome: Ongoing (interventions implemented as appropriate)   11/24/19 1701   Coping/Psychosocial   Plan of Care Reviewed With patient;family   Plan of Care Review   Progress improving   OTHER   Outcome Summary Patient has no complaints at this time. Platelets improving. No Rituxan given per Oncology doctor. Ok to discharge from oncologist stand point. will continue to monitor     Goal: Individualization and Mutuality  Outcome: Ongoing (interventions implemented as appropriate)    Goal: Discharge Needs Assessment  Outcome: Ongoing (interventions implemented as appropriate)    Goal: Interprofessional Rounds/Family Conf  Outcome: Ongoing (interventions implemented as appropriate)      Problem: Pain, Acute (Adult)  Goal: Acceptable Pain Control/Comfort Level  Outcome: Ongoing (interventions implemented as appropriate)

## 2019-11-24 NOTE — PLAN OF CARE
Problem: Patient Care Overview  Goal: Plan of Care Review  Outcome: Ongoing (interventions implemented as appropriate)   11/24/19 0111   Coping/Psychosocial   Plan of Care Reviewed With patient;mother   Plan of Care Review   Progress no change   OTHER   Outcome Summary Patient complained of pain at bone marrow biopsy site saying it has increased each day not getting better. Family at bedside as patient due to get rituxan in am. We are to pull labs from her iv site as she is bleeding risk.        Problem: Pain, Acute (Adult)  Goal: Identify Related Risk Factors and Signs and Symptoms  Outcome: Outcome(s) achieved Date Met: 11/24/19    Goal: Acceptable Pain Control/Comfort Level  Outcome: Ongoing (interventions implemented as appropriate)

## 2019-11-25 ENCOUNTER — APPOINTMENT (OUTPATIENT)
Dept: ONCOLOGY | Facility: CLINIC | Age: 34
End: 2019-11-25

## 2019-11-25 ENCOUNTER — APPOINTMENT (OUTPATIENT)
Dept: LAB | Facility: HOSPITAL | Age: 34
End: 2019-11-25

## 2019-11-25 VITALS
OXYGEN SATURATION: 99 % | RESPIRATION RATE: 18 BRPM | WEIGHT: 119.05 LBS | SYSTOLIC BLOOD PRESSURE: 103 MMHG | HEART RATE: 58 BPM | HEIGHT: 62 IN | TEMPERATURE: 98 F | BODY MASS INDEX: 21.91 KG/M2 | DIASTOLIC BLOOD PRESSURE: 63 MMHG

## 2019-11-25 LAB
ANION GAP SERPL CALCULATED.3IONS-SCNC: 11 MMOL/L (ref 5–15)
BUN BLD-MCNC: 14 MG/DL (ref 6–20)
BUN/CREAT SERPL: 26.9 (ref 7–25)
CALCIUM SPEC-SCNC: 9.6 MG/DL (ref 8.6–10.5)
CHLORIDE SERPL-SCNC: 101 MMOL/L (ref 98–107)
CO2 SERPL-SCNC: 28 MMOL/L (ref 22–29)
CREAT BLD-MCNC: 0.52 MG/DL (ref 0.57–1)
DEPRECATED RDW RBC AUTO: 42.4 FL (ref 37–54)
ERYTHROCYTE [DISTWIDTH] IN BLOOD BY AUTOMATED COUNT: 14.1 % (ref 12.3–15.4)
GFR SERPL CREATININE-BSD FRML MDRD: 137 ML/MIN/1.73
GLUCOSE BLD-MCNC: 136 MG/DL (ref 65–99)
HCT VFR BLD AUTO: 32.5 % (ref 34–46.6)
HGB BLD-MCNC: 11.2 G/DL (ref 12–15.9)
LYMPHOCYTES # BLD MANUAL: 1.09 10*3/MM3 (ref 0.7–3.1)
LYMPHOCYTES NFR BLD MANUAL: 4 % (ref 5–12)
LYMPHOCYTES NFR BLD MANUAL: 9 % (ref 19.6–45.3)
MCH RBC QN AUTO: 29.6 PG (ref 26.6–33)
MCHC RBC AUTO-ENTMCNC: 34.5 G/DL (ref 31.5–35.7)
MCV RBC AUTO: 85.9 FL (ref 79–97)
METAMYELOCYTES NFR BLD MANUAL: 1 % (ref 0–0)
MONOCYTES # BLD AUTO: 0.48 10*3/MM3 (ref 0.1–0.9)
NEUTROPHILS # BLD AUTO: 10.29 10*3/MM3 (ref 1.7–7)
NEUTROPHILS NFR BLD MANUAL: 82 % (ref 42.7–76)
NEUTS BAND NFR BLD MANUAL: 3 % (ref 0–5)
PLATELET # BLD AUTO: 51 10*3/MM3 (ref 140–450)
PMV BLD AUTO: 8.5 FL (ref 6–12)
POTASSIUM BLD-SCNC: 4 MMOL/L (ref 3.5–5.2)
RBC # BLD AUTO: 3.78 10*6/MM3 (ref 3.77–5.28)
RBC MORPH BLD: NORMAL
SCAN SLIDE: NORMAL
SMALL PLATELETS BLD QL SMEAR: ABNORMAL
SODIUM BLD-SCNC: 140 MMOL/L (ref 136–145)
VARIANT LYMPHS NFR BLD MANUAL: 1 % (ref 0–5)
WBC MORPH BLD: NORMAL
WBC NRBC COR # BLD: 12.1 10*3/MM3 (ref 3.4–10.8)

## 2019-11-25 PROCEDURE — 99225 PR SBSQ OBSERVATION CARE/DAY 25 MINUTES: CPT | Performed by: INTERNAL MEDICINE

## 2019-11-25 PROCEDURE — G0378 HOSPITAL OBSERVATION PER HR: HCPCS

## 2019-11-25 PROCEDURE — 85007 BL SMEAR W/DIFF WBC COUNT: CPT | Performed by: NURSE PRACTITIONER

## 2019-11-25 PROCEDURE — 80048 BASIC METABOLIC PNL TOTAL CA: CPT | Performed by: HOSPITALIST

## 2019-11-25 PROCEDURE — 99217 PR OBSERVATION CARE DISCHARGE MANAGEMENT: CPT | Performed by: INTERNAL MEDICINE

## 2019-11-25 PROCEDURE — 96376 TX/PRO/DX INJ SAME DRUG ADON: CPT

## 2019-11-25 PROCEDURE — 25010000002 METHYLPREDNISOLONE PER 125 MG: Performed by: NURSE PRACTITIONER

## 2019-11-25 PROCEDURE — 85025 COMPLETE CBC W/AUTO DIFF WBC: CPT | Performed by: NURSE PRACTITIONER

## 2019-11-25 RX ADMIN — METHYLPREDNISOLONE SODIUM SUCCINATE 125 MG: 125 INJECTION, POWDER, FOR SOLUTION INTRAMUSCULAR; INTRAVENOUS at 06:27

## 2019-11-25 RX ADMIN — METHYLPREDNISOLONE SODIUM SUCCINATE 125 MG: 125 INJECTION, POWDER, FOR SOLUTION INTRAMUSCULAR; INTRAVENOUS at 00:23

## 2019-11-25 RX ADMIN — PANTOPRAZOLE SODIUM 40 MG: 40 TABLET, DELAYED RELEASE ORAL at 06:28

## 2019-11-25 RX ADMIN — MEGESTROL ACETATE 40 MG: 40 SUSPENSION ORAL at 09:14

## 2019-11-25 RX ADMIN — Medication 10 ML: at 09:15

## 2019-11-25 NOTE — PROGRESS NOTES
Hematology/Oncology Inpatient Progress Note    PATIENT NAME: Tiny Akhtar  : 1985  MRN: [id number]    CHIEF COMPLAINT:  Thrombocytopenia    HISTORY OF PRESENT ILLNESS:    Tiny Akhtar is a 34 y.o. female with a past medical history of thrombocytopenia, anemia, and kidney stones.  She presented to [facility] Emergency Department on 2019 after she had blood work drawn earlier in the day that showed a platelet count of 9,000 at this facility.  She had previously been admitted through the ED on 2019 reporting bruising on her ankles and in her mouth.  She received 2 units of platelets and started on steroids. She was discharged on 19.The patient's last platelet count was 17,000 on 19 after a platelet transfusion.  She had been on steroids for four days.  The patient reported continued  subcutaneous bruising. She denied any headaches, nosebleeds, or gingival bleeding.  She was admitted for IV steroids and further management.     19  Hematology/Oncology was consulted on this patient known to our service and previously consulted for acute thrombocytopenia on 19. She was followed by Uri Sagastume MD for ITP. The patient had been transfused and started on IV steroids while inpatient 19 to 19.  She was transitioned to oral steroids at discharge.   · 19  Bone marrow biopsy - Smears of bone marrow aspirate with cell block (clot) preparation and marrow biopsy:  Hypocellular for age bone marrow (50%),  Adequate numbers of unremarkable megakaryocytes,  Absent iron stores,  Negative for involvement by malignant lymphoma.  Flow cytometry-CD 56 expression on granulocytes and monocytes.  Chromosomal analysis - pending.     PCP: Jose Ingram APRN    Subjective  Patient does not report any bleeding.  No new bruises.  Her platelets are improving.    ROS:  Review of Systems   Constitutional: Negative for chills and fever.   HENT: Negative  "for ear pain, mouth sores, nosebleeds and sore throat.    Eyes: Positive for itching. Negative for photophobia and visual disturbance.   Respiratory: Negative for wheezing and stridor.    Cardiovascular: Negative for chest pain and palpitations.   Gastrointestinal: Negative for abdominal pain, diarrhea, nausea and vomiting.   Endocrine: Negative for cold intolerance and heat intolerance.   Genitourinary: Positive for vaginal bleeding ( menses). Negative for dysuria and hematuria.   Musculoskeletal: Negative for joint swelling and neck stiffness.   Skin: Negative for color change and rash.   Neurological: Negative for seizures and syncope.   Hematological: Negative for adenopathy. Bruises/bleeds easily ( Bruising).        No obvious bleeding   Psychiatric/Behavioral: Negative for agitation, confusion and hallucinations.   All other systems reviewed and are negative.     MEDICATIONS:    Scheduled Meds:       Continuous Infusions:      No current facility-administered medications for this encounter.    PRN Meds:       ALLERGIES:  No Known Allergies    Objective    VITALS:   /63 (BP Location: Right arm, Patient Position: Sitting)   Pulse 58   Temp 98 °F (36.7 °C) (Oral)   Resp 18   Ht 157.5 cm (62\")   Wt 54 kg (119 lb 0.8 oz)   SpO2 99%   BMI 21.77 kg/m²     PHYSICAL EXAM:  Physical Exam   Constitutional: She is oriented to person, place, and time. She appears well-developed and well-nourished. No distress.   HENT:   Head: Normocephalic and atraumatic.   Eyes: Conjunctivae and EOM are normal. Right eye exhibits no discharge. Left eye exhibits no discharge. No scleral icterus.   Neck: Normal range of motion. Neck supple. No thyromegaly present.   Cardiovascular: Normal rate, regular rhythm and normal heart sounds. Exam reveals no gallop and no friction rub.   Pulmonary/Chest: Effort normal and breath sounds normal. No stridor. No respiratory distress. She has no wheezes.   Abdominal: Soft. Bowel sounds are " normal. She exhibits no mass. There is no tenderness. There is no rebound and no guarding.   Musculoskeletal: Normal range of motion. She exhibits no tenderness.   Peripheral IV right antecubital    Lymphadenopathy:     She has no cervical adenopathy.   Neurological: She is alert and oriented to person, place, and time. She exhibits normal muscle tone.   Skin: Skin is warm, dry and intact. Ecchymosis ( scattered) noted. No rash noted. She is not diaphoretic. No erythema.   Psychiatric: She has a normal mood and affect. Her behavior is normal.   Nursing note and vitals reviewed.        RECENT LABS:  Lab Results (last 24 hours)     Procedure Component Value Units Date/Time    CBC & Differential [[id number]] Collected:  11/25/19 0306    Specimen:  Blood Updated:  11/25/19 0524    Narrative:       The following orders were created for panel order CBC & Differential.  Procedure                               Abnormality         Status                     ---------                               -----------         ------                     CBC Auto Differential[[id number]]        Abnormal            Final result                 Please view results for these tests on the individual orders.    CBC Auto Differential [[id number]]  (Abnormal) Collected:  11/25/19 0306    Specimen:  Blood Updated:  11/25/19 0524     WBC 12.10 10*3/mm3      RBC 3.78 10*6/mm3      Hemoglobin 11.2 g/dL      Hematocrit 32.5 %      MCV 85.9 fL      MCH 29.6 pg      MCHC 34.5 g/dL      RDW 14.1 %      RDW-SD 42.4 fl      MPV 8.5 fL      Platelets 51 10*3/mm3     Scan Slide [[id number]] Collected:  11/25/19 0306    Specimen:  Blood Updated:  11/25/19 0524     Scan Slide --     Comment: See Manual Differential Results       Manual Differential [[id number]]  (Abnormal) Collected:  11/25/19 0306    Specimen:  Blood Updated:  11/25/19 0524     Neutrophil % 82.0 %      Lymphocyte % 9.0 %      Monocyte % 4.0 %      Bands %  3.0 %      Metamyelocyte % 1.0 %       Atypical Lymphocyte % 1.0 %      Neutrophils Absolute 10.29 10*3/mm3      Lymphocytes Absolute 1.09 10*3/mm3      Monocytes Absolute 0.48 10*3/mm3      RBC Morphology Normal     WBC Morphology Normal     Platelet Estimate Decreased    Basic Metabolic Panel [[id number]]  (Abnormal) Collected:  11/25/19 0306    Specimen:  Blood Updated:  11/25/19 0426     Glucose 136 mg/dL      BUN 14 mg/dL      Creatinine 0.52 mg/dL      Sodium 140 mmol/L      Potassium 4.0 mmol/L      Chloride 101 mmol/L      CO2 28.0 mmol/L      Calcium 9.6 mg/dL      eGFR Non African Amer 137 mL/min/1.73      BUN/Creatinine Ratio 26.9     Anion Gap 11.0 mmol/L     Narrative:       GFR Normal >60  Chronic Kidney Disease <60  Kidney Failure <15    CBC & Differential [[id number]] Collected:  11/24/19 1358    Specimen:  Blood Updated:  11/24/19 1415    Narrative:       The following orders were created for panel order CBC & Differential.  Procedure                               Abnormality         Status                     ---------                               -----------         ------                     CBC Auto Differential[[id number]]        Abnormal            Final result                 Please view results for these tests on the individual orders.    CBC Auto Differential [[id number]]  (Abnormal) Collected:  11/24/19 1358    Specimen:  Blood Updated:  11/24/19 1415     WBC 10.80 10*3/mm3      RBC 3.81 10*6/mm3      Hemoglobin 11.2 g/dL      Hematocrit 32.7 %      MCV 85.9 fL      MCH 29.3 pg      MCHC 34.1 g/dL      RDW 14.2 %      RDW-SD 42.9 fl      MPV 9.7 fL      Platelets 52 10*3/mm3      Neutrophil % 86.2 %      Lymphocyte % 9.1 %      Monocyte % 4.6 %      Eosinophil % 0.0 %      Basophil % 0.1 %      Neutrophils, Absolute 9.30 10*3/mm3      Lymphocytes, Absolute 1.00 10*3/mm3      Monocytes, Absolute 0.50 10*3/mm3      Eosinophils, Absolute 0.00 10*3/mm3      Basophils, Absolute 0.00 10*3/mm3      nRBC 0.0 /100 WBC     Narrative:        Reviewed by Pathologist within the past 30 days on 11/18/19 .    Folate [[id number]]  (Normal) Collected:  11/23/19 2212    Specimen:  Blood Updated:  11/24/19 1201     Folate 11.20 ng/mL     Vitamin B12 [[id number]]  (Normal) Collected:  11/23/19 2212    Specimen:  Blood Updated:  11/24/19 1156     Vitamin B-12 461 pg/mL     Hepatitis B Surface Antibody [[id number]]  (Abnormal) Collected:  11/23/19 2212    Specimen:  Blood Updated:  11/24/19 1156     Hep B S Ab Reactive    Haptoglobin [[id number]]  (Normal) Collected:  11/23/19 2213    Specimen:  Blood Updated:  11/24/19 1145     Haptoglobin 136 mg/dL      Comment: Specimen hemolyzed. Results may be affected.             PENDING RESULTS: Chromosomal analysis bone marrow biopsy on 11/19/2019, , SPEP, occult blood x1 stool, JOSE, H plyori antibodies        IMAGING REVIEWED:  No radiology results for the last day    I have reviewed the patient's labs, imaging, reports, and other clinician documentation.    Assessment/Plan   ASSESSMENT  1. Acute thrombocytopenia, refractory to steroids: likely due to ITP, began on 11/18/19, normal platelet count 10/7/19. Coags and LFTs normal.  No history of alcohol abuse. S/p bone marrow biopsy 11/18/19 - hypocellular for age bone marrow (50%),  adequate numbers of unremarkable megakaryocytes,  absent iron stores,  negative for involvement by malignant lymphoma.  Flow cytometry-CD 56 expression on granulocytes and monocytes.  Chromosomal analysis - pending. Continue Solumedrol and Protonix. Platelets 9,000 on admission, plt count improved to 52,000 today. HIV testing negative. TSH normal.   · 1 unit SDP 11/18/19   · 1 unit SDP 11/19/19  · 1 unit SDP 11/22/19   2. Anemia: History of HAILE:  Bone marrow biopsy on 11/18/19 showed absent iron stores. Anemia work-up: reticulocytes 1.53, ferritin 15.38, iron 40, iron saturation 9, transferrin 310, TIBC 462, , haptoglobin 136, vitamin B12 461, folate 11.2   · Iron deficiency anemia - S/p  Venofer 400 mg IV x 1 dose on 11/23/19  3. Left renal stone: 3 mm kidney stone visualized on CT A/P on 10/7/19.  Asymptomatic.  4. Anxiety/weight loss:  management per Primary team   5. Menstrual bleeding: LMPD 11/23/19 - started Megace per recommendation from GYN to suppress menstrual cycle      PLAN  1. CBC daily   2. Transfuse 1 unit single donor platelets for platelet count less than 15,000 or with bleeding  3. HOLD on starting Rituxan inpatient 375 mg/m2 on days 1,8,15, and 22 x 1 cycle, notified Pharmacy and RN - patient's platelet count is improving   4. Consider thrombopoietin receptor agonist   5. Continue Solu-Medrol 125 mg every 6 hours inpatient and transition to prednisone 50 mg po daily for home med  6. Continue Megace 40 mg PO QID x 7 days, then BID x 7 days, then daily if amenorrhea   7. Continue Protonix - patient to continue with prednisone  8. Consider splenectomy   9. Vaccinations: message sent to María Elena Dubon RN for plan in EPIC  · PPSV23 in 8 weeks   · Meningococcal vaccination with Menveo - 2nd dose in 8 weeks   · Bexsero-  2nd dose in 4 weeks   12. OK to discharge from Hematology/Oncology standpoint   13. [facility] - message sent to  for scheduling     Electronically signed by FARZAD Banegas, 11/25/19, 8:25 AM.        I have personally performed a face-to-face diagnostic evaluation on this patient.  I have reviewed and agreed with the care plan.   Patient seen and examined nurse practitioner note reviewed updated discussed with nurse practitioner.  Platelet count is improving.  52,000 today.  I will discharge the patient on prednisone 50 mg daily.  Check CBC later this week to recheck platelet count.    I discussed the patients findings and my recommendations with patient.     Uri Sagastume MD  11/25/19  6:51 PM

## 2019-11-25 NOTE — DISCHARGE SUMMARY
Date of Admission: 11/22/2019    Date of Discharge:  11/25/2019    Length of stay:  LOS: 0 days     Discharge Diagnosis: thrombocytopenia    Presenting Problem/History of Present Illness  Active Hospital Problems    Diagnosis  POA   • **Thrombocytopenia (CMS/HCC) [D69.6]  Yes   • Iron deficiency anemia [D50.9]  Yes   • Ecchymosis [R58]  Yes      Resolved Hospital Problems   No resolved problems to display.          Hospital Course  Patient is a 34 y.o. female presented with low platelets and was seen by Onc who transfused her and started her on steroids. Plt were 51K at discharge     Past Medical History:     Past Medical History:   Diagnosis Date   • Anxiety    • Iron deficiency anemia    • Kidney stone 10/2019       Past Surgical History:   History reviewed. No pertinent surgical history.    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        Procedures Performed         Consults:   Consults     Date and Time Order Name Status Description    11/22/2019 2349 Inpatient Hematology & Oncology Consult Completed     11/18/2019 0955 Hematology and Oncology (on-call MD unless specified) Completed     11/18/2019 0955 Hospitalist (on-call MD unless specified) Completed           Pertinent Test Results:       Lab Results (most recent)     Procedure Component Value Units Date/Time    CBC & Differential [[id number]] Collected:  11/25/19 0306    Specimen:  Blood Updated:  11/25/19 0524    Narrative:       The following orders were created for panel order CBC &  Differential.  Procedure                               Abnormality         Status                     ---------                               -----------         ------                     CBC Auto Differential[[id number]]        Abnormal            Final result                 Please view results for these tests on the individual orders.    CBC Auto Differential [[id number]]  (Abnormal) Collected:  11/25/19 0306    Specimen:  Blood Updated:  11/25/19 0524     WBC 12.10 10*3/mm3      RBC 3.78 10*6/mm3      Hemoglobin 11.2 g/dL      Hematocrit 32.5 %      MCV 85.9 fL      MCH 29.6 pg      MCHC 34.5 g/dL      RDW 14.1 %      RDW-SD 42.4 fl      MPV 8.5 fL      Platelets 51 10*3/mm3     Scan Slide [[id number]] Collected:  11/25/19 0306    Specimen:  Blood Updated:  11/25/19 0524     Scan Slide --     Comment: See Manual Differential Results       Manual Differential [[id number]]  (Abnormal) Collected:  11/25/19 0306    Specimen:  Blood Updated:  11/25/19 0524     Neutrophil % 82.0 %      Lymphocyte % 9.0 %      Monocyte % 4.0 %      Bands %  3.0 %      Metamyelocyte % 1.0 %      Atypical Lymphocyte % 1.0 %      Neutrophils Absolute 10.29 10*3/mm3      Lymphocytes Absolute 1.09 10*3/mm3      Monocytes Absolute 0.48 10*3/mm3      RBC Morphology Normal     WBC Morphology Normal     Platelet Estimate Decreased    Basic Metabolic Panel [[id number]]  (Abnormal) Collected:  11/25/19 0306    Specimen:  Blood Updated:  11/25/19 0426     Glucose 136 mg/dL      BUN 14 mg/dL      Creatinine 0.52 mg/dL      Sodium 140 mmol/L      Potassium 4.0 mmol/L      Chloride 101 mmol/L      CO2 28.0 mmol/L      Calcium 9.6 mg/dL      eGFR Non African Amer 137 mL/min/1.73      BUN/Creatinine Ratio 26.9     Anion Gap 11.0 mmol/L     Narrative:       GFR Normal >60  Chronic Kidney Disease <60  Kidney Failure <15    CBC & Differential [[id number]] Collected:  11/24/19 1358    Specimen:  Blood Updated:  11/24/19 1415    Narrative:       The following  orders were created for panel order CBC & Differential.  Procedure                               Abnormality         Status                     ---------                               -----------         ------                     CBC Auto Differential[[id number]]        Abnormal            Final result                 Please view results for these tests on the individual orders.    CBC Auto Differential [[id number]]  (Abnormal) Collected:  11/24/19 1358    Specimen:  Blood Updated:  11/24/19 1415     WBC 10.80 10*3/mm3      RBC 3.81 10*6/mm3      Hemoglobin 11.2 g/dL      Hematocrit 32.7 %      MCV 85.9 fL      MCH 29.3 pg      MCHC 34.1 g/dL      RDW 14.2 %      RDW-SD 42.9 fl      MPV 9.7 fL      Platelets 52 10*3/mm3      Neutrophil % 86.2 %      Lymphocyte % 9.1 %      Monocyte % 4.6 %      Eosinophil % 0.0 %      Basophil % 0.1 %      Neutrophils, Absolute 9.30 10*3/mm3      Lymphocytes, Absolute 1.00 10*3/mm3      Monocytes, Absolute 0.50 10*3/mm3      Eosinophils, Absolute 0.00 10*3/mm3      Basophils, Absolute 0.00 10*3/mm3      nRBC 0.0 /100 WBC     Narrative:       Reviewed by Pathologist within the past 30 days on 11/18/19 .    Folate [[id number]]  (Normal) Collected:  11/23/19 2212    Specimen:  Blood Updated:  11/24/19 1201     Folate 11.20 ng/mL     Vitamin B12 [[id number]]  (Normal) Collected:  11/23/19 2212    Specimen:  Blood Updated:  11/24/19 1156     Vitamin B-12 461 pg/mL     Hepatitis B Surface Antibody [[id number]]  (Abnormal) Collected:  11/23/19 2212    Specimen:  Blood Updated:  11/24/19 1156     Hep B S Ab Reactive    Haptoglobin [[id number]]  (Normal) Collected:  11/23/19 2213    Specimen:  Blood Updated:  11/24/19 1145     Haptoglobin 136 mg/dL      Comment: Specimen hemolyzed. Results may be affected.       HIV-1 & HIV-2 Antibodies [[id number]] Collected:  11/23/19 2212    Specimen:  Blood Updated:  11/23/19 9537    Narrative:       The following orders were created for panel order HIV-1 &  HIV-2 Antibodies.  Procedure                               Abnormality         Status                     ---------                               -----------         ------                     HIV-1 / O / 2 Ag / Antib...[[id number]]  Normal              Final result                 Please view results for these tests on the individual orders.    HIV-1 / O / 2 Ag / Antibody 4th Generation [[id number]]  (Normal) Collected:  11/23/19 2212    Specimen:  Blood Updated:  11/23/19 2257     HIV-1/ HIV-2 Non-Reactive     Comment: A non-reactive test result does not preclude the possibility of exposure to HIV or infection with HIV. An antibody response to recent exposure may take several months to reach detectable levels.       Narrative:       The HIV antibody/antigen combo assay is a qualitative assay for HIV that includes the p24 antigen as well as antibodies to HIV types 1 and 2. This test is intended to be used as a screening assay in the diagnosis of HIV infection in patients over the age of 2.    Hepatitis B Surface Antigen [[id number]] Collected:  11/23/19 2212    Specimen:  Blood Updated:  11/23/19 2257    Narrative:       The following orders were created for panel order Hepatitis B Surface Antigen.  Procedure                               Abnormality         Status                     ---------                               -----------         ------                     Hepatitis B Surface Antigen[[id number]]  Normal              Final result                 Please view results for these tests on the individual orders.    Hepatitis B Surface Antigen [[id number]]  (Normal) Collected:  11/23/19 2212    Specimen:  Blood Updated:  11/23/19 2257     Hepatitis B Surface Ag Non-Reactive    TSH [[id number]]  (Normal) Collected:  11/23/19 2212    Specimen:  Blood Updated:  11/23/19 2257     TSH 0.337 uIU/mL     Hepatitis Panel, Acute [[id number]]  (Normal) Collected:  11/23/19 2150    Specimen:  Blood from Arm, Right Updated:   11/23/19 2248     Hepatitis B Surface Ag Non-Reactive     Hep A IgM Non-Reactive     Hep B C IgM Non-Reactive     Hepatitis C Ab Non-Reactive    Hepatitis B Core Antibody, Total [[id number]] Collected:  11/23/19 2212    Specimen:  Blood Updated:  11/23/19 2214    JOSE by IFA, Reflex 9-biomarkers profile [[id number]] Collected:  11/23/19 2212    Specimen:  Blood Updated:  11/23/19 2214    Helicobacter Pylori, IgA IgG IgM [[id number]] Collected:  11/23/19 2212    Specimen:  Blood Updated:  11/23/19 2214    Protein Electrophoresis, Total [[id number]] Collected:  11/23/19 2212    Specimen:  Blood from Arm, Left Updated:  11/23/19 2213    Ferritin [[id number]]  (Normal) Collected:  11/23/19 0618    Specimen:  Blood Updated:  11/23/19 1100     Ferritin 15.38 ng/mL     Iron Profile [[id number]]  (Abnormal) Collected:  11/23/19 0618    Specimen:  Blood Updated:  11/23/19 1055     Iron 40 mcg/dL      Iron Saturation 9 %      Transferrin 310 mg/dL      TIBC 462 mcg/dL     Lactate Dehydrogenase [[id number]]  (Abnormal) Collected:  11/23/19 0618    Specimen:  Blood Updated:  11/23/19 1055      U/L     Basic Metabolic Panel [[id number]]  (Abnormal) Collected:  11/23/19 0618    Specimen:  Blood Updated:  11/23/19 0726     Glucose 137 mg/dL      BUN 16 mg/dL      Creatinine 0.52 mg/dL      Sodium 139 mmol/L      Potassium 3.9 mmol/L      Chloride 102 mmol/L      CO2 25.0 mmol/L      Calcium 9.7 mg/dL      eGFR Non African Amer 137 mL/min/1.73      BUN/Creatinine Ratio 30.8     Anion Gap 12.0 mmol/L     Narrative:       GFR Normal >60  Chronic Kidney Disease <60  Kidney Failure <15    Comprehensive Metabolic Panel [[id number]]  (Abnormal) Collected:  11/22/19 2248    Specimen:  Blood from Cannula Updated:  11/22/19 2341     Glucose 153 mg/dL      BUN 17 mg/dL      Creatinine 0.54 mg/dL      Sodium 139 mmol/L      Potassium 3.5 mmol/L      Chloride 103 mmol/L      CO2 23.0 mmol/L      Calcium 9.1 mg/dL      Total Protein 6.6 g/dL       Albumin 4.10 g/dL      ALT (SGPT) 38 U/L      AST (SGOT) 16 U/L      Alkaline Phosphatase 36 U/L      Total Bilirubin 0.3 mg/dL      eGFR Non African Amer 131 mL/min/1.73      Globulin 2.5 gm/dL      A/G Ratio 1.6 g/dL      BUN/Creatinine Ratio 31.5     Anion Gap 13.0 mmol/L     Narrative:       GFR Normal >60  Chronic Kidney Disease <60  Kidney Failure <15    Pregnancy, Urine - Urine, Clean Catch [[id number]]  (Normal) Collected:  11/22/19 2153    Specimen:  Urine, Clean Catch Updated:  11/22/19 2205     HCG, Urine QL Negative    Reticulocytes [[id number]]  (Normal) Collected:  11/22/19 1834    Specimen:  Blood Updated:  11/22/19 1842     Reticulocyte % 1.53 %      Reticulocyte Absolute 0.0633 10*6/mm3                   Imaging Results (Most Recent)     None          Condition on Discharge:  stable    Vital Signs  Temp:  [98 °F (36.7 °C)-98.6 °F (37 °C)] 98 °F (36.7 °C)  Heart Rate:  [54-63] 54  Resp:  [16-18] 18  BP: (103-120)/(56-65) 105/61    Physical Exam:     General Appearance:    Alert, cooperative, in no acute distress   Lungs:     Clear to auscultation,respirations regular, even and                  unlabored    Heart:    Regular rhythm and normal rate, normal S1 and S2, no            murmur, no gallop, no rub, no click   Abdomen:     Normal bowel sounds, no masses, no organomegaly, soft        non-tender, non-distended, no guarding, no rebound                tenderness   Extremities:   Moves all extremities well, no edema, no cyanosis, no             redness       Discharge Disposition  home    Discharge Medications     Discharge Medications      Continue These Medications      Instructions Start Date   NON FORMULARY   1 tablet, Oral, Daily, Balance OTC       pantoprazole 20 MG EC tablet  Commonly known as:  PROTONIX   20 mg, Oral, Daily      predniSONE 50 MG tablet  Commonly known as:  DELTASONE   50 mg, Oral, Daily With Breakfast             Discharge Diet: healthy heart      Activity at Discharge: as  tolerated      Follow-up Appointments: Dr Rodríguez per her instructions  Future Appointments   Date Time Provider Department Center   11/25/2019  8:30 AM Uri Sagastume MD [facility] NA None   11/25/2019  8:30 AM LAB MD [facility] LAB NA [facility] None     Additional Instructions for the Follow-ups that You Need to Schedule     CBC and Differential   Nov 23, 2019      Manual Differential:  No         Comprehensive metabolic panel   Nov 23, 2019      Hepatitis B Core Antibody, Total   Nov 23, 2019      Hepatitis B Surface Antigen   Nov 23, 2019      Hepatitis B surface antibody   Nov 23, 2019            Test Results Pending at Discharge   Order Current Status    JOSE by IFA, Reflex 9-biomarkers profile In process    Helicobacter Pylori, IgA IgG IgM In process    Hepatitis B Core Antibody, Total In process    Protein Electrophoresis, Total In process           Risk for Readmission (LACE) Score: 4 (11/25/2019  6:01 AM)          New Storey Jr., MD  11/25/19  8:15 AM    Time:

## 2019-11-25 NOTE — SIGNIFICANT NOTE
11/25/19 0822   Discharge of Care   Discharge Mode wheel chair   Discharge Destination home   Discharged Accompanied by spouse   Discharge Contact Information if Applicable [phone]   Discharge Teaching Done  Yes   Learning Method Explanation;Teach Back     Pt spouse at bedside. Spouse to provide transportation home when paperwork/teaching completed.

## 2019-11-25 NOTE — PROGRESS NOTES
[facility] Medicine Services Daily Progress Note      Hospitalist Team  LOS 0 days      Patient Care Team:  Jose Ingram APRN as PCP - General (Family Medicine)    Patient Location: [facility]/      Subjective   Subjective     Chief Complaint / Subjective  Chief Complaint   Patient presents with   • abnormal       Examined the patient in the morning of 11/24/2019 in the presence of her family.  Denies mucosal bleed. started menstrual cycle 11/23/2019 therefore started on Megace.  Inappropriate response to platelet transfusion and did not increase by expected number therefore ITP still suspected.  The patient was on 1 mg/per kilogram steroid at home and during recent hospitalization thus failed steroid treatment.   Hematology/oncology would like to monitor response and have not decided yet on starting Rituxan. Infectious causes for thrombocytopenia (HIV, viral hepatitis panel , H. pylori  Etc) checked.  OB/GYN consult discontinued.      Present on Admission:  • Thrombocytopenia (CMS/HCC)  • Ecchymosis  • Iron deficiency anemia      Brief Synopsis of Hospital Course/HPI    The patient is a 34 years old female with recent admission for work-up of thrombocytopenia and was diagnosed with ITP. She was admitted 11/18/19 with platelets of 5 and was followed by hematology and had a bone marrow biopsy. The patient was discharged on 50 mg of prednisone.  She followed up with her PCP on 11/22/2019 and Platelets were found to be 9 thus was sent to the ED. She was given 1 unit platelets and a dose of IV solu medrol in ED and hematology was consulted. She was here in October in ED for a kidney stone and platelets were normal.     11/25- no bleed except started her period      Review of Systems   All other systems reviewed and are negative.        Objective   Objective      Vital Signs  Temp:  [98 °F (36.7 °C)-98.6 °F (37 °C)] 98 °F (36.7 °C)  Heart Rate:  [54-63] 54  Resp:  [16-18] 18  BP:  "(103-120)/(56-65) 105/61  Oxygen Therapy  SpO2: 99 %  Pulse Oximetry Type: Intermittent  Device (Oxygen Therapy): room air  Flowsheet Rows      First Filed Value   Admission Height  157.5 cm (62\") Documented at 11/22/2019 1743   Admission Weight  51.3 kg (113 lb) Documented at 11/22/2019 1743        Intake & Output (last 3 days)       11/22 0701 - 11/23 0700 11/23 0701 - 11/24 0700 11/24 0701 - 11/25 0700 11/25 0701 - 11/26 0700    P.O.  480 540     Blood 459.5       IV Piggyback 500       Total Intake(mL/kg) 959.5 (19.9) 480 (8.9) 540 (10)     Urine (mL/kg/hr)   0 (0)     Stool   0     Total Output   0     Net +959.5 +480 +540             Urine Unmeasured Occurrence  3 x 6 x         Lines, Drains & Airways    Active LDAs     Name:   Placement date:   Placement time:   Site:   Days:    Peripheral IV 11/22/19 1834 Right Antecubital   11/22/19 1834    Antecubital   less than 1              Physical Exam:    Physical Exam   Constitutional: She is oriented to person, place, and time. She appears well-developed and well-nourished.   Appears younger than stated age.   HENT:   Head: Normocephalic and atraumatic.   Mouth/Throat: Oropharynx is clear and moist and mucous membranes are normal. Normal dentition.   Eyes: Conjunctivae and EOM are normal. Pupils are equal, round, and reactive to light. No scleral icterus.   Neck: Normal range of motion. Neck supple.   Cardiovascular: Normal rate and normal heart sounds.   Pulmonary/Chest: Effort normal and breath sounds normal.   Abdominal: Soft. Bowel sounds are normal.   Musculoskeletal: Normal range of motion.   Neurological: She is alert and oriented to person, place, and time.   Skin: Skin is warm and dry. Ecchymosis noted.        Psychiatric: She has a normal mood and affect.     Procedures: none    Results Review:     I reviewed the patient's new clinical results.      Lab Results (last 24 hours)     Procedure Component Value Units Date/Time    CBC & Differential " [[id number]] Collected:  11/25/19 0306    Specimen:  Blood Updated:  11/25/19 0524    Narrative:       The following orders were created for panel order CBC & Differential.  Procedure                               Abnormality         Status                     ---------                               -----------         ------                     CBC Auto Differential[[id number]]        Abnormal            Final result                 Please view results for these tests on the individual orders.    CBC Auto Differential [[id number]]  (Abnormal) Collected:  11/25/19 0306    Specimen:  Blood Updated:  11/25/19 0524     WBC 12.10 10*3/mm3      RBC 3.78 10*6/mm3      Hemoglobin 11.2 g/dL      Hematocrit 32.5 %      MCV 85.9 fL      MCH 29.6 pg      MCHC 34.5 g/dL      RDW 14.1 %      RDW-SD 42.4 fl      MPV 8.5 fL      Platelets 51 10*3/mm3     Scan Slide [[id number]] Collected:  11/25/19 0306    Specimen:  Blood Updated:  11/25/19 0524     Scan Slide --     Comment: See Manual Differential Results       Manual Differential [[id number]]  (Abnormal) Collected:  11/25/19 0306    Specimen:  Blood Updated:  11/25/19 0524     Neutrophil % 82.0 %      Lymphocyte % 9.0 %      Monocyte % 4.0 %      Bands %  3.0 %      Metamyelocyte % 1.0 %      Atypical Lymphocyte % 1.0 %      Neutrophils Absolute 10.29 10*3/mm3      Lymphocytes Absolute 1.09 10*3/mm3      Monocytes Absolute 0.48 10*3/mm3      RBC Morphology Normal     WBC Morphology Normal     Platelet Estimate Decreased    Basic Metabolic Panel [[id number]]  (Abnormal) Collected:  11/25/19 0306    Specimen:  Blood Updated:  11/25/19 0426     Glucose 136 mg/dL      BUN 14 mg/dL      Creatinine 0.52 mg/dL      Sodium 140 mmol/L      Potassium 4.0 mmol/L      Chloride 101 mmol/L      CO2 28.0 mmol/L      Calcium 9.6 mg/dL      eGFR Non African Amer 137 mL/min/1.73      BUN/Creatinine Ratio 26.9     Anion Gap 11.0 mmol/L     Narrative:       GFR Normal >60  Chronic Kidney Disease  <60  Kidney Failure <15    CBC & Differential [[id number]] Collected:  11/24/19 1358    Specimen:  Blood Updated:  11/24/19 1415    Narrative:       The following orders were created for panel order CBC & Differential.  Procedure                               Abnormality         Status                     ---------                               -----------         ------                     CBC Auto Differential[[id number]]        Abnormal            Final result                 Please view results for these tests on the individual orders.    CBC Auto Differential [[id number]]  (Abnormal) Collected:  11/24/19 1358    Specimen:  Blood Updated:  11/24/19 1415     WBC 10.80 10*3/mm3      RBC 3.81 10*6/mm3      Hemoglobin 11.2 g/dL      Hematocrit 32.7 %      MCV 85.9 fL      MCH 29.3 pg      MCHC 34.1 g/dL      RDW 14.2 %      RDW-SD 42.9 fl      MPV 9.7 fL      Platelets 52 10*3/mm3      Neutrophil % 86.2 %      Lymphocyte % 9.1 %      Monocyte % 4.6 %      Eosinophil % 0.0 %      Basophil % 0.1 %      Neutrophils, Absolute 9.30 10*3/mm3      Lymphocytes, Absolute 1.00 10*3/mm3      Monocytes, Absolute 0.50 10*3/mm3      Eosinophils, Absolute 0.00 10*3/mm3      Basophils, Absolute 0.00 10*3/mm3      nRBC 0.0 /100 WBC     Narrative:       Reviewed by Pathologist within the past 30 days on 11/18/19 .    Folate [[id number]]  (Normal) Collected:  11/23/19 2212    Specimen:  Blood Updated:  11/24/19 1201     Folate 11.20 ng/mL     Vitamin B12 [[id number]]  (Normal) Collected:  11/23/19 2212    Specimen:  Blood Updated:  11/24/19 1156     Vitamin B-12 461 pg/mL     Hepatitis B Surface Antibody [[id number]]  (Abnormal) Collected:  11/23/19 2212    Specimen:  Blood Updated:  11/24/19 1156     Hep B S Ab Reactive    Haptoglobin [[id number]]  (Normal) Collected:  11/23/19 2213    Specimen:  Blood Updated:  11/24/19 1145     Haptoglobin 136 mg/dL      Comment: Specimen hemolyzed. Results may be affected.           No results found for:  HGBA1C  Results from last 7 days   Lab Units 11/18/19  0918   INR  1.00             Microbiology Results (last 10 days)     ** No results found for the last 240 hours. **          ECG/EMG Results (most recent)     None                  Ir Bone Marrow Biopsy And Aspiration    Result Date: 11/19/2019  IMPRESSION : Technically successful bone marrow aspiration and biopsy under fluoroscopy  Electronically Signed By-Kiran Lopez On:11/19/2019 3:33 PM This report was finalized on [id number] by  Kiran Lopez, .      Xrays, labs reviewed personally by physician.    Medication Review:   I have reviewed the patient's current medication list      Scheduled Meds    [START ON 12/1/2019] megestrol acetate 40 mg Oral BID   Followed by      megestrol acetate 40 mg Oral 4x Daily   Followed by      [START ON 12/8/2019] megestrol acetate 40 mg Oral Daily   methylPREDNISolone sodium succinate 125 mg Intravenous Q6H   pantoprazole 40 mg Oral QAM   riTUXimab (RITUXAN) IVPB 570 mg Intravenous Once   sodium chloride 10 mL Intravenous Q12H   sodium chloride 250 mL Intravenous Once       Meds Infusions    sodium chloride 50 mL/hr Last Rate: 50 mL/hr (11/24/19 1045)       Meds PRN  •  diphenhydrAMINE  •  famotidine  •  hydrocortisone sodium succinate  •  melatonin  •  ondansetron **OR** ondansetron  •  sodium chloride      Assessment/Plan   Assessment/Plan     Active Hospital Problems:  * Thrombocytopenia (CMS/HCC)- (present on admission)    --Secondary to ITP   --Discharged home on prednisone 50 mg daily   --Transfused 1 unit platelet 11/22/2019 but an appropriate response  --s/p work-up for infectious causes  --Megace started 11/23/2019 because menstrual bleeding  --s/p bone marrow biopsy last admission   --Hematology/oncology has not decided on starting Rituxan  --Keep overnight 11/24/2019 to observe platelets       Iron deficiency anemia- (present on admission)    -- Low ferritin level.  --HAILE possibly through menstrual  loss  --Hematology/oncology following     Ecchymosis- (present on admission)    --Secondary to thrombocytopenia.         VTE Prophylaxis - SCDs.      Code Status -   Code Status and Medical Interventions:   Ordered at: 11/22/19 2491     Code Status:    CPR     Medical Interventions (Level of Support Prior to Arrest):    Full       Discharge Planning:  Discharge home 1 to 2 days based on hematology/oncology recommendations    Destination      No service coordination in this encounter.      Durable Medical Equipment      No service coordination in this encounter.      Dialysis/Infusion      No service coordination in this encounter.      Home Medical Care      No service coordination in this encounter.      Therapy      No service coordination in this encounter.      Community Resources      No service coordination in this encounter.              Tony Rider DO, 11/23/19 10:42 AM

## 2019-11-25 NOTE — OUTREACH NOTE
Medical Week 1 Survey      Responses   Facility patient discharged from?  [facility]   Does the patient have one of the following disease processes/diagnoses(primary or secondary)?  Other   Is there a successful TCM telephone encounter documented?  No   Week 1 attempt successful?  No   Revoke  Readmitted          Hayley Cortez RN

## 2019-11-25 NOTE — PLAN OF CARE
Problem: Patient Care Overview  Goal: Plan of Care Review   11/25/19 0346   OTHER   Outcome Summary Patient resting abed, no complaints at this time. She is hopefull that her platelets continue to improve so she can be discharged.

## 2019-11-26 ENCOUNTER — READMISSION MANAGEMENT (OUTPATIENT)
Dept: CALL CENTER | Facility: HOSPITAL | Age: 34
End: 2019-11-26

## 2019-11-26 LAB
ALBUMIN SERPL-MCNC: 3.5 G/DL (ref 2.9–4.4)
ALBUMIN/GLOB SERPL: 1.5 {RATIO} (ref 0.7–1.7)
ALPHA1 GLOB FLD ELPH-MCNC: 0.2 G/DL (ref 0–0.4)
ALPHA2 GLOB SERPL ELPH-MCNC: 0.6 G/DL (ref 0.4–1)
B-GLOBULIN SERPL ELPH-MCNC: 0.9 G/DL (ref 0.7–1.3)
GAMMA GLOB SERPL ELPH-MCNC: 0.7 G/DL (ref 0.4–1.8)
GLOBULIN SER CALC-MCNC: 2.4 G/DL (ref 2.2–3.9)
H PYLORI IGA SER IA-ACNC: <9 UNITS (ref 0–8.9)
H PYLORI IGG SER IA-ACNC: 1.43 INDEX VALUE (ref 0–0.79)
H PYLORI IGM SER-ACNC: <9 UNITS (ref 0–8.9)
HBV CORE AB SER DONR QL IA: NEGATIVE
Lab: ABNORMAL
M-SPIKE: ABNORMAL G/DL
PROT SERPL-MCNC: 5.9 G/DL (ref 6–8.5)

## 2019-11-26 NOTE — OUTREACH NOTE
Prep Survey      Responses   Facility patient discharged from?  [facility]   Is patient eligible?  Yes   Discharge diagnosis  thrombocytopenia   Does the patient have one of the following disease processes/diagnoses(primary or secondary)?  Other   Does the patient have Home health ordered?  No   Is there a DME ordered?  No   Prep survey completed?  Yes          Pearl Robertson RN

## 2019-11-27 ENCOUNTER — OFFICE VISIT (OUTPATIENT)
Dept: ONCOLOGY | Facility: CLINIC | Age: 34
End: 2019-11-27

## 2019-11-27 ENCOUNTER — READMISSION MANAGEMENT (OUTPATIENT)
Dept: CALL CENTER | Facility: HOSPITAL | Age: 34
End: 2019-11-27

## 2019-11-27 ENCOUNTER — TELEPHONE (OUTPATIENT)
Dept: ONCOLOGY | Facility: CLINIC | Age: 34
End: 2019-11-27

## 2019-11-27 ENCOUNTER — APPOINTMENT (OUTPATIENT)
Dept: LAB | Facility: HOSPITAL | Age: 34
End: 2019-11-27

## 2019-11-27 VITALS
WEIGHT: 116.4 LBS | HEIGHT: 62 IN | SYSTOLIC BLOOD PRESSURE: 128 MMHG | HEART RATE: 74 BPM | BODY MASS INDEX: 21.42 KG/M2 | RESPIRATION RATE: 16 BRPM | TEMPERATURE: 98.3 F | DIASTOLIC BLOOD PRESSURE: 82 MMHG

## 2019-11-27 DIAGNOSIS — D69.6 THROMBOCYTOPENIA (HCC): Primary | ICD-10-CM

## 2019-11-27 DIAGNOSIS — Z53.21 PROC/TRTMT NOT CRD OUT D/T PT LV BEF SEEN BY HLTH CARE PROV: ICD-10-CM

## 2019-11-27 LAB
ANA SER QL IA: NEGATIVE
BASOPHILS # BLD AUTO: 0.01 10*3/MM3 (ref 0–0.2)
BASOPHILS NFR BLD AUTO: 0.1 % (ref 0–1.5)
DEPRECATED RDW RBC AUTO: 41.9 FL (ref 37–54)
EOSINOPHIL # BLD AUTO: 0.06 10*3/MM3 (ref 0–0.4)
EOSINOPHIL NFR BLD AUTO: 0.4 % (ref 0.3–6.2)
ERYTHROCYTE [DISTWIDTH] IN BLOOD BY AUTOMATED COUNT: 13.4 % (ref 12.3–15.4)
HCT VFR BLD AUTO: 37.7 % (ref 34–46.6)
HGB BLD-MCNC: 12.5 G/DL (ref 12–15.9)
LYMPHOCYTES # BLD AUTO: 1.9 10*3/MM3 (ref 0.7–3.1)
LYMPHOCYTES NFR BLD AUTO: 11.3 % (ref 19.6–45.3)
Lab: NORMAL
MCH RBC QN AUTO: 29.1 PG (ref 26.6–33)
MCHC RBC AUTO-ENTMCNC: 33.2 G/DL (ref 31.5–35.7)
MCV RBC AUTO: 87.7 FL (ref 79–97)
MONOCYTES # BLD AUTO: 0.62 10*3/MM3 (ref 0.1–0.9)
MONOCYTES NFR BLD AUTO: 3.7 % (ref 5–12)
NEUTROPHILS # BLD AUTO: 14.29 10*3/MM3 (ref 1.7–7)
NEUTROPHILS NFR BLD AUTO: 84.5 % (ref 42.7–76)
PLATELET # BLD AUTO: 8 10*3/MM3 (ref 140–450)
RBC # BLD AUTO: 4.3 10*6/MM3 (ref 3.77–5.28)
WBC NRBC COR # BLD: 16.88 10*3/MM3 (ref 3.4–10.8)

## 2019-11-27 PROCEDURE — 85025 COMPLETE CBC W/AUTO DIFF WBC: CPT | Performed by: INTERNAL MEDICINE

## 2019-11-27 RX ORDER — TRANEXAMIC ACID 650 MG/1
TABLET ORAL
Qty: 42 TABLET | Refills: 0 | Status: SHIPPED | OUTPATIENT
Start: 2019-11-27 | End: 2019-12-02 | Stop reason: HOSPADM

## 2019-11-27 RX ORDER — MEGESTROL ACETATE 20 MG/1
40 TABLET ORAL 4 TIMES DAILY
Qty: 168 TABLET | Refills: 0 | Status: SHIPPED | OUTPATIENT
Start: 2019-11-27 | End: 2019-12-18

## 2019-11-27 NOTE — TELEPHONE ENCOUNTER
Called bed control to set up admission for pt for Friday morning for Rituxan treatment for ITP per Dr. Llanes. Dr. Mcfarland has accepted pt. Pt notified that hospital will call her Friday AM when bed available and she v/u. mh

## 2019-11-27 NOTE — OUTREACH NOTE
Medical Week 1 Survey      Responses   Facility patient discharged from?  [facility]   Does the patient have one of the following disease processes/diagnoses(primary or secondary)?  Other   Is there a successful TCM telephone encounter documented?  No   Week 1 attempt successful?  Yes   Call start time  1342   Call end time  1343   Week 1 call completed?  Yes   Graduated  Yes   Did the patient feel the follow up calls were helpful during their recovery period?  Yes   Was the number of calls appropriate?  Yes   Wrap up additional comments  PATIENT WAS AT A PHYSICIAN APPOINTMENT AND STATED THAT SHE COULD NOT TALK.           Mindi Harris LPN

## 2019-11-29 ENCOUNTER — HOSPITAL ENCOUNTER (INPATIENT)
Facility: HOSPITAL | Age: 34
LOS: 3 days | Discharge: HOME | End: 2019-12-02
Attending: INTERNAL MEDICINE | Admitting: INTERNAL MEDICINE

## 2019-11-29 DIAGNOSIS — D69.6 THROMBOCYTOPENIA (HCC): ICD-10-CM

## 2019-11-29 DIAGNOSIS — D69.6 THROMBOCYTOPENIA (HCC): Primary | ICD-10-CM

## 2019-11-29 PROBLEM — R04.0 EPISTAXIS NOT DUE TO TRAUMA: Status: ACTIVE | Noted: 2019-11-29

## 2019-11-29 PROBLEM — D69.3 IMMUNE THROMBOCYTOPENIC PURPURA (HCC): Chronic | Status: ACTIVE | Noted: 2019-11-18

## 2019-11-29 LAB
ABO GROUP BLD: NORMAL
ALBUMIN SERPL-MCNC: 4.1 G/DL (ref 3.5–5.2)
ALBUMIN/GLOB SERPL: 1.5 G/DL
ALP SERPL-CCNC: 36 U/L (ref 39–117)
ALT SERPL W P-5'-P-CCNC: 31 U/L (ref 1–33)
ANION GAP SERPL CALCULATED.3IONS-SCNC: 15 MMOL/L (ref 5–15)
AST SERPL-CCNC: 16 U/L (ref 1–32)
BILIRUB SERPL-MCNC: 0.3 MG/DL (ref 0.2–1.2)
BLD GP AB SCN SERPL QL: NEGATIVE
BUN BLD-MCNC: 17 MG/DL (ref 6–20)
BUN/CREAT SERPL: 25 (ref 7–25)
CALCIUM SPEC-SCNC: 9.5 MG/DL (ref 8.6–10.5)
CHLORIDE SERPL-SCNC: 98 MMOL/L (ref 98–107)
CO2 SERPL-SCNC: 23 MMOL/L (ref 22–29)
CREAT BLD-MCNC: 0.68 MG/DL (ref 0.57–1)
DEPRECATED RDW RBC AUTO: 45.9 FL (ref 37–54)
ERYTHROCYTE [DISTWIDTH] IN BLOOD BY AUTOMATED COUNT: 14.8 % (ref 12.3–15.4)
GFR SERPL CREATININE-BSD FRML MDRD: 100 ML/MIN/1.73
GLOBULIN UR ELPH-MCNC: 2.8 GM/DL
GLUCOSE BLD-MCNC: 93 MG/DL (ref 65–99)
GLUCOSE BLDC GLUCOMTR-MCNC: 122 MG/DL (ref 70–105)
HAV IGM SERPL QL IA: NORMAL
HBV CORE IGM SERPL QL IA: NORMAL
HBV SURFACE AG SERPL QL IA: NORMAL
HCT VFR BLD AUTO: 37.9 % (ref 34–46.6)
HCV AB SER DONR QL: NORMAL
HGB BLD-MCNC: 12.7 G/DL (ref 12–15.9)
HIV1+2 AB SER QL: NORMAL
KARYOTYP MAR: NORMAL
LYMPHOCYTES # BLD MANUAL: 1.7 10*3/MM3 (ref 0.7–3.1)
LYMPHOCYTES NFR BLD MANUAL: 3 % (ref 5–12)
LYMPHOCYTES NFR BLD MANUAL: 8 % (ref 19.6–45.3)
MCH RBC QN AUTO: 29.4 PG (ref 26.6–33)
MCHC RBC AUTO-ENTMCNC: 33.4 G/DL (ref 31.5–35.7)
MCV RBC AUTO: 88.1 FL (ref 79–97)
METAMYELOCYTES NFR BLD MANUAL: 1 % (ref 0–0)
MONOCYTES # BLD AUTO: 0.64 10*3/MM3 (ref 0.1–0.9)
NEUTROPHILS # BLD AUTO: 18.66 10*3/MM3 (ref 1.7–7)
NEUTROPHILS NFR BLD MANUAL: 88 % (ref 42.7–76)
PLATELET # BLD AUTO: 12 10*3/MM3 (ref 140–450)
PMV BLD AUTO: 9.6 FL (ref 6–12)
POTASSIUM BLD-SCNC: 4.2 MMOL/L (ref 3.5–5.2)
PROT SERPL-MCNC: 6.9 G/DL (ref 6–8.5)
RBC # BLD AUTO: 4.31 10*6/MM3 (ref 3.77–5.28)
RBC MORPH BLD: NORMAL
RH BLD: POSITIVE
SCAN SLIDE: NORMAL
SMALL PLATELETS BLD QL SMEAR: ABNORMAL
SODIUM BLD-SCNC: 136 MMOL/L (ref 136–145)
T&S EXPIRATION DATE: NORMAL
WBC MORPH BLD: NORMAL
WBC NRBC COR # BLD: 21.2 10*3/MM3 (ref 3.4–10.8)

## 2019-11-29 PROCEDURE — 80074 ACUTE HEPATITIS PANEL: CPT | Performed by: INTERNAL MEDICINE

## 2019-11-29 PROCEDURE — 86900 BLOOD TYPING SEROLOGIC ABO: CPT | Performed by: INTERNAL MEDICINE

## 2019-11-29 PROCEDURE — 86850 RBC ANTIBODY SCREEN: CPT | Performed by: INTERNAL MEDICINE

## 2019-11-29 PROCEDURE — 25010000002 ONDANSETRON PER 1 MG: Performed by: PHYSICIAN ASSISTANT

## 2019-11-29 PROCEDURE — 25010000002 DIPHENHYDRAMINE PER 50 MG: Performed by: INTERNAL MEDICINE

## 2019-11-29 PROCEDURE — 99223 1ST HOSP IP/OBS HIGH 75: CPT | Performed by: INTERNAL MEDICINE

## 2019-11-29 PROCEDURE — 82962 GLUCOSE BLOOD TEST: CPT

## 2019-11-29 PROCEDURE — 85007 BL SMEAR W/DIFF WBC COUNT: CPT | Performed by: INTERNAL MEDICINE

## 2019-11-29 PROCEDURE — 36430 TRANSFUSION BLD/BLD COMPNT: CPT

## 2019-11-29 PROCEDURE — 80053 COMPREHEN METABOLIC PANEL: CPT | Performed by: INTERNAL MEDICINE

## 2019-11-29 PROCEDURE — 99255 IP/OBS CONSLTJ NEW/EST HI 80: CPT | Performed by: INTERNAL MEDICINE

## 2019-11-29 PROCEDURE — 86901 BLOOD TYPING SEROLOGIC RH(D): CPT | Performed by: INTERNAL MEDICINE

## 2019-11-29 PROCEDURE — P9035 PLATELET PHERES LEUKOREDUCED: HCPCS

## 2019-11-29 PROCEDURE — G0432 EIA HIV-1/HIV-2 SCREEN: HCPCS | Performed by: INTERNAL MEDICINE

## 2019-11-29 PROCEDURE — 25010000002 RITUXIMAB 100 MG/10ML SOLUTION 10 ML VIAL: Performed by: INTERNAL MEDICINE

## 2019-11-29 PROCEDURE — P9037 PLATE PHERES LEUKOREDU IRRAD: HCPCS

## 2019-11-29 PROCEDURE — 85025 COMPLETE CBC W/AUTO DIFF WBC: CPT | Performed by: INTERNAL MEDICINE

## 2019-11-29 PROCEDURE — 25010000002 RITUXIMAB 500 MG/50ML SOLUTION 50 ML VIAL: Performed by: INTERNAL MEDICINE

## 2019-11-29 RX ORDER — ONDANSETRON 2 MG/ML
4 INJECTION INTRAMUSCULAR; INTRAVENOUS EVERY 6 HOURS PRN
Status: DISCONTINUED | OUTPATIENT
Start: 2019-11-29 | End: 2019-12-02 | Stop reason: HOSPADM

## 2019-11-29 RX ORDER — SODIUM CHLORIDE 9 MG/ML
250 INJECTION, SOLUTION INTRAVENOUS ONCE
Status: CANCELLED | OUTPATIENT
Start: 2019-11-29

## 2019-11-29 RX ORDER — ACETAMINOPHEN 325 MG/1
650 TABLET ORAL EVERY 6 HOURS PRN
COMMUNITY

## 2019-11-29 RX ORDER — ACETAMINOPHEN 325 MG/1
650 TABLET ORAL ONCE
Status: COMPLETED | OUTPATIENT
Start: 2019-11-29 | End: 2019-11-29

## 2019-11-29 RX ORDER — ALUMINA, MAGNESIA, AND SIMETHICONE 2400; 2400; 240 MG/30ML; MG/30ML; MG/30ML
15 SUSPENSION ORAL EVERY 6 HOURS PRN
Status: DISCONTINUED | OUTPATIENT
Start: 2019-11-29 | End: 2019-12-02 | Stop reason: HOSPADM

## 2019-11-29 RX ORDER — BISACODYL 10 MG
10 SUPPOSITORY, RECTAL RECTAL DAILY PRN
Status: DISCONTINUED | OUTPATIENT
Start: 2019-11-29 | End: 2019-12-02 | Stop reason: HOSPADM

## 2019-11-29 RX ORDER — PANTOPRAZOLE SODIUM 40 MG/1
40 TABLET, DELAYED RELEASE ORAL DAILY
Status: DISCONTINUED | OUTPATIENT
Start: 2019-11-30 | End: 2019-12-02 | Stop reason: HOSPADM

## 2019-11-29 RX ORDER — MAGNESIUM SULFATE HEPTAHYDRATE 40 MG/ML
2 INJECTION, SOLUTION INTRAVENOUS AS NEEDED
Status: DISCONTINUED | OUTPATIENT
Start: 2019-11-29 | End: 2019-12-02 | Stop reason: HOSPADM

## 2019-11-29 RX ORDER — SODIUM CHLORIDE 9 MG/ML
250 INJECTION, SOLUTION INTRAVENOUS ONCE
Status: COMPLETED | OUTPATIENT
Start: 2019-11-29 | End: 2019-11-29

## 2019-11-29 RX ORDER — SODIUM CHLORIDE 0.9 % (FLUSH) 0.9 %
10 SYRINGE (ML) INJECTION EVERY 12 HOURS SCHEDULED
Status: DISCONTINUED | OUTPATIENT
Start: 2019-11-29 | End: 2019-12-02 | Stop reason: HOSPADM

## 2019-11-29 RX ORDER — ACETAMINOPHEN 325 MG/1
650 TABLET ORAL ONCE
Status: CANCELLED | OUTPATIENT
Start: 2019-11-29

## 2019-11-29 RX ORDER — FAMOTIDINE 10 MG/ML
20 INJECTION, SOLUTION INTRAVENOUS AS NEEDED
Status: COMPLETED | OUTPATIENT
Start: 2019-11-29 | End: 2019-11-29

## 2019-11-29 RX ORDER — ACETAMINOPHEN 650 MG/1
650 SUPPOSITORY RECTAL EVERY 4 HOURS PRN
Status: DISCONTINUED | OUTPATIENT
Start: 2019-11-29 | End: 2019-12-02 | Stop reason: HOSPADM

## 2019-11-29 RX ORDER — POTASSIUM CHLORIDE 1.5 G/1.77G
40 POWDER, FOR SOLUTION ORAL AS NEEDED
Status: DISCONTINUED | OUTPATIENT
Start: 2019-11-29 | End: 2019-12-02 | Stop reason: HOSPADM

## 2019-11-29 RX ORDER — MEPERIDINE HYDROCHLORIDE 50 MG/ML
25 INJECTION INTRAMUSCULAR; INTRAVENOUS; SUBCUTANEOUS
Status: ACTIVE | OUTPATIENT
Start: 2019-11-29 | End: 2019-11-30

## 2019-11-29 RX ORDER — DIPHENHYDRAMINE HYDROCHLORIDE 50 MG/ML
50 INJECTION INTRAMUSCULAR; INTRAVENOUS AS NEEDED
Status: DISCONTINUED | OUTPATIENT
Start: 2019-11-29 | End: 2019-12-02 | Stop reason: HOSPADM

## 2019-11-29 RX ORDER — ACETAMINOPHEN 325 MG/1
650 TABLET ORAL EVERY 4 HOURS PRN
Status: DISCONTINUED | OUTPATIENT
Start: 2019-11-29 | End: 2019-12-02 | Stop reason: HOSPADM

## 2019-11-29 RX ORDER — DOCUSATE SODIUM 100 MG/1
100 CAPSULE, LIQUID FILLED ORAL 2 TIMES DAILY PRN
Status: DISCONTINUED | OUTPATIENT
Start: 2019-11-29 | End: 2019-12-02 | Stop reason: HOSPADM

## 2019-11-29 RX ORDER — ACETAMINOPHEN 325 MG/1
650 TABLET ORAL EVERY 6 HOURS PRN
Status: DISCONTINUED | OUTPATIENT
Start: 2019-11-29 | End: 2019-11-29

## 2019-11-29 RX ORDER — ACETAMINOPHEN 160 MG/5ML
650 SOLUTION ORAL EVERY 4 HOURS PRN
Status: DISCONTINUED | OUTPATIENT
Start: 2019-11-29 | End: 2019-12-02 | Stop reason: HOSPADM

## 2019-11-29 RX ORDER — MAGNESIUM SULFATE HEPTAHYDRATE 40 MG/ML
4 INJECTION, SOLUTION INTRAVENOUS AS NEEDED
Status: DISCONTINUED | OUTPATIENT
Start: 2019-11-29 | End: 2019-12-02 | Stop reason: HOSPADM

## 2019-11-29 RX ORDER — ONDANSETRON 4 MG/1
4 TABLET, FILM COATED ORAL EVERY 6 HOURS PRN
Status: DISCONTINUED | OUTPATIENT
Start: 2019-11-29 | End: 2019-12-02 | Stop reason: HOSPADM

## 2019-11-29 RX ORDER — MEPERIDINE HYDROCHLORIDE 50 MG/ML
25 INJECTION INTRAMUSCULAR; INTRAVENOUS; SUBCUTANEOUS
Status: CANCELLED | OUTPATIENT
Start: 2019-11-29 | End: 2019-11-30

## 2019-11-29 RX ORDER — PREDNISONE 50 MG/1
50 TABLET ORAL
Status: DISCONTINUED | OUTPATIENT
Start: 2019-11-30 | End: 2019-11-30

## 2019-11-29 RX ORDER — POTASSIUM CHLORIDE 20 MEQ/1
40 TABLET, EXTENDED RELEASE ORAL AS NEEDED
Status: DISCONTINUED | OUTPATIENT
Start: 2019-11-29 | End: 2019-12-02 | Stop reason: HOSPADM

## 2019-11-29 RX ORDER — MEGESTROL ACETATE 40 MG/1
40 TABLET ORAL 4 TIMES DAILY
Status: DISCONTINUED | OUTPATIENT
Start: 2019-11-29 | End: 2019-12-02

## 2019-11-29 RX ORDER — CALCIUM CARBONATE 200(500)MG
2 TABLET,CHEWABLE ORAL 2 TIMES DAILY PRN
Status: DISCONTINUED | OUTPATIENT
Start: 2019-11-29 | End: 2019-12-02 | Stop reason: HOSPADM

## 2019-11-29 RX ORDER — FAMOTIDINE 10 MG/ML
20 INJECTION, SOLUTION INTRAVENOUS AS NEEDED
Status: CANCELLED | OUTPATIENT
Start: 2019-11-29

## 2019-11-29 RX ORDER — DIPHENHYDRAMINE HYDROCHLORIDE 50 MG/ML
50 INJECTION INTRAMUSCULAR; INTRAVENOUS ONCE
Status: COMPLETED | OUTPATIENT
Start: 2019-11-29 | End: 2019-11-29

## 2019-11-29 RX ORDER — SODIUM CHLORIDE 0.9 % (FLUSH) 0.9 %
10 SYRINGE (ML) INJECTION AS NEEDED
Status: DISCONTINUED | OUTPATIENT
Start: 2019-11-29 | End: 2019-12-02 | Stop reason: HOSPADM

## 2019-11-29 RX ORDER — DIPHENHYDRAMINE HYDROCHLORIDE 50 MG/ML
50 INJECTION INTRAMUSCULAR; INTRAVENOUS AS NEEDED
Status: CANCELLED | OUTPATIENT
Start: 2019-11-29

## 2019-11-29 RX ORDER — CHOLECALCIFEROL (VITAMIN D3) 125 MCG
5 CAPSULE ORAL NIGHTLY PRN
Status: DISCONTINUED | OUTPATIENT
Start: 2019-11-29 | End: 2019-12-02 | Stop reason: HOSPADM

## 2019-11-29 RX ADMIN — Medication 10 ML: at 15:03

## 2019-11-29 RX ADMIN — MEGESTROL ACETATE 40 MG: 40 TABLET ORAL at 15:02

## 2019-11-29 RX ADMIN — TRANEXAMIC ACID 1000 MG: 100 INJECTION, SOLUTION INTRAVENOUS at 19:27

## 2019-11-29 RX ADMIN — MEGESTROL ACETATE 40 MG: 40 TABLET ORAL at 20:51

## 2019-11-29 RX ADMIN — NYSTATIN 500000 UNITS: 500000 SUSPENSION ORAL at 15:02

## 2019-11-29 RX ADMIN — MEGESTROL ACETATE 40 MG: 40 TABLET ORAL at 17:11

## 2019-11-29 RX ADMIN — DIPHENHYDRAMINE HYDROCHLORIDE 50 MG: 50 INJECTION, SOLUTION INTRAMUSCULAR; INTRAVENOUS at 15:02

## 2019-11-29 RX ADMIN — ACETAMINOPHEN 650 MG: 325 TABLET, FILM COATED ORAL at 22:12

## 2019-11-29 RX ADMIN — FAMOTIDINE 20 MG: 10 INJECTION, SOLUTION INTRAVENOUS at 15:54

## 2019-11-29 RX ADMIN — ONDANSETRON 4 MG: 2 INJECTION INTRAMUSCULAR; INTRAVENOUS at 15:54

## 2019-11-29 RX ADMIN — NYSTATIN 500000 UNITS: 500000 SUSPENSION ORAL at 20:51

## 2019-11-29 RX ADMIN — Medication 10 ML: at 20:51

## 2019-11-29 RX ADMIN — RITUXIMAB 570 MG: 10 INJECTION, SOLUTION INTRAVENOUS at 15:45

## 2019-11-29 RX ADMIN — ACETAMINOPHEN 650 MG: 325 TABLET ORAL at 15:02

## 2019-11-29 RX ADMIN — SODIUM CHLORIDE 250 ML: 0.9 INJECTION, SOLUTION INTRAVENOUS at 15:28

## 2019-11-29 RX ADMIN — NYSTATIN 500000 UNITS: 500000 SUSPENSION ORAL at 17:11

## 2019-11-30 LAB
ANION GAP SERPL CALCULATED.3IONS-SCNC: 15 MMOL/L (ref 5–15)
BUN BLD-MCNC: 16 MG/DL (ref 6–20)
BUN/CREAT SERPL: 21.1 (ref 7–25)
CALCIUM SPEC-SCNC: 9.3 MG/DL (ref 8.6–10.5)
CHLORIDE SERPL-SCNC: 102 MMOL/L (ref 98–107)
CO2 SERPL-SCNC: 24 MMOL/L (ref 22–29)
CREAT BLD-MCNC: 0.76 MG/DL (ref 0.57–1)
DEPRECATED RDW RBC AUTO: 45.9 FL (ref 37–54)
ERYTHROCYTE [DISTWIDTH] IN BLOOD BY AUTOMATED COUNT: 15.1 % (ref 12.3–15.4)
GFR SERPL CREATININE-BSD FRML MDRD: 88 ML/MIN/1.73
GLUCOSE BLD-MCNC: 89 MG/DL (ref 65–99)
HCT VFR BLD AUTO: 36.9 % (ref 34–46.6)
HGB BLD-MCNC: 12.1 G/DL (ref 12–15.9)
MCH RBC QN AUTO: 28.9 PG (ref 26.6–33)
MCHC RBC AUTO-ENTMCNC: 33 G/DL (ref 31.5–35.7)
MCV RBC AUTO: 87.7 FL (ref 79–97)
PLATELET # BLD AUTO: 6 10*3/MM3 (ref 140–450)
PMV BLD AUTO: 9.9 FL (ref 6–12)
POTASSIUM BLD-SCNC: 3.7 MMOL/L (ref 3.5–5.2)
RBC # BLD AUTO: 4.21 10*6/MM3 (ref 3.77–5.28)
SODIUM BLD-SCNC: 141 MMOL/L (ref 136–145)
WBC NRBC COR # BLD: 11.2 10*3/MM3 (ref 3.4–10.8)

## 2019-11-30 PROCEDURE — 25010000002 ONDANSETRON PER 1 MG: Performed by: PHYSICIAN ASSISTANT

## 2019-11-30 PROCEDURE — P9035 PLATELET PHERES LEUKOREDUCED: HCPCS

## 2019-11-30 PROCEDURE — 99233 SBSQ HOSP IP/OBS HIGH 50: CPT | Performed by: INTERNAL MEDICINE

## 2019-11-30 PROCEDURE — 36430 TRANSFUSION BLD/BLD COMPNT: CPT

## 2019-11-30 PROCEDURE — 63710000001 PREDNISONE PER 5 MG: Performed by: PHYSICIAN ASSISTANT

## 2019-11-30 PROCEDURE — 63710000001 PREDNISONE PER 5 MG: Performed by: NURSE PRACTITIONER

## 2019-11-30 PROCEDURE — 25010000002 METHYLPREDNISOLONE PER 125 MG: Performed by: INTERNAL MEDICINE

## 2019-11-30 PROCEDURE — 85027 COMPLETE CBC AUTOMATED: CPT | Performed by: INTERNAL MEDICINE

## 2019-11-30 PROCEDURE — 80048 BASIC METABOLIC PNL TOTAL CA: CPT | Performed by: PHYSICIAN ASSISTANT

## 2019-11-30 RX ORDER — METHYLPREDNISOLONE SODIUM SUCCINATE 125 MG/2ML
125 INJECTION, POWDER, LYOPHILIZED, FOR SOLUTION INTRAMUSCULAR; INTRAVENOUS EVERY 6 HOURS
Status: DISCONTINUED | OUTPATIENT
Start: 2019-11-30 | End: 2019-11-30

## 2019-11-30 RX ORDER — PREDNISONE 50 MG/1
50 TABLET ORAL 2 TIMES DAILY WITH MEALS
Status: DISCONTINUED | OUTPATIENT
Start: 2019-11-30 | End: 2019-12-02 | Stop reason: HOSPADM

## 2019-11-30 RX ADMIN — NYSTATIN 500000 UNITS: 500000 SUSPENSION ORAL at 08:52

## 2019-11-30 RX ADMIN — NYSTATIN 500000 UNITS: 500000 SUSPENSION ORAL at 17:40

## 2019-11-30 RX ADMIN — Medication 10 ML: at 12:06

## 2019-11-30 RX ADMIN — METHYLPREDNISOLONE SODIUM SUCCINATE 125 MG: 125 INJECTION, POWDER, FOR SOLUTION INTRAMUSCULAR; INTRAVENOUS at 12:52

## 2019-11-30 RX ADMIN — MEGESTROL ACETATE 40 MG: 40 TABLET ORAL at 17:40

## 2019-11-30 RX ADMIN — ONDANSETRON 4 MG: 2 INJECTION INTRAMUSCULAR; INTRAVENOUS at 12:52

## 2019-11-30 RX ADMIN — PANTOPRAZOLE SODIUM 40 MG: 40 TABLET, DELAYED RELEASE ORAL at 08:52

## 2019-11-30 RX ADMIN — MEGESTROL ACETATE 40 MG: 40 TABLET ORAL at 08:52

## 2019-11-30 RX ADMIN — NYSTATIN 500000 UNITS: 500000 SUSPENSION ORAL at 20:42

## 2019-11-30 RX ADMIN — Medication 10 ML: at 20:42

## 2019-11-30 RX ADMIN — MEGESTROL ACETATE 40 MG: 40 TABLET ORAL at 12:57

## 2019-11-30 RX ADMIN — TRANEXAMIC ACID 1000 MG: 100 INJECTION, SOLUTION INTRAVENOUS at 12:06

## 2019-11-30 RX ADMIN — PREDNISONE 50 MG: 50 TABLET ORAL at 17:40

## 2019-11-30 RX ADMIN — MEGESTROL ACETATE 40 MG: 40 TABLET ORAL at 20:42

## 2019-11-30 RX ADMIN — PREDNISONE 50 MG: 50 TABLET ORAL at 08:52

## 2019-11-30 RX ADMIN — ACETAMINOPHEN 650 MG: 325 TABLET, FILM COATED ORAL at 16:41

## 2019-11-30 RX ADMIN — NYSTATIN 500000 UNITS: 500000 SUSPENSION ORAL at 12:52

## 2019-12-01 LAB
ABO + RH BLD: NORMAL
ANION GAP SERPL CALCULATED.3IONS-SCNC: 15 MMOL/L (ref 5–15)
ANISOCYTOSIS BLD QL: ABNORMAL
BH BB BLOOD EXPIRATION DATE: NORMAL
BH BB BLOOD TYPE BARCODE: 6200
BH BB DISPENSE STATUS: NORMAL
BH BB PRODUCT CODE: NORMAL
BH BB UNIT NUMBER: NORMAL
BUN BLD-MCNC: 15 MG/DL (ref 6–20)
BUN/CREAT SERPL: 23.1 (ref 7–25)
CALCIUM SPEC-SCNC: 9.3 MG/DL (ref 8.6–10.5)
CHLORIDE SERPL-SCNC: 100 MMOL/L (ref 98–107)
CO2 SERPL-SCNC: 23 MMOL/L (ref 22–29)
CREAT BLD-MCNC: 0.65 MG/DL (ref 0.57–1)
DEPRECATED RDW RBC AUTO: 45.1 FL (ref 37–54)
ERYTHROCYTE [DISTWIDTH] IN BLOOD BY AUTOMATED COUNT: 14.8 % (ref 12.3–15.4)
GFR SERPL CREATININE-BSD FRML MDRD: 106 ML/MIN/1.73
GLUCOSE BLD-MCNC: 171 MG/DL (ref 65–99)
HCT VFR BLD AUTO: 32.6 % (ref 34–46.6)
HGB BLD-MCNC: 11.1 G/DL (ref 12–15.9)
LYMPHOCYTES # BLD MANUAL: 1.04 10*3/MM3 (ref 0.7–3.1)
LYMPHOCYTES NFR BLD MANUAL: 4 % (ref 5–12)
LYMPHOCYTES NFR BLD MANUAL: 6 % (ref 19.6–45.3)
MCH RBC QN AUTO: 29.9 PG (ref 26.6–33)
MCHC RBC AUTO-ENTMCNC: 34.2 G/DL (ref 31.5–35.7)
MCV RBC AUTO: 87.5 FL (ref 79–97)
MONOCYTES # BLD AUTO: 0.7 10*3/MM3 (ref 0.1–0.9)
NEUTROPHILS # BLD AUTO: 15.66 10*3/MM3 (ref 1.7–7)
NEUTROPHILS NFR BLD MANUAL: 86 % (ref 42.7–76)
NEUTS BAND NFR BLD MANUAL: 4 % (ref 0–5)
PLAT MORPH BLD: NORMAL
PLATELET # BLD AUTO: 11 10*3/MM3 (ref 140–450)
PMV BLD AUTO: 10.2 FL (ref 6–12)
POTASSIUM BLD-SCNC: 4.2 MMOL/L (ref 3.5–5.2)
RBC # BLD AUTO: 3.72 10*6/MM3 (ref 3.77–5.28)
RETICS # AUTO: 0.12 10*6/MM3 (ref 0.02–0.13)
RETICS/RBC NFR AUTO: 3.13 % (ref 0.7–1.9)
SCAN SLIDE: NORMAL
SODIUM BLD-SCNC: 138 MMOL/L (ref 136–145)
UNIT  ABO: NORMAL
UNIT  RH: NORMAL
WBC MORPH BLD: NORMAL
WBC NRBC COR # BLD: 17.4 10*3/MM3 (ref 3.4–10.8)

## 2019-12-01 PROCEDURE — 63710000001 DIPHENHYDRAMINE PER 50 MG: Performed by: NURSE PRACTITIONER

## 2019-12-01 PROCEDURE — 63710000001 PREDNISONE PER 5 MG: Performed by: NURSE PRACTITIONER

## 2019-12-01 PROCEDURE — 99233 SBSQ HOSP IP/OBS HIGH 50: CPT | Performed by: INTERNAL MEDICINE

## 2019-12-01 PROCEDURE — 25010000002 IMMUNE GLOBULIN (HUMAN) 10 GM/100ML SOLUTION 100 ML VIAL: Performed by: NURSE PRACTITIONER

## 2019-12-01 PROCEDURE — 80048 BASIC METABOLIC PNL TOTAL CA: CPT | Performed by: PHYSICIAN ASSISTANT

## 2019-12-01 PROCEDURE — 85007 BL SMEAR W/DIFF WBC COUNT: CPT | Performed by: INTERNAL MEDICINE

## 2019-12-01 PROCEDURE — 25010000002 IMMUNE GLOBULIN (HUMAN) 20 GM/200ML SOLUTION 200 ML VIAL: Performed by: NURSE PRACTITIONER

## 2019-12-01 PROCEDURE — 85045 AUTOMATED RETICULOCYTE COUNT: CPT | Performed by: INTERNAL MEDICINE

## 2019-12-01 PROCEDURE — 85025 COMPLETE CBC W/AUTO DIFF WBC: CPT | Performed by: INTERNAL MEDICINE

## 2019-12-01 PROCEDURE — 25010000002 ONDANSETRON PER 1 MG: Performed by: PHYSICIAN ASSISTANT

## 2019-12-01 RX ORDER — ACETAMINOPHEN 325 MG/1
650 TABLET ORAL ONCE
Status: COMPLETED | OUTPATIENT
Start: 2019-12-01 | End: 2019-12-01

## 2019-12-01 RX ORDER — DIPHENHYDRAMINE HCL 25 MG
25 TABLET ORAL ONCE
Status: COMPLETED | OUTPATIENT
Start: 2019-12-01 | End: 2019-12-01

## 2019-12-01 RX ADMIN — MEGESTROL ACETATE 40 MG: 40 TABLET ORAL at 17:11

## 2019-12-01 RX ADMIN — ACETAMINOPHEN 650 MG: 325 TABLET, FILM COATED ORAL at 14:18

## 2019-12-01 RX ADMIN — NYSTATIN 500000 UNITS: 500000 SUSPENSION ORAL at 17:11

## 2019-12-01 RX ADMIN — PREDNISONE 50 MG: 50 TABLET ORAL at 08:47

## 2019-12-01 RX ADMIN — MEGESTROL ACETATE 40 MG: 40 TABLET ORAL at 20:52

## 2019-12-01 RX ADMIN — Medication 10 ML: at 08:48

## 2019-12-01 RX ADMIN — IMMUNE GLOBULIN (HUMAN) 30 G: 10 INJECTION INTRAVENOUS; SUBCUTANEOUS at 14:53

## 2019-12-01 RX ADMIN — NYSTATIN 500000 UNITS: 500000 SUSPENSION ORAL at 08:47

## 2019-12-01 RX ADMIN — Medication 10 ML: at 20:53

## 2019-12-01 RX ADMIN — NYSTATIN 500000 UNITS: 500000 SUSPENSION ORAL at 12:24

## 2019-12-01 RX ADMIN — DIPHENHYDRAMINE HCL 25 MG: 25 TABLET ORAL at 14:18

## 2019-12-01 RX ADMIN — PANTOPRAZOLE SODIUM 40 MG: 40 TABLET, DELAYED RELEASE ORAL at 08:47

## 2019-12-01 RX ADMIN — NYSTATIN 500000 UNITS: 500000 SUSPENSION ORAL at 20:52

## 2019-12-01 RX ADMIN — MEGESTROL ACETATE 40 MG: 40 TABLET ORAL at 08:47

## 2019-12-01 RX ADMIN — PREDNISONE 50 MG: 50 TABLET ORAL at 17:11

## 2019-12-01 RX ADMIN — MEGESTROL ACETATE 40 MG: 40 TABLET ORAL at 12:58

## 2019-12-01 RX ADMIN — TRANEXAMIC ACID 1000 MG: 100 INJECTION, SOLUTION INTRAVENOUS at 12:58

## 2019-12-01 RX ADMIN — ONDANSETRON 4 MG: 2 INJECTION INTRAMUSCULAR; INTRAVENOUS at 12:58

## 2019-12-01 NOTE — PLAN OF CARE
Problem: Patient Care Overview  Goal: Plan of Care Review  Outcome: Ongoing (interventions implemented as appropriate)   12/01/19 0330   Coping/Psychosocial   Plan of Care Reviewed With patient   Plan of Care Review   Progress no change       Problem: Chemotherapy Effects (Adult)  Goal: Signs and Symptoms of Listed Potential Problems Will be Absent, Minimized or Managed (Chemotherapy Effects)  Outcome: Ongoing (interventions implemented as appropriate)   12/01/19 0330   Goal/Outcome Evaluation   Problems Assessed (Chemotherapy Effects) all   Problems Present (Chemotherapy Effects) none

## 2019-12-01 NOTE — PROGRESS NOTES
Hematology/Oncology Inpatient Progress Note    PATIENT NAME: Tiny Akhtar  : 1985  MRN: [id number]    CHIEF COMPLAINT: Inpatient Rituxan therapy    HISTORY OF PRESENT ILLNESS:  34 y.o. female  with a past medical history of thrombocytopenia, anemia, and kidney stones.  She presented to [facility] Emergency Department on 2019 after she had blood work drawn earlier in the day that showed a platelet count of 9,000 at this facility.  She had previously been admitted through the ED on 2019 reporting bruising on her ankles and in her mouth.  She received 2 units of platelets and started on steroids. She was discharged on 19.The patient's last platelet count was 17,000 on 19 after a platelet transfusion.  She had been on steroids for four days.  The patient reported continued  subcutaneous bruising. She denied any headaches, nosebleeds, or gingival bleeding.  She was admitted for IV steroids and further management.     19  Hematology/Oncology was consulted on this patient known to our service and previously consulted for acute thrombocytopenia on 19. She was followed by Uri Sagastume MD for ITP. The patient had been transfused and started on IV steroids while inpatient 19 to 19.  She was transitioned to oral steroids at discharge.   · 19  Bone marrow biopsy - Smears of bone marrow aspirate with cell block (clot) preparation and marrow biopsy:  Hypocellular for age bone marrow (50%),  Adequate numbers of unremarkable megakaryocytes,  Absent iron stores,  Negative for involvement by malignant lymphoma.  Flow cytometry-CD 56 expression on granulocytes and monocytes.  Chromosomal analysis -revealed no evidence of an acquired clonal abnormality.  · 2019 WBC 16.88, hemoglobin 12.5, MCV 87.7, platelets 8000     PCP: Jose Ingram, FARZAD     19  Hematology/Oncology was consulted on a patient known to us and followed in the  office by Dr. Llanes for acute thrombocytopenia, likely ITP.       Subjective   Nosebleeds resolved.  Noted extensive new bruises  ROS:  Review of Systems   Constitutional: Negative for chills and fever.   HENT: Negative for ear pain, mouth sores, nosebleeds and sore throat.    Eyes: Negative for photophobia and visual disturbance.   Respiratory: Negative for wheezing and stridor.    Cardiovascular: Negative for chest pain and palpitations.   Gastrointestinal: Negative for abdominal pain, diarrhea, nausea and vomiting.   Endocrine: Negative for cold intolerance and heat intolerance.   Genitourinary: Negative for dysuria and hematuria.   Musculoskeletal: Negative for joint swelling and neck stiffness.   Skin: Negative for color change and rash.   Neurological: Negative for seizures and syncope.   Hematological: Negative for adenopathy. Bruises/bleeds easily.        No obvious bleeding   Psychiatric/Behavioral: Negative for agitation, confusion and hallucinations.        MEDICATIONS:    Scheduled Meds:      immune globulin (human) (GAMUNEX-C) 30 g 30 g Intravenous Once   megestrol 40 mg Oral 4x Daily   nystatin 500,000 Units Swish & Swallow 4x Daily   pantoprazole 40 mg Oral Daily   predniSONE 50 mg Oral BID With Meals   riTUXimab (RITUXAN) IVPB 375 mg/m2 (Treatment Plan Recorded) Intravenous Once   sodium chloride 10 mL Intravenous Q12H      Continuous Infusions:      PRN Meds:  •  acetaminophen **OR** acetaminophen **OR** acetaminophen  •  aluminum-magnesium hydroxide-simethicone  •  bisacodyl  •  calcium carbonate  •  diphenhydrAMINE  •  docusate sodium  •  hydrocortisone sodium succinate  •  magnesium hydroxide  •  magnesium sulfate **OR** magnesium sulfate **OR** magnesium sulfate  •  melatonin  •  ondansetron **OR** ondansetron  •  potassium chloride  •  potassium chloride  •  sodium chloride     ALLERGIES:  No Known Allergies    Objective    VITALS:   /69   Pulse 75   Temp 98.2 °F (36.8 °C) (Oral)    "Resp 17   Ht 157.5 cm (62\")   Wt 52.9 kg (116 lb 10 oz)   LMP 11/29/2019   SpO2 98%   BMI 21.33 kg/m²     PHYSICAL EXAM:  Physical Exam   Constitutional: She is oriented to person, place, and time. No distress.   HENT:   Head: Normocephalic and atraumatic.   Eyes: Conjunctivae and EOM are normal. Right eye exhibits no discharge. Left eye exhibits no discharge. No scleral icterus.   Neck: Normal range of motion. Neck supple. No thyromegaly present.   Cardiovascular: Normal rate, regular rhythm and normal heart sounds. Exam reveals no gallop and no friction rub.   Pulmonary/Chest: Effort normal. No stridor. No respiratory distress. She has no wheezes.   Abdominal: Soft. Bowel sounds are normal. She exhibits no mass. There is no tenderness. There is no rebound and no guarding.   Musculoskeletal: Normal range of motion. She exhibits no tenderness.   Lymphadenopathy:     She has no cervical adenopathy.   Neurological: She is alert and oriented to person, place, and time. She exhibits normal muscle tone.   Skin: Skin is warm. No rash noted. She is not diaphoretic. No erythema.   Psychiatric: She has a normal mood and affect. Her behavior is normal.   Nursing note and vitals reviewed.  Extensive bruises on both upper and lower extremities    RECENT LABS:  Lab Results (last 24 hours)     Procedure Component Value Units Date/Time    Reticulocytes [[id number]]  (Abnormal) Collected:  12/01/19 0320    Specimen:  Blood Updated:  12/01/19 1241     Reticulocyte % 3.13 %      Reticulocyte Absolute 0.1176 10*6/mm3     Scan Slide [[id number]] Collected:  12/01/19 0320    Specimen:  Blood Updated:  12/01/19 0459     Scan Slide --     Comment: See Manual Differential Results       Manual Differential [[id number]]  (Abnormal) Collected:  12/01/19 0320    Specimen:  Blood Updated:  12/01/19 0459     Neutrophil % 86.0 %      Lymphocyte % 6.0 %      Monocyte % 4.0 %      Bands %  4.0 %      Neutrophils Absolute 15.66 10*3/mm3      " Lymphocytes Absolute 1.04 10*3/mm3      Monocytes Absolute 0.70 10*3/mm3      Anisocytosis Slight/1+     WBC Morphology Normal     Platelet Morphology Normal    CBC & Differential [[id number]] Collected:  12/01/19 0320    Specimen:  Blood Updated:  12/01/19 0459    Narrative:       The following orders were created for panel order CBC & Differential.  Procedure                               Abnormality         Status                     ---------                               -----------         ------                     CBC Auto Differential[[id number]]        Abnormal            Final result                 Please view results for these tests on the individual orders.    CBC Auto Differential [[id number]]  (Abnormal) Collected:  12/01/19 0320    Specimen:  Blood Updated:  12/01/19 0459     WBC 17.40 10*3/mm3      RBC 3.72 10*6/mm3      Hemoglobin 11.1 g/dL      Hematocrit 32.6 %      MCV 87.5 fL      MCH 29.9 pg      MCHC 34.2 g/dL      RDW 14.8 %      RDW-SD 45.1 fl      MPV 10.2 fL      Platelets 11 10*3/mm3     Basic Metabolic Panel [[id number]]  (Abnormal) Collected:  12/01/19 0320    Specimen:  Blood Updated:  12/01/19 0404     Glucose 171 mg/dL      BUN 15 mg/dL      Creatinine 0.65 mg/dL      Sodium 138 mmol/L      Potassium 4.2 mmol/L      Chloride 100 mmol/L      CO2 23.0 mmol/L      Calcium 9.3 mg/dL      eGFR Non African Amer 106 mL/min/1.73      BUN/Creatinine Ratio 23.1     Anion Gap 15.0 mmol/L     Narrative:       GFR Normal >60  Chronic Kidney Disease <60  Kidney Failure <15          PENDING RESULTS: n/a    IMAGING REVIEWED:  No radiology results for the last day    I have reviewed the patient's labs, imaging, reports, and other clinician documentation.    Assessment/Plan    1. Acute thrombocytopenia, refractory to steroids: likely due to ITP, began on 11/18/19, normal platelet count 10/7/19. Coags and LFTs normal.  No history of alcohol abuse. S/p bone marrow biopsy 11/18/19 - hypocellular for age  bone marrow (50%),  adequate numbers of unremarkable megakaryocytes,  absent iron stores,  negative for involvement by malignant lymphoma.  Flow cytometry-CD 56 expression on granulocytes and monocytes.  Chromosomal analysis - pending. HIV testing negative. TSH normal.   · 1 unit SDP 11/18/19   · 1 unit SDP 11/19/19  · 1 unit SDP 11/22/19   · 11/29/2019 cycle 1 day 1 Rituxan 375 mg/m² received plans for 3 more weekly cycles.  · 1 unit of platelets 11/29/2019  · 1 unit of platelets 11/30/2019 for count of 6000  2. Anemia: History of HAILE:  Bone marrow biopsy on 11/18/19 showed absent iron stores. Anemia work-up: reticulocytes 1.53, ferritin 15.38, iron 40, iron saturation 9, transferrin 310, TIBC 462, , haptoglobin 136, vitamin B12 461, folate 11.2   · Iron deficiency anemia - S/p Venofer 400 mg IV x 1 dose on 11/23/19  3. Left renal stone: 3 mm kidney stone visualized on CT A/P on 10/7/19.  Asymptomatic.  4. Anxiety/weight loss:  management per Primary team   5. Menstrual bleeding: LMPD 11/23/19 - started Megace per recommendation from GYN to suppress menstrual cycle      PLAN  1. Monitor CBC daily   2. Transfuse 1 unit single donor platelets PRN bleeding  3. Consider thrombopoietin receptor agonist if needed in the future  4. Increase prednisone to 50 mg p.o. twice daily  5. Continue Megace 40 mg PO QID x 7 days, then BID x 7 days, then daily if amenorrhea   6. Continue Protonix - patient to continue with the steroids  7. Consider splenectomy   8. Vaccinations:   · PPSV23 in 8 weeks   · Meningococcal vaccination with Menveo - 2nd dose in 8 weeks   · Bexsero-  2nd dose in 4 weeks       Electronically signed by FARZAD Jarrett, 12/01/19, 11:07 AM.  Patient seen and examined nurse practitioner note reviewed updated discussed with nurse practitioner.  Patient on very high-dose of prednisone at 100 mg a day yesterday  Day 3 of Rituxan  Platelet count low at 11  IVIG trial today  Discussed with patient  and family  I have personally performed a face-to-face diagnostic evaluation on this patient.  I have reviewed and agree with the care plan.    I discussed the patients findings and my recommendations with patient    Uri Rodríguez MD  12/01/19  1:56 PM

## 2019-12-01 NOTE — PROGRESS NOTES
[facility] Medicine Services  INPATIENT PROGRESS NOTE    Hospitalist Team  LOS 2 days    Patient Care Team:  Jose Ingram APRN as PCP - General (Family Medicine)      Chief Complaint / Subjective    No chief complaint on file.  Bruising    HPI  Ms. Akhtar is a 34 y.o. with a past medical history of thrombocytopenia who presents to [facility] 11/29 as a direct admit from the cancer care center complaining of weakness, fatigue, dizziness and worsening bruising x 2 weeks.  The patient states she was supposed to be placed on a medication (she cannot recall the medication).  However, she states her pharmacy required a prior authorization and she was unable to get the medicine.        Upon admission, the patient's labs included the following: Na 136, K 4.2, BUN 17, Cr 0.68, glucose 93, WBC 21.2, hgb 12.7, plts 12.  Oncology was consulted.  The patient was admitted for further evaluation and management.      More bruising, menorrhagia    Interval History and ROS:     History taken from: patient    Review of Systems   Constitution: Positive for weakness and malaise/fatigue.   Skin:        bruising   Neurological: Positive for dizziness.   All other systems reviewed and are negative.      Family History   Problem Relation Age of Onset   • Thrombocytopenia Nephew         ITP   • Diabetes Mother    • Diabetes Father    • Cancer Paternal Grandmother        Past Medical History:   Diagnosis Date   • Anxiety    • Iron deficiency anemia    • Kidney stone 10/2019       Social History     Socioeconomic History   • Marital status:      Spouse name: Not on file   • Number of children: Not on file   • Years of education: Not on file   • Highest education level: Not on file   Tobacco Use   • Smoking status: Never Smoker   • Smokeless tobacco: Never Used   Substance and Sexual Activity   • Alcohol use: No     Frequency: Never   • Drug use: No   • Sexual activity: Defer   Social History  "Narrative    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          Objective    Physical Exam     Vital Signs  Temp:  [98 °F (36.7 °C)-98.4 °F (36.9 °C)] 98.2 °F (36.8 °C)  Heart Rate:  [71-82] 71  Resp:  [15-18] 18  BP: (100-118)/(62-87) 118/87    Oxygen Therapy  SpO2: 99 %  Pulse Oximetry Type: Intermittent  Device (Oxygen Therapy): room air  Flowsheet Rows      First Filed Value   Admission Height  157.5 cm (62\") Documented at 11/29/2019 1100   Admission Weight  52.9 kg (116 lb 9.6 oz) Documented at 11/29/2019 1100        Intake & Output (last 3 days)       11/28 0701 - 11/29 0700 11/29 0701 - 11/30 0700 11/30 0701 - 12/01 0700 12/01 0701 - 12/02 0700    P.O.  960 1460 520    Blood  250 250     Total Intake(mL/kg)  1210 (22.9) 1710 (32.3) 520 (9.8)    Urine (mL/kg/hr)  0      Stool  0      Total Output  0      Net  +1210 +1710 +520            Urine Unmeasured Occurrence  4 x          Lines, Drains & Airways    Active LDAs     Name:   Placement date:   Placement time:   Site:   Days:    Peripheral IV 11/29/19 1623 Right Antecubital   11/29/19 1623    Antecubital   less than 1                Physical Exam:    Physical Exam   Constitutional: Patient appears well-developed and well-nourished and in no acute distress     HEENT:   Head: Normocephalic and atraumatic.   Eyes: Pupils are equal, round, and reactive to light. EOM are intact. Sclera are anicteric and non-injected.  Mouth and Throat: Patient has moist mucous membranes. Oropharynx is clear of any erythema or exudate.     Neck: Neck supple. No thyromegaly present. No lymphadenopathy present. No masses.     Cardiovascular: Inspection: No JVD present. Palpation: No parasternal heave. Pedal pulses +1 bilaterally. No leg edema. Auscultation: Regular rate, regular rhythm, S1 normal and S2 normal. reveals no gallop and no friction rub. No Carotid bruit bilaterally.    Pulmonary/Chest: Inspection: No distress, no use of accessory muscles. Lungs are clear to auscultation " bilaterally. No respiratory distress. No wheezes. No rhonchi. No rales.     Abdomen /Gastrointestinal: Inspection: no distension. Palpation: no masses, no hepatosplenomegaly. Soft. There is no tenderness. Bowel sounds are normal.     Musculoskeletal: Normal Muscle tone. Age appropriate, no deformities.    Neurological: Patient is alert and oriented to person, place, and time. Cranial nerves II-XII are grossly intact with no focal deficits. Sensori-motor exam is normal. No cerebellar signs.    Skin: Skin is warm. No rash noted. Nails show no clubbing. No cyanosis or erythema. Scattered bruising on the limbs.  Emotional Behavior:   Appropriate  Debilities:  None     No change in exam from November 30 12/01/19  12:20 PM       Wounds (last 24 hours)      LDA Wound     Row Name 11/30/19 0737 11/29/19 1901 11/29/19 1149       Wound 11/19/19 1547 Left lateral lumbar spine Puncture    Wound - Properties Group Date first assessed: 11/19/19  - Time first assessed: 1547  - Present on Hospital Admission: N  -AC Side: Left  -AC Orientation: lateral  -AC Location: lumbar spine  -AC Primary Wound Type: Puncture  -AC    Dressing Appearance  open to air  -CHRIS  open to air  -JM  intact;dry;no drainage  -HW    Closure  --  --  Adhesive bandage  -HW    Base  --  --  maroon/purple;other (see comments) bruised  -HW    Periwound  --  --  warm;petechiae  -HW    Periwound Temperature  --  --  warm  -HW    Periwound Skin Turgor  --  --  soft  -HW    Drainage Amount  none  -CHRIS  --  none  -HW    Dressing Care, Wound  --  --  dressing applied;border dressing;silicone  -HW      User Key  (r) = Recorded By, (t) = Taken By, (c) = Cosigned By    Initials Name Provider Type    Megan Landrum RN Registered Nurse    Siomara Bryant RN Registered Nurse    Jero Avalos LPN Licensed Nurse    Dee Randolph RN Registered Nurse          Procedures:        Assessment/Plan with Problem wise       * Immune thrombocytopenic purpura  (CMS/HCC)- (present on admission)    - plts 12  - oncology following  - patient started on Rituxan   - transfuse platelets for < 15,000  - continue home prednisone, megace and protonix   - oncology considering splenectomy   - regular diet      Epistaxis not due to trauma- (present on admission)    Tranexamic acid x1      Thrombocytopenia (CMS/HCC)- (present on admission)     Rituximab, platelets if needed, steroids, IVIG if needed         Resolved Hospital Problems:  No notes have been filed under this hospital service.  Service: Hospitalist      Estimated Creatinine Clearance: 103.8 mL/min (by C-G formula based on SCr of 0.65 mg/dL).    Code Status and Medical Interventions:   Ordered at: 11/29/19 1242     Code Status:    CPR     Medical Interventions (Level of Support Prior to Arrest):    Full       Plan    Care coordination with Dr. Rodríguez and nursing for current care and her ITP and epistaxis today 11/30/19 11:32 AM.  Checking iron profile.  No iron stores on bone marrow.  Possible Venofer after iron profile 12/01/19  12:23 PM      Plan for disposition:            Destination      No service coordination in this encounter.      Durable Medical Equipment      No service coordination in this encounter.      Dialysis/Infusion      No service coordination in this encounter.      Home Medical Care      No service coordination in this encounter.      Therapy      No service coordination in this encounter.      Community Resources      No service coordination in this encounter.         Historical & Objective Data     Results Review:    I reviewed the patient's new clinical results.    Results from last 7 days   Lab Units 12/01/19  0320 11/30/19  0927 11/29/19  1216  11/25/19  0306   WBC 10*3/mm3 17.40* 11.20* 21.20*   < > 12.10*   HEMOGLOBIN g/dL 11.1* 12.1 12.7   < > 11.2*   HEMATOCRIT % 32.6* 36.9 37.9   < > 32.5*   PLATELETS 10*3/mm3 11* 6* 12*   < > 51*   MONOCYTES % % 4.0*  --  3.0*  --  4.0*    < > = values in this  interval not displayed.     Results from last 7 days   Lab Units 12/01/19  0320 11/30/19  0927 11/29/19  1217   SODIUM mmol/L 138 141 136   POTASSIUM mmol/L 4.2 3.7 4.2   CHLORIDE mmol/L 100 102 98   CO2 mmol/L 23.0 24.0 23.0   BUN mg/dL 15 16 17   CREATININE mg/dL 0.65 0.76 0.68   CALCIUM mg/dL 9.3 9.3 9.5   BILIRUBIN mg/dL  --   --  0.3   ALK PHOS U/L  --   --  36*   ALT (SGPT) U/L  --   --  31   AST (SGOT) U/L  --   --  16   GLUCOSE mg/dL 171* 89 93         Lab Results   Component Value Date    CALCIUM 9.3 12/01/2019     No results found for: HGBA1C  No results found for: CHOL, CHLPL, TRIG, HDL, LDL, LDLDIRECT  Lab Results   Component Value Date    LIPASE 34 10/07/2019                 Lab Results   Lab Value Date/Time    FINALDX  11/19/2019 1514     Smears of bone marrow aspirate with cell block (clot) preparation and marrow biopsy:    Hypocellular for age bone marrow (50%)    Adequate numbers of unremarkable megakaryocytes    Absent iron stores    Negative for involvement by malignant lymphoma, see attached flow cytometry report from [facility]    JPR/sms       FINALDX  11/18/2019 0937     Thrombocytopenia.  No blasts identified.         Microbiology Results (last 10 days)     ** No results found for the last 240 hours. **          ECG/EMG Results (most recent)     None                    No radiology results for the last 7 days    I personally reviewed patient's x-ray films and my findings are: 0    I personally reviewed patient's EKG strip and my findings are: 0    Medication Review:   I have reviewed the patient's current medication list    Scheduled Meds    megestrol 40 mg Oral 4x Daily   nystatin 500,000 Units Swish & Swallow 4x Daily   pantoprazole 40 mg Oral Daily   predniSONE 50 mg Oral BID With Meals   riTUXimab (RITUXAN) IVPB 375 mg/m2 (Treatment Plan Recorded) Intravenous Once   sodium chloride 10 mL Intravenous Q12H   tranexamic acid 1,000 mg Intravenous Once       Meds Infusions       Meds  PRN  •  acetaminophen **OR** acetaminophen **OR** acetaminophen  •  aluminum-magnesium hydroxide-simethicone  •  bisacodyl  •  calcium carbonate  •  diphenhydrAMINE  •  docusate sodium  •  hydrocortisone sodium succinate  •  magnesium hydroxide  •  magnesium sulfate **OR** magnesium sulfate **OR** magnesium sulfate  •  melatonin  •  ondansetron **OR** ondansetron  •  potassium chloride  •  potassium chloride  •  sodium chloride      Kevin Mcfarland MD  12/01/19  12:20 PM

## 2019-12-02 VITALS
OXYGEN SATURATION: 100 % | BODY MASS INDEX: 21.46 KG/M2 | SYSTOLIC BLOOD PRESSURE: 121 MMHG | WEIGHT: 116.62 LBS | RESPIRATION RATE: 16 BRPM | HEIGHT: 62 IN | DIASTOLIC BLOOD PRESSURE: 73 MMHG | TEMPERATURE: 97.7 F | HEART RATE: 72 BPM

## 2019-12-02 LAB
ABO + RH BLD: NORMAL
ANION GAP SERPL CALCULATED.3IONS-SCNC: 9 MMOL/L (ref 5–15)
BH BB BLOOD EXPIRATION DATE: NORMAL
BH BB BLOOD TYPE BARCODE: 6200
BH BB DISPENSE STATUS: NORMAL
BH BB PRODUCT CODE: NORMAL
BH BB UNIT NUMBER: NORMAL
BUN BLD-MCNC: 16 MG/DL (ref 6–20)
BUN/CREAT SERPL: 29.1 (ref 7–25)
CALCIUM SPEC-SCNC: 9.3 MG/DL (ref 8.6–10.5)
CHLORIDE SERPL-SCNC: 101 MMOL/L (ref 98–107)
CO2 SERPL-SCNC: 27 MMOL/L (ref 22–29)
CREAT BLD-MCNC: 0.55 MG/DL (ref 0.57–1)
DEPRECATED RDW RBC AUTO: 47.3 FL (ref 37–54)
ERYTHROCYTE [DISTWIDTH] IN BLOOD BY AUTOMATED COUNT: 15.4 % (ref 12.3–15.4)
FERRITIN SERPL-MCNC: 231.7 NG/ML (ref 13–150)
GFR SERPL CREATININE-BSD FRML MDRD: 128 ML/MIN/1.73
GLUCOSE BLD-MCNC: 107 MG/DL (ref 65–99)
HCT VFR BLD AUTO: 31.8 % (ref 34–46.6)
HGB BLD-MCNC: 10.9 G/DL (ref 12–15.9)
IRON 24H UR-MRATE: 72 MCG/DL (ref 37–145)
IRON SATN MFR SERPL: 17 % (ref 20–50)
MCH RBC QN AUTO: 30 PG (ref 26.6–33)
MCHC RBC AUTO-ENTMCNC: 34.1 G/DL (ref 31.5–35.7)
MCV RBC AUTO: 87.9 FL (ref 79–97)
PLATELET # BLD AUTO: 56 10*3/MM3 (ref 140–450)
PMV BLD AUTO: 9.4 FL (ref 6–12)
POTASSIUM BLD-SCNC: 4.1 MMOL/L (ref 3.5–5.2)
RBC # BLD AUTO: 3.62 10*6/MM3 (ref 3.77–5.28)
SODIUM BLD-SCNC: 137 MMOL/L (ref 136–145)
TIBC SERPL-MCNC: 416 MCG/DL (ref 298–536)
TRANSFERRIN SERPL-MCNC: 279 MG/DL (ref 200–360)
UNIT  ABO: NORMAL
UNIT  RH: NORMAL
WBC NRBC COR # BLD: 21.8 10*3/MM3 (ref 3.4–10.8)

## 2019-12-02 PROCEDURE — 63710000001 PREDNISONE PER 5 MG: Performed by: NURSE PRACTITIONER

## 2019-12-02 PROCEDURE — 85027 COMPLETE CBC AUTOMATED: CPT | Performed by: INTERNAL MEDICINE

## 2019-12-02 PROCEDURE — 82728 ASSAY OF FERRITIN: CPT | Performed by: INTERNAL MEDICINE

## 2019-12-02 PROCEDURE — 84466 ASSAY OF TRANSFERRIN: CPT | Performed by: INTERNAL MEDICINE

## 2019-12-02 PROCEDURE — 99239 HOSP IP/OBS DSCHRG MGMT >30: CPT | Performed by: INTERNAL MEDICINE

## 2019-12-02 PROCEDURE — 83540 ASSAY OF IRON: CPT | Performed by: INTERNAL MEDICINE

## 2019-12-02 PROCEDURE — 80048 BASIC METABOLIC PNL TOTAL CA: CPT | Performed by: PHYSICIAN ASSISTANT

## 2019-12-02 RX ORDER — LIDOCAINE HYDROCHLORIDE 20 MG/ML
5 SOLUTION OROPHARYNGEAL
Status: DISCONTINUED | OUTPATIENT
Start: 2019-12-02 | End: 2019-12-02 | Stop reason: HOSPADM

## 2019-12-02 RX ORDER — POLYETHYLENE GLYCOL 3350 17 G/17G
17 POWDER, FOR SOLUTION ORAL DAILY PRN
Qty: 225 G | Refills: 0 | Status: SHIPPED | OUTPATIENT
Start: 2019-12-02 | End: 2020-11-02

## 2019-12-02 RX ORDER — FERROUS SULFATE TAB EC 324 MG (65 MG FE EQUIVALENT) 324 (65 FE) MG
324 TABLET DELAYED RESPONSE ORAL
Status: DISCONTINUED | OUTPATIENT
Start: 2019-12-02 | End: 2019-12-02 | Stop reason: HOSPADM

## 2019-12-02 RX ORDER — MEGESTROL ACETATE 40 MG/1
40 TABLET ORAL 4 TIMES DAILY
Status: DISCONTINUED | OUTPATIENT
Start: 2019-12-02 | End: 2019-12-02 | Stop reason: HOSPADM

## 2019-12-02 RX ORDER — FERROUS SULFATE TAB EC 324 MG (65 MG FE EQUIVALENT) 324 (65 FE) MG
324 TABLET DELAYED RESPONSE ORAL
Qty: 30 TABLET | Refills: 0 | Status: SHIPPED | OUTPATIENT
Start: 2019-12-03 | End: 2020-11-02 | Stop reason: CLARIF

## 2019-12-02 RX ORDER — MEGESTROL ACETATE 40 MG/1
40 TABLET ORAL 2 TIMES DAILY
Status: DISCONTINUED | OUTPATIENT
Start: 2019-12-02 | End: 2019-12-02

## 2019-12-02 RX ORDER — LIDOCAINE HYDROCHLORIDE 20 MG/ML
5 SOLUTION OROPHARYNGEAL
Qty: 100 ML | Refills: 0 | Status: SHIPPED | OUTPATIENT
Start: 2019-12-02 | End: 2019-12-20

## 2019-12-02 RX ADMIN — PREDNISONE 50 MG: 50 TABLET ORAL at 17:32

## 2019-12-02 RX ADMIN — MEGESTROL ACETATE 40 MG: 40 TABLET ORAL at 13:13

## 2019-12-02 RX ADMIN — NYSTATIN 500000 UNITS: 500000 SUSPENSION ORAL at 13:13

## 2019-12-02 RX ADMIN — MEGESTROL ACETATE 40 MG: 40 TABLET ORAL at 17:32

## 2019-12-02 RX ADMIN — FERROUS SULFATE TAB EC 324 MG (65 MG FE EQUIVALENT) 324 MG: 324 (65 FE) TABLET DELAYED RESPONSE at 13:13

## 2019-12-02 RX ADMIN — POLYETHYLENE GLYCOL 3350 17 G: 17 POWDER, FOR SOLUTION ORAL at 14:26

## 2019-12-02 RX ADMIN — NYSTATIN 500000 UNITS: 500000 SUSPENSION ORAL at 17:32

## 2019-12-02 RX ADMIN — NYSTATIN 500000 UNITS: 500000 SUSPENSION ORAL at 08:10

## 2019-12-02 RX ADMIN — PANTOPRAZOLE SODIUM 40 MG: 40 TABLET, DELAYED RELEASE ORAL at 08:10

## 2019-12-02 RX ADMIN — LIDOCAINE HYDROCHLORIDE 5 ML: 20 SOLUTION ORAL; TOPICAL at 17:32

## 2019-12-02 RX ADMIN — PREDNISONE 50 MG: 50 TABLET ORAL at 08:10

## 2019-12-02 RX ADMIN — MEGESTROL ACETATE 40 MG: 40 TABLET ORAL at 08:10

## 2019-12-02 RX ADMIN — Medication 10 ML: at 08:11

## 2019-12-02 NOTE — PLAN OF CARE
Problem: Patient Care Overview  Goal: Plan of Care Review  Outcome: Ongoing (interventions implemented as appropriate)   12/02/19 0745 12/02/19 1439   Coping/Psychosocial   Plan of Care Reviewed With patient --    Plan of Care Review   Progress --  improving   OTHER   Outcome Summary --  Platelet level has improved. Complains of constipation was given miralax.     Goal: Discharge Needs Assessment  Outcome: Ongoing (interventions implemented as appropriate)   12/02/19 1423   Discharge Needs Assessment   Patient/Family Anticipates Transition to home with family   Patient/Family Anticipated Services at Transition none   Transportation Concerns car, none   Transportation Anticipated family or friend will provide   Equipment Needed After Discharge none   Disability   Equipment Currently Used at Home none     Goal: Interprofessional Rounds/Family Conf  Outcome: Ongoing (interventions implemented as appropriate)   12/02/19 1439   Interdisciplinary Rounds/Family Conf   Participants patient;physician

## 2019-12-02 NOTE — CONSULTS
Nutrition Services    Patient Name:  Tiny Akhtar  YOB: 1985  MRN: [id number]  Admit Date:  11/29/2019    Comments: Will continue to monitor po intakes to confirm nutritional adequacy.    Reason for Assessment     Row Name           Reason for Assessment    Reason For Assessment MST 2, Nursing Admission Screen Consult       Diagnosis H&P: Thrombocytopenia, Iron Deficiency Anemia, Anixety, Kidney Stones    Current Problems: [facility]/t weakness, fatigue, dizziness, and bruising    Acute thrombocytopenia - oncology following, anemia, anxiety/weight loss         Nutrition/Diet History     Row Name           Nutrition/Diet History    Typical Food/Fluid Intake 12/2: Pt reports losing weight during recent hospital admit, states she was not eating well d/t decreased appetite however is now on a steroid and appetite has improved and has gained weight back     Functional Status Pt walking (to nursing station) before visit attempt       Food Allergies  NKA to food       Factors Affecting Nutritional Intake  Prior decreased appetite         Anthropometrics             Anthropometrics    Height 62”    Weight 116 lb (11/30/19)       Admit Weight    Admit Weight  116 lb (11/29/19)       Ideal Body Weight (IBW)    Ideal Body Weight (IBW) 110 lb    % Ideal Body Weight 105%       Usual Body Weight (UBW)    Usual Body Weight 115-120 lb per pt    % Usual Body Weight 100%    Weight Hx  113 lb (11/18/19)  119 lb (10/7/19)       Body Mass Index (BMI)    BMI (kg/m2) 21.33           Labs/Tests/Procedures/Meds                Labs    Pertinent Lab Results Comments Gluc 107, Na 137, K 4.1, BUN 16, Crt 0.55 L, Ca 9.3, Hgb 10.9 L, Hct 31.8 L        Medications    Pertinent Medications Comments Megace, Mycostatin, Protonix, Predisone         Physical Findings                Physical Findings    Overall Physical Appearance 12/2: No s/s of malnutrition at this time     Gastrointestinal No N/V, No BM recorded this  admit (x3 days), Pt reports only one BM since admit, states she may benefit from a stool softener - alerted RN     Tubes N/A     Oral/Mouth Cavity No chew/swallow issues     Skin Spine Puncture         Estimated/Assessed Needs              Calculation Measurements    Weight Used For Calculations      Height         KCAL/KG    KCAL/KG               Protein Requirements    Weight Used For Protein Calculations      Est Protein Requirement Amount (gms/kg)      Estimated Protein Requirements (gms/day)         Fluid Requirements    Estimated Fluid Requirements (mL/day)          Nutrition Prescription Ordered                Nutrition Prescription PO    Current PO Diet   Regular     Supplement      Supplement Frequency         Nutrition Prescription EN    Enteral Route  -- -     Product  -- -     TF Delivery Method  -----     Continuous TF Goal Rate (mL/hr)  -- -     Water flush (mL)   -- -     Water Flush Frequency  -- -        Nutrition Prescription PN    PN Route  -- -     PN Goal Volume (mL)  -- -     Dextrose (Kcal)  -- -     Amino Acid (gm)  -- -     Lipid Concentration (%)  -- -     Lipid Volume (mL)  -- -     Lipid Frequency  -- -         Evaluation of Received Nutrient/Fluid Intake                PO Evaluation    % PO Intake 93.75% x 8 meals since 11/29 - pt is ordering full meals        EN Evaluation    TF Changes  -- -     TF Residual  -- -     TF Tolerance  -- -        PN Evaluation    Number of Days PN Evaluated  -- -           Problem/Interventions:  Problem 1                Nutrition Diagnoses Problem 1    Problem 1 No nutrition diagnosis at this time           Intervention Goal                Intervention Goal    General Maintain PO intake >75%         Nutrition Intervention                Nutrition Intervention    RD/Tech Action Continue to monitor po intakes to confirm nutritional adequacy         Nutrition Prescription     Row Name           Nutrition Prescription PO    Supplement N/A at this time      Supplement Frequency N/A at this time        Nutrition Prescription EN    Enteral Prescription  -- -        Nutrition Prescription PN    Parenteral Prescription  -- -         Education/Evaluation                Monitor/Evaluation    Monitor  PO intake, weight, labs, meds, skin, and BMs             Electronically signed by:  Tawnya Acosta RD  12/02/19 11:14 AM

## 2019-12-02 NOTE — PLAN OF CARE
Problem: Patient Care Overview  Goal: Plan of Care Review  Outcome: Ongoing (interventions implemented as appropriate)   12/01/19 0330 12/01/19 1905   Coping/Psychosocial   Plan of Care Reviewed With --  patient;significant other   Plan of Care Review   Progress no change --      Goal: Individualization and Mutuality  Outcome: Ongoing (interventions implemented as appropriate)    Goal: Discharge Needs Assessment  Outcome: Ongoing (interventions implemented as appropriate)   11/29/19 1125   Discharge Needs Assessment   Patient/Family Anticipates Transition to home with family   Patient/Family Anticipated Services at Transition none   Transportation Anticipated family or friend will provide     Goal: Interprofessional Rounds/Family Conf  Outcome: Ongoing (interventions implemented as appropriate)      Problem: Chemotherapy Effects (Adult)  Goal: Signs and Symptoms of Listed Potential Problems Will be Absent, Minimized or Managed (Chemotherapy Effects)  Outcome: Ongoing (interventions implemented as appropriate)   12/01/19 0330   Goal/Outcome Evaluation   Problems Assessed (Chemotherapy Effects) all   Problems Present (Chemotherapy Effects) none

## 2019-12-02 NOTE — PROGRESS NOTES
Discharge Planning Assessment   Etienne     Patient Name: Tiny Akhtar  MRN: [id number]  Today's Date: 12/2/2019    Admit Date: 11/29/2019    Discharge Needs Assessment     Row Name 12/02/19 1423       Living Environment    Lives With  significant other    Current Living Arrangements  home/apartment/condo    Primary Care Provided by  self    Family Caregiver if Needed  significant other    Quality of Family Relationships  unable to assess    Able to Return to Prior Arrangements  yes       Resource/Environmental Concerns    Resource/Environmental Concerns  none    Transportation Concerns  car, none       Transition Planning    Patient/Family Anticipates Transition to  home with family    Patient/Family Anticipated Services at Transition  none    Transportation Anticipated  family or friend will provide       Discharge Needs Assessment    Equipment Currently Used at Home  none    Equipment Needed After Discharge  none          Carol naegele rn  Case management  Office number [phone]  Cell phone [phone]

## 2019-12-02 NOTE — PROGRESS NOTES
Hematology/Oncology Inpatient Progress Note    PATIENT NAME: Tiny Akhtar  : 1985  MRN: [id number]    CHIEF COMPLAINT: Inpatient Rituxan therapy    HISTORY OF PRESENT ILLNESS:  34 y.o. female  with a past medical history of thrombocytopenia, anemia, and kidney stones.  She presented to [facility] Emergency Department on 2019 after she had blood work drawn earlier in the day that showed a platelet count of 9,000 at this facility.  She had previously been admitted through the ED on 2019 reporting bruising on her ankles and in her mouth.  She received 2 units of platelets and started on steroids. She was discharged on 19.The patient's last platelet count was 17,000 on 19 after a platelet transfusion.  She had been on steroids for four days.  The patient reported continued  subcutaneous bruising. She denied any headaches, nosebleeds, or gingival bleeding.  She was admitted for IV steroids and further management.     19  Hematology/Oncology was consulted on this patient known to our service and previously consulted for acute thrombocytopenia on 19. She was followed by Uri Sagastume MD for ITP. The patient had been transfused and started on IV steroids while inpatient 19 to 19.  She was transitioned to oral steroids at discharge.   · 19  Bone marrow biopsy - Smears of bone marrow aspirate with cell block (clot) preparation and marrow biopsy:  Hypocellular for age bone marrow (50%),  Adequate numbers of unremarkable megakaryocytes,  Absent iron stores,  Negative for involvement by malignant lymphoma.  Flow cytometry-CD 56 expression on granulocytes and monocytes.  Chromosomal analysis -revealed no evidence of an acquired clonal abnormality.  · 2019 WBC 16.88, hemoglobin 12.5, MCV 87.7, platelets 8000     PCP: Jose Ingram, FARZAD     19  Hematology/Oncology was consulted on a patient known to us and followed in the  office by Dr. Llanes for acute thrombocytopenia, likely ITP.       Subjective   Nosebleeds resolved.  Noted extensive new bruises  ROS:  Review of Systems   Constitutional: Negative for chills and fever.   HENT: Negative for ear pain, mouth sores, nosebleeds and sore throat.    Eyes: Negative for photophobia and visual disturbance.   Respiratory: Negative for wheezing and stridor.    Cardiovascular: Negative for chest pain and palpitations.   Gastrointestinal: Negative for abdominal pain, diarrhea, nausea and vomiting.   Endocrine: Negative for cold intolerance and heat intolerance.   Genitourinary: Negative for dysuria and hematuria.   Musculoskeletal: Negative for joint swelling and neck stiffness.   Skin: Negative for color change and rash.   Neurological: Negative for seizures and syncope.   Hematological: Negative for adenopathy. Bruises/bleeds easily.        No obvious bleeding   Psychiatric/Behavioral: Negative for agitation, confusion and hallucinations.        MEDICATIONS:    Scheduled Meds:      megestrol 40 mg Oral 4x Daily   nystatin 500,000 Units Swish & Swallow 4x Daily   pantoprazole 40 mg Oral Daily   predniSONE 50 mg Oral BID With Meals   riTUXimab (RITUXAN) IVPB 375 mg/m2 (Treatment Plan Recorded) Intravenous Once   sodium chloride 10 mL Intravenous Q12H      Continuous Infusions:      PRN Meds:  •  acetaminophen **OR** acetaminophen **OR** acetaminophen  •  aluminum-magnesium hydroxide-simethicone  •  bisacodyl  •  calcium carbonate  •  diphenhydrAMINE  •  docusate sodium  •  hydrocortisone sodium succinate  •  magnesium hydroxide  •  magnesium sulfate **OR** magnesium sulfate **OR** magnesium sulfate  •  melatonin  •  ondansetron **OR** ondansetron  •  potassium chloride  •  potassium chloride  •  sodium chloride     ALLERGIES:  No Known Allergies    Objective    VITALS:   /54 (BP Location: Left arm, Patient Position: Lying)   Pulse 69   Temp 98.7 °F (37.1 °C) (Oral)   Resp 16   Ht  "157.5 cm (62\")   Wt 52.9 kg (116 lb 10 oz)   LMP 11/29/2019   SpO2 97%   BMI 21.33 kg/m²     PHYSICAL EXAM:  Physical Exam   Constitutional: She is oriented to person, place, and time. No distress.   HENT:   Head: Normocephalic and atraumatic.   Eyes: Conjunctivae and EOM are normal. Right eye exhibits no discharge. Left eye exhibits no discharge. No scleral icterus.   Neck: Normal range of motion. Neck supple. No thyromegaly present.   Cardiovascular: Normal rate, regular rhythm and normal heart sounds. Exam reveals no gallop and no friction rub.   Pulmonary/Chest: Effort normal. No stridor. No respiratory distress. She has no wheezes.   Abdominal: Soft. Bowel sounds are normal. She exhibits no mass. There is no tenderness. There is no rebound and no guarding.   Musculoskeletal: Normal range of motion. She exhibits no tenderness.   Lymphadenopathy:     She has no cervical adenopathy.   Neurological: She is alert and oriented to person, place, and time. She exhibits normal muscle tone.   Skin: Skin is warm. No rash noted. She is not diaphoretic. No erythema.   Psychiatric: She has a normal mood and affect. Her behavior is normal.   Nursing note and vitals reviewed.  Extensive bruises on both upper and lower extremities    RECENT LABS:  Lab Results (last 24 hours)     Procedure Component Value Units Date/Time    Ferritin [[id number]]  (Abnormal) Collected:  12/02/19 0354    Specimen:  Blood Updated:  12/02/19 0448     Ferritin 231.70 ng/mL     Iron Profile [[id number]]  (Abnormal) Collected:  12/02/19 0354    Specimen:  Blood Updated:  12/02/19 0444     Iron 72 mcg/dL      Iron Saturation 17 %      Transferrin 279 mg/dL      TIBC 416 mcg/dL     Basic Metabolic Panel [[id number]]  (Abnormal) Collected:  12/02/19 0354    Specimen:  Blood Updated:  12/02/19 0443     Glucose 107 mg/dL      BUN 16 mg/dL      Creatinine 0.55 mg/dL      Sodium 137 mmol/L      Potassium 4.1 mmol/L      Chloride 101 mmol/L      CO2 27.0 " mmol/L      Calcium 9.3 mg/dL      eGFR Non African Amer 128 mL/min/1.73      BUN/Creatinine Ratio 29.1     Anion Gap 9.0 mmol/L     Narrative:       GFR Normal >60  Chronic Kidney Disease <60  Kidney Failure <15    CBC (No Diff) [[id number]]  (Abnormal) Collected:  12/02/19 0354    Specimen:  Blood Updated:  12/02/19 0425     WBC 21.80 10*3/mm3      RBC 3.62 10*6/mm3      Hemoglobin 10.9 g/dL      Hematocrit 31.8 %      MCV 87.9 fL      MCH 30.0 pg      MCHC 34.1 g/dL      RDW 15.4 %      RDW-SD 47.3 fl      MPV 9.4 fL      Platelets 56 10*3/mm3      Comment: Result checked        Reticulocytes [[id number]]  (Abnormal) Collected:  12/01/19 0320    Specimen:  Blood Updated:  12/01/19 1241     Reticulocyte % 3.13 %      Reticulocyte Absolute 0.1176 10*6/mm3           PENDING RESULTS: n/a    IMAGING REVIEWED:  No radiology results for the last day    I have reviewed the patient's labs, imaging, reports, and other clinician documentation.    Assessment/Plan    1. Acute thrombocytopenia, refractory to steroids: due to ITP, began on 11/18/19, normal platelet count 10/7/19. Coags and LFTs normal.  No history of alcohol abuse. S/p bone marrow biopsy 11/18/19 - hypocellular for age bone marrow (50%),  adequate numbers of unremarkable megakaryocytes,  absent iron stores,  negative for involvement by malignant lymphoma.  Flow cytometry-CD 56 expression on granulocytes and monocytes.  Chromosomal analysis normal. HIV/hepatitis testing negative. Preparing for possibly splenectomy-meningococcal/HIB vaccines given 11/30, Influenza given 12/1.  Improved.   · 1 unit SDP 11/18/19.  Started methylprednisolone.    · 1 unit SDP 11/19/19  · 1 unit SDP 11/22/19   · 11/29/2019 cycle 1 day 1 Rituxan 375 mg/m² received plans for 3 more weekly cycles.  · 1 unit of platelets 11/29/2019  · 1 unit of platelets 11/30/2019 for count of 6000  · 12/1-IVIG given and Prednisone increased to 50 mg BID.   2. Anemia: History of HAILE:  Bone marrow biopsy on  11/18/19 showed absent iron stores. Anemia work-up: reticulocytes 1.53, ferritin 15.38, iron 40, iron saturation 9, transferrin 310, TIBC 462, , haptoglobin 136, vitamin B12 461, folate 11.2, TSH normal. On protonix. Due to menorrhagia?   · Iron deficiency anemia - S/p Venofer 400 mg IV x 1 dose on 11/23/19  · 12/2-started oral Ferrous sulfate.    3. Left renal stone: 3 mm kidney stone visualized on CT A/P on 10/7/19.  Asymptomatic.  4. Anxiety/weight loss:  management per Primary team.  TSH normal.  Normal BMI.   5. Menorrhaghia : LMPD 11/23/19 - started Megace (11/27) per recommendation from GYN to suppress menstrual cycle.  Complicated by ITP.      PLAN  1. Monitor CBC daily   2. Transfuse 1 unit single donor platelets PRN bleeding  3. Consider thrombopoietin receptor agonist if needed in the future  4. Continue prednisone to 50 mg p.o. twice daily  5. Continue Megace 40 mg PO QID x 7 days, then on 12/4, change to BID x 7 days, then daily if amenorrhea   6. Continue Protonix - patient to continue with the steroids  7. Consider splenectomy   8. Vaccinations:   · PPSV23 in 8 weeks (1/25)  · Meningococcal vaccination with Menveo - 2nd dose in 8 weeks (1/25).   · Bexsero-  2nd dose in 4 weeks (12/28).    9.   Start Ferrous sulfate daily.      Note updated by FARZAD Parra. Pt seen and examined by Dr. Rodríguez.   Electronically signed by FARZAD Ray, 12/02/19, 11:44 AM.    Patient seen and examined nurse practitioner note reviewed updated discussed with nurse practitioner.  Patient on very high-dose of prednisone at 100 mg a day yesterday  Day 3 of Rituxan  Platelet count low at 11  IVIG trial today  Discussed with patient and family  I have personally performed a face-to-face diagnostic evaluation on this patient.  I have reviewed and agree with the care plan.    I discussed the patients findings and my recommendations with patient    Uri Rodríguez MD  12/02/19  11:43 AM

## 2019-12-03 ENCOUNTER — READMISSION MANAGEMENT (OUTPATIENT)
Dept: CALL CENTER | Facility: HOSPITAL | Age: 34
End: 2019-12-03

## 2019-12-03 ENCOUNTER — LAB (OUTPATIENT)
Dept: LAB | Facility: HOSPITAL | Age: 34
End: 2019-12-03

## 2019-12-03 DIAGNOSIS — D50.9 IRON DEFICIENCY ANEMIA, UNSPECIFIED IRON DEFICIENCY ANEMIA TYPE: ICD-10-CM

## 2019-12-03 DIAGNOSIS — D69.3 IMMUNE THROMBOCYTOPENIC PURPURA (HCC): ICD-10-CM

## 2019-12-03 DIAGNOSIS — D69.3 IMMUNE THROMBOCYTOPENIC PURPURA (HCC): Primary | ICD-10-CM

## 2019-12-03 DIAGNOSIS — D69.6 THROMBOCYTOPENIA (HCC): ICD-10-CM

## 2019-12-03 LAB
BASOPHILS # BLD AUTO: 0.03 10*3/MM3 (ref 0–0.2)
BASOPHILS NFR BLD AUTO: 0.1 % (ref 0–1.5)
DEPRECATED RDW RBC AUTO: 48.9 FL (ref 37–54)
EOSINOPHIL # BLD AUTO: 0 10*3/MM3 (ref 0–0.4)
EOSINOPHIL NFR BLD AUTO: 0 % (ref 0.3–6.2)
ERYTHROCYTE [DISTWIDTH] IN BLOOD BY AUTOMATED COUNT: 15.6 % (ref 12.3–15.4)
HCT VFR BLD AUTO: 39.9 % (ref 34–46.6)
HGB BLD-MCNC: 12.8 G/DL (ref 12–15.9)
LYMPHOCYTES # BLD AUTO: 1.32 10*3/MM3 (ref 0.7–3.1)
LYMPHOCYTES NFR BLD AUTO: 5.9 % (ref 19.6–45.3)
MCH RBC QN AUTO: 29.1 PG (ref 26.6–33)
MCHC RBC AUTO-ENTMCNC: 32.1 G/DL (ref 31.5–35.7)
MCV RBC AUTO: 90.7 FL (ref 79–97)
MONOCYTES # BLD AUTO: 2.12 10*3/MM3 (ref 0.1–0.9)
MONOCYTES NFR BLD AUTO: 9.5 % (ref 5–12)
NEUTROPHILS # BLD AUTO: 18.93 10*3/MM3 (ref 1.7–7)
NEUTROPHILS NFR BLD AUTO: 84.5 % (ref 42.7–76)
PLATELET # BLD AUTO: 129 10*3/MM3 (ref 140–450)
PMV BLD AUTO: 10.1 FL (ref 6–12)
RBC # BLD AUTO: 4.4 10*6/MM3 (ref 3.77–5.28)
WBC NRBC COR # BLD: 22.4 10*3/MM3 (ref 3.4–10.8)

## 2019-12-03 PROCEDURE — 85025 COMPLETE CBC W/AUTO DIFF WBC: CPT

## 2019-12-03 PROCEDURE — 36415 COLL VENOUS BLD VENIPUNCTURE: CPT

## 2019-12-03 RX ORDER — FAMOTIDINE 10 MG/ML
20 INJECTION, SOLUTION INTRAVENOUS AS NEEDED
Status: CANCELLED | OUTPATIENT
Start: 2019-12-06

## 2019-12-03 RX ORDER — SODIUM CHLORIDE 9 MG/ML
250 INJECTION, SOLUTION INTRAVENOUS ONCE
Status: CANCELLED | OUTPATIENT
Start: 2019-12-06

## 2019-12-03 RX ORDER — DIPHENHYDRAMINE HYDROCHLORIDE 50 MG/ML
50 INJECTION INTRAMUSCULAR; INTRAVENOUS AS NEEDED
Status: CANCELLED | OUTPATIENT
Start: 2019-12-06

## 2019-12-03 RX ORDER — ACETAMINOPHEN 325 MG/1
650 TABLET ORAL ONCE
Status: CANCELLED | OUTPATIENT
Start: 2019-12-06

## 2019-12-03 RX ORDER — MEPERIDINE HYDROCHLORIDE 50 MG/ML
25 INJECTION INTRAMUSCULAR; INTRAVENOUS; SUBCUTANEOUS
Status: CANCELLED | OUTPATIENT
Start: 2019-12-06 | End: 2019-12-07

## 2019-12-03 NOTE — DISCHARGE SUMMARY
Date of Admission: 11/29/2019    Date of Discharge:  12/2/2019    Length of stay:  LOS: 3 days     Admission Diagnosis:   Thrombocytopenia (CMS/HCC) [D69.6]      Discharge Diagnosis:     Active Hospital Problems:  * Immune thrombocytopenic purpura (CMS/HCC)- (present on admission)    - plts improved to 56,000  - oncology following  - patient started on Rituxan and IVIG  - continue home prednisone, megace and protonix   - can d/c tranexamic acid per dr. Llanes   - oncology considering splenectomy   - regular diet      Epistaxis not due to trauma- (present on admission)    Tranexamic acid x1      Thrombocytopenia (CMS/HCC)- (present on admission)     Rituximab, platelets if needed, steroids, IVIG if needed         Resolved Hospital Problems:  No notes have been filed under this hospital service.  Service: Hospitalist        Hospital Course:  Patient is a 34 y.o. female with a past medical history of thrombocytopenia who presents to [facility] 11/29 as a direct admit from the cancer care center complaining of weakness, fatigue, dizziness and worsening bruising x 2 weeks.  The patient states she was supposed to be placed on a medication (she cannot recall the medication).  However, she states her pharmacy required a prior authorization and she was unable to get the medicine.        Upon admission, the patient's labs included the following: Na 136, K 4.2, BUN 17, Cr 0.68, glucose 93, WBC 21.2, hgb 12.7, plts 12.  Oncology was consulted.  The patient was admitted for further evaluation and management.      Patient was given rituxamab, prednisone was increased and she was given IVIG with improvement of her platelet count. PT is complaining or worsening mouth and throat pain and constipation. She will be continued on nystatin and given viscous lidocaine and miralax as needed. She will have CBC performed tomorrow at Dr. Llanes's office and follow up with him on Friday.       Procedures Performed:          Consults:   Consults     Date and Time Order Name Status Description    11/29/2019 1156 Hematology & Oncology Inpatient Consult Completed     11/22/2019 2349 Inpatient Hematology & Oncology Consult Completed     11/18/2019 0955 Hematology and Oncology (on-call MD unless specified) Completed     11/18/2019 0955 Hospitalist (on-call MD unless specified) Completed           Vital Signs:  Temp:  [97.7 °F (36.5 °C)] 97.7 °F (36.5 °C)  Heart Rate:  [72] 72  Resp:  [16] 16  BP: (121)/(73) 121/73        Physical Exam:  Physical Exam   Constitutional: She appears well-developed.   Cardiovascular: Normal rate and regular rhythm.   No murmur heard.  Pulmonary/Chest: Effort normal and breath sounds normal. No respiratory distress.   Abdominal: Soft. Bowel sounds are normal. She exhibits no distension. There is no tenderness.           Pertinent Test Results:   Lab Results (last 72 hours)     Procedure Component Value Units Date/Time    Ferritin [[id number]]  (Abnormal) Collected:  12/02/19 0354    Specimen:  Blood Updated:  12/02/19 0448     Ferritin 231.70 ng/mL     Iron Profile [[id number]]  (Abnormal) Collected:  12/02/19 0354    Specimen:  Blood Updated:  12/02/19 0444     Iron 72 mcg/dL      Iron Saturation 17 %      Transferrin 279 mg/dL      TIBC 416 mcg/dL     Basic Metabolic Panel [[id number]]  (Abnormal) Collected:  12/02/19 0354    Specimen:  Blood Updated:  12/02/19 0443     Glucose 107 mg/dL      BUN 16 mg/dL      Creatinine 0.55 mg/dL      Sodium 137 mmol/L      Potassium 4.1 mmol/L      Chloride 101 mmol/L      CO2 27.0 mmol/L      Calcium 9.3 mg/dL      eGFR Non African Amer 128 mL/min/1.73      BUN/Creatinine Ratio 29.1     Anion Gap 9.0 mmol/L     Narrative:       GFR Normal >60  Chronic Kidney Disease <60  Kidney Failure <15    CBC (No Diff) [[id number]]  (Abnormal) Collected:  12/02/19 0354    Specimen:  Blood Updated:  12/02/19 0425     WBC 21.80 10*3/mm3      RBC 3.62 10*6/mm3      Hemoglobin 10.9 g/dL       Hematocrit 31.8 %      MCV 87.9 fL      MCH 30.0 pg      MCHC 34.1 g/dL      RDW 15.4 %      RDW-SD 47.3 fl      MPV 9.4 fL      Platelets 56 10*3/mm3      Comment: Result checked        Reticulocytes [[id number]]  (Abnormal) Collected:  12/01/19 0320    Specimen:  Blood Updated:  12/01/19 1241     Reticulocyte % 3.13 %      Reticulocyte Absolute 0.1176 10*6/mm3     Scan Slide [[id number]] Collected:  12/01/19 0320    Specimen:  Blood Updated:  12/01/19 0459     Scan Slide --     Comment: See Manual Differential Results       Manual Differential [[id number]]  (Abnormal) Collected:  12/01/19 0320    Specimen:  Blood Updated:  12/01/19 0459     Neutrophil % 86.0 %      Lymphocyte % 6.0 %      Monocyte % 4.0 %      Bands %  4.0 %      Neutrophils Absolute 15.66 10*3/mm3      Lymphocytes Absolute 1.04 10*3/mm3      Monocytes Absolute 0.70 10*3/mm3      Anisocytosis Slight/1+     WBC Morphology Normal     Platelet Morphology Normal    CBC & Differential [[id number]] Collected:  12/01/19 0320    Specimen:  Blood Updated:  12/01/19 0459    Narrative:       The following orders were created for panel order CBC & Differential.  Procedure                               Abnormality         Status                     ---------                               -----------         ------                     CBC Auto Differential[[id number]]        Abnormal            Final result                 Please view results for these tests on the individual orders.    CBC Auto Differential [[id number]]  (Abnormal) Collected:  12/01/19 0320    Specimen:  Blood Updated:  12/01/19 0459     WBC 17.40 10*3/mm3      RBC 3.72 10*6/mm3      Hemoglobin 11.1 g/dL      Hematocrit 32.6 %      MCV 87.5 fL      MCH 29.9 pg      MCHC 34.2 g/dL      RDW 14.8 %      RDW-SD 45.1 fl      MPV 10.2 fL      Platelets 11 10*3/mm3     Basic Metabolic Panel [[id number]]  (Abnormal) Collected:  12/01/19 0320    Specimen:  Blood Updated:  12/01/19 0404     Glucose 171 mg/dL       BUN 15 mg/dL      Creatinine 0.65 mg/dL      Sodium 138 mmol/L      Potassium 4.2 mmol/L      Chloride 100 mmol/L      CO2 23.0 mmol/L      Calcium 9.3 mg/dL      eGFR Non African Amer 106 mL/min/1.73      BUN/Creatinine Ratio 23.1     Anion Gap 15.0 mmol/L     Narrative:       GFR Normal >60  Chronic Kidney Disease <60  Kidney Failure <15    Basic Metabolic Panel [[id number]]  (Normal) Collected:  11/30/19 0927    Specimen:  Blood Updated:  11/30/19 1019     Glucose 89 mg/dL      BUN 16 mg/dL      Creatinine 0.76 mg/dL      Sodium 141 mmol/L      Potassium 3.7 mmol/L      Chloride 102 mmol/L      CO2 24.0 mmol/L      Calcium 9.3 mg/dL      eGFR Non African Amer 88 mL/min/1.73      BUN/Creatinine Ratio 21.1     Anion Gap 15.0 mmol/L     Narrative:       GFR Normal >60  Chronic Kidney Disease <60  Kidney Failure <15    CBC (No Diff) [[id number]]  (Abnormal) Collected:  11/30/19 0927    Specimen:  Blood Updated:  11/30/19 1002     WBC 11.20 10*3/mm3      RBC 4.21 10*6/mm3      Hemoglobin 12.1 g/dL      Hematocrit 36.9 %      MCV 87.7 fL      MCH 28.9 pg      MCHC 33.0 g/dL      RDW 15.1 %      RDW-SD 45.9 fl      MPV 9.9 fL      Platelets 6 10*3/mm3     HIV-1 & HIV-2 Antibodies [[id number]] Collected:  11/29/19 1748    Specimen:  Blood Updated:  11/29/19 2030    Narrative:       The following orders were created for panel order HIV-1 & HIV-2 Antibodies.  Procedure                               Abnormality         Status                     ---------                               -----------         ------                     HIV-1 / O / 2 Ag / Antib...[[id number]]  Normal              Final result                 Please view results for these tests on the individual orders.    HIV-1 / O / 2 Ag / Antibody 4th Generation [[id number]]  (Normal) Collected:  11/29/19 1748    Specimen:  Blood Updated:  11/29/19 2030     HIV-1/ HIV-2 Non-Reactive     Comment: A non-reactive test result does not preclude the possibility of  exposure to HIV or infection with HIV. An antibody response to recent exposure may take several months to reach detectable levels.       Narrative:       The HIV antibody/antigen combo assay is a qualitative assay for HIV that includes the p24 antigen as well as antibodies to HIV types 1 and 2. This test is intended to be used as a screening assay in the diagnosis of HIV infection in patients over the age of 2.    Hepatitis Panel, Acute [[id number]]  (Normal) Collected:  11/29/19 1748    Specimen:  Blood Updated:  11/29/19 2024     Hepatitis B Surface Ag Non-Reactive     Hep A IgM Non-Reactive     Hep B C IgM Non-Reactive     Hepatitis C Ab Non-Reactive                 Imaging Results (All)     None                Discharge Disposition:  Home or Self Care    Discharge Medications:     Discharge Medications      New Medications      Instructions Start Date   ferrous sulfate 324 (65 Fe) MG tablet delayed-release EC tablet   324 mg, Oral, Daily With Breakfast   Start Date:  12/3/2019     Lidocaine Viscous HCl 2 % solution  Commonly known as:  XYLOCAINE   5 mL, Oral, Every 3 Hours PRN      polyethylene glycol powder  Commonly known as:  MIRALAX   17 g, Oral, Daily PRN         Continue These Medications      Instructions Start Date   acetaminophen 325 MG tablet  Commonly known as:  TYLENOL   650 mg, Oral, Every 6 Hours PRN      megestrol 20 MG tablet  Commonly known as:  MEGACE   40 mg, Oral, 4 Times Daily      NON FORMULARY   1 tablet, Oral, Daily, Balance OTC       nystatin 320339 UNIT/ML suspension  Commonly known as:  MYCOSTATIN   500,000 Units, Swish & Swallow, 4 Times Daily, 5 ml q 4 hours       pantoprazole 20 MG EC tablet  Commonly known as:  PROTONIX   20 mg, Oral, Daily      predniSONE 50 MG tablet  Commonly known as:  DELTASONE   50 mg, Oral, Daily With Breakfast         Stop These Medications    Tranexamic Acid 650 MG tablet  Commonly known as:  LYSTEDA            Discharge Diet:   Diet Instructions     Diet:  Regular      Discharge Diet:  Regular          Activity at Discharge:   Activity Instructions     Activity as Tolerated            Follow-up Appointments:  No future appointments.      Test Results Pending at Discharge:  none    Condition on Discharge:    Stable      Risk for Readmission (LACE): Score: 7 (12/2/2019  6:00 AM)          Melissa Aleman DO  12/02/19  7:23 PM    Time: Discharge 33 min with face-to-face history/exam, writing all prescriptions, and documenting discharge data including care coordination

## 2019-12-03 NOTE — OUTREACH NOTE
Prep Survey      Responses   Facility patient discharged from?  [facility]   Is patient eligible?  Yes   Discharge diagnosis  Immune thrombocytopenic purpura    Does the patient have one of the following disease processes/diagnoses(primary or secondary)?  Other   Does the patient have Home health ordered?  No   Is there a DME ordered?  No   Prep survey completed?  Yes          Pearl Robertson RN

## 2019-12-03 NOTE — PROGRESS NOTES
Discharge Planning Assessment  [facility]     Patient Name: Tiny Akhtar  MRN: [id number]  Today's Date: 12/3/2019    Admit Date: 11/29/2019          Plan    Final Discharge Disposition Code  01 - home or self-care    Final Note  return home          Carol naegele rn  Case management  Office number [phone]  Cell phone [phone]

## 2019-12-04 ENCOUNTER — TELEPHONE (OUTPATIENT)
Dept: ONCOLOGY | Facility: CLINIC | Age: 34
End: 2019-12-04

## 2019-12-04 ENCOUNTER — READMISSION MANAGEMENT (OUTPATIENT)
Dept: CALL CENTER | Facility: HOSPITAL | Age: 34
End: 2019-12-04

## 2019-12-04 NOTE — TELEPHONE ENCOUNTER
----- Message from Forrest Llanes MD sent at 12/3/2019  5:39 PM EST -----  Asked patient to lower prednisone dose to 40 mg a day

## 2019-12-04 NOTE — OUTREACH NOTE
HPI     Blurred Vision      Additional comments: OD              Concerns About Ocular Health      Additional comments: OD              Comments     Patient Reports That he was seen by Dr. Jarquin 1 week ago due to OD   hurting, light sensitive and excessive watering, he states that he was   referred over to have a corneal eval of his right eye due to the   keratoconus progressing especially in his right eye. Vision is severely   blurry with the cornea turning white OD. 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 Dr. Jarquin Recommended possible Transplant in OD     Patient describes that the pain is worse somedays than others.     Patient admits to using drops that were prescibed to him by Dr. Jarquin.     Drops :  Cipro BID-TID OD   Zylet BID OD- Helps with his pain and light sensitivity  Vinnie 128 BID-TID OD     Keratoconus OD             Last edited by Steve Claudio on 11/6/2019  8:56 AM. (History)            Assessment /Plan     For exam results, see Encounter Report.    Keratoconus, acute hydrops, right      PF, Timolol, Vinnie tid    Reassured, monitor for resolution                    Medical Week 1 Survey      Responses   Facility patient discharged from?  [facility]   Does the patient have one of the following disease processes/diagnoses(primary or secondary)?  Other   Is there a successful TCM telephone encounter documented?  No   Week 1 attempt successful?  No   Revoke  Decline to participate [No answer/Left voicemail]          Lizzette Goldman LPN

## 2019-12-04 NOTE — TELEPHONE ENCOUNTER
I called pt to let her know that Dr. Llanes would like her to decrease prednisone to 40 mg daily. Pt did not answer, so I LVM asking pt to call our office back.

## 2019-12-05 ENCOUNTER — TELEPHONE (OUTPATIENT)
Dept: ONCOLOGY | Facility: CLINIC | Age: 34
End: 2019-12-05

## 2019-12-06 ENCOUNTER — HOSPITAL ENCOUNTER (OUTPATIENT)
Dept: ONCOLOGY | Facility: HOSPITAL | Age: 34
Setting detail: INFUSION SERIES
Discharge: HOME | End: 2019-12-06

## 2019-12-06 ENCOUNTER — TELEPHONE (OUTPATIENT)
Dept: ONCOLOGY | Facility: CLINIC | Age: 34
End: 2019-12-06

## 2019-12-06 VITALS
RESPIRATION RATE: 18 BRPM | SYSTOLIC BLOOD PRESSURE: 113 MMHG | HEART RATE: 92 BPM | HEIGHT: 62 IN | TEMPERATURE: 99.1 F | BODY MASS INDEX: 21.9 KG/M2 | WEIGHT: 119 LBS | DIASTOLIC BLOOD PRESSURE: 66 MMHG

## 2019-12-06 DIAGNOSIS — D69.3 IMMUNE THROMBOCYTOPENIC PURPURA (HCC): Primary | ICD-10-CM

## 2019-12-06 LAB
BASOPHILS # BLD AUTO: 0.01 10*3/MM3 (ref 0–0.2)
BASOPHILS NFR BLD AUTO: 0.1 % (ref 0–1.5)
DEPRECATED RDW RBC AUTO: 50.9 FL (ref 37–54)
EOSINOPHIL # BLD AUTO: 0.12 10*3/MM3 (ref 0–0.4)
EOSINOPHIL NFR BLD AUTO: 1.2 % (ref 0.3–6.2)
ERYTHROCYTE [DISTWIDTH] IN BLOOD BY AUTOMATED COUNT: 15.9 % (ref 12.3–15.4)
HCT VFR BLD AUTO: 36.1 % (ref 34–46.6)
HGB BLD-MCNC: 11.8 G/DL (ref 12–15.9)
LYMPHOCYTES # BLD AUTO: 2.31 10*3/MM3 (ref 0.7–3.1)
LYMPHOCYTES NFR BLD AUTO: 23.3 % (ref 19.6–45.3)
MCH RBC QN AUTO: 29.8 PG (ref 26.6–33)
MCHC RBC AUTO-ENTMCNC: 32.7 G/DL (ref 31.5–35.7)
MCV RBC AUTO: 91.2 FL (ref 79–97)
MONOCYTES # BLD AUTO: 0.95 10*3/MM3 (ref 0.1–0.9)
MONOCYTES NFR BLD AUTO: 9.6 % (ref 5–12)
NEUTROPHILS # BLD AUTO: 6.53 10*3/MM3 (ref 1.7–7)
NEUTROPHILS NFR BLD AUTO: 65.8 % (ref 42.7–76)
PLATELET # BLD AUTO: 24 10*3/MM3 (ref 140–450)
PMV BLD AUTO: 10.7 FL (ref 6–12)
RBC # BLD AUTO: 3.96 10*6/MM3 (ref 3.77–5.28)
WBC NRBC COR # BLD: 9.92 10*3/MM3 (ref 3.4–10.8)

## 2019-12-06 PROCEDURE — 25010000002 RITUXIMAB 10 MG/ML SOLUTION 10 ML VIAL: Performed by: NURSE PRACTITIONER

## 2019-12-06 PROCEDURE — 25010000002 RITUXIMAB 10 MG/ML SOLUTION 50 ML VIAL: Performed by: NURSE PRACTITIONER

## 2019-12-06 PROCEDURE — 36415 COLL VENOUS BLD VENIPUNCTURE: CPT | Performed by: INTERNAL MEDICINE

## 2019-12-06 PROCEDURE — 96367 TX/PROPH/DG ADDL SEQ IV INF: CPT | Performed by: INTERNAL MEDICINE

## 2019-12-06 PROCEDURE — 96415 CHEMO IV INFUSION ADDL HR: CPT | Performed by: INTERNAL MEDICINE

## 2019-12-06 PROCEDURE — 25010000002 DIPHENHYDRAMINE PER 50 MG: Performed by: NURSE PRACTITIONER

## 2019-12-06 PROCEDURE — 85025 COMPLETE CBC W/AUTO DIFF WBC: CPT | Performed by: NURSE PRACTITIONER

## 2019-12-06 PROCEDURE — 96413 CHEMO IV INFUSION 1 HR: CPT | Performed by: INTERNAL MEDICINE

## 2019-12-06 RX ORDER — ACETAMINOPHEN 325 MG/1
650 TABLET ORAL ONCE
Status: COMPLETED | OUTPATIENT
Start: 2019-12-06 | End: 2019-12-06

## 2019-12-06 RX ORDER — SODIUM CHLORIDE 9 MG/ML
250 INJECTION, SOLUTION INTRAVENOUS ONCE
Status: COMPLETED | OUTPATIENT
Start: 2019-12-06 | End: 2019-12-06

## 2019-12-06 RX ADMIN — RITUXIMAB 570 MG: 10 INJECTION, SOLUTION INTRAVENOUS at 11:20

## 2019-12-06 RX ADMIN — ACETAMINOPHEN 650 MG: 325 TABLET ORAL at 10:39

## 2019-12-06 RX ADMIN — DIPHENHYDRAMINE HYDROCHLORIDE 25 MG: 50 INJECTION, SOLUTION INTRAMUSCULAR; INTRAVENOUS at 10:40

## 2019-12-06 RX ADMIN — SODIUM CHLORIDE 250 ML: 0.9 INJECTION, SOLUTION INTRAVENOUS at 10:39

## 2019-12-06 NOTE — TELEPHONE ENCOUNTER
Benefit review.  Patient has active [employer] coverage.  Patient is eligible for copay assistance but has met her 2019 ded/oop.

## 2019-12-08 ENCOUNTER — HOSPITAL ENCOUNTER (EMERGENCY)
Facility: HOSPITAL | Age: 34
Discharge: HOME | End: 2019-12-09
Admitting: EMERGENCY MEDICINE

## 2019-12-08 DIAGNOSIS — D69.3 IDIOPATHIC THROMBOCYTOPENIA (HCC): Primary | ICD-10-CM

## 2019-12-08 LAB
ANION GAP SERPL CALCULATED.3IONS-SCNC: 11 MMOL/L (ref 5–15)
BASOPHILS # BLD MANUAL: 0.19 10*3/MM3 (ref 0–0.2)
BASOPHILS NFR BLD AUTO: 2 % (ref 0–1.5)
BUN BLD-MCNC: 17 MG/DL (ref 6–20)
BUN/CREAT SERPL: 30.9 (ref 7–25)
CALCIUM SPEC-SCNC: 9.2 MG/DL (ref 8.6–10.5)
CHLORIDE SERPL-SCNC: 100 MMOL/L (ref 98–107)
CO2 SERPL-SCNC: 26 MMOL/L (ref 22–29)
CREAT BLD-MCNC: 0.55 MG/DL (ref 0.57–1)
DEPRECATED RDW RBC AUTO: 52.5 FL (ref 37–54)
EOSINOPHIL # BLD MANUAL: 0.19 10*3/MM3 (ref 0–0.4)
EOSINOPHIL NFR BLD MANUAL: 2 % (ref 0.3–6.2)
ERYTHROCYTE [DISTWIDTH] IN BLOOD BY AUTOMATED COUNT: 17.1 % (ref 12.3–15.4)
GFR SERPL CREATININE-BSD FRML MDRD: 128 ML/MIN/1.73
GLUCOSE BLD-MCNC: 103 MG/DL (ref 65–99)
HCT VFR BLD AUTO: 34.3 % (ref 34–46.6)
HGB BLD-MCNC: 11.6 G/DL (ref 12–15.9)
INR PPP: 1 (ref 0.9–1.1)
LYMPHOCYTES # BLD MANUAL: 2.26 10*3/MM3 (ref 0.7–3.1)
LYMPHOCYTES NFR BLD MANUAL: 2 % (ref 5–12)
LYMPHOCYTES NFR BLD MANUAL: 24 % (ref 19.6–45.3)
MCH RBC QN AUTO: 29.9 PG (ref 26.6–33)
MCHC RBC AUTO-ENTMCNC: 33.9 G/DL (ref 31.5–35.7)
MCV RBC AUTO: 88.2 FL (ref 79–97)
MONOCYTES # BLD AUTO: 0.19 10*3/MM3 (ref 0.1–0.9)
MYELOCYTES NFR BLD MANUAL: 1 % (ref 0–0)
NEUTROPHILS # BLD AUTO: 6.49 10*3/MM3 (ref 1.7–7)
NEUTROPHILS NFR BLD MANUAL: 69 % (ref 42.7–76)
PLATELET # BLD AUTO: 5 10*3/MM3 (ref 140–450)
PMV BLD AUTO: 9.6 FL (ref 6–12)
POTASSIUM BLD-SCNC: 3.6 MMOL/L (ref 3.5–5.2)
PROTHROMBIN TIME: 10.5 SECONDS (ref 9.6–11.7)
RBC # BLD AUTO: 3.89 10*6/MM3 (ref 3.77–5.28)
RBC MORPH BLD: NORMAL
SCAN SLIDE: NORMAL
SMALL PLATELETS BLD QL SMEAR: ABNORMAL
SODIUM BLD-SCNC: 137 MMOL/L (ref 136–145)
WBC MORPH BLD: NORMAL
WBC NRBC COR # BLD: 9.4 10*3/MM3 (ref 3.4–10.8)

## 2019-12-08 PROCEDURE — 25010000002 MORPHINE PER 10 MG: Performed by: NURSE PRACTITIONER

## 2019-12-08 PROCEDURE — 99284 EMERGENCY DEPT VISIT MOD MDM: CPT

## 2019-12-08 PROCEDURE — 63710000001 DIPHENHYDRAMINE PER 50 MG: Performed by: NURSE PRACTITIONER

## 2019-12-08 PROCEDURE — 96366 THER/PROPH/DIAG IV INF ADDON: CPT

## 2019-12-08 PROCEDURE — 96372 THER/PROPH/DIAG INJ SC/IM: CPT

## 2019-12-08 PROCEDURE — 25010000002 IMMUNE GLOBULIN (HUMAN) 20 GM/200ML SOLUTION: Performed by: NURSE PRACTITIONER

## 2019-12-08 PROCEDURE — 96365 THER/PROPH/DIAG IV INF INIT: CPT

## 2019-12-08 PROCEDURE — 85025 COMPLETE CBC W/AUTO DIFF WBC: CPT | Performed by: NURSE PRACTITIONER

## 2019-12-08 PROCEDURE — 85007 BL SMEAR W/DIFF WBC COUNT: CPT | Performed by: NURSE PRACTITIONER

## 2019-12-08 PROCEDURE — 85610 PROTHROMBIN TIME: CPT | Performed by: NURSE PRACTITIONER

## 2019-12-08 PROCEDURE — 80048 BASIC METABOLIC PNL TOTAL CA: CPT | Performed by: NURSE PRACTITIONER

## 2019-12-08 RX ORDER — ONDANSETRON 4 MG/1
4 TABLET, ORALLY DISINTEGRATING ORAL ONCE
Status: COMPLETED | OUTPATIENT
Start: 2019-12-08 | End: 2019-12-08

## 2019-12-08 RX ORDER — HYDROCODONE BITARTRATE AND ACETAMINOPHEN 5; 325 MG/1; MG/1
1 TABLET ORAL ONCE AS NEEDED
Status: DISCONTINUED | OUTPATIENT
Start: 2019-12-08 | End: 2019-12-09 | Stop reason: HOSPADM

## 2019-12-08 RX ORDER — IBUPROFEN 600 MG/1
600 TABLET ORAL ONCE
Status: DISCONTINUED | OUTPATIENT
Start: 2019-12-08 | End: 2019-12-09 | Stop reason: HOSPADM

## 2019-12-08 RX ORDER — SODIUM CHLORIDE 0.9 % (FLUSH) 0.9 %
10 SYRINGE (ML) INJECTION AS NEEDED
Status: DISCONTINUED | OUTPATIENT
Start: 2019-12-08 | End: 2019-12-09 | Stop reason: HOSPADM

## 2019-12-08 RX ORDER — ACETAMINOPHEN 325 MG/1
650 TABLET ORAL ONCE
Status: COMPLETED | OUTPATIENT
Start: 2019-12-08 | End: 2019-12-08

## 2019-12-08 RX ORDER — MORPHINE SULFATE 4 MG/ML
4 INJECTION, SOLUTION INTRAMUSCULAR; INTRAVENOUS ONCE
Status: COMPLETED | OUTPATIENT
Start: 2019-12-08 | End: 2019-12-08

## 2019-12-08 RX ORDER — DIPHENHYDRAMINE HCL 25 MG
25 TABLET ORAL ONCE
Status: COMPLETED | OUTPATIENT
Start: 2019-12-08 | End: 2019-12-08

## 2019-12-08 RX ADMIN — ONDANSETRON 4 MG: 4 TABLET, ORALLY DISINTEGRATING ORAL at 20:20

## 2019-12-08 RX ADMIN — HYDROCODONE BITARTRATE AND ACETAMINOPHEN 1 TABLET: 5; 325 TABLET ORAL at 20:20

## 2019-12-08 RX ADMIN — ACETAMINOPHEN 650 MG: 325 TABLET ORAL at 21:24

## 2019-12-08 RX ADMIN — DIPHENHYDRAMINE HCL 25 MG: 25 TABLET ORAL at 21:24

## 2019-12-08 RX ADMIN — MORPHINE SULFATE 4 MG: 4 INJECTION INTRAVENOUS at 22:11

## 2019-12-08 RX ADMIN — IMMUNE GLOBULIN (HUMAN) 40 G: 10 INJECTION INTRAVENOUS; SUBCUTANEOUS at 21:45

## 2019-12-09 ENCOUNTER — LAB (OUTPATIENT)
Dept: LAB | Facility: HOSPITAL | Age: 34
End: 2019-12-09

## 2019-12-09 VITALS
BODY MASS INDEX: 22.72 KG/M2 | SYSTOLIC BLOOD PRESSURE: 106 MMHG | DIASTOLIC BLOOD PRESSURE: 58 MMHG | HEIGHT: 62 IN | OXYGEN SATURATION: 100 % | HEART RATE: 67 BPM | WEIGHT: 123.46 LBS | RESPIRATION RATE: 15 BRPM | TEMPERATURE: 98.2 F

## 2019-12-09 DIAGNOSIS — D69.6 THROMBOCYTOPENIA (HCC): ICD-10-CM

## 2019-12-09 LAB
BASOPHILS # BLD AUTO: 0.05 10*3/MM3 (ref 0–0.2)
BASOPHILS NFR BLD AUTO: 0.7 % (ref 0–1.5)
DEPRECATED RDW RBC AUTO: 51.5 FL (ref 37–54)
EOSINOPHIL # BLD AUTO: 0.1 10*3/MM3 (ref 0–0.4)
EOSINOPHIL NFR BLD AUTO: 1.5 % (ref 0.3–6.2)
ERYTHROCYTE [DISTWIDTH] IN BLOOD BY AUTOMATED COUNT: 16 % (ref 12.3–15.4)
HCT VFR BLD AUTO: 35 % (ref 34–46.6)
HGB BLD-MCNC: 11.4 G/DL (ref 12–15.9)
LYMPHOCYTES # BLD AUTO: 1.48 10*3/MM3 (ref 0.7–3.1)
LYMPHOCYTES NFR BLD AUTO: 21.9 % (ref 19.6–45.3)
MCH RBC QN AUTO: 29.5 PG (ref 26.6–33)
MCHC RBC AUTO-ENTMCNC: 32.6 G/DL (ref 31.5–35.7)
MCV RBC AUTO: 90.7 FL (ref 79–97)
MONOCYTES # BLD AUTO: 0.74 10*3/MM3 (ref 0.1–0.9)
MONOCYTES NFR BLD AUTO: 10.9 % (ref 5–12)
NEUTROPHILS # BLD AUTO: 4.39 10*3/MM3 (ref 1.7–7)
NEUTROPHILS NFR BLD AUTO: 65 % (ref 42.7–76)
PLATELET # BLD AUTO: 11 10*3/MM3 (ref 140–450)
PMV BLD AUTO: 12.2 FL (ref 6–12)
RBC # BLD AUTO: 3.86 10*6/MM3 (ref 3.77–5.28)
WBC NRBC COR # BLD: 6.76 10*3/MM3 (ref 3.4–10.8)

## 2019-12-09 PROCEDURE — 85025 COMPLETE CBC W/AUTO DIFF WBC: CPT

## 2019-12-09 PROCEDURE — 36415 COLL VENOUS BLD VENIPUNCTURE: CPT

## 2019-12-09 PROCEDURE — 25010000002 IMMUNE GLOBULIN (HUMAN) 10 GM/100ML SOLUTION: Performed by: NURSE PRACTITIONER

## 2019-12-09 PROCEDURE — 96366 THER/PROPH/DIAG IV INF ADDON: CPT

## 2019-12-09 RX ADMIN — IMMUNE GLOBULIN (HUMAN) 10 G: 10 INJECTION INTRAVENOUS; SUBCUTANEOUS at 01:42

## 2019-12-09 NOTE — ED PROVIDER NOTES
Subjective   Patient is a 34-year-old female who states she was diagnosed with idiopathic thrombocytopenia 3 weeks ago today.  She states that she has been dealing with low platelets ever since.  She is seen Dr. Eckert at the cancer care center.  She states that today she developed some sores inside her mouth and the first time that she was diagnosed with low platelets that is how she presented.  She denies pain          Review of Systems   Constitutional: Negative for chills, fatigue and fever.   HENT: Positive for mouth sores. Negative for congestion, tinnitus and trouble swallowing.    Eyes: Negative for photophobia, discharge and redness.   Respiratory: Negative for cough and shortness of breath.    Cardiovascular: Negative for chest pain and palpitations.   Gastrointestinal: Negative for abdominal pain, diarrhea, nausea and vomiting.   Genitourinary: Negative for dysuria, frequency and urgency.   Musculoskeletal: Negative for back pain, joint swelling and myalgias.   Skin: Negative for rash.        Easily bruising   Neurological: Negative for dizziness and headaches.   Hematological: Bruises/bleeds easily.   Psychiatric/Behavioral: Negative for confusion.   All other systems reviewed and are negative.      Past Medical History:   Diagnosis Date   • Anxiety    • Iron deficiency anemia    • Kidney stone 10/2019       No Known Allergies    History reviewed. No pertinent surgical history.    Family History   Problem Relation Age of Onset   • Thrombocytopenia Nephew         ITP   • Diabetes Mother    • Diabetes Father    • Cancer Paternal Grandmother        Social History     Socioeconomic History   • Marital status:      Spouse name: Not on file   • Number of children: Not on file   • Years of education: Not on file   • Highest education level: Not on file   Tobacco Use   • Smoking status: Never Smoker   • Smokeless tobacco: Never Used   Substance and Sexual Activity   • Alcohol use: No     Frequency: Never    • Drug use: No   • Sexual activity: Defer   Social History Narrative    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            Objective   Physical Exam   Constitutional: She is oriented to person, place, and time. She appears well-developed and well-nourished.   HENT:   Head: Normocephalic and atraumatic.   Mouth/Throat: Uvula is midline, oropharynx is clear and moist and mucous membranes are normal. Oral lesions present.   Eyes: Pupils are equal, round, and reactive to light. Conjunctivae and EOM are normal.   Neck: Normal range of motion. Neck supple.   Cardiovascular: Normal rate, regular rhythm, normal heart sounds and intact distal pulses.   Pulmonary/Chest: Effort normal and breath sounds normal. No respiratory distress. She has no wheezes.   Abdominal: Soft. Bowel sounds are normal. She exhibits no distension and no mass. There is no tenderness. There is no rebound and no guarding.   Musculoskeletal: Normal range of motion. She exhibits no deformity.   Neurological: She is alert and oriented to person, place, and time. She displays normal reflexes. No cranial nerve deficit or sensory deficit. GCS eye subscore is 4. GCS verbal subscore is 5. GCS motor subscore is 6.   Skin: Skin is warm, dry and intact. Capillary refill takes less than 2 seconds. Bruising noted. No rash noted.   Psychiatric: She has a normal mood and affect.   Vitals reviewed.      Procedures           ED Course  ED Course as of Dec 16 0212   Sun Dec 08, 2019   2042 Spoke with Dr. Llanes and will give IVIG- give 1g/ kg and the d/c home to follow up in the office tomorrow.,    [KW]   2238 Patient complaining of increased headache and will get some morphine.  IVIG infusion going at this time    [KW]   Mon Dec 09, 2019   0041 Patient was reevalutated, she denies any pain, nausea, vomiting or other complaints at this time.     [MM]      ED Course User Index  [KW] Sammi Orellana, APRN  [MM] Neeta Orozco PA      /62   Pulse 79    "Temp 98.4 °F (36.9 °C)   Resp 16   Ht 157.5 cm (62\")   Wt 56 kg (123 lb 7.3 oz)   LMP 11/29/2019   SpO2 100%   BMI 22.58 kg/m²   Labs Reviewed   BASIC METABOLIC PANEL - Abnormal; Notable for the following components:       Result Value    Glucose 103 (*)     Creatinine 0.55 (*)     BUN/Creatinine Ratio 30.9 (*)     All other components within normal limits    Narrative:     GFR Normal >60  Chronic Kidney Disease <60  Kidney Failure <15     CBC WITH AUTO DIFFERENTIAL - Abnormal; Notable for the following components:    Hemoglobin 11.6 (*)     RDW 17.1 (*)     Platelets 5 (*)     All other components within normal limits   MANUAL DIFFERENTIAL - Abnormal; Notable for the following components:    Monocyte % 2.0 (*)     Basophil % 2.0 (*)     Myelocyte % 1.0 (*)     All other components within normal limits   PROTIME-INR - Normal   SCAN SLIDE   CBC AND DIFFERENTIAL    Narrative:     The following orders were created for panel order CBC & Differential.  Procedure                               Abnormality         Status                     ---------                               -----------         ------                     CBC Auto Differential[[id number]]        Abnormal            Final result                 Please view results for these tests on the individual orders.     Medications   sodium chloride 0.9 % flush 10 mL (has no administration in time range)   HYDROcodone-acetaminophen (NORCO) 5-325 MG per tablet 1 tablet (1 tablet Oral Given 12/8/19 2020)   ibuprofen (ADVIL,MOTRIN) tablet 600 mg (600 mg Oral Not Given 12/8/19 2020)   immune globulin (human) (GAMUNEX-C) infusion 10 g (has no administration in time range)   ondansetron ODT (ZOFRAN-ODT) disintegrating tablet 4 mg (4 mg Oral Given 12/8/19 2020)   acetaminophen (TYLENOL) tablet 650 mg (650 mg Oral Given 12/8/19 2124)   diphenhydrAMINE (BENADRYL) tablet 25 mg (25 mg Oral Given 12/8/19 2124)   immune globulin (human) (GAMUNEX-C) infusion 40 g (40 g " Intravenous New Bag 12/8/19 0933)   Morphine sulfate (PF) injection 4 mg (4 mg Subcutaneous Given 12/8/19 2211)     No radiology results for the last day                  No data recorded                        MDM    Final diagnoses:   Idiopathic thrombocytopenia (CMS/HCC)              Sammi Orellana, APRN  12/16/19 3007

## 2019-12-11 ENCOUNTER — LAB (OUTPATIENT)
Dept: LAB | Facility: HOSPITAL | Age: 34
End: 2019-12-11

## 2019-12-11 DIAGNOSIS — D69.3 IMMUNE THROMBOCYTOPENIC PURPURA (HCC): ICD-10-CM

## 2019-12-11 DIAGNOSIS — D69.6 THROMBOCYTOPENIA (HCC): ICD-10-CM

## 2019-12-11 LAB
BASOPHILS # BLD AUTO: 0.09 10*3/MM3 (ref 0–0.2)
BASOPHILS NFR BLD AUTO: 1.6 % (ref 0–1.5)
DEPRECATED RDW RBC AUTO: 51.9 FL (ref 37–54)
EOSINOPHIL # BLD AUTO: 0.16 10*3/MM3 (ref 0–0.4)
EOSINOPHIL NFR BLD AUTO: 2.8 % (ref 0.3–6.2)
ERYTHROCYTE [DISTWIDTH] IN BLOOD BY AUTOMATED COUNT: 16.2 % (ref 12.3–15.4)
HCT VFR BLD AUTO: 34.9 % (ref 34–46.6)
HGB BLD-MCNC: 11.5 G/DL (ref 12–15.9)
LYMPHOCYTES # BLD AUTO: 1.26 10*3/MM3 (ref 0.7–3.1)
LYMPHOCYTES NFR BLD AUTO: 21.8 % (ref 19.6–45.3)
MCH RBC QN AUTO: 29.8 PG (ref 26.6–33)
MCHC RBC AUTO-ENTMCNC: 33 G/DL (ref 31.5–35.7)
MCV RBC AUTO: 90.4 FL (ref 79–97)
MONOCYTES # BLD AUTO: 0.78 10*3/MM3 (ref 0.1–0.9)
MONOCYTES NFR BLD AUTO: 13.5 % (ref 5–12)
NEUTROPHILS # BLD AUTO: 3.48 10*3/MM3 (ref 1.7–7)
NEUTROPHILS NFR BLD AUTO: 60.3 % (ref 42.7–76)
PLATELET # BLD AUTO: 16 10*3/MM3 (ref 140–450)
PMV BLD AUTO: 13.8 FL (ref 6–12)
RBC # BLD AUTO: 3.86 10*6/MM3 (ref 3.77–5.28)
WBC NRBC COR # BLD: 5.77 10*3/MM3 (ref 3.4–10.8)

## 2019-12-11 PROCEDURE — 85025 COMPLETE CBC W/AUTO DIFF WBC: CPT

## 2019-12-11 PROCEDURE — 36415 COLL VENOUS BLD VENIPUNCTURE: CPT

## 2019-12-11 RX ORDER — FAMOTIDINE 10 MG/ML
20 INJECTION, SOLUTION INTRAVENOUS AS NEEDED
Status: CANCELLED | OUTPATIENT
Start: 2019-12-13

## 2019-12-11 RX ORDER — MEPERIDINE HYDROCHLORIDE 50 MG/ML
25 INJECTION INTRAMUSCULAR; INTRAVENOUS; SUBCUTANEOUS
Status: CANCELLED | OUTPATIENT
Start: 2019-12-13

## 2019-12-11 RX ORDER — SODIUM CHLORIDE 9 MG/ML
250 INJECTION, SOLUTION INTRAVENOUS ONCE
Status: CANCELLED | OUTPATIENT
Start: 2019-12-13

## 2019-12-11 RX ORDER — DIPHENHYDRAMINE HYDROCHLORIDE 50 MG/ML
50 INJECTION INTRAMUSCULAR; INTRAVENOUS AS NEEDED
Status: CANCELLED | OUTPATIENT
Start: 2019-12-13

## 2019-12-11 RX ORDER — ACETAMINOPHEN 325 MG/1
650 TABLET ORAL ONCE
Status: CANCELLED | OUTPATIENT
Start: 2019-12-13

## 2019-12-13 ENCOUNTER — HOSPITAL ENCOUNTER (OUTPATIENT)
Dept: ONCOLOGY | Facility: HOSPITAL | Age: 34
Setting detail: INFUSION SERIES
Discharge: HOME | End: 2019-12-13

## 2019-12-13 ENCOUNTER — OFFICE VISIT (OUTPATIENT)
Dept: ONCOLOGY | Facility: CLINIC | Age: 34
End: 2019-12-13

## 2019-12-13 VITALS
WEIGHT: 118 LBS | HEART RATE: 60 BPM | HEIGHT: 62 IN | TEMPERATURE: 98.3 F | BODY MASS INDEX: 21.71 KG/M2 | RESPIRATION RATE: 18 BRPM | DIASTOLIC BLOOD PRESSURE: 68 MMHG | SYSTOLIC BLOOD PRESSURE: 103 MMHG

## 2019-12-13 DIAGNOSIS — D69.6 THROMBOCYTOPENIA (HCC): ICD-10-CM

## 2019-12-13 DIAGNOSIS — D69.3 IMMUNE THROMBOCYTOPENIC PURPURA (HCC): Primary | ICD-10-CM

## 2019-12-13 DIAGNOSIS — D69.6 THROMBOCYTOPENIA (HCC): Primary | Chronic | ICD-10-CM

## 2019-12-13 LAB
BASOPHILS # BLD AUTO: 0.06 10*3/MM3 (ref 0–0.2)
BASOPHILS NFR BLD AUTO: 1 % (ref 0–1.5)
DEPRECATED RDW RBC AUTO: 51.3 FL (ref 37–54)
EOSINOPHIL # BLD AUTO: 0.23 10*3/MM3 (ref 0–0.4)
EOSINOPHIL NFR BLD AUTO: 3.9 % (ref 0.3–6.2)
ERYTHROCYTE [DISTWIDTH] IN BLOOD BY AUTOMATED COUNT: 16.1 % (ref 12.3–15.4)
HCT VFR BLD AUTO: 33.3 % (ref 34–46.6)
HGB BLD-MCNC: 11 G/DL (ref 12–15.9)
LYMPHOCYTES # BLD AUTO: 1.45 10*3/MM3 (ref 0.7–3.1)
LYMPHOCYTES NFR BLD AUTO: 24.9 % (ref 19.6–45.3)
MCH RBC QN AUTO: 30.1 PG (ref 26.6–33)
MCHC RBC AUTO-ENTMCNC: 33 G/DL (ref 31.5–35.7)
MCV RBC AUTO: 91.2 FL (ref 79–97)
MONOCYTES # BLD AUTO: 0.71 10*3/MM3 (ref 0.1–0.9)
MONOCYTES NFR BLD AUTO: 12.2 % (ref 5–12)
NEUTROPHILS # BLD AUTO: 3.38 10*3/MM3 (ref 1.7–7)
NEUTROPHILS NFR BLD AUTO: 58 % (ref 42.7–76)
PLATELET # BLD AUTO: 13 10*3/MM3 (ref 140–450)
PMV BLD AUTO: 14.1 FL (ref 6–12)
RBC # BLD AUTO: 3.65 10*6/MM3 (ref 3.77–5.28)
WBC NRBC COR # BLD: 5.83 10*3/MM3 (ref 3.4–10.8)

## 2019-12-13 PROCEDURE — 96367 TX/PROPH/DG ADDL SEQ IV INF: CPT | Performed by: INTERNAL MEDICINE

## 2019-12-13 PROCEDURE — 25010000002 RITUXIMAB 10 MG/ML SOLUTION 10 ML VIAL: Performed by: NURSE PRACTITIONER

## 2019-12-13 PROCEDURE — 85025 COMPLETE CBC W/AUTO DIFF WBC: CPT | Performed by: NURSE PRACTITIONER

## 2019-12-13 PROCEDURE — 25010000002 DIPHENHYDRAMINE PER 50 MG: Performed by: NURSE PRACTITIONER

## 2019-12-13 PROCEDURE — 96375 TX/PRO/DX INJ NEW DRUG ADDON: CPT | Performed by: INTERNAL MEDICINE

## 2019-12-13 PROCEDURE — 96413 CHEMO IV INFUSION 1 HR: CPT | Performed by: INTERNAL MEDICINE

## 2019-12-13 PROCEDURE — 25010000002 RITUXIMAB 10 MG/ML SOLUTION 50 ML VIAL: Performed by: NURSE PRACTITIONER

## 2019-12-13 PROCEDURE — 96415 CHEMO IV INFUSION ADDL HR: CPT | Performed by: INTERNAL MEDICINE

## 2019-12-13 RX ORDER — ACETAMINOPHEN 325 MG/1
650 TABLET ORAL ONCE
Status: COMPLETED | OUTPATIENT
Start: 2019-12-13 | End: 2019-12-13

## 2019-12-13 RX ORDER — DIPHENHYDRAMINE HYDROCHLORIDE 50 MG/ML
50 INJECTION INTRAMUSCULAR; INTRAVENOUS AS NEEDED
Status: CANCELLED | OUTPATIENT
Start: 2020-01-16

## 2019-12-13 RX ORDER — FAMOTIDINE 10 MG/ML
20 INJECTION, SOLUTION INTRAVENOUS AS NEEDED
Status: CANCELLED | OUTPATIENT
Start: 2020-01-16

## 2019-12-13 RX ORDER — DIPHENHYDRAMINE HCL 25 MG
25 CAPSULE ORAL ONCE
Status: CANCELLED | OUTPATIENT
Start: 2020-01-16

## 2019-12-13 RX ORDER — SODIUM CHLORIDE 9 MG/ML
250 INJECTION, SOLUTION INTRAVENOUS ONCE
Status: CANCELLED | OUTPATIENT
Start: 2020-01-16

## 2019-12-13 RX ORDER — MEPERIDINE HYDROCHLORIDE 50 MG/ML
25 INJECTION INTRAMUSCULAR; INTRAVENOUS; SUBCUTANEOUS
Status: CANCELLED | OUTPATIENT
Start: 2020-01-16

## 2019-12-13 RX ORDER — ACETAMINOPHEN 325 MG/1
650 TABLET ORAL ONCE
Status: CANCELLED | OUTPATIENT
Start: 2020-01-16

## 2019-12-13 RX ADMIN — RITUXIMAB 570 MG: 10 INJECTION, SOLUTION INTRAVENOUS at 09:57

## 2019-12-13 RX ADMIN — ACETAMINOPHEN 650 MG: 325 TABLET ORAL at 09:25

## 2019-12-13 RX ADMIN — DIPHENHYDRAMINE HYDROCHLORIDE 25 MG: 50 INJECTION, SOLUTION INTRAMUSCULAR; INTRAVENOUS at 09:28

## 2019-12-13 NOTE — PROGRESS NOTES
I asked Dr. Llanes for parameters on her platelets for giving her IV IgG. Dr. Llnaes said that if her platelets are greater than 20 to hold it and bring her back to our office on Monday for CBC.

## 2019-12-13 NOTE — PROGRESS NOTES
Patient to office today for Rituxan. Dr Llanes saw patient and requested patient be given IVIG in ambulatory care tomorrow. Called [facility] for authorization, called [facility] to set up and called pharmacy at the hospital to see if they had it. Also informed Carey that patient needed to follow up in 1 week with NP on 12-19-19

## 2019-12-14 ENCOUNTER — HOSPITAL ENCOUNTER (OUTPATIENT)
Dept: INFUSION THERAPY | Facility: HOSPITAL | Age: 34
Discharge: HOME | End: 2019-12-14
Admitting: INTERNAL MEDICINE

## 2019-12-14 VITALS
OXYGEN SATURATION: 99 % | HEART RATE: 85 BPM | BODY MASS INDEX: 21.71 KG/M2 | WEIGHT: 118 LBS | DIASTOLIC BLOOD PRESSURE: 64 MMHG | HEIGHT: 62 IN | SYSTOLIC BLOOD PRESSURE: 116 MMHG | TEMPERATURE: 98 F | RESPIRATION RATE: 16 BRPM

## 2019-12-14 DIAGNOSIS — D69.6 THROMBOCYTOPENIA (HCC): Primary | ICD-10-CM

## 2019-12-14 LAB
BASOPHILS # BLD AUTO: 0.1 10*3/MM3 (ref 0–0.2)
BASOPHILS NFR BLD AUTO: 1.2 % (ref 0–1.5)
DEPRECATED RDW RBC AUTO: 54.3 FL (ref 37–54)
EOSINOPHIL # BLD AUTO: 0.3 10*3/MM3 (ref 0–0.4)
EOSINOPHIL NFR BLD AUTO: 6.8 % (ref 0.3–6.2)
ERYTHROCYTE [DISTWIDTH] IN BLOOD BY AUTOMATED COUNT: 17.2 % (ref 12.3–15.4)
HCT VFR BLD AUTO: 35.3 % (ref 34–46.6)
HGB BLD-MCNC: 12 G/DL (ref 12–15.9)
LYMPHOCYTES # BLD AUTO: 1.2 10*3/MM3 (ref 0.7–3.1)
LYMPHOCYTES NFR BLD AUTO: 26.3 % (ref 19.6–45.3)
MCH RBC QN AUTO: 30.6 PG (ref 26.6–33)
MCHC RBC AUTO-ENTMCNC: 34 G/DL (ref 31.5–35.7)
MCV RBC AUTO: 90 FL (ref 79–97)
MONOCYTES # BLD AUTO: 0.6 10*3/MM3 (ref 0.1–0.9)
MONOCYTES NFR BLD AUTO: 12.9 % (ref 5–12)
NEUTROPHILS # BLD AUTO: 2.3 10*3/MM3 (ref 1.7–7)
NEUTROPHILS NFR BLD AUTO: 52.8 % (ref 42.7–76)
NRBC BLD AUTO-RTO: 0.1 /100 WBC (ref 0–0.2)
PLATELET # BLD AUTO: 12 10*3/MM3 (ref 140–450)
PMV BLD AUTO: 9 FL (ref 6–12)
RBC # BLD AUTO: 3.92 10*6/MM3 (ref 3.77–5.28)
WBC NRBC COR # BLD: 4.4 10*3/MM3 (ref 3.4–10.8)

## 2019-12-14 PROCEDURE — 25010000002 IMMUNE GLOBULIN (HUMAN) 20 GM/200ML SOLUTION 200 ML VIAL: Performed by: INTERNAL MEDICINE

## 2019-12-14 PROCEDURE — 63710000001 DIPHENHYDRAMINE PER 50 MG: Performed by: INTERNAL MEDICINE

## 2019-12-14 PROCEDURE — 96365 THER/PROPH/DIAG IV INF INIT: CPT

## 2019-12-14 PROCEDURE — 85025 COMPLETE CBC W/AUTO DIFF WBC: CPT | Performed by: INTERNAL MEDICINE

## 2019-12-14 PROCEDURE — 96366 THER/PROPH/DIAG IV INF ADDON: CPT

## 2019-12-14 PROCEDURE — 25010000002 IMMUNE GLOBULIN (HUMAN) 5 GM/50ML SOLUTION 50 ML VIAL: Performed by: INTERNAL MEDICINE

## 2019-12-14 PROCEDURE — 25010000002 IMMUNE GLOBULIN (HUMAN) 10 GM/100ML SOLUTION 100 ML VIAL: Performed by: INTERNAL MEDICINE

## 2019-12-14 RX ORDER — DIPHENHYDRAMINE HCL 25 MG
25 TABLET ORAL ONCE
Status: COMPLETED | OUTPATIENT
Start: 2019-12-14 | End: 2019-12-14

## 2019-12-14 RX ORDER — ACETAMINOPHEN 325 MG/1
650 TABLET ORAL ONCE
Status: COMPLETED | OUTPATIENT
Start: 2019-12-14 | End: 2019-12-14

## 2019-12-14 RX ADMIN — IMMUNE GLOBULIN (HUMAN) 55 G: 10 INJECTION INTRAVENOUS; SUBCUTANEOUS at 09:53

## 2019-12-14 RX ADMIN — DIPHENHYDRAMINE HCL 25 MG: 25 TABLET ORAL at 09:16

## 2019-12-14 RX ADMIN — ACETAMINOPHEN 650 MG: 500 TABLET, FILM COATED ORAL at 09:16

## 2019-12-16 ENCOUNTER — APPOINTMENT (OUTPATIENT)
Dept: LAB | Facility: HOSPITAL | Age: 34
End: 2019-12-16

## 2019-12-16 ENCOUNTER — LAB (OUTPATIENT)
Dept: LAB | Facility: HOSPITAL | Age: 34
End: 2019-12-16

## 2019-12-16 DIAGNOSIS — D69.6 THROMBOCYTOPENIA (HCC): ICD-10-CM

## 2019-12-16 LAB
BASOPHILS # BLD AUTO: 0.04 10*3/MM3 (ref 0–0.2)
BASOPHILS NFR BLD AUTO: 0.7 % (ref 0–1.5)
DEPRECATED RDW RBC AUTO: 49.8 FL (ref 37–54)
EOSINOPHIL # BLD AUTO: 0.16 10*3/MM3 (ref 0–0.4)
EOSINOPHIL NFR BLD AUTO: 2.9 % (ref 0.3–6.2)
ERYTHROCYTE [DISTWIDTH] IN BLOOD BY AUTOMATED COUNT: 15.8 % (ref 12.3–15.4)
HCT VFR BLD AUTO: 33.7 % (ref 34–46.6)
HGB BLD-MCNC: 11.5 G/DL (ref 12–15.9)
LYMPHOCYTES # BLD AUTO: 1.08 10*3/MM3 (ref 0.7–3.1)
LYMPHOCYTES NFR BLD AUTO: 19.4 % (ref 19.6–45.3)
MCH RBC QN AUTO: 30.5 PG (ref 26.6–33)
MCHC RBC AUTO-ENTMCNC: 34.1 G/DL (ref 31.5–35.7)
MCV RBC AUTO: 89.4 FL (ref 79–97)
MONOCYTES # BLD AUTO: 0.67 10*3/MM3 (ref 0.1–0.9)
MONOCYTES NFR BLD AUTO: 12 % (ref 5–12)
NEUTROPHILS # BLD AUTO: 3.63 10*3/MM3 (ref 1.7–7)
NEUTROPHILS NFR BLD AUTO: 65 % (ref 42.7–76)
PLATELET # BLD AUTO: 24 10*3/MM3 (ref 140–450)
PMV BLD AUTO: 12.1 FL (ref 6–12)
RBC # BLD AUTO: 3.77 10*6/MM3 (ref 3.77–5.28)
WBC NRBC COR # BLD: 5.58 10*3/MM3 (ref 3.4–10.8)

## 2019-12-16 PROCEDURE — 36415 COLL VENOUS BLD VENIPUNCTURE: CPT

## 2019-12-16 PROCEDURE — 85025 COMPLETE CBC W/AUTO DIFF WBC: CPT

## 2019-12-18 ENCOUNTER — TELEPHONE (OUTPATIENT)
Dept: ONCOLOGY | Facility: CLINIC | Age: 34
End: 2019-12-18

## 2019-12-18 ENCOUNTER — LAB (OUTPATIENT)
Dept: LAB | Facility: HOSPITAL | Age: 34
End: 2019-12-18

## 2019-12-18 DIAGNOSIS — D69.3 IMMUNE THROMBOCYTOPENIC PURPURA (HCC): Primary | Chronic | ICD-10-CM

## 2019-12-18 DIAGNOSIS — D69.6 THROMBOCYTOPENIA (HCC): ICD-10-CM

## 2019-12-18 LAB
BASOPHILS # BLD AUTO: 0.02 10*3/MM3 (ref 0–0.2)
BASOPHILS NFR BLD AUTO: 0.6 % (ref 0–1.5)
DEPRECATED RDW RBC AUTO: 50.3 FL (ref 37–54)
EOSINOPHIL # BLD AUTO: 0.21 10*3/MM3 (ref 0–0.4)
EOSINOPHIL NFR BLD AUTO: 6 % (ref 0.3–6.2)
ERYTHROCYTE [DISTWIDTH] IN BLOOD BY AUTOMATED COUNT: 15.7 % (ref 12.3–15.4)
HCT VFR BLD AUTO: 32.9 % (ref 34–46.6)
HGB BLD-MCNC: 11.2 G/DL (ref 12–15.9)
LYMPHOCYTES # BLD AUTO: 1.13 10*3/MM3 (ref 0.7–3.1)
LYMPHOCYTES NFR BLD AUTO: 32.1 % (ref 19.6–45.3)
MCH RBC QN AUTO: 30.7 PG (ref 26.6–33)
MCHC RBC AUTO-ENTMCNC: 34 G/DL (ref 31.5–35.7)
MCV RBC AUTO: 90.1 FL (ref 79–97)
MONOCYTES # BLD AUTO: 0.49 10*3/MM3 (ref 0.1–0.9)
MONOCYTES NFR BLD AUTO: 13.9 % (ref 5–12)
NEUTROPHILS # BLD AUTO: 1.67 10*3/MM3 (ref 1.7–7)
NEUTROPHILS NFR BLD AUTO: 47.4 % (ref 42.7–76)
PLATELET # BLD AUTO: 12 10*3/MM3 (ref 140–450)
PMV BLD AUTO: 14.2 FL (ref 6–12)
RBC # BLD AUTO: 3.65 10*6/MM3 (ref 3.77–5.28)
WBC NRBC COR # BLD: 3.52 10*3/MM3 (ref 3.4–10.8)

## 2019-12-18 PROCEDURE — 36415 COLL VENOUS BLD VENIPUNCTURE: CPT

## 2019-12-18 PROCEDURE — 85025 COMPLETE CBC W/AUTO DIFF WBC: CPT

## 2019-12-18 RX ORDER — DIPHENHYDRAMINE HYDROCHLORIDE 50 MG/ML
50 INJECTION INTRAMUSCULAR; INTRAVENOUS AS NEEDED
Status: CANCELLED | OUTPATIENT
Start: 2019-12-20

## 2019-12-18 RX ORDER — ACETAMINOPHEN 325 MG/1
650 TABLET ORAL ONCE
Status: CANCELLED | OUTPATIENT
Start: 2019-12-20

## 2019-12-18 RX ORDER — FAMOTIDINE 10 MG/ML
20 INJECTION, SOLUTION INTRAVENOUS AS NEEDED
Status: CANCELLED | OUTPATIENT
Start: 2019-12-20

## 2019-12-18 RX ORDER — SODIUM CHLORIDE 9 MG/ML
250 INJECTION, SOLUTION INTRAVENOUS ONCE
Status: CANCELLED | OUTPATIENT
Start: 2019-12-20

## 2019-12-18 RX ORDER — MEPERIDINE HYDROCHLORIDE 50 MG/ML
25 INJECTION INTRAMUSCULAR; INTRAVENOUS; SUBCUTANEOUS
Status: CANCELLED | OUTPATIENT
Start: 2019-12-20

## 2019-12-18 NOTE — TELEPHONE ENCOUNTER
Called pt to advise Dr. Rodríguez recommended splenectomy.     She advised  had a different plan.  Advised her Dr. Llanes is on vacation until 1/3/20.     His 12/13/19 office visit note is not complete. Sent him a message in [facility] to please advise.     Electronically signed by FARZAD Banegas, 12/18/19, 2:19 PM.      ----- Message from Susanna Bruce MA sent at 12/18/2019 10:48 AM EST -----  Regarding: FW: splenectomy   Pt is confused as to why she would be having the splenectomy when Dr. Llanes told her that would be a last resort. She states that she was told about some kind of injection she could get before that. She is asking to speak with you before we go through with the Dr. Akhtar referral.   ----- Message -----  From: Shannon Burgos APRN  Sent: 12/18/2019  10:08 AM EST  To: [facility]  Subject: splenectomy                                      Reviewed patient's declining platelets on CBC with Dr. Rodríguez.     She wants her to have a splenectomy.      Put in urgent referral to Dr. Erick Akhtar.     Please call patient to advise.     Electronically signed by FARZAD Banegas, 12/18/19, 10:09 AM.

## 2019-12-18 NOTE — TELEPHONE ENCOUNTER
----- Message from Susanna Bruce MA sent at 12/18/2019 10:48 AM EST -----  Regarding: FW: splenectomy   Pt is confused as to why she would be having the splenectomy when Dr. Llanes told her that would be a last resort. She states that she was told about some kind of injection she could get before that. She is asking to speak with you before we go through with the Dr. Akhtar referral.   ----- Message -----  From: Shannon Burgos APRN  Sent: 12/18/2019  10:08 AM EST  To: [facility]  Subject: splenectomy                                      Reviewed patient's declining platelets on CBC with Dr. Rodríguez.     She wants her to have a splenectomy.      Put in urgent referral to Dr. Erick Akhtar.     Please call patient to advise.     Electronically signed by FARZAD Banegas, 12/18/19, 10:09 AM.

## 2019-12-19 ENCOUNTER — LAB (OUTPATIENT)
Dept: LAB | Facility: HOSPITAL | Age: 34
End: 2019-12-19

## 2019-12-19 DIAGNOSIS — D69.6 THROMBOCYTOPENIA (HCC): ICD-10-CM

## 2019-12-19 LAB
BASOPHILS # BLD AUTO: 0.03 10*3/MM3 (ref 0–0.2)
BASOPHILS NFR BLD AUTO: 0.6 % (ref 0–1.5)
DEPRECATED RDW RBC AUTO: 51.9 FL (ref 37–54)
EOSINOPHIL # BLD AUTO: 0.21 10*3/MM3 (ref 0–0.4)
EOSINOPHIL NFR BLD AUTO: 4 % (ref 0.3–6.2)
ERYTHROCYTE [DISTWIDTH] IN BLOOD BY AUTOMATED COUNT: 16.3 % (ref 12.3–15.4)
HCT VFR BLD AUTO: 35.3 % (ref 34–46.6)
HGB BLD-MCNC: 11.9 G/DL (ref 12–15.9)
LYMPHOCYTES # BLD AUTO: 1.37 10*3/MM3 (ref 0.7–3.1)
LYMPHOCYTES NFR BLD AUTO: 26.3 % (ref 19.6–45.3)
MCH RBC QN AUTO: 30.4 PG (ref 26.6–33)
MCHC RBC AUTO-ENTMCNC: 33.7 G/DL (ref 31.5–35.7)
MCV RBC AUTO: 90.3 FL (ref 79–97)
MONOCYTES # BLD AUTO: 0.67 10*3/MM3 (ref 0.1–0.9)
MONOCYTES NFR BLD AUTO: 12.9 % (ref 5–12)
NEUTROPHILS # BLD AUTO: 2.93 10*3/MM3 (ref 1.7–7)
NEUTROPHILS NFR BLD AUTO: 56.2 % (ref 42.7–76)
PLATELET # BLD AUTO: 11 10*3/MM3 (ref 140–450)
PMV BLD AUTO: 14.3 FL (ref 6–12)
RBC # BLD AUTO: 3.91 10*6/MM3 (ref 3.77–5.28)
WBC NRBC COR # BLD: 5.21 10*3/MM3 (ref 3.4–10.8)

## 2019-12-19 PROCEDURE — 85025 COMPLETE CBC W/AUTO DIFF WBC: CPT

## 2019-12-19 PROCEDURE — 36415 COLL VENOUS BLD VENIPUNCTURE: CPT

## 2019-12-19 NOTE — PROGRESS NOTES
Per Dr. Llanes:     Start Nplate this week.   Pt to finish rituximab weekly x 4.     Initial dose 1 mcg/kg once weekly (based on actual body weight) adjust dose by 1 mcg/kg/week increments to achieve plt count greater than or equal to 50,000/mm3 and to reduce the risk of bleeding: Max dose 10 mcg/kg/week.    Electronically signed by FARZAD Banegas, 12/19/19, 8:36 AM.

## 2019-12-20 ENCOUNTER — TELEPHONE (OUTPATIENT)
Dept: ONCOLOGY | Facility: CLINIC | Age: 34
End: 2019-12-20

## 2019-12-20 ENCOUNTER — HOSPITAL ENCOUNTER (OUTPATIENT)
Dept: INFUSION THERAPY | Facility: HOSPITAL | Age: 34
Setting detail: INFUSION SERIES
Discharge: HOME | End: 2019-12-20

## 2019-12-20 ENCOUNTER — HOSPITAL ENCOUNTER (OUTPATIENT)
Dept: ONCOLOGY | Facility: HOSPITAL | Age: 34
Setting detail: INFUSION SERIES
Discharge: HOME | End: 2019-12-20

## 2019-12-20 VITALS
SYSTOLIC BLOOD PRESSURE: 102 MMHG | DIASTOLIC BLOOD PRESSURE: 67 MMHG | RESPIRATION RATE: 18 BRPM | WEIGHT: 118 LBS | BODY MASS INDEX: 21.71 KG/M2 | TEMPERATURE: 98.4 F | HEIGHT: 62 IN | HEART RATE: 77 BPM

## 2019-12-20 VITALS
HEART RATE: 90 BPM | TEMPERATURE: 98.2 F | RESPIRATION RATE: 16 BRPM | OXYGEN SATURATION: 100 % | DIASTOLIC BLOOD PRESSURE: 61 MMHG | SYSTOLIC BLOOD PRESSURE: 108 MMHG

## 2019-12-20 DIAGNOSIS — D69.3 IMMUNE THROMBOCYTOPENIC PURPURA (HCC): Primary | Chronic | ICD-10-CM

## 2019-12-20 DIAGNOSIS — D69.3 IMMUNE THROMBOCYTOPENIC PURPURA (HCC): Primary | ICD-10-CM

## 2019-12-20 DIAGNOSIS — D69.6 THROMBOCYTOPENIA (HCC): Chronic | ICD-10-CM

## 2019-12-20 DIAGNOSIS — D69.3 IMMUNE THROMBOCYTOPENIC PURPURA (HCC): ICD-10-CM

## 2019-12-20 LAB
ABO + RH BLD: NORMAL
ANISOCYTOSIS BLD QL: ABNORMAL
BASOPHILS # BLD AUTO: 0.02 10*3/MM3 (ref 0–0.2)
BASOPHILS # BLD MANUAL: 0.04 10*3/MM3 (ref 0–0.2)
BASOPHILS NFR BLD AUTO: 0.4 % (ref 0–1.5)
BASOPHILS NFR BLD AUTO: 1 % (ref 0–1.5)
BH BB BLOOD EXPIRATION DATE: NORMAL
BH BB BLOOD TYPE BARCODE: 6200
BH BB DISPENSE STATUS: NORMAL
BH BB PRODUCT CODE: NORMAL
BH BB UNIT NUMBER: NORMAL
DEPRECATED RDW RBC AUTO: 51.5 FL (ref 37–54)
DEPRECATED RDW RBC AUTO: 54.3 FL (ref 37–54)
EOSINOPHIL # BLD AUTO: 0.18 10*3/MM3 (ref 0–0.4)
EOSINOPHIL # BLD MANUAL: 0.17 10*3/MM3 (ref 0–0.4)
EOSINOPHIL NFR BLD AUTO: 3.6 % (ref 0.3–6.2)
EOSINOPHIL NFR BLD MANUAL: 4 % (ref 0.3–6.2)
ERYTHROCYTE [DISTWIDTH] IN BLOOD BY AUTOMATED COUNT: 16.5 % (ref 12.3–15.4)
ERYTHROCYTE [DISTWIDTH] IN BLOOD BY AUTOMATED COUNT: 17.5 % (ref 12.3–15.4)
HCT VFR BLD AUTO: 33.5 % (ref 34–46.6)
HCT VFR BLD AUTO: 37.4 % (ref 34–46.6)
HGB BLD-MCNC: 11.6 G/DL (ref 12–15.9)
HGB BLD-MCNC: 12.8 G/DL (ref 12–15.9)
LYMPHOCYTES # BLD AUTO: 1.54 10*3/MM3 (ref 0.7–3.1)
LYMPHOCYTES # BLD MANUAL: 1.85 10*3/MM3 (ref 0.7–3.1)
LYMPHOCYTES NFR BLD AUTO: 30.4 % (ref 19.6–45.3)
LYMPHOCYTES NFR BLD MANUAL: 12 % (ref 5–12)
LYMPHOCYTES NFR BLD MANUAL: 43 % (ref 19.6–45.3)
MCH RBC QN AUTO: 30.2 PG (ref 26.6–33)
MCH RBC QN AUTO: 31 PG (ref 26.6–33)
MCHC RBC AUTO-ENTMCNC: 34.2 G/DL (ref 31.5–35.7)
MCHC RBC AUTO-ENTMCNC: 34.7 G/DL (ref 31.5–35.7)
MCV RBC AUTO: 88.2 FL (ref 79–97)
MCV RBC AUTO: 89.3 FL (ref 79–97)
METAMYELOCYTES NFR BLD MANUAL: 2 % (ref 0–0)
MONOCYTES # BLD AUTO: 0.52 10*3/MM3 (ref 0.1–0.9)
MONOCYTES # BLD AUTO: 0.58 10*3/MM3 (ref 0.1–0.9)
MONOCYTES NFR BLD AUTO: 11.4 % (ref 5–12)
NEUTROPHILS # BLD AUTO: 1.63 10*3/MM3 (ref 1.7–7)
NEUTROPHILS # BLD AUTO: 2.75 10*3/MM3 (ref 1.7–7)
NEUTROPHILS NFR BLD AUTO: 54.2 % (ref 42.7–76)
NEUTROPHILS NFR BLD MANUAL: 35 % (ref 42.7–76)
NEUTS BAND NFR BLD MANUAL: 3 % (ref 0–5)
PATHOLOGY REVIEW: YES
PLATELET # BLD AUTO: 12 10*3/MM3 (ref 140–450)
PLATELET # BLD AUTO: 8 10*3/MM3 (ref 140–450)
PMV BLD AUTO: 9.9 FL (ref 6–12)
RBC # BLD AUTO: 3.75 10*6/MM3 (ref 3.77–5.28)
RBC # BLD AUTO: 4.24 10*6/MM3 (ref 3.77–5.28)
SCAN SLIDE: NORMAL
SMALL PLATELETS BLD QL SMEAR: ABNORMAL
UNIT  ABO: NORMAL
UNIT  RH: NORMAL
WBC MORPH BLD: NORMAL
WBC NRBC COR # BLD: 4.3 10*3/MM3 (ref 3.4–10.8)
WBC NRBC COR # BLD: 5.07 10*3/MM3 (ref 3.4–10.8)

## 2019-12-20 PROCEDURE — 25010000002 DIPHENHYDRAMINE PER 50 MG: Performed by: NURSE PRACTITIONER

## 2019-12-20 PROCEDURE — P9035 PLATELET PHERES LEUKOREDUCED: HCPCS

## 2019-12-20 PROCEDURE — P9100 PATHOGEN TEST FOR PLATELETS: HCPCS

## 2019-12-20 PROCEDURE — 85025 COMPLETE CBC W/AUTO DIFF WBC: CPT | Performed by: NURSE PRACTITIONER

## 2019-12-20 PROCEDURE — 96367 TX/PROPH/DG ADDL SEQ IV INF: CPT | Performed by: INTERNAL MEDICINE

## 2019-12-20 PROCEDURE — 63710000001 DIPHENHYDRAMINE PER 50 MG: Performed by: NURSE PRACTITIONER

## 2019-12-20 PROCEDURE — P9037 PLATE PHERES LEUKOREDU IRRAD: HCPCS

## 2019-12-20 PROCEDURE — 25010000002 RITUXIMAB 10 MG/ML SOLUTION 10 ML VIAL: Performed by: NURSE PRACTITIONER

## 2019-12-20 PROCEDURE — 25010000002 RITUXIMAB 10 MG/ML SOLUTION 50 ML VIAL: Performed by: NURSE PRACTITIONER

## 2019-12-20 PROCEDURE — 85007 BL SMEAR W/DIFF WBC COUNT: CPT | Performed by: NURSE PRACTITIONER

## 2019-12-20 PROCEDURE — 96413 CHEMO IV INFUSION 1 HR: CPT | Performed by: INTERNAL MEDICINE

## 2019-12-20 PROCEDURE — 36415 COLL VENOUS BLD VENIPUNCTURE: CPT | Performed by: INTERNAL MEDICINE

## 2019-12-20 PROCEDURE — 96415 CHEMO IV INFUSION ADDL HR: CPT | Performed by: INTERNAL MEDICINE

## 2019-12-20 PROCEDURE — 36430 TRANSFUSION BLD/BLD COMPNT: CPT

## 2019-12-20 RX ORDER — ACETAMINOPHEN 325 MG/1
650 TABLET ORAL ONCE
Status: CANCELLED | OUTPATIENT
Start: 2019-12-20 | End: 2019-12-20

## 2019-12-20 RX ORDER — SODIUM CHLORIDE 9 MG/ML
250 INJECTION, SOLUTION INTRAVENOUS AS NEEDED
Status: CANCELLED | OUTPATIENT
Start: 2019-12-20

## 2019-12-20 RX ORDER — ACETAMINOPHEN 325 MG/1
650 TABLET ORAL ONCE
Status: COMPLETED | OUTPATIENT
Start: 2019-12-20 | End: 2019-12-20

## 2019-12-20 RX ORDER — SODIUM CHLORIDE 9 MG/ML
250 INJECTION, SOLUTION INTRAVENOUS AS NEEDED
Status: DISCONTINUED | OUTPATIENT
Start: 2019-12-20 | End: 2019-12-22 | Stop reason: HOSPADM

## 2019-12-20 RX ORDER — MEGESTROL ACETATE 20 MG/1
20 TABLET ORAL 3 TIMES DAILY
COMMUNITY
End: 2020-11-24

## 2019-12-20 RX ORDER — DIPHENHYDRAMINE HCL 25 MG
25 CAPSULE ORAL ONCE
Status: CANCELLED | OUTPATIENT
Start: 2019-12-20 | End: 2019-12-20

## 2019-12-20 RX ORDER — DIPHENHYDRAMINE HCL 25 MG
25 TABLET ORAL ONCE
Status: COMPLETED | OUTPATIENT
Start: 2019-12-20 | End: 2019-12-20

## 2019-12-20 RX ORDER — SODIUM CHLORIDE 9 MG/ML
250 INJECTION, SOLUTION INTRAVENOUS ONCE
Status: DISCONTINUED | OUTPATIENT
Start: 2019-12-20 | End: 2019-12-21 | Stop reason: HOSPADM

## 2019-12-20 RX ORDER — TRANEXAMIC ACID 650 MG/1
TABLET ORAL 3 TIMES DAILY
COMMUNITY
End: 2020-01-03

## 2019-12-20 RX ADMIN — RITUXIMAB 570 MG: 10 INJECTION, SOLUTION INTRAVENOUS at 11:00

## 2019-12-20 RX ADMIN — ACETAMINOPHEN 650 MG: 325 TABLET ORAL at 10:29

## 2019-12-20 RX ADMIN — DIPHENHYDRAMINE HCL 25 MG: 25 TABLET ORAL at 16:23

## 2019-12-20 RX ADMIN — DIPHENHYDRAMINE HYDROCHLORIDE 25 MG: 50 INJECTION, SOLUTION INTRAMUSCULAR; INTRAVENOUS at 10:31

## 2019-12-20 RX ADMIN — ACETAMINOPHEN 650 MG: 325 TABLET, FILM COATED ORAL at 16:23

## 2019-12-20 NOTE — TELEPHONE ENCOUNTER
Received call back from pt stating that she was calling Betty RN to see what time she is supposed to be at ambulatory care for platelets.  Per Betty pt needs to be there by 3:00 if possible.  Pt notified and v/u.  She states that she will go there now.  Betty notified that pt is on her way to ambulatory care.

## 2019-12-20 NOTE — PROGRESS NOTES
Patient is here for Rituxan and her platelets are 8. She has Nplate ordered but it has not been authorized by insurance yet. We spoke to the patient about signing an ABN to receive the Nplate today and she declined. Patient is to go to ambulatory care today for a unit of platelets and Saturday and Sunday for a CBC recheck. She will come here on Monday for immunizations and CBC.

## 2019-12-20 NOTE — TELEPHONE ENCOUNTER
I was asked to see patient regarding a letter of non-covered for Nplate.  The authorization has been requested but not recd.  I discussed the letter of non-coverage with the patient.  Patient declined.  Patient wants to wait for authorization.

## 2019-12-21 ENCOUNTER — HOSPITAL ENCOUNTER (OUTPATIENT)
Dept: INFUSION THERAPY | Facility: HOSPITAL | Age: 34
Setting detail: INFUSION SERIES
Discharge: HOME | End: 2019-12-21

## 2019-12-21 DIAGNOSIS — D69.3 IMMUNE THROMBOCYTOPENIC PURPURA (HCC): Primary | ICD-10-CM

## 2019-12-21 DIAGNOSIS — D69.6 THROMBOCYTOPENIA (HCC): ICD-10-CM

## 2019-12-21 LAB
BASOPHILS # BLD AUTO: 0 10*3/MM3 (ref 0–0.2)
BASOPHILS NFR BLD AUTO: 0.7 % (ref 0–1.5)
DEPRECATED RDW RBC AUTO: 53.8 FL (ref 37–54)
EOSINOPHIL # BLD AUTO: 0.2 10*3/MM3 (ref 0–0.4)
EOSINOPHIL NFR BLD AUTO: 4.9 % (ref 0.3–6.2)
ERYTHROCYTE [DISTWIDTH] IN BLOOD BY AUTOMATED COUNT: 17.3 % (ref 12.3–15.4)
HCT VFR BLD AUTO: 32 % (ref 34–46.6)
HGB BLD-MCNC: 11 G/DL (ref 12–15.9)
LYMPHOCYTES # BLD AUTO: 1.3 10*3/MM3 (ref 0.7–3.1)
LYMPHOCYTES NFR BLD AUTO: 33.3 % (ref 19.6–45.3)
MCH RBC QN AUTO: 30.7 PG (ref 26.6–33)
MCHC RBC AUTO-ENTMCNC: 34.4 G/DL (ref 31.5–35.7)
MCV RBC AUTO: 89.3 FL (ref 79–97)
MONOCYTES # BLD AUTO: 0.5 10*3/MM3 (ref 0.1–0.9)
MONOCYTES NFR BLD AUTO: 12.5 % (ref 5–12)
NEUTROPHILS # BLD AUTO: 1.9 10*3/MM3 (ref 1.7–7)
NEUTROPHILS NFR BLD AUTO: 48.6 % (ref 42.7–76)
NRBC BLD AUTO-RTO: 0.1 /100 WBC (ref 0–0.2)
PLATELET # BLD AUTO: 11 10*3/MM3 (ref 140–450)
PMV BLD AUTO: 8.8 FL (ref 6–12)
RBC # BLD AUTO: 3.58 10*6/MM3 (ref 3.77–5.28)
WBC NRBC COR # BLD: 4 10*3/MM3 (ref 3.4–10.8)

## 2019-12-21 PROCEDURE — 36415 COLL VENOUS BLD VENIPUNCTURE: CPT

## 2019-12-21 PROCEDURE — 85025 COMPLETE CBC W/AUTO DIFF WBC: CPT | Performed by: NURSE PRACTITIONER

## 2019-12-21 PROCEDURE — G0463 HOSPITAL OUTPT CLINIC VISIT: HCPCS

## 2019-12-21 NOTE — CODE DOCUMENTATION
Platelet count came back at 11.  Patient stated that the platelets do not bring up her count.  Dr. Llanes doesn't make her get the platelets, but since Dr. Rowland caring for patient while he is out of town sent her over to possibly get them.  Patient would rather not get infused since it does not help her count.  Notified Amirah MORA with [facility] and she said would let Dr. Rowland know.  Patient to come back again in the a.m. For another blood check to see what her levels are.

## 2019-12-22 LAB
ABO + RH BLD: NORMAL
ANISOCYTOSIS BLD QL: NORMAL
BASOPHILS # BLD AUTO: 0 10*3/MM3 (ref 0–0.2)
BASOPHILS NFR BLD AUTO: 0.7 % (ref 0–1.5)
BH BB BLOOD EXPIRATION DATE: NORMAL
BH BB BLOOD TYPE BARCODE: 9500
BH BB DISPENSE STATUS: NORMAL
BH BB PRODUCT CODE: NORMAL
BH BB UNIT NUMBER: NORMAL
DEPRECATED RDW RBC AUTO: 54.3 FL (ref 37–54)
EOSINOPHIL # BLD AUTO: 0.2 10*3/MM3 (ref 0–0.4)
EOSINOPHIL NFR BLD AUTO: 4.4 % (ref 0.3–6.2)
ERYTHROCYTE [DISTWIDTH] IN BLOOD BY AUTOMATED COUNT: 17.6 % (ref 12.3–15.4)
HCT VFR BLD AUTO: 32.4 % (ref 34–46.6)
HGB BLD-MCNC: 11.2 G/DL (ref 12–15.9)
LYMPHOCYTES # BLD AUTO: 1.6 10*3/MM3 (ref 0.7–3.1)
LYMPHOCYTES NFR BLD AUTO: 35.5 % (ref 19.6–45.3)
MCH RBC QN AUTO: 30.6 PG (ref 26.6–33)
MCHC RBC AUTO-ENTMCNC: 34.4 G/DL (ref 31.5–35.7)
MCV RBC AUTO: 88.8 FL (ref 79–97)
MONOCYTES # BLD AUTO: 0.5 10*3/MM3 (ref 0.1–0.9)
MONOCYTES NFR BLD AUTO: 10.3 % (ref 5–12)
NEUTROPHILS # BLD AUTO: 2.2 10*3/MM3 (ref 1.7–7)
NEUTROPHILS NFR BLD AUTO: 49.1 % (ref 42.7–76)
NRBC BLD AUTO-RTO: 0 /100 WBC (ref 0–0.2)
PLATELET # BLD AUTO: 14 10*3/MM3 (ref 140–450)
PMV BLD AUTO: 9 FL (ref 6–12)
RBC # BLD AUTO: 3.65 10*6/MM3 (ref 3.77–5.28)
SMALL PLATELETS BLD QL SMEAR: NORMAL
UNIT  ABO: NORMAL
UNIT  RH: NORMAL
WBC MORPH BLD: NORMAL
WBC NRBC COR # BLD: 4.5 10*3/MM3 (ref 3.4–10.8)

## 2019-12-22 PROCEDURE — 85025 COMPLETE CBC W/AUTO DIFF WBC: CPT

## 2019-12-22 PROCEDURE — 36415 COLL VENOUS BLD VENIPUNCTURE: CPT

## 2019-12-22 PROCEDURE — 85007 BL SMEAR W/DIFF WBC COUNT: CPT | Performed by: INTERNAL MEDICINE

## 2019-12-22 NOTE — CODE DOCUMENTATION
Pt had a platelet count of 14 today.  Notified Amirah MORA.  Pt to return to [facility] tomorrow for follow up and  Repeat CBC.

## 2019-12-23 ENCOUNTER — APPOINTMENT (OUTPATIENT)
Dept: INFUSION THERAPY | Facility: HOSPITAL | Age: 34
End: 2019-12-23

## 2019-12-23 ENCOUNTER — APPOINTMENT (OUTPATIENT)
Dept: ONCOLOGY | Facility: HOSPITAL | Age: 34
End: 2019-12-23

## 2019-12-23 ENCOUNTER — HOSPITAL ENCOUNTER (OUTPATIENT)
Dept: ONCOLOGY | Facility: HOSPITAL | Age: 34
Discharge: HOME | End: 2019-12-23
Admitting: INTERNAL MEDICINE

## 2019-12-23 ENCOUNTER — LAB (OUTPATIENT)
Dept: LAB | Facility: HOSPITAL | Age: 34
End: 2019-12-23

## 2019-12-23 DIAGNOSIS — D69.3 IMMUNE THROMBOCYTOPENIC PURPURA (HCC): Primary | Chronic | ICD-10-CM

## 2019-12-23 DIAGNOSIS — D69.3 IMMUNE THROMBOCYTOPENIC PURPURA (HCC): Primary | ICD-10-CM

## 2019-12-23 DIAGNOSIS — D69.3 IMMUNE THROMBOCYTOPENIC PURPURA (HCC): ICD-10-CM

## 2019-12-23 LAB
BASOPHILS # BLD AUTO: 0.03 10*3/MM3 (ref 0–0.2)
BASOPHILS NFR BLD AUTO: 0.5 % (ref 0–1.5)
DEPRECATED RDW RBC AUTO: 50.3 FL (ref 37–54)
EOSINOPHIL # BLD AUTO: 0.06 10*3/MM3 (ref 0–0.4)
EOSINOPHIL NFR BLD AUTO: 0.9 % (ref 0.3–6.2)
ERYTHROCYTE [DISTWIDTH] IN BLOOD BY AUTOMATED COUNT: 15.9 % (ref 12.3–15.4)
HCT VFR BLD AUTO: 33.4 % (ref 34–46.6)
HGB BLD-MCNC: 11.5 G/DL (ref 12–15.9)
LAB AP CASE REPORT: NORMAL
LYMPHOCYTES # BLD AUTO: 1.85 10*3/MM3 (ref 0.7–3.1)
LYMPHOCYTES NFR BLD AUTO: 28.8 % (ref 19.6–45.3)
MCH RBC QN AUTO: 30.9 PG (ref 26.6–33)
MCHC RBC AUTO-ENTMCNC: 34.4 G/DL (ref 31.5–35.7)
MCV RBC AUTO: 89.8 FL (ref 79–97)
MONOCYTES # BLD AUTO: 0.64 10*3/MM3 (ref 0.1–0.9)
MONOCYTES NFR BLD AUTO: 10 % (ref 5–12)
NEUTROPHILS # BLD AUTO: 3.85 10*3/MM3 (ref 1.7–7)
NEUTROPHILS NFR BLD AUTO: 59.8 % (ref 42.7–76)
PATH REPORT.FINAL DX SPEC: NORMAL
PLATELET # BLD AUTO: 13 10*3/MM3 (ref 140–450)
PMV BLD AUTO: 13.9 FL (ref 6–12)
RBC # BLD AUTO: 3.72 10*6/MM3 (ref 3.77–5.28)
WBC NRBC COR # BLD: 6.43 10*3/MM3 (ref 3.4–10.8)

## 2019-12-23 PROCEDURE — 36415 COLL VENOUS BLD VENIPUNCTURE: CPT

## 2019-12-23 PROCEDURE — 90620 MENB-4C VACCINE IM: CPT | Performed by: INTERNAL MEDICINE

## 2019-12-23 PROCEDURE — 90471 IMMUNIZATION ADMIN: CPT

## 2019-12-23 PROCEDURE — 25010000002 MENINGOCOCCAL B SUSPENSION PREFILLED SYRINGE: Performed by: INTERNAL MEDICINE

## 2019-12-23 PROCEDURE — 85025 COMPLETE CBC W/AUTO DIFF WBC: CPT

## 2019-12-23 RX ADMIN — NEISSERIA MENINGITIDIS SEROGROUP B NHBA FUSION PROTEIN ANTIGEN, NEISSERIA MENINGITIDIS SEROGROUP B FHBP FUSION PROTEIN ANTIGEN AND NEISSERIA MENINGITIDIS SEROGROUP B NADA PROTEIN ANTIGEN 0.5 ML: 50; 50; 50; 25 INJECTION, SUSPENSION INTRAMUSCULAR at 16:39

## 2019-12-23 NOTE — PROGRESS NOTES
Patient stated she is going to get vaccinations done at her work since she can get vaccines for free advised patient she needs to get prevnar and menvo on 1/21/19

## 2019-12-23 NOTE — PROGRESS NOTES
Sent patient to [facility] for one unit of PLTS per DR. VELAZQUEZ called and spoke with Makayla in the ER.

## 2019-12-24 ENCOUNTER — HOSPITAL ENCOUNTER (OUTPATIENT)
Dept: INFUSION THERAPY | Facility: HOSPITAL | Age: 34
Setting detail: INFUSION SERIES
Discharge: HOME | End: 2019-12-24

## 2019-12-24 DIAGNOSIS — D69.3 IMMUNE THROMBOCYTOPENIC PURPURA (HCC): ICD-10-CM

## 2019-12-24 LAB
BASOPHILS # BLD AUTO: 0.1 10*3/MM3 (ref 0–0.2)
BASOPHILS NFR BLD AUTO: 1.2 % (ref 0–1.5)
DEPRECATED RDW RBC AUTO: 54.3 FL (ref 37–54)
EOSINOPHIL # BLD AUTO: 0.2 10*3/MM3 (ref 0–0.4)
EOSINOPHIL NFR BLD AUTO: 2.4 % (ref 0.3–6.2)
ERYTHROCYTE [DISTWIDTH] IN BLOOD BY AUTOMATED COUNT: 17.6 % (ref 12.3–15.4)
HCT VFR BLD AUTO: 32.7 % (ref 34–46.6)
HGB BLD-MCNC: 11.5 G/DL (ref 12–15.9)
LYMPHOCYTES # BLD AUTO: 1.6 10*3/MM3 (ref 0.7–3.1)
LYMPHOCYTES NFR BLD AUTO: 24 % (ref 19.6–45.3)
MCH RBC QN AUTO: 31.1 PG (ref 26.6–33)
MCHC RBC AUTO-ENTMCNC: 35.1 G/DL (ref 31.5–35.7)
MCV RBC AUTO: 88.5 FL (ref 79–97)
MONOCYTES # BLD AUTO: 0.8 10*3/MM3 (ref 0.1–0.9)
MONOCYTES NFR BLD AUTO: 12.1 % (ref 5–12)
NEUTROPHILS # BLD AUTO: 4 10*3/MM3 (ref 1.7–7)
NEUTROPHILS NFR BLD AUTO: 60.3 % (ref 42.7–76)
NRBC BLD AUTO-RTO: 0 /100 WBC (ref 0–0.2)
PLAT MORPH BLD: NORMAL
PLATELET # BLD AUTO: 11 10*3/MM3 (ref 140–450)
PMV BLD AUTO: 9.7 FL (ref 6–12)
RBC # BLD AUTO: 3.7 10*6/MM3 (ref 3.77–5.28)
RBC MORPH BLD: NORMAL
WBC MORPH BLD: NORMAL
WBC NRBC COR # BLD: 6.7 10*3/MM3 (ref 3.4–10.8)

## 2019-12-24 PROCEDURE — G0463 HOSPITAL OUTPT CLINIC VISIT: HCPCS

## 2019-12-24 PROCEDURE — 85025 COMPLETE CBC W/AUTO DIFF WBC: CPT | Performed by: NURSE PRACTITIONER

## 2019-12-24 PROCEDURE — 36415 COLL VENOUS BLD VENIPUNCTURE: CPT

## 2019-12-24 PROCEDURE — 85007 BL SMEAR W/DIFF WBC COUNT: CPT | Performed by: NURSE PRACTITIONER

## 2019-12-24 NOTE — CODE DOCUMENTATION
Labs drawn from left ac on first attempt. Platelet count 11. Called cancer care, spoke with Lisbeth, no platelets needed today.

## 2019-12-26 ENCOUNTER — HOSPITAL ENCOUNTER (OUTPATIENT)
Dept: ONCOLOGY | Facility: HOSPITAL | Age: 34
Discharge: HOME | End: 2019-12-26
Admitting: NURSE PRACTITIONER

## 2019-12-26 ENCOUNTER — HOSPITAL ENCOUNTER (OUTPATIENT)
Dept: INFUSION THERAPY | Facility: HOSPITAL | Age: 34
Discharge: HOME | End: 2019-12-26
Admitting: NURSE PRACTITIONER

## 2019-12-26 ENCOUNTER — LAB (OUTPATIENT)
Dept: LAB | Facility: HOSPITAL | Age: 34
End: 2019-12-26

## 2019-12-26 VITALS
DIASTOLIC BLOOD PRESSURE: 62 MMHG | WEIGHT: 118 LBS | HEART RATE: 88 BPM | TEMPERATURE: 98 F | HEIGHT: 62 IN | SYSTOLIC BLOOD PRESSURE: 110 MMHG | RESPIRATION RATE: 18 BRPM | BODY MASS INDEX: 21.71 KG/M2

## 2019-12-26 VITALS
RESPIRATION RATE: 16 BRPM | OXYGEN SATURATION: 100 % | HEART RATE: 80 BPM | TEMPERATURE: 97.8 F | SYSTOLIC BLOOD PRESSURE: 109 MMHG | DIASTOLIC BLOOD PRESSURE: 65 MMHG

## 2019-12-26 DIAGNOSIS — D69.3 IMMUNE THROMBOCYTOPENIC PURPURA (HCC): Primary | ICD-10-CM

## 2019-12-26 DIAGNOSIS — D69.6 THROMBOCYTOPENIA (HCC): Primary | ICD-10-CM

## 2019-12-26 DIAGNOSIS — D69.3 IMMUNE THROMBOCYTOPENIC PURPURA (HCC): ICD-10-CM

## 2019-12-26 LAB
ANISOCYTOSIS BLD QL: NORMAL
BASOPHILS # BLD AUTO: 0 10*3/MM3 (ref 0–0.2)
BASOPHILS # BLD AUTO: 0.02 10*3/MM3 (ref 0–0.2)
BASOPHILS NFR BLD AUTO: 0.4 % (ref 0–1.5)
BASOPHILS NFR BLD AUTO: 0.7 % (ref 0–1.5)
DEPRECATED RDW RBC AUTO: 51.4 FL (ref 37–54)
DEPRECATED RDW RBC AUTO: 52.5 FL (ref 37–54)
EOSINOPHIL # BLD AUTO: 0.1 10*3/MM3 (ref 0–0.4)
EOSINOPHIL # BLD AUTO: 0.17 10*3/MM3 (ref 0–0.4)
EOSINOPHIL NFR BLD AUTO: 1.7 % (ref 0.3–6.2)
EOSINOPHIL NFR BLD AUTO: 3.2 % (ref 0.3–6.2)
ERYTHROCYTE [DISTWIDTH] IN BLOOD BY AUTOMATED COUNT: 16 % (ref 12.3–15.4)
ERYTHROCYTE [DISTWIDTH] IN BLOOD BY AUTOMATED COUNT: 17.2 % (ref 12.3–15.4)
HCT VFR BLD AUTO: 31.6 % (ref 34–46.6)
HCT VFR BLD AUTO: 32.3 % (ref 34–46.6)
HGB BLD-MCNC: 10.8 G/DL (ref 12–15.9)
HGB BLD-MCNC: 11 G/DL (ref 12–15.9)
LYMPHOCYTES # BLD AUTO: 1.7 10*3/MM3 (ref 0.7–3.1)
LYMPHOCYTES # BLD AUTO: 1.99 10*3/MM3 (ref 0.7–3.1)
LYMPHOCYTES NFR BLD AUTO: 28.6 % (ref 19.6–45.3)
LYMPHOCYTES NFR BLD AUTO: 37.2 % (ref 19.6–45.3)
MCH RBC QN AUTO: 30.5 PG (ref 26.6–33)
MCH RBC QN AUTO: 30.6 PG (ref 26.6–33)
MCHC RBC AUTO-ENTMCNC: 33.4 G/DL (ref 31.5–35.7)
MCHC RBC AUTO-ENTMCNC: 34.9 G/DL (ref 31.5–35.7)
MCV RBC AUTO: 87.8 FL (ref 79–97)
MCV RBC AUTO: 91.2 FL (ref 79–97)
MONOCYTES # BLD AUTO: 0.5 10*3/MM3 (ref 0.1–0.9)
MONOCYTES # BLD AUTO: 0.69 10*3/MM3 (ref 0.1–0.9)
MONOCYTES NFR BLD AUTO: 12.9 % (ref 5–12)
MONOCYTES NFR BLD AUTO: 8.3 % (ref 5–12)
NEUTROPHILS # BLD AUTO: 2.48 10*3/MM3 (ref 1.7–7)
NEUTROPHILS # BLD AUTO: 3.5 10*3/MM3 (ref 1.7–7)
NEUTROPHILS NFR BLD AUTO: 46.3 % (ref 42.7–76)
NEUTROPHILS NFR BLD AUTO: 60.7 % (ref 42.7–76)
NRBC BLD AUTO-RTO: 0.1 /100 WBC (ref 0–0.2)
PLATELET # BLD AUTO: 10 10*3/MM3 (ref 140–450)
PLATELET # BLD AUTO: 7 10*3/MM3 (ref 140–450)
PMV BLD AUTO: 10.2 FL (ref 6–12)
RBC # BLD AUTO: 3.54 10*6/MM3 (ref 3.77–5.28)
RBC # BLD AUTO: 3.6 10*6/MM3 (ref 3.77–5.28)
SMALL PLATELETS BLD QL SMEAR: NORMAL
WBC MORPH BLD: NORMAL
WBC NRBC COR # BLD: 5.35 10*3/MM3 (ref 3.4–10.8)
WBC NRBC COR # BLD: 5.8 10*3/MM3 (ref 3.4–10.8)

## 2019-12-26 PROCEDURE — 85025 COMPLETE CBC W/AUTO DIFF WBC: CPT

## 2019-12-26 PROCEDURE — 36415 COLL VENOUS BLD VENIPUNCTURE: CPT

## 2019-12-26 PROCEDURE — 36430 TRANSFUSION BLD/BLD COMPNT: CPT

## 2019-12-26 PROCEDURE — P9035 PLATELET PHERES LEUKOREDUCED: HCPCS

## 2019-12-26 PROCEDURE — 85007 BL SMEAR W/DIFF WBC COUNT: CPT | Performed by: INTERNAL MEDICINE

## 2019-12-26 PROCEDURE — 85025 COMPLETE CBC W/AUTO DIFF WBC: CPT | Performed by: INTERNAL MEDICINE

## 2019-12-26 RX ORDER — DIPHENHYDRAMINE HCL 25 MG
25 TABLET ORAL ONCE
Status: DISCONTINUED | OUTPATIENT
Start: 2019-12-26 | End: 2019-12-28 | Stop reason: HOSPADM

## 2019-12-26 RX ORDER — SODIUM CHLORIDE 9 MG/ML
250 INJECTION, SOLUTION INTRAVENOUS AS NEEDED
Status: CANCELLED | OUTPATIENT
Start: 2019-12-26

## 2019-12-26 RX ORDER — SODIUM CHLORIDE 9 MG/ML
250 INJECTION, SOLUTION INTRAVENOUS AS NEEDED
Status: DISCONTINUED | OUTPATIENT
Start: 2019-12-26 | End: 2019-12-28 | Stop reason: HOSPADM

## 2019-12-26 RX ORDER — ACETAMINOPHEN 325 MG/1
650 TABLET ORAL ONCE
Status: DISCONTINUED | OUTPATIENT
Start: 2019-12-26 | End: 2019-12-28 | Stop reason: HOSPADM

## 2019-12-26 NOTE — PROGRESS NOTES
Patient came in today for cbc she was sent to the ER on Monday 12/23/19 for Plts spoke with Makayla in the ER and advised Dr. Rowland said to give one unit, advised her that the patient would be arriving later that evening around 9:00pm. Patient came in today with Plt count of 7 because when she arrived at the ER on 12/23/19 they told her there was no orders and they do not do that there patient stated they called Dr. Rodríguez and she advised to send patient home since the plts don't work for her. Sent patient to [facility] today for PLTS spoke with Lizzette and she advised there was no Plts available right now and they were calling to get plts she stated they would call the patient when they arrived. Patient is not having any bleeding at this time.

## 2019-12-27 ENCOUNTER — HOSPITAL ENCOUNTER (OUTPATIENT)
Dept: ONCOLOGY | Facility: HOSPITAL | Age: 34
Setting detail: INFUSION SERIES
Discharge: HOME | End: 2019-12-27

## 2019-12-27 VITALS
DIASTOLIC BLOOD PRESSURE: 64 MMHG | TEMPERATURE: 98.4 F | HEART RATE: 78 BPM | BODY MASS INDEX: 22.27 KG/M2 | WEIGHT: 121.8 LBS | SYSTOLIC BLOOD PRESSURE: 124 MMHG

## 2019-12-27 DIAGNOSIS — D69.6 THROMBOCYTOPENIA (HCC): ICD-10-CM

## 2019-12-27 DIAGNOSIS — D69.3 IMMUNE THROMBOCYTOPENIC PURPURA (HCC): Primary | ICD-10-CM

## 2019-12-27 LAB
BASOPHILS # BLD AUTO: 0.03 10*3/MM3 (ref 0–0.2)
BASOPHILS NFR BLD AUTO: 0.5 % (ref 0–1.5)
DEPRECATED RDW RBC AUTO: 51.2 FL (ref 37–54)
EOSINOPHIL # BLD AUTO: 0.16 10*3/MM3 (ref 0–0.4)
EOSINOPHIL NFR BLD AUTO: 2.8 % (ref 0.3–6.2)
ERYTHROCYTE [DISTWIDTH] IN BLOOD BY AUTOMATED COUNT: 15.9 % (ref 12.3–15.4)
HCT VFR BLD AUTO: 33.7 % (ref 34–46.6)
HGB BLD-MCNC: 11.4 G/DL (ref 12–15.9)
LYMPHOCYTES # BLD AUTO: 2.29 10*3/MM3 (ref 0.7–3.1)
LYMPHOCYTES NFR BLD AUTO: 40.3 % (ref 19.6–45.3)
MCH RBC QN AUTO: 30.4 PG (ref 26.6–33)
MCHC RBC AUTO-ENTMCNC: 33.8 G/DL (ref 31.5–35.7)
MCV RBC AUTO: 89.9 FL (ref 79–97)
MONOCYTES # BLD AUTO: 0.79 10*3/MM3 (ref 0.1–0.9)
MONOCYTES NFR BLD AUTO: 13.9 % (ref 5–12)
NEUTROPHILS # BLD AUTO: 2.41 10*3/MM3 (ref 1.7–7)
NEUTROPHILS NFR BLD AUTO: 42.5 % (ref 42.7–76)
PLATELET # BLD AUTO: 8 10*3/MM3 (ref 140–450)
RBC # BLD AUTO: 3.75 10*6/MM3 (ref 3.77–5.28)
WBC NRBC COR # BLD: 5.68 10*3/MM3 (ref 3.4–10.8)

## 2019-12-27 PROCEDURE — 85025 COMPLETE CBC W/AUTO DIFF WBC: CPT | Performed by: NURSE PRACTITIONER

## 2019-12-27 PROCEDURE — 96372 THER/PROPH/DIAG INJ SC/IM: CPT | Performed by: NURSE PRACTITIONER

## 2019-12-27 PROCEDURE — 25010000002 ROMIPLOSTIM PER 10 MCG: Performed by: NURSE PRACTITIONER

## 2019-12-27 PROCEDURE — 36416 COLLJ CAPILLARY BLOOD SPEC: CPT | Performed by: NURSE PRACTITIONER

## 2019-12-27 RX ADMIN — ROMIPLOSTIM 55 MCG: 250 INJECTION, POWDER, LYOPHILIZED, FOR SOLUTION SUBCUTANEOUS at 10:32

## 2019-12-27 NOTE — PROGRESS NOTES
Platlet level of 8 today. Pt denies abnormal bleeding or bruising.   Pt will receive N-plate today.   Pt advised that if she starts to have abnormal bleeding or bruising to go to the ER.   Pt v/u.   Pt will return on Monday for recheck of CBC.   Pt will need to be scheduled at [facility] on Wed 1/1/20 for repeat CBC.

## 2019-12-28 LAB
ABO + RH BLD: NORMAL
BH BB BLOOD EXPIRATION DATE: NORMAL
BH BB BLOOD TYPE BARCODE: 6200
BH BB DISPENSE STATUS: NORMAL
BH BB PRODUCT CODE: NORMAL
BH BB UNIT NUMBER: NORMAL
UNIT  ABO: NORMAL
UNIT  RH: NORMAL

## 2019-12-30 ENCOUNTER — HOSPITAL ENCOUNTER (OUTPATIENT)
Dept: ONCOLOGY | Facility: HOSPITAL | Age: 34
Discharge: HOME | End: 2019-12-30
Admitting: NURSE PRACTITIONER

## 2019-12-30 ENCOUNTER — LAB (OUTPATIENT)
Dept: LAB | Facility: HOSPITAL | Age: 34
End: 2019-12-30

## 2019-12-30 ENCOUNTER — HOSPITAL ENCOUNTER (OUTPATIENT)
Dept: INFUSION THERAPY | Facility: HOSPITAL | Age: 34
Setting detail: INFUSION SERIES
Discharge: HOME | End: 2019-12-30

## 2019-12-30 DIAGNOSIS — D69.3 IMMUNE THROMBOCYTOPENIC PURPURA (HCC): ICD-10-CM

## 2019-12-30 DIAGNOSIS — D69.6 THROMBOCYTOPENIA (HCC): Primary | ICD-10-CM

## 2019-12-30 DIAGNOSIS — D69.3 IMMUNE THROMBOCYTOPENIC PURPURA (HCC): Primary | ICD-10-CM

## 2019-12-30 LAB
BASOPHILS # BLD AUTO: 0.03 10*3/MM3 (ref 0–0.2)
BASOPHILS # BLD MANUAL: 0.2 10*3/MM3 (ref 0–0.2)
BASOPHILS NFR BLD AUTO: 0.4 % (ref 0–1.5)
BASOPHILS NFR BLD AUTO: 2 % (ref 0–1.5)
DEPRECATED RDW RBC AUTO: 48.2 FL (ref 37–54)
DEPRECATED RDW RBC AUTO: 52.1 FL (ref 37–54)
EOSINOPHIL # BLD AUTO: 0.14 10*3/MM3 (ref 0–0.4)
EOSINOPHIL # BLD MANUAL: 0.1 10*3/MM3 (ref 0–0.4)
EOSINOPHIL NFR BLD AUTO: 1.9 % (ref 0.3–6.2)
EOSINOPHIL NFR BLD MANUAL: 1 % (ref 0.3–6.2)
ERYTHROCYTE [DISTWIDTH] IN BLOOD BY AUTOMATED COUNT: 15 % (ref 12.3–15.4)
ERYTHROCYTE [DISTWIDTH] IN BLOOD BY AUTOMATED COUNT: 16.8 % (ref 12.3–15.4)
HCT VFR BLD AUTO: 34.9 % (ref 34–46.6)
HCT VFR BLD AUTO: 35.1 % (ref 34–46.6)
HGB BLD-MCNC: 12.2 G/DL (ref 12–15.9)
HGB BLD-MCNC: 12.2 G/DL (ref 12–15.9)
LYMPHOCYTES # BLD AUTO: 2.8 10*3/MM3 (ref 0.7–3.1)
LYMPHOCYTES # BLD MANUAL: 2.57 10*3/MM3 (ref 0.7–3.1)
LYMPHOCYTES NFR BLD AUTO: 38.5 % (ref 19.6–45.3)
LYMPHOCYTES NFR BLD MANUAL: 10 % (ref 5–12)
LYMPHOCYTES NFR BLD MANUAL: 26 % (ref 19.6–45.3)
MCH RBC QN AUTO: 30.6 PG (ref 26.6–33)
MCH RBC QN AUTO: 30.8 PG (ref 26.6–33)
MCHC RBC AUTO-ENTMCNC: 34.8 G/DL (ref 31.5–35.7)
MCHC RBC AUTO-ENTMCNC: 34.8 G/DL (ref 31.5–35.7)
MCV RBC AUTO: 87.8 FL (ref 79–97)
MCV RBC AUTO: 88.6 FL (ref 79–97)
METAMYELOCYTES NFR BLD MANUAL: 3 % (ref 0–0)
MONOCYTES # BLD AUTO: 0.93 10*3/MM3 (ref 0.1–0.9)
MONOCYTES # BLD AUTO: 0.99 10*3/MM3 (ref 0.1–0.9)
MONOCYTES NFR BLD AUTO: 12.8 % (ref 5–12)
NEUTROPHILS # BLD AUTO: 3.38 10*3/MM3 (ref 1.7–7)
NEUTROPHILS # BLD AUTO: 5.74 10*3/MM3 (ref 1.7–7)
NEUTROPHILS NFR BLD AUTO: 46.4 % (ref 42.7–76)
NEUTROPHILS NFR BLD MANUAL: 52 % (ref 42.7–76)
NEUTS BAND NFR BLD MANUAL: 6 % (ref 0–5)
PLAT MORPH BLD: NORMAL
PLATELET # BLD AUTO: 31 10*3/MM3 (ref 140–450)
PLATELET # BLD AUTO: 6 10*3/MM3 (ref 140–450)
PMV BLD AUTO: 8.8 FL (ref 6–12)
RBC # BLD AUTO: 3.96 10*6/MM3 (ref 3.77–5.28)
RBC # BLD AUTO: 3.98 10*6/MM3 (ref 3.77–5.28)
RBC MORPH BLD: NORMAL
SCAN SLIDE: NORMAL
WBC MORPH BLD: NORMAL
WBC NRBC COR # BLD: 7.28 10*3/MM3 (ref 3.4–10.8)
WBC NRBC COR # BLD: 9.9 10*3/MM3 (ref 3.4–10.8)

## 2019-12-30 PROCEDURE — 85025 COMPLETE CBC W/AUTO DIFF WBC: CPT

## 2019-12-30 PROCEDURE — G0463 HOSPITAL OUTPT CLINIC VISIT: HCPCS

## 2019-12-30 PROCEDURE — 85007 BL SMEAR W/DIFF WBC COUNT: CPT | Performed by: NURSE PRACTITIONER

## 2019-12-30 PROCEDURE — 36415 COLL VENOUS BLD VENIPUNCTURE: CPT

## 2019-12-30 RX ORDER — ALLOPURINOL 100 MG/1
160 TABLET ORAL DAILY
COMMUNITY
End: 2019-12-30

## 2019-12-30 RX ORDER — SODIUM CHLORIDE 9 MG/ML
250 INJECTION, SOLUTION INTRAVENOUS AS NEEDED
Status: CANCELLED | OUTPATIENT
Start: 2020-01-17

## 2019-12-30 RX ORDER — PANTOPRAZOLE SODIUM 40 MG/1
40 TABLET, DELAYED RELEASE ORAL DAILY
COMMUNITY
End: 2019-12-30

## 2019-12-30 NOTE — PROGRESS NOTES
Patient here for CBC plts 6 sent to [facility] for Transfusion per Lakshmi NP patient is going to [facility] on wed for cbc, she is scheduled for NPLATE on 1/3/20. Patient stated she awoke in the middle of the night with a nose bleed that lasted for about 2min.

## 2020-01-03 ENCOUNTER — HOSPITAL ENCOUNTER (OUTPATIENT)
Dept: ONCOLOGY | Facility: HOSPITAL | Age: 35
Setting detail: INFUSION SERIES
Discharge: HOME | End: 2020-01-03

## 2020-01-03 VITALS
HEIGHT: 62 IN | SYSTOLIC BLOOD PRESSURE: 109 MMHG | RESPIRATION RATE: 18 BRPM | BODY MASS INDEX: 22.63 KG/M2 | HEART RATE: 60 BPM | DIASTOLIC BLOOD PRESSURE: 70 MMHG | TEMPERATURE: 98.3 F | WEIGHT: 123 LBS

## 2020-01-03 DIAGNOSIS — D69.6 THROMBOCYTOPENIA (HCC): Primary | ICD-10-CM

## 2020-01-03 DIAGNOSIS — D69.3 IMMUNE THROMBOCYTOPENIC PURPURA (HCC): Primary | ICD-10-CM

## 2020-01-03 LAB
BASOPHILS # BLD AUTO: 0.04 10*3/MM3 (ref 0–0.2)
BASOPHILS NFR BLD AUTO: 0.6 % (ref 0–1.5)
DEPRECATED RDW RBC AUTO: 48 FL (ref 37–54)
EOSINOPHIL # BLD AUTO: 0.13 10*3/MM3 (ref 0–0.4)
EOSINOPHIL NFR BLD AUTO: 2 % (ref 0.3–6.2)
ERYTHROCYTE [DISTWIDTH] IN BLOOD BY AUTOMATED COUNT: 15 % (ref 12.3–15.4)
HCT VFR BLD AUTO: 32.1 % (ref 34–46.6)
HGB BLD-MCNC: 11.2 G/DL (ref 12–15.9)
LYMPHOCYTES # BLD AUTO: 2.08 10*3/MM3 (ref 0.7–3.1)
LYMPHOCYTES NFR BLD AUTO: 32.7 % (ref 19.6–45.3)
MCH RBC QN AUTO: 30.9 PG (ref 26.6–33)
MCHC RBC AUTO-ENTMCNC: 34.9 G/DL (ref 31.5–35.7)
MCV RBC AUTO: 88.4 FL (ref 79–97)
MONOCYTES # BLD AUTO: 0.71 10*3/MM3 (ref 0.1–0.9)
MONOCYTES NFR BLD AUTO: 11.1 % (ref 5–12)
NEUTROPHILS # BLD AUTO: 3.41 10*3/MM3 (ref 1.7–7)
NEUTROPHILS NFR BLD AUTO: 53.6 % (ref 42.7–76)
PLATELET # BLD AUTO: 10 10*3/MM3 (ref 140–450)
RBC # BLD AUTO: 3.63 10*6/MM3 (ref 3.77–5.28)
WBC NRBC COR # BLD: 6.37 10*3/MM3 (ref 3.4–10.8)

## 2020-01-03 PROCEDURE — 25010000002 ROMIPLOSTIM PER 10 MCG: Performed by: NURSE PRACTITIONER

## 2020-01-03 PROCEDURE — 96372 THER/PROPH/DIAG INJ SC/IM: CPT | Performed by: NURSE PRACTITIONER

## 2020-01-03 PROCEDURE — 36415 COLL VENOUS BLD VENIPUNCTURE: CPT | Performed by: NURSE PRACTITIONER

## 2020-01-03 PROCEDURE — 85025 COMPLETE CBC W/AUTO DIFF WBC: CPT | Performed by: NURSE PRACTITIONER

## 2020-01-03 RX ORDER — TRANEXAMIC ACID 650 MG/1
1300 TABLET ORAL 3 TIMES DAILY
Qty: 30 TABLET | Refills: 0 | Status: SHIPPED | OUTPATIENT
Start: 2020-01-03 | End: 2020-05-01 | Stop reason: SDUPTHER

## 2020-01-03 RX ADMIN — ROMIPLOSTIM 110 MCG: 250 INJECTION, POWDER, LYOPHILIZED, FOR SOLUTION SUBCUTANEOUS at 11:06

## 2020-01-06 ENCOUNTER — DOCUMENTATION (OUTPATIENT)
Dept: INTERNAL MEDICINE | Facility: HOSPITAL | Age: 35
End: 2020-01-06

## 2020-01-10 ENCOUNTER — HOSPITAL ENCOUNTER (OUTPATIENT)
Dept: ONCOLOGY | Facility: HOSPITAL | Age: 35
Setting detail: INFUSION SERIES
Discharge: HOME | End: 2020-01-10

## 2020-01-10 VITALS
BODY MASS INDEX: 22.6 KG/M2 | HEART RATE: 99 BPM | DIASTOLIC BLOOD PRESSURE: 74 MMHG | RESPIRATION RATE: 18 BRPM | WEIGHT: 122.8 LBS | HEIGHT: 62 IN | SYSTOLIC BLOOD PRESSURE: 129 MMHG | TEMPERATURE: 98.4 F

## 2020-01-10 DIAGNOSIS — D69.3 IMMUNE THROMBOCYTOPENIC PURPURA (HCC): ICD-10-CM

## 2020-01-10 DIAGNOSIS — D69.6 THROMBOCYTOPENIA (HCC): Primary | ICD-10-CM

## 2020-01-10 LAB
BASOPHILS # BLD AUTO: 0.06 10*3/MM3 (ref 0–0.2)
BASOPHILS NFR BLD AUTO: 1.2 % (ref 0–1.5)
DEPRECATED RDW RBC AUTO: 49.7 FL (ref 37–54)
EOSINOPHIL # BLD AUTO: 0.11 10*3/MM3 (ref 0–0.4)
EOSINOPHIL NFR BLD AUTO: 2.1 % (ref 0.3–6.2)
ERYTHROCYTE [DISTWIDTH] IN BLOOD BY AUTOMATED COUNT: 15.4 % (ref 12.3–15.4)
HCT VFR BLD AUTO: 34.7 % (ref 34–46.6)
HGB BLD-MCNC: 11.9 G/DL (ref 12–15.9)
LYMPHOCYTES # BLD AUTO: 2.05 10*3/MM3 (ref 0.7–3.1)
LYMPHOCYTES NFR BLD AUTO: 39.4 % (ref 19.6–45.3)
MCH RBC QN AUTO: 31.2 PG (ref 26.6–33)
MCHC RBC AUTO-ENTMCNC: 34.3 G/DL (ref 31.5–35.7)
MCV RBC AUTO: 90.8 FL (ref 79–97)
MONOCYTES # BLD AUTO: 0.63 10*3/MM3 (ref 0.1–0.9)
MONOCYTES NFR BLD AUTO: 12.1 % (ref 5–12)
NEUTROPHILS # BLD AUTO: 2.35 10*3/MM3 (ref 1.7–7)
NEUTROPHILS NFR BLD AUTO: 45.2 % (ref 42.7–76)
PLATELET # BLD AUTO: 4 10*3/MM3 (ref 140–450)
RBC # BLD AUTO: 3.82 10*6/MM3 (ref 3.77–5.28)
WBC NRBC COR # BLD: 5.2 10*3/MM3 (ref 3.4–10.8)

## 2020-01-10 PROCEDURE — 96372 THER/PROPH/DIAG INJ SC/IM: CPT | Performed by: INTERNAL MEDICINE

## 2020-01-10 PROCEDURE — 85025 COMPLETE CBC W/AUTO DIFF WBC: CPT | Performed by: NURSE PRACTITIONER

## 2020-01-10 PROCEDURE — 25010000002 ROMIPLOSTIM PER 10 MCG: Performed by: NURSE PRACTITIONER

## 2020-01-10 PROCEDURE — 36415 COLL VENOUS BLD VENIPUNCTURE: CPT | Performed by: INTERNAL MEDICINE

## 2020-01-10 RX ADMIN — ROMIPLOSTIM 160 MCG: 250 INJECTION, POWDER, LYOPHILIZED, FOR SOLUTION SUBCUTANEOUS at 10:37

## 2020-01-14 NOTE — PROGRESS NOTES
HEMATOLOGY ONCOLOGY FOLLOW UP        Patient name: Tiny Akhtar  : 1985  MRN: [id number]  Primary Care Physician: Jose Ingram, APRN  Referring Physician: Jose Ingram, *  Reason For Consult:     Chief Complaint   Patient presents with   • Follow-up     Thrombocytopenia       History of Present Illness:  Tiny Akhtar is a 34 y.o. female with a past medical history of thrombocytopenia, anemia, and kidney stones.  She presented to [facility] Emergency Department on 2019 after she had blood work drawn earlier in the day that showed a platelet count of 9,000 at this facility.  She had previously been admitted through the ED on 2019 reporting bruising on her ankles and in her mouth.  She received 2 units of platelets and started on steroids. She was discharged on 19.The patient's last platelet count was 17,000 on 19 after a platelet transfusion.  She had been on steroids for four days.  The patient reported continued  subcutaneous bruising. She denied any headaches, nosebleeds, or gingival bleeding.  She was admitted for IV steroids and further management.     · 10/7/2019 CT abdomen and pelvis: 3 mm stone at the left ureterovesical junction causing mild to moderate obstruction.Spleen reported as normal size.  · 10/7/2019 WBC 10.2, hemoglobin 12.2, platelets 268    · 2019 WBC 5.7, hemoglobin 12.8, Platelets 5000,  Transfused 2 units platelets.  initial presentation for diagnosis acute thrombocytopenia,. Started on Solumedrol 125 mg IV Q6Hr  · 2019 platelets 5000.  Patient transfused with platelets.  Platelets increased to 13,000  · 19  Bone marrow biopsy - Smears of bone marrow aspirate with cell block (clot) preparation and marrow biopsy:  Hypocellular for age bone marrow (50%),  Adequate numbers of unremarkable megakaryocytes,  Absent iron stores,  Negative for involvement by malignant lymphoma.  Flow cytometry-CD 56  expression on granulocytes and monocytes.  Chromosomal analysis -46XX  · 11/20/2019 platelet count 17,000 . Switched to Prednisone 1 mg/kg ( 50 mg daily)  · 11/21/2019 platelets 17,000  · 11/22/2019  Readmitted . Restarted Solumedrol  mg Q6H . platelets 9000  · 11/23/19 platelets 13,000  · 11/23/2019 hematology/Oncology was consulted on this patient known to our service and previously consulted for acute thrombocytopenia on 11/18/19. She was followed by Uri Sagastume MD for ITP. The patient had been transfused and started on IV steroids Prednisone while inpatient 11/18/19 to 11/20/19.  She was transitioned to oral steroids at discharge.     · 11/23/2019 Hepatitis panel negative, TSH 0.33, H. pylori IgG 1.43 High, IgM negative, JOSE negative, HIV antibodies negative  · 11/23/2019 patient received IV Venofer.  Patient received meningococcal vaccine, Haemophilus vaccine, pneumococcus vaccine  · 11/23/2019 platelets 13,000  · 11/24/2019 platelets 52,000.  Platelets responded to steroids. On IV solumedrol. Started on Megace 40 mg po QID for menorrhagia.  · Pt discharged on Prednisone 50 mg  · 11/27/2019 platelets 8000.  Patient's failed steroids ( on prednisone 50 mg daily).. Started Tranexamic acid 1300 mg TID  · 11/29/2019  READMITTED platelets 12,000  · 11/29/19-Cycle 1 Day one of RITUXIMAB and continues on megestrol  · 11/30/2019 WBC 11.2, hemoglobin 12.1, platelets 6000  · 12/1/2019 platelets 11,000.  Patient received IVIG 30 g  · 12/2/2019 iron 72, iron saturation 17% low, TIBC 416, ferritin 231, platelets 56,000  · 12/3/2019 platelets 129,000  · 12/4/2019 Prednisone decreased to 40 mg daily  · 12/6/2019 patient received week 2 of Rituximab.  Platelets 24,000, hemoglobin 11.8  · 12/8/2019 platelets 5000, hemoglobin 11.6, PT 10.5, INR 1.  Patient received IVIG 50 g  · 12/9/2019 platelets 11,000  · 12/11/2019 platelets 16,000  · 12/13/2019: Platelets 13,000 Patient received week 3 of her Rituximab.  Prednisone decreased to 20 mg daily  · 12/14/2019 patient received IVIG 55 g, platelets 12,000.   · 12/16/2019 platelets 24,000. Prednisone decreased to 10 mg daily  · 12/18/2019 platelets 12,000  · 12/19/2019 platelets 11,000  · 12/20/19-week 4 Rituximab and Megestrol, platelets 12,000. Prednisone DCed.  · 12/26/2019 platelets 7000  · 12/27/19-patient was started on NPLATE   1 mcg/kg, platelets 8000  · 12/30/2019 platelets 6000.  After transfusion 31,000  · 01/03/2020- NPLATE 2 mcg/kg, platelets 10,000  · 01/10/2020-NPLATE 3 mcg/kg.  continues on Megace megestrol, platelets 4000  · 1/15/2020: WBC 5.9, hemoglobin 12.5, platelets 2000k          Subjective:01/15/2020  Patient is here for a follow up of ITP.. She is accompanied by her spouse and child. She state she has finished the Rituxan. She is still taking Iron and Megesrol, and NPLATE.She needs a refill on Megace. Her most recent treatment was 01/10/20. She is scheduled for future treatment on 01/17/2020. She states she has some fatigue, but denies any other symptoms. She is still getting her immunizations.  Patient has not experienced any bleeding.  She is having very slight menstrual bleeding.  She is off steroids.      The following portions of the patient's history were reviewed and updated as appropriate: allergies, current medications, past family history, past medical history, past social history, past surgical history and problem list.         Past Medical History:   Diagnosis Date   • Anxiety    • Iron deficiency anemia    • Kidney stone 10/2019       No past surgical history on file.      Current Outpatient Medications:   •  acetaminophen (TYLENOL) 325 MG tablet, Take 650 mg by mouth Every 6 (Six) Hours As Needed for Mild Pain  or Moderate Pain ., Disp: , Rfl:   •  ferrous sulfate 324 (65 Fe) MG tablet delayed-release EC tablet, Take 1 tablet by mouth Daily With Breakfast., Disp: 30 tablet, Rfl: 0  •  megestrol (MEGACE) 20 MG tablet, Take 20 mg by mouth  3 (Three) Times a Day., Disp: , Rfl:   •  NON FORMULARY, Take 1 tablet by mouth Daily. Balance OTC, Disp: , Rfl:   •  pantoprazole (PROTONIX) 20 MG EC tablet, , Disp: , Rfl:   •  polyethylene glycol (MIRALAX) powder, Take 17 g by mouth Daily As Needed (constipation)., Disp: 225 g, Rfl: 0  •  Tranexamic Acid 650 MG tablet, Take 2 tablets by mouth 3 (Three) Times a Day., Disp: 30 tablet, Rfl: 0    No Known Allergies    Family History   Problem Relation Age of Onset   • Thrombocytopenia Nephew         ITP   • Diabetes Mother    • Diabetes Father    • Cancer Paternal Grandmother        Cancer-related family history includes Cancer in her paternal grandmother.    Social History     Tobacco Use   • Smoking status: Never Smoker   • Smokeless tobacco: Never Used   Substance Use Topics   • Alcohol use: No     Frequency: Never   • Drug use: No         I have reviewed the history of present illness, past medical history, family history, social history, lab results, all notes and other records since the patient was last seen on  11/21/2019.    ROS:     Review of Systems   Constitutional: Positive for fatigue. Negative for chills and fever.   HENT: Negative for ear pain, mouth sores, nosebleeds and sore throat.    Eyes: Negative for photophobia and visual disturbance.   Respiratory: Negative for wheezing and stridor.    Cardiovascular: Negative for chest pain and palpitations.   Gastrointestinal: Negative for abdominal pain, diarrhea, nausea and vomiting.   Endocrine: Negative for cold intolerance and heat intolerance.   Genitourinary: Negative for dysuria and hematuria.   Musculoskeletal: Negative for joint swelling and neck stiffness.   Skin: Negative for color change and rash.   Neurological: Negative for seizures and syncope.   Hematological: Negative for adenopathy.        No obvious bleeding   Psychiatric/Behavioral: Negative for agitation, confusion and hallucinations.       Objective:    Vitals:    01/15/20 1135   BP:  "124/81   Pulse: 80   Resp: 18   Temp: 98 °F (36.7 °C)   Weight: 57.8 kg (127 lb 6.4 oz)   Height: 157.5 cm (62\")   PainSc: 0-No pain     ECOG  (0) Fully active, able to carry on all predisease performance without restriction    Physical Exam:   Physical Exam   Constitutional: She is oriented to person, place, and time. She appears well-developed and well-nourished.   HENT:   Head: Normocephalic and atraumatic.   Nose: Nose normal.   Mouth/Throat: Oropharynx is clear and moist. No oropharyngeal exudate.   Eyes: Conjunctivae and EOM are normal. No scleral icterus.   Neck: Normal range of motion. No tracheal deviation present. No thyromegaly present.   Cardiovascular: Regular rhythm and normal heart sounds.   Pulmonary/Chest: No stridor.   Abdominal: Soft. Bowel sounds are normal. She exhibits no distension and no mass. There is no tenderness.   Musculoskeletal: Normal range of motion. She exhibits no edema or deformity.   Lymphadenopathy:     She has no cervical adenopathy.   Neurological: She is alert and oriented to person, place, and time. No sensory deficit.   Skin: Skin is warm.   Psychiatric: Her behavior is normal.   Vitals reviewed.            Lab Results - Last 18 Months   Lab Units 01/15/20  1139 01/10/20  1000 01/03/20  1002   WBC 10*3/mm3 5.92 5.20 6.37   HEMOGLOBIN g/dL 12.5 11.9* 11.2*   HEMATOCRIT % 36.3 34.7 32.1*   PLATELETS 10*3/mm3 2* 4* 10*   MCV fL 90.1 90.8 88.4     Lab Results - Last 18 Months   Lab Units 12/08/19  2022 12/02/19  0354 12/01/19  0320  11/29/19  1217  11/22/19  2248 11/18/19  0918   SODIUM mmol/L 137 137 138   < > 136   < > 139 140   POTASSIUM mmol/L 3.6 4.1 4.2   < > 4.2   < > 3.5 3.8   CHLORIDE mmol/L 100 101 100   < > 98   < > 103 105   CO2 mmol/L 26.0 27.0 23.0   < > 23.0   < > 23.0 26.0   BUN mg/dL 17 16 15   < > 17   < > 17 16   CREATININE mg/dL 0.55* 0.55* 0.65   < > 0.68   < > 0.54* 0.60   CALCIUM mg/dL 9.2 9.3 9.3   < > 9.5   < > 9.1 9.5   BILIRUBIN mg/dL  --   --   --   " --  0.3  --  0.3 0.3   ALK PHOS U/L  --   --   --   --  36*  --  36* 36*   ALT (SGPT) U/L  --   --   --   --  31  --  38* 13   AST (SGOT) U/L  --   --   --   --  16  --  16 17   GLUCOSE mg/dL 103* 107* 171*   < > 93   < > 153* 102*    < > = values in this interval not displayed.       Lab Results   Component Value Date    GLUCOSE 103 (H) 12/08/2019    BUN 17 12/08/2019    CREATININE 0.55 (L) 12/08/2019    EGFRIFNONA 128 12/08/2019    BCR 30.9 (H) 12/08/2019    K 3.6 12/08/2019    CO2 26.0 12/08/2019    CALCIUM 9.2 12/08/2019    PROTENTOTREF 5.9 (L) 11/23/2019    ALBUMIN 4.10 11/29/2019    LABIL2 1.5 11/23/2019    AST 16 11/29/2019    ALT 31 11/29/2019       Lab Results - Last 18 Months   Lab Units 12/08/19 2022 11/18/19  0918   INR  1.00 1.00   APTT seconds  --  23.5*       Lab Results   Component Value Date    IRON 72 12/02/2019    TIBC 416 12/02/2019    FERRITIN 231.70 (H) 12/02/2019       Lab Results   Component Value Date    FOLATE 11.20 11/23/2019       No results found for: OCCULTBLD    Lab Results   Component Value Date    RETICCTPCT 3.13 (H) 12/01/2019       Lab Results   Component Value Date    ZWUYKCHR54 461 11/23/2019     No results found for: SPEP, UPEP  LDH   Date Value Ref Range Status   11/23/2019 123 (L) 135 - 214 U/L Final     Lab Results   Component Value Date    JOSE Negative 11/23/2019     Lab Results   Component Value Date    HAPTOGLOBIN 136 11/23/2019     Lab Results   Component Value Date    PTT 23.5 (L) 11/18/2019    INR 1.00 12/08/2019     No results found for:   No results found for: CEA  No components found for: CA-19-9  No results found for: PSA          Assessment/Plan     Assessment:  1. Acute idiopathic thrombocytopenic purpura ITP. : Refractory to steroids: She was diagnosed on 11/18/19  by mouth.  S/p bone marrow biopsy 11/18/19 - hypocellular for age bone marrow (50%),  adequate numbers of unremarkable megakaryocytes,  absent iron stores,  negative for involvement by malignant  lymphoma.  Flow cytometry-CD 56 expression on granulocytes and monocytes.  . Normal karyotype 46XX.  HIV testing negative. TSH normal.   She did have a transient response to IVIG.  Received 4 weekly doses of rituximab starting 11/29/2019 to 12/20/2019.  She did not respond to rituximab right away and still awaiting response.  Started on Nplate 12/27/2019 at 1 mcg/kg.  Dose increased to 3 mcg/kg as of 1/10/2020  2. Anemia: History of HAILE:  Bone marrow biopsy on 11/18/19 showed absent iron stores. Anemia work-up: reticulocytes 1.53, ferritin 15.38, iron 40, iron saturation 9, transferrin 310, TIBC 462,   · Iron deficiency anemia - S/p Venofer 400 mg IV x 1 dose on 11/23/19  3. Left renal stone: 3 mm kidney stone visualized on CT A/P on 10/7/19.  Asymptomatic.  4. Anxiety/weight loss:  management per Primary team   5. Menstrual bleeding: LMPD 11/23/19 -On Megace  to suppress menstrual cycle . Also on Lysteda (tranexamic acid)     PLAN  1. Continue Nplate while we await response to rituximab.  We will continue to titrate the dose gradually up to a total of 10 mcg/kg.  2. Considering re-challenging with IVIG for acutely low platelets  3. Rituximab can have very delayed response in some cases up to 3 months.  4. Recommend against any platelet transfusions as patient has tolerated very low platelets without any bleeding symptoms even at 2000.  5. Continue Megace  6. Continue Lysteda, tranexamic acid  7. Continue Protonix  8. Consider splenectomy if patient fails rituximab and Nplate.   9. Continue twice weekly CBCs   10. Will also order antiphospholipid antibodies,  11. Check IgA level to rule out IgA deficiency.  12. Check serum copper level  13. Will send a referral to hematology at the [facility] for second opinion.                  There are no diagnoses linked to this encounter.  No orders of the defined types were placed in this encounter.                    Thank you very much for providing the  opportunity to participate in this patient’s care. Please do not hesitate to call if there are any other questions.    I have reviewed all relevant labs results,outside reports, imaging,,notes, vitals, medications and plan with the patient today.    Portions of the note have been Scribed by medical assistant/Nurse.  I have reviewed and made changes accordingly.

## 2020-01-15 ENCOUNTER — OFFICE VISIT (OUTPATIENT)
Dept: ONCOLOGY | Facility: CLINIC | Age: 35
End: 2020-01-15

## 2020-01-15 VITALS
SYSTOLIC BLOOD PRESSURE: 124 MMHG | BODY MASS INDEX: 23.45 KG/M2 | HEART RATE: 80 BPM | HEIGHT: 62 IN | DIASTOLIC BLOOD PRESSURE: 81 MMHG | WEIGHT: 127.4 LBS | RESPIRATION RATE: 18 BRPM | TEMPERATURE: 98 F

## 2020-01-15 DIAGNOSIS — D69.3 IMMUNE THROMBOCYTOPENIC PURPURA (HCC): Primary | ICD-10-CM

## 2020-01-15 DIAGNOSIS — D50.9 IRON DEFICIENCY ANEMIA, UNSPECIFIED IRON DEFICIENCY ANEMIA TYPE: ICD-10-CM

## 2020-01-15 DIAGNOSIS — D69.6 THROMBOCYTOPENIA (HCC): Chronic | ICD-10-CM

## 2020-01-15 LAB — IGA1 MFR SER: 81 MG/DL (ref 70–400)

## 2020-01-15 PROCEDURE — 82784 ASSAY IGA/IGD/IGG/IGM EACH: CPT | Performed by: INTERNAL MEDICINE

## 2020-01-15 PROCEDURE — 36415 COLL VENOUS BLD VENIPUNCTURE: CPT | Performed by: INTERNAL MEDICINE

## 2020-01-15 PROCEDURE — 99215 OFFICE O/P EST HI 40 MIN: CPT | Performed by: INTERNAL MEDICINE

## 2020-01-15 RX ORDER — MEGESTROL ACETATE 20 MG/1
40 TABLET ORAL 2 TIMES DAILY
COMMUNITY
End: 2020-01-16 | Stop reason: SDUPTHER

## 2020-01-15 RX ORDER — PANTOPRAZOLE SODIUM 20 MG/1
TABLET, DELAYED RELEASE ORAL
COMMUNITY
Start: 2020-01-05 | End: 2020-11-24

## 2020-01-16 ENCOUNTER — HOSPITAL ENCOUNTER (OUTPATIENT)
Dept: ONCOLOGY | Facility: HOSPITAL | Age: 35
Setting detail: INFUSION SERIES
Discharge: HOME | End: 2020-01-16

## 2020-01-16 VITALS
BODY MASS INDEX: 22.87 KG/M2 | RESPIRATION RATE: 20 BRPM | HEART RATE: 80 BPM | WEIGHT: 124.3 LBS | TEMPERATURE: 98.1 F | DIASTOLIC BLOOD PRESSURE: 63 MMHG | SYSTOLIC BLOOD PRESSURE: 98 MMHG | HEIGHT: 62 IN

## 2020-01-16 DIAGNOSIS — D69.6 THROMBOCYTOPENIA (HCC): Primary | ICD-10-CM

## 2020-01-16 LAB
BASOPHILS # BLD AUTO: 0.06 10*3/MM3 (ref 0–0.2)
BASOPHILS NFR BLD AUTO: 1.2 % (ref 0–1.5)
CARDIOLIPIN IGG SER IA-ACNC: <9 GPL U/ML (ref 0–14)
CARDIOLIPIN IGM SER IA-ACNC: <9 MPL U/ML (ref 0–12)
DEPRECATED RDW RBC AUTO: 47.1 FL (ref 37–54)
EOSINOPHIL # BLD AUTO: 0.14 10*3/MM3 (ref 0–0.4)
EOSINOPHIL NFR BLD AUTO: 2.7 % (ref 0.3–6.2)
ERYTHROCYTE [DISTWIDTH] IN BLOOD BY AUTOMATED COUNT: 14.7 % (ref 12.3–15.4)
HCT VFR BLD AUTO: 37.7 % (ref 34–46.6)
HGB BLD-MCNC: 13 G/DL (ref 12–15.9)
LYMPHOCYTES # BLD AUTO: 2.11 10*3/MM3 (ref 0.7–3.1)
LYMPHOCYTES NFR BLD AUTO: 41.1 % (ref 19.6–45.3)
MCH RBC QN AUTO: 31.1 PG (ref 26.6–33)
MCHC RBC AUTO-ENTMCNC: 34.5 G/DL (ref 31.5–35.7)
MCV RBC AUTO: 90.2 FL (ref 79–97)
MONOCYTES # BLD AUTO: 0.52 10*3/MM3 (ref 0.1–0.9)
MONOCYTES NFR BLD AUTO: 10.1 % (ref 5–12)
NEUTROPHILS # BLD AUTO: 2.3 10*3/MM3 (ref 1.7–7)
NEUTROPHILS NFR BLD AUTO: 44.9 % (ref 42.7–76)
PLATELET # BLD AUTO: 4 10*3/MM3 (ref 140–450)
RBC # BLD AUTO: 4.18 10*6/MM3 (ref 3.77–5.28)
WBC NRBC COR # BLD: 5.13 10*3/MM3 (ref 3.4–10.8)

## 2020-01-16 PROCEDURE — 63710000001 DIPHENHYDRAMINE PER 50 MG: Performed by: INTERNAL MEDICINE

## 2020-01-16 PROCEDURE — 85025 COMPLETE CBC W/AUTO DIFF WBC: CPT | Performed by: NURSE PRACTITIONER

## 2020-01-16 PROCEDURE — 25010000002 IMMUNE GLOBULIN (HUMAN) 10 GM/100ML SOLUTION: Performed by: NURSE PRACTITIONER

## 2020-01-16 PROCEDURE — 96365 THER/PROPH/DIAG IV INF INIT: CPT | Performed by: INTERNAL MEDICINE

## 2020-01-16 PROCEDURE — 96366 THER/PROPH/DIAG IV INF ADDON: CPT | Performed by: INTERNAL MEDICINE

## 2020-01-16 PROCEDURE — 25010000002 IMMUNE GLOBULIN (HUMAN) 20 GM/200ML SOLUTION: Performed by: INTERNAL MEDICINE

## 2020-01-16 PROCEDURE — 25010000002 IMMUNE GLOBULIN (HUMAN) 5 GM/50ML SOLUTION: Performed by: NURSE PRACTITIONER

## 2020-01-16 PROCEDURE — 36415 COLL VENOUS BLD VENIPUNCTURE: CPT | Performed by: INTERNAL MEDICINE

## 2020-01-16 RX ORDER — DIPHENHYDRAMINE HCL 25 MG
25 CAPSULE ORAL ONCE
Status: COMPLETED | OUTPATIENT
Start: 2020-01-16 | End: 2020-01-16

## 2020-01-16 RX ORDER — ACETAMINOPHEN 325 MG/1
650 TABLET ORAL ONCE
Status: COMPLETED | OUTPATIENT
Start: 2020-01-16 | End: 2020-01-16

## 2020-01-16 RX ADMIN — IMMUNE GLOBULIN (HUMAN) 10 G: 10 INJECTION INTRAVENOUS; SUBCUTANEOUS at 13:35

## 2020-01-16 RX ADMIN — DIPHENHYDRAMINE HYDROCHLORIDE 25 MG: 25 CAPSULE ORAL at 10:39

## 2020-01-16 RX ADMIN — IMMUNE GLOBULIN (HUMAN) 20 G: 10 INJECTION INTRAVENOUS; SUBCUTANEOUS at 12:38

## 2020-01-16 RX ADMIN — IMMUNE GLOBULIN (HUMAN) 20 G: 10 INJECTION INTRAVENOUS; SUBCUTANEOUS at 10:52

## 2020-01-16 RX ADMIN — IMMUNE GLOBULIN (HUMAN) 5 G: 10 INJECTION INTRAVENOUS; SUBCUTANEOUS at 14:03

## 2020-01-16 RX ADMIN — ACETAMINOPHEN 650 MG: 325 TABLET ORAL at 10:39

## 2020-01-16 NOTE — PROGRESS NOTES
Patient is to get IVIG today and tomorrow as well as dose increase on NPlate for tomorrow per Dr Llanes.  Orders were entered and adjusted by Lakshmi RUSSELL.

## 2020-01-17 ENCOUNTER — HOSPITAL ENCOUNTER (OUTPATIENT)
Dept: ONCOLOGY | Facility: HOSPITAL | Age: 35
Setting detail: INFUSION SERIES
Discharge: HOME | End: 2020-01-17

## 2020-01-17 VITALS
SYSTOLIC BLOOD PRESSURE: 103 MMHG | DIASTOLIC BLOOD PRESSURE: 64 MMHG | WEIGHT: 124 LBS | BODY MASS INDEX: 22.82 KG/M2 | HEIGHT: 62 IN | HEART RATE: 80 BPM | TEMPERATURE: 98.9 F | RESPIRATION RATE: 18 BRPM

## 2020-01-17 DIAGNOSIS — D69.6 THROMBOCYTOPENIA (HCC): Primary | ICD-10-CM

## 2020-01-17 LAB
B2 GLYCOPROT1 IGA SER-ACNC: <9 GPI IGA UNITS (ref 0–25)
B2 GLYCOPROT1 IGG SER-ACNC: <9 GPI IGG UNITS (ref 0–20)
B2 GLYCOPROT1 IGM SER-ACNC: <9 GPI IGM UNITS (ref 0–32)
BASOPHILS # BLD AUTO: 0.07 10*3/MM3 (ref 0–0.2)
BASOPHILS NFR BLD AUTO: 1.6 % (ref 0–1.5)
COPPER SERPL-MCNC: 114 UG/DL (ref 72–166)
DEPRECATED RDW RBC AUTO: 45.9 FL (ref 37–54)
EOSINOPHIL # BLD AUTO: 0.17 10*3/MM3 (ref 0–0.4)
EOSINOPHIL NFR BLD AUTO: 4 % (ref 0.3–6.2)
ERYTHROCYTE [DISTWIDTH] IN BLOOD BY AUTOMATED COUNT: 14.4 % (ref 12.3–15.4)
HCT VFR BLD AUTO: 33.4 % (ref 34–46.6)
HGB BLD-MCNC: 11.5 G/DL (ref 12–15.9)
LYMPHOCYTES # BLD AUTO: 1.33 10*3/MM3 (ref 0.7–3.1)
LYMPHOCYTES NFR BLD AUTO: 31.1 % (ref 19.6–45.3)
MCH RBC QN AUTO: 31.3 PG (ref 26.6–33)
MCHC RBC AUTO-ENTMCNC: 34.4 G/DL (ref 31.5–35.7)
MCV RBC AUTO: 90.8 FL (ref 79–97)
MONOCYTES # BLD AUTO: 0.54 10*3/MM3 (ref 0.1–0.9)
MONOCYTES NFR BLD AUTO: 12.6 % (ref 5–12)
NEUTROPHILS # BLD AUTO: 2.16 10*3/MM3 (ref 1.7–7)
NEUTROPHILS NFR BLD AUTO: 50.7 % (ref 42.7–76)
PLATELET # BLD AUTO: 36 10*3/MM3 (ref 140–450)
PMV BLD AUTO: 12.2 FL (ref 6–12)
RBC # BLD AUTO: 3.68 10*6/MM3 (ref 3.77–5.28)
WBC NRBC COR # BLD: 4.27 10*3/MM3 (ref 3.4–10.8)

## 2020-01-17 PROCEDURE — 25010000002 IMMUNE GLOBULIN (HUMAN) 20 GM/200ML SOLUTION: Performed by: NURSE PRACTITIONER

## 2020-01-17 PROCEDURE — 25010000002 IMMUNE GLOBULIN (HUMAN) 10 GM/100ML SOLUTION: Performed by: NURSE PRACTITIONER

## 2020-01-17 PROCEDURE — 25010000002 IMMUNE GLOBULIN (HUMAN) 5 GM/50ML SOLUTION: Performed by: NURSE PRACTITIONER

## 2020-01-17 PROCEDURE — 63710000001 DIPHENHYDRAMINE PER 50 MG: Performed by: INTERNAL MEDICINE

## 2020-01-17 PROCEDURE — 96372 THER/PROPH/DIAG INJ SC/IM: CPT | Performed by: INTERNAL MEDICINE

## 2020-01-17 PROCEDURE — 96365 THER/PROPH/DIAG IV INF INIT: CPT | Performed by: INTERNAL MEDICINE

## 2020-01-17 PROCEDURE — 85025 COMPLETE CBC W/AUTO DIFF WBC: CPT | Performed by: INTERNAL MEDICINE

## 2020-01-17 PROCEDURE — 96366 THER/PROPH/DIAG IV INF ADDON: CPT | Performed by: INTERNAL MEDICINE

## 2020-01-17 PROCEDURE — 96375 TX/PRO/DX INJ NEW DRUG ADDON: CPT | Performed by: INTERNAL MEDICINE

## 2020-01-17 RX ORDER — ACETAMINOPHEN 325 MG/1
650 TABLET ORAL ONCE
Status: COMPLETED | OUTPATIENT
Start: 2020-01-17 | End: 2020-01-17

## 2020-01-17 RX ORDER — DIPHENHYDRAMINE HCL 25 MG
25 CAPSULE ORAL ONCE
Status: COMPLETED | OUTPATIENT
Start: 2020-01-17 | End: 2020-01-17

## 2020-01-17 RX ADMIN — IMMUNE GLOBULIN (HUMAN) 10 G: 10 INJECTION INTRAVENOUS; SUBCUTANEOUS at 13:44

## 2020-01-17 RX ADMIN — IMMUNE GLOBULIN (HUMAN) 10 G: 10 INJECTION INTRAVENOUS; SUBCUTANEOUS at 13:14

## 2020-01-17 RX ADMIN — IMMUNE GLOBULIN (HUMAN) 5 G: 10 INJECTION INTRAVENOUS; SUBCUTANEOUS at 14:20

## 2020-01-17 RX ADMIN — DIPHENHYDRAMINE HYDROCHLORIDE 25 MG: 25 CAPSULE ORAL at 10:34

## 2020-01-17 RX ADMIN — IMMUNE GLOBULIN (HUMAN) 5 G: 10 INJECTION INTRAVENOUS; SUBCUTANEOUS at 14:41

## 2020-01-17 RX ADMIN — ACETAMINOPHEN 650 MG: 325 TABLET ORAL at 10:32

## 2020-01-17 RX ADMIN — IMMUNE GLOBULIN (HUMAN) 5 G: 10 INJECTION INTRAVENOUS; SUBCUTANEOUS at 14:56

## 2020-01-17 RX ADMIN — IMMUNE GLOBULIN (HUMAN) 20 G: 10 INJECTION INTRAVENOUS; SUBCUTANEOUS at 11:05

## 2020-01-20 ENCOUNTER — HOSPITAL ENCOUNTER (OUTPATIENT)
Dept: ONCOLOGY | Facility: HOSPITAL | Age: 35
Setting detail: INFUSION SERIES
Discharge: HOME | End: 2020-01-20

## 2020-01-20 ENCOUNTER — APPOINTMENT (OUTPATIENT)
Dept: ONCOLOGY | Facility: HOSPITAL | Age: 35
End: 2020-01-20

## 2020-01-20 VITALS
BODY MASS INDEX: 22.68 KG/M2 | RESPIRATION RATE: 18 BRPM | HEART RATE: 98 BPM | HEIGHT: 62 IN | DIASTOLIC BLOOD PRESSURE: 84 MMHG | SYSTOLIC BLOOD PRESSURE: 132 MMHG | TEMPERATURE: 98.4 F

## 2020-01-20 DIAGNOSIS — D69.6 THROMBOCYTOPENIA (HCC): Primary | ICD-10-CM

## 2020-01-20 LAB
BASOPHILS # BLD AUTO: 0.03 10*3/MM3 (ref 0–0.2)
BASOPHILS NFR BLD AUTO: 0.6 % (ref 0–1.5)
DEPRECATED RDW RBC AUTO: 43.7 FL (ref 37–54)
EOSINOPHIL # BLD AUTO: 0.05 10*3/MM3 (ref 0–0.4)
EOSINOPHIL NFR BLD AUTO: 1 % (ref 0.3–6.2)
ERYTHROCYTE [DISTWIDTH] IN BLOOD BY AUTOMATED COUNT: 13.6 % (ref 12.3–15.4)
HCT VFR BLD AUTO: 34.4 % (ref 34–46.6)
HGB BLD-MCNC: 11.9 G/DL (ref 12–15.9)
LYMPHOCYTES # BLD AUTO: 1.32 10*3/MM3 (ref 0.7–3.1)
LYMPHOCYTES NFR BLD AUTO: 25.8 % (ref 19.6–45.3)
MCH RBC QN AUTO: 31.1 PG (ref 26.6–33)
MCHC RBC AUTO-ENTMCNC: 34.6 G/DL (ref 31.5–35.7)
MCV RBC AUTO: 89.8 FL (ref 79–97)
MONOCYTES # BLD AUTO: 0.72 10*3/MM3 (ref 0.1–0.9)
MONOCYTES NFR BLD AUTO: 14.1 % (ref 5–12)
NEUTROPHILS # BLD AUTO: 2.99 10*3/MM3 (ref 1.7–7)
NEUTROPHILS NFR BLD AUTO: 58.5 % (ref 42.7–76)
PLATELET # BLD AUTO: 44 10*3/MM3 (ref 140–450)
PMV BLD AUTO: 12.1 FL (ref 6–12)
RBC # BLD AUTO: 3.83 10*6/MM3 (ref 3.77–5.28)
WBC NRBC COR # BLD: 5.11 10*3/MM3 (ref 3.4–10.8)

## 2020-01-20 PROCEDURE — 25010000002 ROMIPLOSTIM PER 10 MCG: Performed by: NURSE PRACTITIONER

## 2020-01-20 PROCEDURE — 85025 COMPLETE CBC W/AUTO DIFF WBC: CPT | Performed by: NURSE PRACTITIONER

## 2020-01-20 PROCEDURE — 36415 COLL VENOUS BLD VENIPUNCTURE: CPT | Performed by: INTERNAL MEDICINE

## 2020-01-20 PROCEDURE — 96372 THER/PROPH/DIAG INJ SC/IM: CPT | Performed by: INTERNAL MEDICINE

## 2020-01-20 RX ADMIN — ROMIPLOSTIM 320 MCG: 500 INJECTION, POWDER, LYOPHILIZED, FOR SOLUTION SUBCUTANEOUS at 14:23

## 2020-01-20 NOTE — PROGRESS NOTES
Pt here for N plate,  plts 40.  Pt given NPlate 6 mcg/kg as approved.   Pt had been seen by PCP this am for resp virus.  Pt does not want to receive Pneumovax injection today and requests to reschedule injection for 1/29/2020.

## 2020-01-21 ENCOUNTER — TELEPHONE (OUTPATIENT)
Dept: ONCOLOGY | Facility: HOSPITAL | Age: 35
End: 2020-01-21

## 2020-01-21 LAB
PS IGA SER-ACNC: 1 APS IGA (ref 0–20)
PS IGG SER-ACNC: 3 GPS IGG (ref 0–11)
PS IGM SER-ACNC: 8 MPS IGM (ref 0–25)

## 2020-01-21 NOTE — TELEPHONE ENCOUNTER
Pt received N plate yesterday for plt results of 40.   Pt stated she had been receiving twice weekly CBC,   Reviewed with Dr Llanes and he request pt to have CBC completed on Friday, Jan 24, 2020.  Pt scheduled and called and informed pt of appt time.   Pt v/u and agreement.

## 2020-01-24 ENCOUNTER — HOSPITAL ENCOUNTER (OUTPATIENT)
Dept: ONCOLOGY | Facility: HOSPITAL | Age: 35
Discharge: HOME | End: 2020-01-24
Admitting: INTERNAL MEDICINE

## 2020-01-24 ENCOUNTER — APPOINTMENT (OUTPATIENT)
Dept: ONCOLOGY | Facility: HOSPITAL | Age: 35
End: 2020-01-24

## 2020-01-24 ENCOUNTER — LAB (OUTPATIENT)
Dept: LAB | Facility: HOSPITAL | Age: 35
End: 2020-01-24

## 2020-01-24 DIAGNOSIS — D69.6 THROMBOCYTOPENIA (HCC): ICD-10-CM

## 2020-01-24 DIAGNOSIS — D69.6 THROMBOCYTOPENIA (HCC): Primary | ICD-10-CM

## 2020-01-24 LAB
BASOPHILS # BLD AUTO: 0.03 10*3/MM3 (ref 0–0.2)
BASOPHILS NFR BLD AUTO: 0.6 % (ref 0–1.5)
DEPRECATED RDW RBC AUTO: 41.9 FL (ref 37–54)
EOSINOPHIL # BLD AUTO: 0.18 10*3/MM3 (ref 0–0.4)
EOSINOPHIL NFR BLD AUTO: 3.8 % (ref 0.3–6.2)
ERYTHROCYTE [DISTWIDTH] IN BLOOD BY AUTOMATED COUNT: 13.3 % (ref 12.3–15.4)
HCT VFR BLD AUTO: 33.5 % (ref 34–46.6)
HGB BLD-MCNC: 11.7 G/DL (ref 12–15.9)
LYMPHOCYTES # BLD AUTO: 1.66 10*3/MM3 (ref 0.7–3.1)
LYMPHOCYTES NFR BLD AUTO: 35.2 % (ref 19.6–45.3)
MCH RBC QN AUTO: 31.2 PG (ref 26.6–33)
MCHC RBC AUTO-ENTMCNC: 34.9 G/DL (ref 31.5–35.7)
MCV RBC AUTO: 89.3 FL (ref 79–97)
MONOCYTES # BLD AUTO: 0.6 10*3/MM3 (ref 0.1–0.9)
MONOCYTES NFR BLD AUTO: 12.7 % (ref 5–12)
NEUTROPHILS # BLD AUTO: 2.25 10*3/MM3 (ref 1.7–7)
NEUTROPHILS NFR BLD AUTO: 47.7 % (ref 42.7–76)
PLATELET # BLD AUTO: 28 10*3/MM3 (ref 140–450)
PMV BLD AUTO: 13.9 FL (ref 6–12)
RBC # BLD AUTO: 3.75 10*6/MM3 (ref 3.77–5.28)
WBC NRBC COR # BLD: 4.72 10*3/MM3 (ref 3.4–10.8)

## 2020-01-24 PROCEDURE — 85025 COMPLETE CBC W/AUTO DIFF WBC: CPT

## 2020-01-24 NOTE — PROGRESS NOTES
Patient sent to RN review for nursing assessment for Plts of 28 no c/o of bleeding patient stated she had IVIG on Thursday and Friday of last week 1/16/20, 1/17/20 she come back on Monday 1/27/20 for Nplate Spoke with Talisha RUSSELL and no Plts needed at this time.

## 2020-01-27 ENCOUNTER — HOSPITAL ENCOUNTER (OUTPATIENT)
Dept: ONCOLOGY | Facility: HOSPITAL | Age: 35
Setting detail: INFUSION SERIES
Discharge: HOME | End: 2020-01-27

## 2020-01-27 VITALS
DIASTOLIC BLOOD PRESSURE: 76 MMHG | HEART RATE: 67 BPM | BODY MASS INDEX: 23.55 KG/M2 | WEIGHT: 128 LBS | RESPIRATION RATE: 18 BRPM | TEMPERATURE: 98.5 F | HEIGHT: 62 IN | SYSTOLIC BLOOD PRESSURE: 114 MMHG

## 2020-01-27 DIAGNOSIS — D69.6 THROMBOCYTOPENIA (HCC): Primary | ICD-10-CM

## 2020-01-27 LAB
BASOPHILS # BLD AUTO: 0.04 10*3/MM3 (ref 0–0.2)
BASOPHILS NFR BLD AUTO: 0.9 % (ref 0–1.5)
DEPRECATED RDW RBC AUTO: 43.5 FL (ref 37–54)
EOSINOPHIL # BLD AUTO: 0.23 10*3/MM3 (ref 0–0.4)
EOSINOPHIL NFR BLD AUTO: 5.4 % (ref 0.3–6.2)
ERYTHROCYTE [DISTWIDTH] IN BLOOD BY AUTOMATED COUNT: 13.5 % (ref 12.3–15.4)
HCT VFR BLD AUTO: 34.9 % (ref 34–46.6)
HGB BLD-MCNC: 11.7 G/DL (ref 12–15.9)
LYMPHOCYTES # BLD AUTO: 1.38 10*3/MM3 (ref 0.7–3.1)
LYMPHOCYTES NFR BLD AUTO: 32.5 % (ref 19.6–45.3)
MCH RBC QN AUTO: 30.8 PG (ref 26.6–33)
MCHC RBC AUTO-ENTMCNC: 33.5 G/DL (ref 31.5–35.7)
MCV RBC AUTO: 91.8 FL (ref 79–97)
MONOCYTES # BLD AUTO: 0.48 10*3/MM3 (ref 0.1–0.9)
MONOCYTES NFR BLD AUTO: 11.3 % (ref 5–12)
NEUTROPHILS # BLD AUTO: 2.12 10*3/MM3 (ref 1.7–7)
NEUTROPHILS NFR BLD AUTO: 49.9 % (ref 42.7–76)
PLATELET # BLD AUTO: 100 10*3/MM3 (ref 140–450)
PMV BLD AUTO: 11.1 FL (ref 6–12)
RBC # BLD AUTO: 3.8 10*6/MM3 (ref 3.77–5.28)
WBC NRBC COR # BLD: 4.25 10*3/MM3 (ref 3.4–10.8)

## 2020-01-27 PROCEDURE — 36415 COLL VENOUS BLD VENIPUNCTURE: CPT | Performed by: INTERNAL MEDICINE

## 2020-01-27 PROCEDURE — 85025 COMPLETE CBC W/AUTO DIFF WBC: CPT | Performed by: INTERNAL MEDICINE

## 2020-01-27 PROCEDURE — 96372 THER/PROPH/DIAG INJ SC/IM: CPT | Performed by: INTERNAL MEDICINE

## 2020-01-27 PROCEDURE — 25010000002 ROMIPLOSTIM PER 10 MCG: Performed by: INTERNAL MEDICINE

## 2020-01-27 RX ADMIN — ROMIPLOSTIM 320 MCG: 500 INJECTION, POWDER, LYOPHILIZED, FOR SOLUTION SUBCUTANEOUS at 09:56

## 2020-01-28 NOTE — PROGRESS NOTES
HEMATOLOGY ONCOLOGY FOLLOW UP        Patient name: Tiny Akhtar  : 1985  MRN: [id number]  Primary Care Physician: Jose Ingram, APRN  Referring Physician: Jose Ingram, *  Reason For Consult:     Chief Complaint   Patient presents with   • Follow-up     Acute idiopathic thrombocytopenic purpura ITP       History of Present Illness:  Tiny Akhtar is a 34 y.o. female with a past medical history of thrombocytopenia, anemia, and kidney stones.  She presented to [facility] Emergency Department on 2019 after she had blood work drawn earlier in the day that showed a platelet count of 9,000 at this facility.  She had previously been admitted through the ED on 2019 reporting bruising on her ankles and in her mouth.  She received 2 units of platelets and started on steroids. She was discharged on 19.The patient's last platelet count was 17,000 on 19 after a platelet transfusion.  She had been on steroids for four days.  The patient reported continued  subcutaneous bruising. She denied any headaches, nosebleeds, or gingival bleeding.  She was admitted for IV steroids and further management.     · 10/7/2019 CT abdomen and pelvis: 3 mm stone at the left ureterovesical junction causing mild to moderate obstruction.Spleen reported as normal size.  · 10/7/2019 WBC 10.2, hemoglobin 12.2, platelets 268    · 2019 WBC 5.7, hemoglobin 12.8, Platelets 5000,  Transfused 2 units platelets.  initial presentation for diagnosis acute thrombocytopenia,. Started on Solumedrol 125 mg IV Q6Hr  · 2019 platelets 5000.  Patient transfused with platelets.  Platelets increased to 13,000  · 19  Bone marrow biopsy - Smears of bone marrow aspirate with cell block (clot) preparation and marrow biopsy:  Hypocellular for age bone marrow (50%),  Adequate numbers of unremarkable megakaryocytes,  Absent iron stores,  Negative for involvement by malignant  lymphoma.  Flow cytometry-CD 56 expression on granulocytes and monocytes.  Chromosomal analysis -46XX  · 11/20/2019 platelet count 17,000 . Switched to Prednisone 1 mg/kg ( 50 mg daily)  · 11/21/2019 platelets 17,000  · 11/22/2019  Readmitted . Restarted Solumedrol  mg Q6H . platelets 9000  · 11/23/19 platelets 13,000  · 11/23/2019 hematology/Oncology was consulted on this patient known to our service and previously consulted for acute thrombocytopenia on 11/18/19. She was followed by Uri Sagastume MD for ITP. The patient had been transfused and started on IV steroids Prednisone while inpatient 11/18/19 to 11/20/19.  She was transitioned to oral steroids at discharge.     · 11/23/2019 Hepatitis panel negative, TSH 0.33, H. pylori IgG 1.43 High, IgM negative, JOSE negative, HIV antibodies negative  · 11/23/2019 patient received IV Venofer.  Patient received meningococcal vaccine, Haemophilus vaccine, pneumococcus vaccine  · 11/23/2019 platelets 13,000  · 11/24/2019 platelets 52,000.  Platelets responded to steroids. On IV solumedrol. Started on Megace 40 mg po QID for menorrhagia.  · Pt discharged on Prednisone 50 mg  · 11/27/2019 platelets 8000.  Patient's failed steroids ( on prednisone 50 mg daily).. Started Tranexamic acid 1300 mg TID  · 11/29/2019  READMITTED platelets 12,000  · 11/29/19-Cycle 1 Day one of RITUXIMAB and continues on megestrol  · 11/30/2019 WBC 11.2, hemoglobin 12.1, platelets 6000  · 12/1/2019 platelets 11,000.  Patient received IVIG 30 g  · 12/2/2019 iron 72, iron saturation 17% low, TIBC 416, ferritin 231, platelets 56,000  · 12/3/2019 platelets 129,000  · 12/4/2019 Prednisone decreased to 40 mg daily  · 12/6/2019 patient received week 2 of Rituximab.  Platelets 24,000, hemoglobin 11.8  · 12/8/2019 platelets 5000, hemoglobin 11.6, PT 10.5, INR 1.  Patient received IVIG 50 g  · 12/9/2019 platelets 11,000  · 12/11/2019 platelets 16,000  · 12/13/2019: Platelets 13,000 Patient  received week 3 of her Rituximab. Prednisone decreased to 20 mg daily  · 12/14/2019 patient received IVIG 55 g, platelets 12,000.   · 12/16/2019 platelets 24,000. Prednisone decreased to 10 mg daily  · 12/18/2019 platelets 12,000  · 12/19/2019 platelets 11,000  · 12/20/19-week 4 Rituximab and Megestrol, platelets 12,000. Prednisone DCed.  · 12/26/2019 platelets 7000  · 12/27/19-patient was started on NPLATE   1 mcg/kg, platelets 8000  · 12/30/2019 platelets 6000.  After transfusion 31,000  · 01/03/2020- NPLATE 2 mcg/kg, platelets 10,000  · 01/10/2020-NPLATE 3 mcg/kg.  continues on Megace megestrol, platelets 4000  · 1/15/2020: WBC 5.9, hemoglobin 12.5, platelets 2000k,   · 1/15/2020: Serum copper 114 normal, IgA 81 normal,Anticardiolipin antibodies negative, beta-2 glycoprotein antibodies negative, phosphatidylserine antibodies negative,  · 1/16/2020 patient received IVIG 20 g, platelets 4000  · 1/17/2020 patient received IVIG 20 g, platelets 36,000  · 01/17/2020: IVIG WBC 4.27, hemoglobin, 11.5, platelets  [id number]  · 1/20/2020 patient received Nplate 6 mcg/kg, WBC 5.1, hemoglobin 11.9, platelets 44,  · 1/27/2020 patient received Nplate 6 mcg/kg platelets 100,000  · 1/29/2020 platelets 74,000          Subjective:01/29/2020  Patient is here for a follow up of ITP. She is undergoing IV IG therapy with her last treatment on 01/17/2020. She states she has some fatigue, but denies any other symptoms. Patient has not experienced any bleeding.  She is having very slight menstrual bleeding. She continues to take Megace, Lysteda, Protonix, and tranexamic acid. She currently has a standing order for CBC's twice a week. She is scheduled for an appointment tomorrow at [facility] for a second opinion.   Platelets improved after recent IVIG.  She is doing well.    The following portions of the patient's history were reviewed and updated as appropriate: allergies, current medications, past family history, past medical history, past social  history, past surgical history and problem list.         Past Medical History:   Diagnosis Date   • Anxiety    • Iron deficiency anemia    • Kidney stone 10/2019       No past surgical history on file.      Current Outpatient Medications:   •  acetaminophen (TYLENOL) 325 MG tablet, Take 650 mg by mouth Every 6 (Six) Hours As Needed for Mild Pain  or Moderate Pain ., Disp: , Rfl:   •  ferrous sulfate 324 (65 Fe) MG tablet delayed-release EC tablet, Take 1 tablet by mouth Daily With Breakfast., Disp: 30 tablet, Rfl: 0  •  megestrol (MEGACE) 20 MG tablet, Take 20 mg by mouth 3 (Three) Times a Day., Disp: , Rfl:   •  NON FORMULARY, Take 1 tablet by mouth Daily. Balance OTC, Disp: , Rfl:   •  pantoprazole (PROTONIX) 20 MG EC tablet, , Disp: , Rfl:   •  polyethylene glycol (MIRALAX) powder, Take 17 g by mouth Daily As Needed (constipation)., Disp: 225 g, Rfl: 0  •  Tranexamic Acid 650 MG tablet, Take 2 tablets by mouth 3 (Three) Times a Day., Disp: 30 tablet, Rfl: 0    No Known Allergies    Family History   Problem Relation Age of Onset   • Thrombocytopenia Nephew         ITP   • Diabetes Mother    • Diabetes Father    • Cancer Paternal Grandmother        Cancer-related family history includes Cancer in her paternal grandmother.    Social History     Tobacco Use   • Smoking status: Never Smoker   • Smokeless tobacco: Never Used   Substance Use Topics   • Alcohol use: No     Frequency: Never   • Drug use: No         I have reviewed the history of present illness, past medical history, family history, social history, lab results, all notes and other records since the patient was last seen on  11/21/2019.    ROS:     Review of Systems   Constitutional: Positive for fatigue. Negative for chills and fever.   HENT: Negative for ear pain, mouth sores, nosebleeds and sore throat.    Eyes: Negative for photophobia and visual disturbance.   Respiratory: Negative for wheezing and stridor.    Cardiovascular: Negative for chest pain  "and palpitations.   Gastrointestinal: Negative for abdominal pain, diarrhea, nausea and vomiting.   Endocrine: Negative for cold intolerance and heat intolerance.   Genitourinary: Negative for dysuria and hematuria.   Musculoskeletal: Negative for joint swelling and neck stiffness.   Skin: Negative for color change and rash.   Neurological: Negative for seizures and syncope.   Hematological: Negative for adenopathy.        No obvious bleeding   Psychiatric/Behavioral: Negative for agitation, confusion and hallucinations.       Objective:    Vitals:    01/29/20 1124   BP: 114/77   Pulse: 69   Resp: 16   Temp: 98.3 °F (36.8 °C)   Weight: 58.4 kg (128 lb 12.8 oz)   Height: 157.5 cm (62\")   PainSc: 0-No pain     ECOG  (0) Fully active, able to carry on all predisease performance without restriction    Physical Exam:   Physical Exam   Constitutional: She is oriented to person, place, and time. She appears well-developed and well-nourished.   HENT:   Head: Normocephalic and atraumatic.   Nose: Nose normal.   Mouth/Throat: Oropharynx is clear and moist. No oropharyngeal exudate.   Eyes: Conjunctivae and EOM are normal. No scleral icterus.   Neck: Normal range of motion. No tracheal deviation present. No thyromegaly present.   Cardiovascular: Regular rhythm and normal heart sounds.   Pulmonary/Chest: No stridor.   Abdominal: Soft. Bowel sounds are normal. She exhibits no distension and no mass. There is no tenderness.   Musculoskeletal: Normal range of motion. She exhibits no edema or deformity.   Lymphadenopathy:     She has no cervical adenopathy.   Neurological: She is alert and oriented to person, place, and time. No sensory deficit.   Skin: Skin is warm.   Psychiatric: Her behavior is normal.   Vitals reviewed.            Lab Results - Last 18 Months   Lab Units 01/29/20  1054 01/27/20  0918 01/24/20  0941   WBC 10*3/mm3 5.05 4.25 4.72   HEMOGLOBIN g/dL 12.8 11.7* 11.7*   HEMATOCRIT % 37.6 34.9 33.5*   PLATELETS " 10*3/mm3 74* 100* 28*   MCV fL 90.6 91.8 89.3     Lab Results - Last 18 Months   Lab Units 12/08/19 2022 12/02/19  0354 12/01/19  0320  11/29/19  1217  11/22/19  2248 11/18/19  0918   SODIUM mmol/L 137 137 138   < > 136   < > 139 140   POTASSIUM mmol/L 3.6 4.1 4.2   < > 4.2   < > 3.5 3.8   CHLORIDE mmol/L 100 101 100   < > 98   < > 103 105   CO2 mmol/L 26.0 27.0 23.0   < > 23.0   < > 23.0 26.0   BUN mg/dL 17 16 15   < > 17   < > 17 16   CREATININE mg/dL 0.55* 0.55* 0.65   < > 0.68   < > 0.54* 0.60   CALCIUM mg/dL 9.2 9.3 9.3   < > 9.5   < > 9.1 9.5   BILIRUBIN mg/dL  --   --   --   --  0.3  --  0.3 0.3   ALK PHOS U/L  --   --   --   --  36*  --  36* 36*   ALT (SGPT) U/L  --   --   --   --  31  --  38* 13   AST (SGOT) U/L  --   --   --   --  16  --  16 17   GLUCOSE mg/dL 103* 107* 171*   < > 93   < > 153* 102*    < > = values in this interval not displayed.       Lab Results   Component Value Date    GLUCOSE 103 (H) 12/08/2019    BUN 17 12/08/2019    CREATININE 0.55 (L) 12/08/2019    EGFRIFNONA 128 12/08/2019    BCR 30.9 (H) 12/08/2019    K 3.6 12/08/2019    CO2 26.0 12/08/2019    CALCIUM 9.2 12/08/2019    PROTENTOTREF 5.9 (L) 11/23/2019    ALBUMIN 4.10 11/29/2019    LABIL2 1.5 11/23/2019    AST 16 11/29/2019    ALT 31 11/29/2019       Lab Results - Last 18 Months   Lab Units 12/08/19 2022 11/18/19  0918   INR  1.00 1.00   APTT seconds  --  23.5*       Lab Results   Component Value Date    IRON 72 12/02/2019    TIBC 416 12/02/2019    FERRITIN 231.70 (H) 12/02/2019       Lab Results   Component Value Date    FOLATE 11.20 11/23/2019       No results found for: OCCULTBLD    Lab Results   Component Value Date    RETICCTPCT 3.13 (H) 12/01/2019       Lab Results   Component Value Date    YVDGGRVG28 461 11/23/2019     No results found for: SPEP, UPEP  LDH   Date Value Ref Range Status   11/23/2019 123 (L) 135 - 214 U/L Final     Lab Results   Component Value Date    JOSE Negative 11/23/2019     Lab Results   Component  Value Date    HAPTOGLOBIN 136 11/23/2019     Lab Results   Component Value Date    PTT 23.5 (L) 11/18/2019    INR 1.00 12/08/2019     No results found for:   No results found for: CEA  No components found for: CA-19-9  No results found for: PSA          Assessment/Plan     Assessment:  1. Acute idiopathic thrombocytopenic purpura ITP. : Refractory to steroids: She was diagnosed on 11/18/19  by mouth.  S/p bone marrow biopsy 11/18/19 - hypocellular for age bone marrow (50%),  adequate numbers of unremarkable megakaryocytes,  absent iron stores,  negative for involvement by malignant lymphoma.  Flow cytometry-CD 56 expression on granulocytes and monocytes.  . Normal karyotype 46XX.  HIV testing negative. TSH normal.   She does have a transient responses to IVIG.  Received 4 weekly doses of rituximab starting 11/29/2019 to 12/20/2019.  She did not respond to rituximab right away and still awaiting response.  Started on Nplate 12/27/2019 at 1 mcg/kg.  Dose increased to 3 mcg/kg as of 1/10/2020 and 6 mcg/kg as of last week.  IVIG was repeated on 1/16/2020 and 1/17/2020 with good response.  2. Anemia: History of HAILE:  Bone marrow biopsy on 11/18/19 showed absent iron stores. Anemia work-up: reticulocytes 1.53, ferritin 15.38, iron 40, iron saturation 9, transferrin 310, TIBC 462, .   3. Iron deficiency anemia - S/p Venofer 400 mg IV x 1 dose on 11/23/19  3. Left renal stone: 3 mm kidney stone visualized on CT A/P on 10/7/19.  Asymptomatic.  4. Anxiety/weight loss:  management per Primary team   5. Hx of Menstrual bleeding: LMPD 11/23/19 -On Megace  to suppress menstrual cycle . Also on Lysteda (tranexamic acid)     PLAN  1. Continue Nplate while we await response to rituximab.  We will continue to titrate the dose gradually up to a total of 10 mcg/kg.  2. She does respond to IVIG and can be used occasionally if patient has very severe drop in platelets.  3. I discussed with the patient about to rechallenge  with steroids, this time with dexamethasone 40 mg daily x4 if her platelets drop again.  Splenectomy would be the next option as well.  4. Rituximab can have very delayed response in some cases up to 3 months.  5. Recommend against any platelet transfusions as patient has tolerated very low platelets without any bleeding symptoms even at 2000.  6. Continue Megace  7. Continue Lysteda, tranexamic acid  8. Continue Protonix  9. Consider splenectomy if patient fails rituximab and Nplate.   10. Continue twice weekly CBCs   11. Will send a referral to hematology at the [facility] for second opinion.                  Immune thrombocytopenic purpura (CMS/[facility])  - CBC Auto Differential    Orders Placed This Encounter   Procedures   • CBC Auto Differential     Twice weekly     Standing Status:   Standing     Number of Occurrences:   12     Standing Expiration Date:   1/29/2021     Scheduling Instructions:      collect LABS TODAY                     Thank you very much for providing the opportunity to participate in this patient’s care. Please do not hesitate to call if there are any other questions.    I have reviewed all relevant labs results,outside reports, imaging,,notes, vitals, medications and plan with the patient today.    Portions of the note have been Scribed by medical assistant/Nurse.  I have reviewed and made changes accordingly.

## 2020-01-29 ENCOUNTER — HOSPITAL ENCOUNTER (OUTPATIENT)
Dept: ONCOLOGY | Facility: HOSPITAL | Age: 35
Discharge: HOME | End: 2020-01-29
Admitting: NURSE PRACTITIONER

## 2020-01-29 ENCOUNTER — OFFICE VISIT (OUTPATIENT)
Dept: ONCOLOGY | Facility: CLINIC | Age: 35
End: 2020-01-29

## 2020-01-29 ENCOUNTER — OFFICE VISIT (OUTPATIENT)
Dept: LAB | Facility: HOSPITAL | Age: 35
End: 2020-01-29

## 2020-01-29 VITALS
RESPIRATION RATE: 16 BRPM | BODY MASS INDEX: 23.7 KG/M2 | TEMPERATURE: 98.3 F | DIASTOLIC BLOOD PRESSURE: 77 MMHG | SYSTOLIC BLOOD PRESSURE: 114 MMHG | HEART RATE: 69 BPM | WEIGHT: 128.8 LBS | HEIGHT: 62 IN

## 2020-01-29 DIAGNOSIS — D69.3 IMMUNE THROMBOCYTOPENIC PURPURA (HCC): ICD-10-CM

## 2020-01-29 DIAGNOSIS — D50.9 IRON DEFICIENCY ANEMIA, UNSPECIFIED IRON DEFICIENCY ANEMIA TYPE: ICD-10-CM

## 2020-01-29 DIAGNOSIS — D69.3 IMMUNE THROMBOCYTOPENIC PURPURA (HCC): Primary | Chronic | ICD-10-CM

## 2020-01-29 LAB
BASOPHILS # BLD AUTO: 0.03 10*3/MM3 (ref 0–0.2)
BASOPHILS NFR BLD AUTO: 0.6 % (ref 0–1.5)
DEPRECATED RDW RBC AUTO: 42.7 FL (ref 37–54)
EOSINOPHIL # BLD AUTO: 0.19 10*3/MM3 (ref 0–0.4)
EOSINOPHIL NFR BLD AUTO: 3.8 % (ref 0.3–6.2)
ERYTHROCYTE [DISTWIDTH] IN BLOOD BY AUTOMATED COUNT: 13 % (ref 12.3–15.4)
HCT VFR BLD AUTO: 37.6 % (ref 34–46.6)
HGB BLD-MCNC: 12.8 G/DL (ref 12–15.9)
LYMPHOCYTES # BLD AUTO: 1.65 10*3/MM3 (ref 0.7–3.1)
LYMPHOCYTES NFR BLD AUTO: 32.7 % (ref 19.6–45.3)
MCH RBC QN AUTO: 30.8 PG (ref 26.6–33)
MCHC RBC AUTO-ENTMCNC: 34 G/DL (ref 31.5–35.7)
MCV RBC AUTO: 90.6 FL (ref 79–97)
MONOCYTES # BLD AUTO: 0.63 10*3/MM3 (ref 0.1–0.9)
MONOCYTES NFR BLD AUTO: 12.5 % (ref 5–12)
NEUTROPHILS # BLD AUTO: 2.55 10*3/MM3 (ref 1.7–7)
NEUTROPHILS NFR BLD AUTO: 50.4 % (ref 42.7–76)
PLATELET # BLD AUTO: 74 10*3/MM3 (ref 140–450)
PMV BLD AUTO: 10.7 FL (ref 6–12)
RBC # BLD AUTO: 4.15 10*6/MM3 (ref 3.77–5.28)
WBC NRBC COR # BLD: 5.05 10*3/MM3 (ref 3.4–10.8)

## 2020-01-29 PROCEDURE — 36415 COLL VENOUS BLD VENIPUNCTURE: CPT

## 2020-01-29 PROCEDURE — 99215 OFFICE O/P EST HI 40 MIN: CPT | Performed by: INTERNAL MEDICINE

## 2020-01-29 PROCEDURE — 85025 COMPLETE CBC W/AUTO DIFF WBC: CPT

## 2020-02-03 ENCOUNTER — HOSPITAL ENCOUNTER (OUTPATIENT)
Dept: ONCOLOGY | Facility: HOSPITAL | Age: 35
Setting detail: INFUSION SERIES
Discharge: HOME | End: 2020-02-03

## 2020-02-03 VITALS
WEIGHT: 128 LBS | BODY MASS INDEX: 23.55 KG/M2 | TEMPERATURE: 98.4 F | HEART RATE: 81 BPM | HEIGHT: 62 IN | RESPIRATION RATE: 18 BRPM | SYSTOLIC BLOOD PRESSURE: 133 MMHG | DIASTOLIC BLOOD PRESSURE: 80 MMHG

## 2020-02-03 DIAGNOSIS — D69.6 THROMBOCYTOPENIA (HCC): Primary | ICD-10-CM

## 2020-02-03 LAB
BASOPHILS # BLD AUTO: 0.04 10*3/MM3 (ref 0–0.2)
BASOPHILS NFR BLD AUTO: 0.7 % (ref 0–1.5)
DEPRECATED RDW RBC AUTO: 41.5 FL (ref 37–54)
EOSINOPHIL # BLD AUTO: 0.13 10*3/MM3 (ref 0–0.4)
EOSINOPHIL NFR BLD AUTO: 2.2 % (ref 0.3–6.2)
ERYTHROCYTE [DISTWIDTH] IN BLOOD BY AUTOMATED COUNT: 12.9 % (ref 12.3–15.4)
HCT VFR BLD AUTO: 35.3 % (ref 34–46.6)
HGB BLD-MCNC: 12 G/DL (ref 12–15.9)
LYMPHOCYTES # BLD AUTO: 1.6 10*3/MM3 (ref 0.7–3.1)
LYMPHOCYTES NFR BLD AUTO: 27.1 % (ref 19.6–45.3)
MCH RBC QN AUTO: 30.5 PG (ref 26.6–33)
MCHC RBC AUTO-ENTMCNC: 34 G/DL (ref 31.5–35.7)
MCV RBC AUTO: 89.8 FL (ref 79–97)
MONOCYTES # BLD AUTO: 0.61 10*3/MM3 (ref 0.1–0.9)
MONOCYTES NFR BLD AUTO: 10.3 % (ref 5–12)
NEUTROPHILS # BLD AUTO: 3.53 10*3/MM3 (ref 1.7–7)
NEUTROPHILS NFR BLD AUTO: 59.7 % (ref 42.7–76)
PLATELET # BLD AUTO: 81 10*3/MM3 (ref 140–450)
PMV BLD AUTO: 11.5 FL (ref 6–12)
RBC # BLD AUTO: 3.93 10*6/MM3 (ref 3.77–5.28)
WBC NRBC COR # BLD: 5.91 10*3/MM3 (ref 3.4–10.8)

## 2020-02-03 PROCEDURE — 96372 THER/PROPH/DIAG INJ SC/IM: CPT | Performed by: INTERNAL MEDICINE

## 2020-02-03 PROCEDURE — 25010000002 ROMIPLOSTIM PER 10 MCG: Performed by: NURSE PRACTITIONER

## 2020-02-03 PROCEDURE — 85025 COMPLETE CBC W/AUTO DIFF WBC: CPT | Performed by: INTERNAL MEDICINE

## 2020-02-03 PROCEDURE — 36415 COLL VENOUS BLD VENIPUNCTURE: CPT | Performed by: INTERNAL MEDICINE

## 2020-02-03 RX ADMIN — ROMIPLOSTIM 320 MCG: 500 INJECTION, POWDER, LYOPHILIZED, FOR SOLUTION SUBCUTANEOUS at 10:47

## 2020-02-05 ENCOUNTER — LAB (OUTPATIENT)
Dept: LAB | Facility: HOSPITAL | Age: 35
End: 2020-02-05

## 2020-02-05 ENCOUNTER — HOSPITAL ENCOUNTER (OUTPATIENT)
Dept: ONCOLOGY | Facility: HOSPITAL | Age: 35
Discharge: HOME | End: 2020-02-05
Admitting: NURSE PRACTITIONER

## 2020-02-05 DIAGNOSIS — D69.6 THROMBOCYTOPENIA (HCC): Primary | ICD-10-CM

## 2020-02-05 DIAGNOSIS — D69.3 IMMUNE THROMBOCYTOPENIC PURPURA (HCC): Chronic | ICD-10-CM

## 2020-02-05 LAB
BASOPHILS # BLD AUTO: 0.04 10*3/MM3 (ref 0–0.2)
BASOPHILS NFR BLD AUTO: 0.7 % (ref 0–1.5)
DEPRECATED RDW RBC AUTO: 40.2 FL (ref 37–54)
EOSINOPHIL # BLD AUTO: 0.12 10*3/MM3 (ref 0–0.4)
EOSINOPHIL NFR BLD AUTO: 2 % (ref 0.3–6.2)
ERYTHROCYTE [DISTWIDTH] IN BLOOD BY AUTOMATED COUNT: 12.6 % (ref 12.3–15.4)
HCT VFR BLD AUTO: 36.9 % (ref 34–46.6)
HGB BLD-MCNC: 12.9 G/DL (ref 12–15.9)
LYMPHOCYTES # BLD AUTO: 1.64 10*3/MM3 (ref 0.7–3.1)
LYMPHOCYTES NFR BLD AUTO: 26.8 % (ref 19.6–45.3)
MCH RBC QN AUTO: 31.1 PG (ref 26.6–33)
MCHC RBC AUTO-ENTMCNC: 35 G/DL (ref 31.5–35.7)
MCV RBC AUTO: 88.9 FL (ref 79–97)
MONOCYTES # BLD AUTO: 0.72 10*3/MM3 (ref 0.1–0.9)
MONOCYTES NFR BLD AUTO: 11.8 % (ref 5–12)
NEUTROPHILS # BLD AUTO: 3.59 10*3/MM3 (ref 1.7–7)
NEUTROPHILS NFR BLD AUTO: 58.7 % (ref 42.7–76)
PLATELET # BLD AUTO: 41 10*3/MM3 (ref 140–450)
PMV BLD AUTO: 12.1 FL (ref 6–12)
RBC # BLD AUTO: 4.15 10*6/MM3 (ref 3.77–5.28)
WBC NRBC COR # BLD: 6.11 10*3/MM3 (ref 3.4–10.8)

## 2020-02-05 PROCEDURE — 85025 COMPLETE CBC W/AUTO DIFF WBC: CPT

## 2020-02-05 PROCEDURE — 36415 COLL VENOUS BLD VENIPUNCTURE: CPT

## 2020-02-05 NOTE — PROGRESS NOTES
Spoke with Dr. Llanes about platelets at 43 he stated she is good til her Nplate on 2/10/20. Patient has no bleeding or bruising

## 2020-02-10 ENCOUNTER — HOSPITAL ENCOUNTER (OUTPATIENT)
Dept: ONCOLOGY | Facility: HOSPITAL | Age: 35
Setting detail: INFUSION SERIES
Discharge: HOME | End: 2020-02-10

## 2020-02-10 VITALS
BODY MASS INDEX: 23.41 KG/M2 | RESPIRATION RATE: 18 BRPM | HEART RATE: 81 BPM | DIASTOLIC BLOOD PRESSURE: 72 MMHG | SYSTOLIC BLOOD PRESSURE: 103 MMHG | TEMPERATURE: 98.2 F | HEIGHT: 62 IN

## 2020-02-10 DIAGNOSIS — D69.3 IMMUNE THROMBOCYTOPENIC PURPURA (HCC): Primary | Chronic | ICD-10-CM

## 2020-02-10 DIAGNOSIS — D69.6 THROMBOCYTOPENIA (HCC): ICD-10-CM

## 2020-02-10 LAB
BASOPHILS # BLD AUTO: 0.03 10*3/MM3 (ref 0–0.2)
BASOPHILS NFR BLD AUTO: 0.3 % (ref 0–1.5)
DEPRECATED RDW RBC AUTO: 42 FL (ref 37–54)
EOSINOPHIL # BLD AUTO: 0.12 10*3/MM3 (ref 0–0.4)
EOSINOPHIL NFR BLD AUTO: 1.3 % (ref 0.3–6.2)
ERYTHROCYTE [DISTWIDTH] IN BLOOD BY AUTOMATED COUNT: 13 % (ref 12.3–15.4)
HCT VFR BLD AUTO: 44 % (ref 34–46.6)
HGB BLD-MCNC: 14.8 G/DL (ref 12–15.9)
LYMPHOCYTES # BLD AUTO: 2.13 10*3/MM3 (ref 0.7–3.1)
LYMPHOCYTES NFR BLD AUTO: 22.4 % (ref 19.6–45.3)
MCH RBC QN AUTO: 30.6 PG (ref 26.6–33)
MCHC RBC AUTO-ENTMCNC: 33.6 G/DL (ref 31.5–35.7)
MCV RBC AUTO: 91.1 FL (ref 79–97)
MONOCYTES # BLD AUTO: 0.71 10*3/MM3 (ref 0.1–0.9)
MONOCYTES NFR BLD AUTO: 7.5 % (ref 5–12)
NEUTROPHILS # BLD AUTO: 6.51 10*3/MM3 (ref 1.7–7)
NEUTROPHILS NFR BLD AUTO: 68.5 % (ref 42.7–76)
PLATELET # BLD AUTO: 34 10*3/MM3 (ref 140–450)
RBC # BLD AUTO: 4.83 10*6/MM3 (ref 3.77–5.28)
WBC NRBC COR # BLD: 9.5 10*3/MM3 (ref 3.4–10.8)

## 2020-02-10 PROCEDURE — 25010000002 ROMIPLOSTIM PER 10 MCG: Performed by: INTERNAL MEDICINE

## 2020-02-10 PROCEDURE — 96372 THER/PROPH/DIAG INJ SC/IM: CPT

## 2020-02-10 PROCEDURE — 85025 COMPLETE CBC W/AUTO DIFF WBC: CPT | Performed by: NURSE PRACTITIONER

## 2020-02-10 RX ADMIN — ROMIPLOSTIM 375 MCG: 500 INJECTION, POWDER, LYOPHILIZED, FOR SOLUTION SUBCUTANEOUS at 15:26

## 2020-02-12 ENCOUNTER — APPOINTMENT (OUTPATIENT)
Dept: LAB | Facility: HOSPITAL | Age: 35
End: 2020-02-12

## 2020-02-12 ENCOUNTER — OFFICE VISIT (OUTPATIENT)
Dept: ONCOLOGY | Facility: CLINIC | Age: 35
End: 2020-02-12

## 2020-02-12 VITALS
TEMPERATURE: 98.1 F | HEIGHT: 62 IN | BODY MASS INDEX: 23.52 KG/M2 | WEIGHT: 127.8 LBS | RESPIRATION RATE: 18 BRPM | DIASTOLIC BLOOD PRESSURE: 81 MMHG | SYSTOLIC BLOOD PRESSURE: 118 MMHG | HEART RATE: 81 BPM

## 2020-02-12 DIAGNOSIS — D69.3 IMMUNE THROMBOCYTOPENIC PURPURA (HCC): Primary | Chronic | ICD-10-CM

## 2020-02-12 DIAGNOSIS — N92.2 EXCESSIVE MENSTRUATION AT PUBERTY: ICD-10-CM

## 2020-02-12 DIAGNOSIS — D69.3 IMMUNE THROMBOCYTOPENIC PURPURA (HCC): Chronic | ICD-10-CM

## 2020-02-12 LAB
BASOPHILS # BLD AUTO: 0.04 10*3/MM3 (ref 0–0.2)
BASOPHILS NFR BLD AUTO: 0.8 % (ref 0–1.5)
DEPRECATED RDW RBC AUTO: 40.2 FL (ref 37–54)
EOSINOPHIL # BLD AUTO: 0.15 10*3/MM3 (ref 0–0.4)
EOSINOPHIL NFR BLD AUTO: 2.9 % (ref 0.3–6.2)
ERYTHROCYTE [DISTWIDTH] IN BLOOD BY AUTOMATED COUNT: 12.6 % (ref 12.3–15.4)
HCT VFR BLD AUTO: 36.7 % (ref 34–46.6)
HGB BLD-MCNC: 12.8 G/DL (ref 12–15.9)
LYMPHOCYTES # BLD AUTO: 1.58 10*3/MM3 (ref 0.7–3.1)
LYMPHOCYTES NFR BLD AUTO: 30.5 % (ref 19.6–45.3)
MCH RBC QN AUTO: 31.1 PG (ref 26.6–33)
MCHC RBC AUTO-ENTMCNC: 34.9 G/DL (ref 31.5–35.7)
MCV RBC AUTO: 89.1 FL (ref 79–97)
MONOCYTES # BLD AUTO: 0.63 10*3/MM3 (ref 0.1–0.9)
MONOCYTES NFR BLD AUTO: 12.2 % (ref 5–12)
NEUTROPHILS # BLD AUTO: 2.78 10*3/MM3 (ref 1.7–7)
NEUTROPHILS NFR BLD AUTO: 53.6 % (ref 42.7–76)
PLATELET # BLD AUTO: 24 10*3/MM3 (ref 140–450)
PMV BLD AUTO: 13.6 FL (ref 6–12)
RBC # BLD AUTO: 4.12 10*6/MM3 (ref 3.77–5.28)
WBC NRBC COR # BLD: 5.18 10*3/MM3 (ref 3.4–10.8)

## 2020-02-12 PROCEDURE — 36415 COLL VENOUS BLD VENIPUNCTURE: CPT | Performed by: INTERNAL MEDICINE

## 2020-02-12 PROCEDURE — 99215 OFFICE O/P EST HI 40 MIN: CPT | Performed by: INTERNAL MEDICINE

## 2020-02-12 PROCEDURE — 85025 COMPLETE CBC W/AUTO DIFF WBC: CPT | Performed by: INTERNAL MEDICINE

## 2020-02-12 RX ORDER — DEXAMETHASONE 4 MG/1
TABLET ORAL
Qty: 40 TABLET | Refills: 0 | Status: SHIPPED | OUTPATIENT
Start: 2020-02-14 | End: 2020-02-12

## 2020-02-12 RX ORDER — DEXAMETHASONE 4 MG/1
TABLET ORAL
Qty: 40 TABLET | Refills: 0 | Status: SHIPPED | OUTPATIENT
Start: 2020-02-13 | End: 2020-02-12

## 2020-02-12 RX ORDER — DEXAMETHASONE 4 MG/1
TABLET ORAL
Qty: 40 TABLET | Refills: 0 | Status: SHIPPED | OUTPATIENT
Start: 2020-02-13 | End: 2020-02-15

## 2020-02-12 NOTE — PROGRESS NOTES
HEMATOLOGY ONCOLOGY FOLLOW UP        Patient name: Tiny Akhtar  : 1985  MRN: [id number]  Primary Care Physician: Jose Ingram, APRN  Referring Physician: Jose Ingram, *  Reason For Consult:     Chief Complaint   Patient presents with   • Follow-up     Acute idiopathic thrombocytopenic purpura ITP       History of Present Illness:  Tiny Akhtar is a 34 y.o. female with a past medical history of thrombocytopenia, anemia, and kidney stones.  She presented to [facility] Emergency Department on 2019 after she had blood work drawn earlier in the day that showed a platelet count of 9,000 at this facility.  She had previously been admitted through the ED on 2019 reporting bruising on her ankles and in her mouth.  She received 2 units of platelets and started on steroids. She was discharged on 19.The patient's last platelet count was 17,000 on 19 after a platelet transfusion.  She had been on steroids for four days.  The patient reported continued  subcutaneous bruising. She denied any headaches, nosebleeds, or gingival bleeding.  She was admitted for IV steroids and further management.     · 10/7/2019 CT abdomen and pelvis: 3 mm stone at the left ureterovesical junction causing mild to moderate obstruction.Spleen reported as normal size.  · 10/7/2019 WBC 10.2, hemoglobin 12.2, platelets 268    · 2019 WBC 5.7, hemoglobin 12.8, Platelets 5000,  Transfused 2 units platelets.  initial presentation for diagnosis acute thrombocytopenia,. Started on Solumedrol 125 mg IV Q6Hr  · 2019 platelets 5000.  Patient transfused with platelets.  Platelets increased to 13,000  · 19  Bone marrow biopsy - Smears of bone marrow aspirate with cell block (clot) preparation and marrow biopsy:  Hypocellular for age bone marrow (50%),  Adequate numbers of unremarkable megakaryocytes,  Absent iron stores,  Negative for involvement by malignant  lymphoma.  Flow cytometry-CD 56 expression on granulocytes and monocytes.  Chromosomal analysis -46XX  · 11/20/2019 platelet count 17,000 . Switched to Prednisone 1 mg/kg ( 50 mg daily)  · 11/21/2019 platelets 17,000  · 11/22/2019  Readmitted . Restarted Solumedrol  mg Q6H . platelets 9000  · 11/23/19 platelets 13,000  · 11/23/2019 hematology/Oncology was consulted on this patient known to our service and previously consulted for acute thrombocytopenia on 11/18/19. She was followed by Uri Sagastume MD for ITP. The patient had been transfused and started on IV steroids Prednisone while inpatient 11/18/19 to 11/20/19.  She was transitioned to oral steroids at discharge.     · 11/23/2019 Hepatitis panel negative, TSH 0.33, H. pylori IgG 1.43 High, IgM negative, JOSE negative, HIV antibodies negative  · 11/23/2019 patient received IV Venofer.  Patient received meningococcal vaccine, Haemophilus vaccine, pneumococcus vaccine  · 11/23/2019 platelets 13,000  · 11/24/2019 platelets 52,000.  Platelets responded to steroids. On IV solumedrol. Started on Megace 40 mg po QID for menorrhagia.  · Pt discharged on Prednisone 50 mg  · 11/27/2019 platelets 8000.  Patient's failed steroids ( on prednisone 50 mg daily).. Started Tranexamic acid 1300 mg TID  · 11/29/2019  READMITTED platelets 12,000  · 11/29/19-Cycle 1 Day one of RITUXIMAB and continues on megestrol  · 11/30/2019 WBC 11.2, hemoglobin 12.1, platelets 6000  · 12/1/2019 platelets 11,000.  Patient received IVIG 30 g  · 12/2/2019 iron 72, iron saturation 17% low, TIBC 416, ferritin 231, platelets 56,000  · 12/3/2019 platelets 129,000  · 12/4/2019 Prednisone decreased to 40 mg daily  · 12/6/2019 patient received week 2 of Rituximab.  Platelets 24,000, hemoglobin 11.8  · 12/8/2019 platelets 5000, hemoglobin 11.6, PT 10.5, INR 1.  Patient received IVIG 50 g  · 12/9/2019 platelets 11,000  · 12/11/2019 platelets 16,000  · 12/13/2019: Platelets 13,000 Patient  "received week 3 of her Rituximab. Prednisone decreased to 20 mg daily  · 12/14/2019 patient received IVIG 55 g, platelets 12,000.   · 12/16/2019 platelets 24,000. Prednisone decreased to 10 mg daily  · 12/18/2019 platelets 12,000  · 12/19/2019 platelets 11,000  · 12/20/19-week 4 Rituximab and Megestrol, platelets 12,000. Prednisone DCed.  · 12/26/2019 platelets 7000  · 12/27/19-patient was started on NPLATE   1 mcg/kg, platelets 8000  · 12/30/2019 platelets 6000.  After transfusion 31,000  · 01/03/2020- NPLATE 2 mcg/kg, platelets 10,000  · 01/10/2020-NPLATE 3 mcg/kg.  continues on Megace megestrol, platelets 4000  · 1/15/2020: WBC 5.9, hemoglobin 12.5, platelets 2000k,   · 1/15/2020: Serum copper 114 normal, IgA 81 normal,Anticardiolipin antibodies negative, beta-2 glycoprotein antibodies negative, phosphatidylserine antibodies negative,  · 1/16/2020 patient received IVIG 55 g, platelets 4000  · 1/17/2020 patient received IVIG 55 g, platelets 36,000  · 01/17/2020: IVIG WBC 4.27, hemoglobin, 11.5, platelets  34601  · 1/20/2020 patient received Nplate 6 mcg/kg, WBC 5.1, hemoglobin 11.9, platelets 44,  · 1/27/2020 patient received Nplate 6 mcg/kg platelets 100,000  · 1/29/2020 platelets 74,000  · 1/30/2020 patient seen at [facility] hematology for second opinion.  Recommended to continue course of current treatment.  · 2/3/2020 patient received Nplate 6 mcg/kg.  WBC 5.9, hemoglobin 12, platelets 81k  · 2/10/2020 patient received Nplate 7 mcg/kg platelets 34,000   · 2/12/2020 WBC 5.1, hemoglobin 12.8, platelets 24K  · 12/13/2020: Patient advised to start dexamethasone 40 mg x 4 days course          Subjective:02/12/2020  Patient is here for a follow up of ITP. Her last dose of Nplate was on 02/12/2020. She states she has some, \"tired\" days, but other days she feels good. She continues to take Megace PRN. She has a standing order for  CBC's twice a week. She states she went to [facility] for a second opinion " around a week ago and she states that everything is going well here.  Does not report any problems from her endplate shots.  Does not report any bleeding issues.  She wants to go back to work.  Is not currently taking any tranexamic acid.  She has been taking Megace as needed.  The following portions of the patient's history were reviewed and updated as appropriate: allergies, current medications, past family history, past medical history, past social history, past surgical history and problem list.         Past Medical History:   Diagnosis Date   • Anxiety    • Iron deficiency anemia    • Kidney stone 10/2019       No past surgical history on file.      Current Outpatient Medications:   •  acetaminophen (TYLENOL) 325 MG tablet, Take 650 mg by mouth Every 6 (Six) Hours As Needed for Mild Pain  or Moderate Pain ., Disp: , Rfl:   •  ferrous sulfate 324 (65 Fe) MG tablet delayed-release EC tablet, Take 1 tablet by mouth Daily With Breakfast., Disp: 30 tablet, Rfl: 0  •  megestrol (MEGACE) 20 MG tablet, Take 20 mg by mouth 3 (Three) Times a Day., Disp: , Rfl:   •  NON FORMULARY, Take 1 tablet by mouth Daily. Balance OTC, Disp: , Rfl:   •  pantoprazole (PROTONIX) 20 MG EC tablet, , Disp: , Rfl:   •  polyethylene glycol (MIRALAX) powder, Take 17 g by mouth Daily As Needed (constipation)., Disp: 225 g, Rfl: 0  •  Tranexamic Acid 650 MG tablet, Take 2 tablets by mouth 3 (Three) Times a Day., Disp: 30 tablet, Rfl: 0  •  [START ON 2/13/2020] dexamethasone (DECADRON) 4 MG tablet, Take 40 mg daily for four days., Disp: 40 tablet, Rfl: 0    No Known Allergies    Family History   Problem Relation Age of Onset   • Thrombocytopenia Nephew         ITP   • Diabetes Mother    • Diabetes Father    • Cancer Paternal Grandmother        Cancer-related family history includes Cancer in her paternal grandmother.    Social History     Tobacco Use   • Smoking status: Never Smoker   • Smokeless tobacco: Never Used   Substance Use Topics   •  "Alcohol use: No     Frequency: Never   • Drug use: No         I have reviewed the history of present illness, past medical history, family history, social history, lab results, all notes and other records since the patient was last seen on  11/21/2019.    ROS:     Review of Systems   Constitutional: Positive for fatigue. Negative for chills and fever.   HENT: Negative for ear pain, mouth sores, nosebleeds and sore throat.    Eyes: Negative for photophobia and visual disturbance.   Respiratory: Negative for wheezing and stridor.    Cardiovascular: Negative for chest pain and palpitations.   Gastrointestinal: Negative for abdominal pain, diarrhea, nausea and vomiting.   Endocrine: Negative for cold intolerance and heat intolerance.   Genitourinary: Negative for dysuria and hematuria.   Musculoskeletal: Negative for joint swelling and neck stiffness.   Skin: Negative for color change and rash.   Neurological: Negative for seizures and syncope.   Hematological: Negative for adenopathy.        No obvious bleeding   Psychiatric/Behavioral: Negative for agitation, confusion and hallucinations.       Objective:    Vitals:    02/12/20 0930   BP: 118/81   Pulse: 81   Resp: 18   Temp: 98.1 °F (36.7 °C)   Weight: 58 kg (127 lb 12.8 oz)   Height: 157.5 cm (62\")   PainSc: 0-No pain     ECOG  (0) Fully active, able to carry on all predisease performance without restriction    Physical Exam:   Physical Exam   Constitutional: She is oriented to person, place, and time. She appears well-developed and well-nourished.   HENT:   Head: Normocephalic and atraumatic.   Nose: Nose normal.   Mouth/Throat: Oropharynx is clear and moist. No oropharyngeal exudate.   Eyes: Conjunctivae and EOM are normal. No scleral icterus.   Neck: Normal range of motion. No tracheal deviation present. No thyromegaly present.   Cardiovascular: Regular rhythm and normal heart sounds.   Pulmonary/Chest: No stridor.   Abdominal: Soft. Bowel sounds are normal. She " exhibits no distension and no mass. There is no tenderness.   Musculoskeletal: Normal range of motion. She exhibits no edema or deformity.   Lymphadenopathy:     She has no cervical adenopathy.   Neurological: She is alert and oriented to person, place, and time. No sensory deficit.   Skin: Skin is warm.   Psychiatric: Her behavior is normal.   Vitals reviewed.            Lab Results - Last 18 Months   Lab Units 02/12/20  0912 02/10/20  1453 02/05/20  0951   WBC 10*3/mm3 5.18 9.50 6.11   HEMOGLOBIN g/dL 12.8 14.8 12.9   HEMATOCRIT % 36.7 44.0 36.9   PLATELETS 10*3/mm3 24* 34* 41*   MCV fL 89.1 91.1 88.9     Lab Results - Last 18 Months   Lab Units 12/08/19  2022 12/02/19  0354 12/01/19  0320  11/29/19  1217  11/22/19  2248 11/18/19  0918   SODIUM mmol/L 137 137 138   < > 136   < > 139 140   POTASSIUM mmol/L 3.6 4.1 4.2   < > 4.2   < > 3.5 3.8   CHLORIDE mmol/L 100 101 100   < > 98   < > 103 105   CO2 mmol/L 26.0 27.0 23.0   < > 23.0   < > 23.0 26.0   BUN mg/dL 17 16 15   < > 17   < > 17 16   CREATININE mg/dL 0.55* 0.55* 0.65   < > 0.68   < > 0.54* 0.60   CALCIUM mg/dL 9.2 9.3 9.3   < > 9.5   < > 9.1 9.5   BILIRUBIN mg/dL  --   --   --   --  0.3  --  0.3 0.3   ALK PHOS U/L  --   --   --   --  36*  --  36* 36*   ALT (SGPT) U/L  --   --   --   --  31  --  38* 13   AST (SGOT) U/L  --   --   --   --  16  --  16 17   GLUCOSE mg/dL 103* 107* 171*   < > 93   < > 153* 102*    < > = values in this interval not displayed.       Lab Results   Component Value Date    GLUCOSE 103 (H) 12/08/2019    BUN 17 12/08/2019    CREATININE 0.55 (L) 12/08/2019    EGFRIFNONA 128 12/08/2019    BCR 30.9 (H) 12/08/2019    K 3.6 12/08/2019    CO2 26.0 12/08/2019    CALCIUM 9.2 12/08/2019    PROTENTOTREF 5.9 (L) 11/23/2019    ALBUMIN 4.10 11/29/2019    LABIL2 1.5 11/23/2019    AST 16 11/29/2019    ALT 31 11/29/2019       Lab Results - Last 18 Months   Lab Units 12/08/19 2022 11/18/19  0918   INR  1.00 1.00   APTT seconds  --  23.5*       Lab  Results   Component Value Date    IRON 72 12/02/2019    TIBC 416 12/02/2019    FERRITIN 231.70 (H) 12/02/2019       Lab Results   Component Value Date    FOLATE 11.20 11/23/2019       No results found for: OCCULTBLD    Lab Results   Component Value Date    RETICCTPCT 3.13 (H) 12/01/2019       Lab Results   Component Value Date    CTCNXCIS57 461 11/23/2019     No results found for: SPEP, UPEP  LDH   Date Value Ref Range Status   11/23/2019 123 (L) 135 - 214 U/L Final     Lab Results   Component Value Date    JOSE Negative 11/23/2019     Lab Results   Component Value Date    HAPTOGLOBIN 136 11/23/2019     Lab Results   Component Value Date    PTT 23.5 (L) 11/18/2019    INR 1.00 12/08/2019     No results found for:   No results found for: CEA  No components found for: CA-19-9  No results found for: PSA        Assessment/Plan     Assessment:  1. Acute idiopathic thrombocytopenic purpura ITP. : Refractory to steroids: She was diagnosed on 11/18/19  by mouth.  S/p bone marrow biopsy 11/18/19 - hypocellular for age bone marrow (50%),  adequate numbers of unremarkable megakaryocytes,  absent iron stores,  negative for involvement by malignant lymphoma.  Flow cytometry-CD 56 expression on granulocytes and monocytes.  . Normal karyotype 46XX.  HIV testing negative. TSH normal.   She does have a transient responses to IVIG.  Received 4 weekly doses of rituximab starting 11/29/2019 to 12/20/2019.  She did not respond to rituximab right away and still awaiting response.  Started on Nplate 12/27/2019 at 1 mcg/kg.  Dose increased to 3 mcg/kg as of 1/10/2020 and 7 mcg/kg as of last dose..  IVIG was repeated on 1/16/2020 and 1/17/2020 with good response that lasted for about 3 weeks..  2. Anemia: History of HAILE:  Bone marrow biopsy on 11/18/19 showed absent iron stores. Anemia work-up: reticulocytes 1.53, ferritin 15.38, iron 40, iron saturation 9, transferrin 310, TIBC 462, .   3. Iron deficiency anemia - S/p Venofer 400  mg IV x 1 dose on 11/23/19  3. Left renal stone: 3 mm kidney stone visualized on CT A/P on 10/7/19.  Asymptomatic.  4. Anxiety/weight loss:  management per Primary team   5. Hx of Menstrual bleeding: LMPD 11/23/19 -On Megace  to suppress menstrual cycle . Also on Lysteda (tranexamic acid)     PLAN  1. Reviewed the consultation note from [facility].  2. Continue Nplate while we await response to rituximab.  We will continue to titrate the dose gradually up to a total of 10 mcg/kg.  We will go up to 8 mcg/kg with the next dose and 9 mcg the week after.  3. We will give her another trial of steroids.  Advised that she will need to take dexamethasone 40 mg daily x4 days.  Repeat CBC after the course..  4. She does respond to IVIG and can be used occasionally if patient has very severe drop in platelets.  5. I discussed with the patient about to rechallenge with steroids, this time with dexamethasone 40 mg daily x4.  6. Rituximab can have very delayed response in some cases up to 3 months.  7. Recommend against any platelet transfusions as patient has tolerated very low platelets without any bleeding symptoms even at 2000.  8. Continue Megace  9. Continue Lysteda, tranexamic acid as needed for platelets less than 10,000.  10. Continue Protonix  11. Consider splenectomy/fostamatinib if patient fails rituximab and Nplate.   12. Continue twice weekly CBCs   13. Will send a referral to hematology at the [facility] for second opinion.        Immune thrombocytopenic purpura (CMS/HCC)  - CBC Auto Differential  - dexamethasone (DECADRON) 4 MG tablet    No orders of the defined types were placed in this encounter.              Thank you very much for providing the opportunity to participate in this patient’s care. Please do not hesitate to call if there are any other questions.    I have reviewed all relevant labs results,outside reports, imaging,,notes, vitals, medications and plan with the patient  today.    Portions of the note have been Scribed by medical assistant/Nurse.  I have reviewed and made changes accordingly.

## 2020-02-17 ENCOUNTER — HOSPITAL ENCOUNTER (OUTPATIENT)
Dept: ONCOLOGY | Facility: HOSPITAL | Age: 35
Setting detail: INFUSION SERIES
Discharge: HOME | End: 2020-02-17

## 2020-02-17 VITALS
HEIGHT: 62 IN | TEMPERATURE: 98.4 F | RESPIRATION RATE: 18 BRPM | BODY MASS INDEX: 23.32 KG/M2 | HEART RATE: 65 BPM | SYSTOLIC BLOOD PRESSURE: 116 MMHG | DIASTOLIC BLOOD PRESSURE: 75 MMHG | WEIGHT: 126.7 LBS

## 2020-02-17 DIAGNOSIS — D69.6 THROMBOCYTOPENIA (HCC): Primary | ICD-10-CM

## 2020-02-17 LAB
BASOPHILS # BLD AUTO: 0.03 10*3/MM3 (ref 0–0.2)
BASOPHILS NFR BLD AUTO: 0.7 % (ref 0–1.5)
DEPRECATED RDW RBC AUTO: 39 FL (ref 37–54)
EOSINOPHIL # BLD AUTO: 0.12 10*3/MM3 (ref 0–0.4)
EOSINOPHIL NFR BLD AUTO: 2.6 % (ref 0.3–6.2)
ERYTHROCYTE [DISTWIDTH] IN BLOOD BY AUTOMATED COUNT: 12.3 % (ref 12.3–15.4)
HCT VFR BLD AUTO: 33.7 % (ref 34–46.6)
HGB BLD-MCNC: 11.5 G/DL (ref 12–15.9)
LYMPHOCYTES # BLD AUTO: 1.42 10*3/MM3 (ref 0.7–3.1)
LYMPHOCYTES NFR BLD AUTO: 31.3 % (ref 19.6–45.3)
MCH RBC QN AUTO: 30.5 PG (ref 26.6–33)
MCHC RBC AUTO-ENTMCNC: 34.1 G/DL (ref 31.5–35.7)
MCV RBC AUTO: 89.4 FL (ref 79–97)
MONOCYTES # BLD AUTO: 0.46 10*3/MM3 (ref 0.1–0.9)
MONOCYTES NFR BLD AUTO: 10.2 % (ref 5–12)
NEUTROPHILS # BLD AUTO: 2.5 10*3/MM3 (ref 1.7–7)
NEUTROPHILS NFR BLD AUTO: 55.2 % (ref 42.7–76)
PLATELET # BLD AUTO: 11 10*3/MM3 (ref 140–450)
RBC # BLD AUTO: 3.77 10*6/MM3 (ref 3.77–5.28)
WBC NRBC COR # BLD: 4.53 10*3/MM3 (ref 3.4–10.8)

## 2020-02-17 PROCEDURE — 85025 COMPLETE CBC W/AUTO DIFF WBC: CPT | Performed by: INTERNAL MEDICINE

## 2020-02-17 PROCEDURE — 36415 COLL VENOUS BLD VENIPUNCTURE: CPT

## 2020-02-17 PROCEDURE — 96372 THER/PROPH/DIAG INJ SC/IM: CPT

## 2020-02-17 PROCEDURE — 25010000002 ROMIPLOSTIM PER 10 MCG: Performed by: NURSE PRACTITIONER

## 2020-02-17 RX ORDER — DEXTROAMPHETAMINE SACCHARATE, AMPHETAMINE ASPARTATE, DEXTROAMPHETAMINE SULFATE AND AMPHETAMINE SULFATE 2.5; 2.5; 2.5; 2.5 MG/1; MG/1; MG/1; MG/1
TABLET ORAL
COMMUNITY
Start: 2020-02-13 | End: 2020-11-02

## 2020-02-17 RX ORDER — WATER 1000 ML/1000ML
INJECTION, SOLUTION INTRAVENOUS
Status: DISCONTINUED
Start: 2020-02-17 | End: 2020-02-18 | Stop reason: HOSPADM

## 2020-02-17 RX ADMIN — ROMIPLOSTIM 430 MCG: 500 INJECTION, POWDER, LYOPHILIZED, FOR SOLUTION SUBCUTANEOUS at 11:27

## 2020-02-17 NOTE — ADDENDUM NOTE
Encounter addended by: Thania Donato on: 2/17/2020 1:26 PM   Actions taken: Charge Capture section accepted

## 2020-02-20 ENCOUNTER — TELEPHONE (OUTPATIENT)
Dept: ONCOLOGY | Facility: CLINIC | Age: 35
End: 2020-02-20

## 2020-02-20 ENCOUNTER — APPOINTMENT (OUTPATIENT)
Dept: LAB | Facility: HOSPITAL | Age: 35
End: 2020-02-20

## 2020-02-20 NOTE — TELEPHONE ENCOUNTER
Left message for patient.     Called to verify pt has already taken dexamethasone 40 mg daily x 4 days.     Should have taken 2/12 to 2/15/20     If she has taken will need IVIG again.     Please let me know when patient calls back.     Electronically signed by FARZAD Banegas, 02/20/20, 6:34 PM.

## 2020-02-21 ENCOUNTER — HOSPITAL ENCOUNTER (OUTPATIENT)
Dept: ONCOLOGY | Facility: HOSPITAL | Age: 35
Setting detail: INFUSION SERIES
Discharge: HOME | End: 2020-02-21

## 2020-02-21 ENCOUNTER — LAB (OUTPATIENT)
Dept: LAB | Facility: HOSPITAL | Age: 35
End: 2020-02-21

## 2020-02-21 ENCOUNTER — HOSPITAL ENCOUNTER (OUTPATIENT)
Dept: INFUSION THERAPY | Facility: HOSPITAL | Age: 35
Setting detail: INFUSION SERIES
Discharge: HOME | End: 2020-02-21

## 2020-02-21 ENCOUNTER — HOSPITAL ENCOUNTER (OUTPATIENT)
Dept: ONCOLOGY | Facility: HOSPITAL | Age: 35
Discharge: HOME | End: 2020-02-21
Admitting: NURSE PRACTITIONER

## 2020-02-21 VITALS
SYSTOLIC BLOOD PRESSURE: 110 MMHG | TEMPERATURE: 98.1 F | WEIGHT: 128.2 LBS | BODY MASS INDEX: 23.59 KG/M2 | HEART RATE: 72 BPM | DIASTOLIC BLOOD PRESSURE: 70 MMHG | RESPIRATION RATE: 18 BRPM | HEIGHT: 62 IN

## 2020-02-21 VITALS
TEMPERATURE: 99 F | WEIGHT: 128 LBS | HEIGHT: 62 IN | DIASTOLIC BLOOD PRESSURE: 58 MMHG | RESPIRATION RATE: 12 BRPM | SYSTOLIC BLOOD PRESSURE: 98 MMHG | HEART RATE: 69 BPM | BODY MASS INDEX: 23.55 KG/M2 | OXYGEN SATURATION: 100 %

## 2020-02-21 VITALS — HEART RATE: 85 BPM | DIASTOLIC BLOOD PRESSURE: 65 MMHG | TEMPERATURE: 98.8 F | SYSTOLIC BLOOD PRESSURE: 100 MMHG

## 2020-02-21 VITALS — DIASTOLIC BLOOD PRESSURE: 68 MMHG | SYSTOLIC BLOOD PRESSURE: 105 MMHG | TEMPERATURE: 98.3 F | HEART RATE: 90 BPM

## 2020-02-21 DIAGNOSIS — D69.6 THROMBOCYTOPENIA (HCC): Primary | ICD-10-CM

## 2020-02-21 DIAGNOSIS — D69.6 THROMBOCYTOPENIA (HCC): ICD-10-CM

## 2020-02-21 DIAGNOSIS — D69.3 IMMUNE THROMBOCYTOPENIC PURPURA (HCC): Chronic | ICD-10-CM

## 2020-02-21 LAB
BASOPHILS # BLD AUTO: 0.06 10*3/MM3 (ref 0–0.2)
BASOPHILS NFR BLD AUTO: 1.1 % (ref 0–1.5)
DEPRECATED RDW RBC AUTO: 38.9 FL (ref 37–54)
EOSINOPHIL # BLD AUTO: 0.11 10*3/MM3 (ref 0–0.4)
EOSINOPHIL NFR BLD AUTO: 2 % (ref 0.3–6.2)
ERYTHROCYTE [DISTWIDTH] IN BLOOD BY AUTOMATED COUNT: 12.4 % (ref 12.3–15.4)
HCT VFR BLD AUTO: 35.8 % (ref 34–46.6)
HGB BLD-MCNC: 12.4 G/DL (ref 12–15.9)
LYMPHOCYTES # BLD AUTO: 1.42 10*3/MM3 (ref 0.7–3.1)
LYMPHOCYTES NFR BLD AUTO: 26.3 % (ref 19.6–45.3)
MCH RBC QN AUTO: 30.4 PG (ref 26.6–33)
MCHC RBC AUTO-ENTMCNC: 34.6 G/DL (ref 31.5–35.7)
MCV RBC AUTO: 87.7 FL (ref 79–97)
MONOCYTES # BLD AUTO: 0.92 10*3/MM3 (ref 0.1–0.9)
MONOCYTES NFR BLD AUTO: 17 % (ref 5–12)
NEUTROPHILS # BLD AUTO: 2.89 10*3/MM3 (ref 1.7–7)
NEUTROPHILS NFR BLD AUTO: 53.6 % (ref 42.7–76)
PLATELET # BLD AUTO: 0 10*3/MM3 (ref 140–450)
PLATELET # BLD AUTO: 12 10*3/MM3 (ref 140–450)
RBC # BLD AUTO: 4.08 10*6/MM3 (ref 3.77–5.28)
WBC NRBC COR # BLD: 5.4 10*3/MM3 (ref 3.4–10.8)

## 2020-02-21 PROCEDURE — 85049 AUTOMATED PLATELET COUNT: CPT | Performed by: NURSE PRACTITIONER

## 2020-02-21 PROCEDURE — 36415 COLL VENOUS BLD VENIPUNCTURE: CPT

## 2020-02-21 PROCEDURE — G0463 HOSPITAL OUTPT CLINIC VISIT: HCPCS

## 2020-02-21 PROCEDURE — P9100 PATHOGEN TEST FOR PLATELETS: HCPCS

## 2020-02-21 PROCEDURE — 96365 THER/PROPH/DIAG IV INF INIT: CPT

## 2020-02-21 PROCEDURE — 25010000002 IMMUNE GLOBULIN (HUMAN) 20 GM/200ML SOLUTION: Performed by: INTERNAL MEDICINE

## 2020-02-21 PROCEDURE — 36430 TRANSFUSION BLD/BLD COMPNT: CPT

## 2020-02-21 PROCEDURE — 96366 THER/PROPH/DIAG IV INF ADDON: CPT

## 2020-02-21 PROCEDURE — P9035 PLATELET PHERES LEUKOREDUCED: HCPCS

## 2020-02-21 PROCEDURE — 85025 COMPLETE CBC W/AUTO DIFF WBC: CPT

## 2020-02-21 PROCEDURE — 63710000001 DIPHENHYDRAMINE PER 50 MG: Performed by: INTERNAL MEDICINE

## 2020-02-21 PROCEDURE — 25010000002 IMMUNE GLOBULIN (HUMAN) 10 GM/100ML SOLUTION: Performed by: INTERNAL MEDICINE

## 2020-02-21 RX ORDER — SODIUM CHLORIDE 9 MG/ML
250 INJECTION, SOLUTION INTRAVENOUS AS NEEDED
Status: CANCELLED | OUTPATIENT
Start: 2020-02-21

## 2020-02-21 RX ORDER — ACETAMINOPHEN 325 MG/1
650 TABLET ORAL ONCE
Status: COMPLETED | OUTPATIENT
Start: 2020-02-21 | End: 2020-02-21

## 2020-02-21 RX ORDER — DIPHENHYDRAMINE HCL 25 MG
25 CAPSULE ORAL ONCE
Status: COMPLETED | OUTPATIENT
Start: 2020-02-21 | End: 2020-02-21

## 2020-02-21 RX ORDER — SODIUM CHLORIDE 9 MG/ML
250 INJECTION, SOLUTION INTRAVENOUS AS NEEDED
Status: DISCONTINUED | OUTPATIENT
Start: 2020-02-21 | End: 2020-02-23 | Stop reason: HOSPADM

## 2020-02-21 RX ORDER — SODIUM CHLORIDE 9 MG/ML
250 INJECTION, SOLUTION INTRAVENOUS ONCE
Status: COMPLETED | OUTPATIENT
Start: 2020-02-21 | End: 2020-02-21

## 2020-02-21 RX ADMIN — IMMUNE GLOBULIN (HUMAN) 20 G: 10 INJECTION INTRAVENOUS; SUBCUTANEOUS at 13:39

## 2020-02-21 RX ADMIN — IMMUNE GLOBULIN (HUMAN) 10 G: 10 INJECTION INTRAVENOUS; SUBCUTANEOUS at 11:12

## 2020-02-21 RX ADMIN — SODIUM CHLORIDE 250 ML: 900 INJECTION, SOLUTION INTRAVENOUS at 11:07

## 2020-02-21 RX ADMIN — IMMUNE GLOBULIN (HUMAN) 20 G: 10 INJECTION INTRAVENOUS; SUBCUTANEOUS at 14:35

## 2020-02-21 RX ADMIN — DIPHENHYDRAMINE HYDROCHLORIDE 25 MG: 25 CAPSULE ORAL at 10:58

## 2020-02-21 RX ADMIN — ACETAMINOPHEN 650 MG: 325 TABLET ORAL at 10:58

## 2020-02-21 RX ADMIN — IMMUNE GLOBULIN (HUMAN) 10 G: 10 INJECTION INTRAVENOUS; SUBCUTANEOUS at 12:47

## 2020-02-21 NOTE — PROGRESS NOTES
Notified Charley FREGOSO of patients platelet count today and she is going to speak with  in regards to plaetlets.  Charley FREGOSO came and spoke with patient and took her to Chemo area.

## 2020-02-21 NOTE — PROGRESS NOTES
Notified Dr. Llanes on 2/20/2020 of platelets declining platelet count of 11,000.  Patient was called and telephone note documented that she had completed her dexamethasone.  Dr. Llanes wanted to start IVIG today for a platelet count of 0.  Patient reported she has had her menstrual cycle for 4 days however;, it is not heavy according to her.  She denies any other bleeding.  She is taking Megace but this time it is not stopping her menstrual cycle at it as it had in the past.   She denies any other bleeding.  She does report a slow healing cold sore that is been present on her lower lip for several days.  Discussed with Dr. Llanes. Plan is detailed below per his instructions.     Plan:  · 2/21/2020 give IVIG 1 mg/kg ASAP, repeat CBC 1 hour after IVIG infusion, then patient is to go to ambulatory care and received 1 unit of single donor platelets.  · 2/22/2020 give IVIG 1 mg/kg and repeat CBC - María Elena Dubon RN arranging   · 2/23/2020 -no labs per Dr. Llanes  · 2/24/2020- give Nplate and repeat CBC - notified  to add appt     Notified  patient does not have follow-up appointment.  Instructed to schedule follow-up appointment in 2 weeks.    Patient instructed to go to the emergency department with any new bleeding.     She verbalized an understanding.     Plan was also reviewed with RN, Betty Montalvo.     Electronically signed by FARZAD Banegas, 02/21/20, 11:04 AM.

## 2020-02-21 NOTE — PROGRESS NOTES
Patient here for IVIG due to thrombocytopenia. See encounter flowsheet for my assessment details.     I talked to [facility] several times today to get her set up for tomorrow for IVIG as well. She said that they do not usually do IVIG on the weekends but due to the circumstances of her platelets being so low.     She will come here on Monday to get Nplate. Patient instructed to go straight to the ER if she has any uncontrolled bleeding between now and then.

## 2020-02-21 NOTE — ADDENDUM NOTE
Encounter addended by: Anneliese Andrade LPN on: 2/21/2020 10:42 AM   Actions taken: Vitals modified

## 2020-02-22 ENCOUNTER — HOSPITAL ENCOUNTER (OUTPATIENT)
Dept: INFUSION THERAPY | Facility: HOSPITAL | Age: 35
Setting detail: INFUSION SERIES
Discharge: HOME | End: 2020-02-22

## 2020-02-22 VITALS
RESPIRATION RATE: 12 BRPM | DIASTOLIC BLOOD PRESSURE: 41 MMHG | SYSTOLIC BLOOD PRESSURE: 104 MMHG | HEART RATE: 62 BPM | TEMPERATURE: 98.2 F

## 2020-02-22 DIAGNOSIS — D69.6 THROMBOCYTOPENIA (HCC): Primary | ICD-10-CM

## 2020-02-22 DIAGNOSIS — R04.0 EPISTAXIS NOT DUE TO TRAUMA: Primary | ICD-10-CM

## 2020-02-22 LAB
BASOPHILS # BLD AUTO: 0 10*3/MM3 (ref 0–0.2)
BASOPHILS NFR BLD AUTO: 0.9 % (ref 0–1.5)
DEPRECATED RDW RBC AUTO: 39.4 FL (ref 37–54)
EOSINOPHIL # BLD AUTO: 0.1 10*3/MM3 (ref 0–0.4)
EOSINOPHIL NFR BLD AUTO: 3.8 % (ref 0.3–6.2)
ERYTHROCYTE [DISTWIDTH] IN BLOOD BY AUTOMATED COUNT: 12.5 % (ref 12.3–15.4)
GIANT PLATELETS: NORMAL
HCT VFR BLD AUTO: 30.5 % (ref 34–46.6)
HGB BLD-MCNC: 10.4 G/DL (ref 12–15.9)
LARGE PLATELETS: NORMAL
LYMPHOCYTES # BLD AUTO: 0.8 10*3/MM3 (ref 0.7–3.1)
LYMPHOCYTES NFR BLD AUTO: 25.2 % (ref 19.6–45.3)
MCH RBC QN AUTO: 30.1 PG (ref 26.6–33)
MCHC RBC AUTO-ENTMCNC: 34 G/DL (ref 31.5–35.7)
MCV RBC AUTO: 88.6 FL (ref 79–97)
MONOCYTES # BLD AUTO: 0.5 10*3/MM3 (ref 0.1–0.9)
MONOCYTES NFR BLD AUTO: 14.7 % (ref 5–12)
NEUTROPHILS # BLD AUTO: 1.8 10*3/MM3 (ref 1.7–7)
NEUTROPHILS NFR BLD AUTO: 55.4 % (ref 42.7–76)
NRBC BLD AUTO-RTO: 0.1 /100 WBC (ref 0–0.2)
PLATELET # BLD AUTO: 65 10*3/MM3 (ref 140–450)
PMV BLD AUTO: 11.1 FL (ref 6–12)
RBC # BLD AUTO: 3.44 10*6/MM3 (ref 3.77–5.28)
RBC MORPH BLD: NORMAL
SMALL PLATELETS BLD QL SMEAR: ADEQUATE
WBC MORPH BLD: NORMAL
WBC NRBC COR # BLD: 3.3 10*3/MM3 (ref 3.4–10.8)

## 2020-02-22 PROCEDURE — 25010000002 IMMUNE GLOBULIN (HUMAN) 20 GM/200ML SOLUTION: Performed by: INTERNAL MEDICINE

## 2020-02-22 PROCEDURE — 63710000001 DIPHENHYDRAMINE PER 50 MG: Performed by: INTERNAL MEDICINE

## 2020-02-22 PROCEDURE — 85007 BL SMEAR W/DIFF WBC COUNT: CPT | Performed by: INTERNAL MEDICINE

## 2020-02-22 PROCEDURE — 85025 COMPLETE CBC W/AUTO DIFF WBC: CPT | Performed by: INTERNAL MEDICINE

## 2020-02-22 PROCEDURE — 96366 THER/PROPH/DIAG IV INF ADDON: CPT

## 2020-02-22 PROCEDURE — 96365 THER/PROPH/DIAG IV INF INIT: CPT

## 2020-02-22 RX ORDER — ACETAMINOPHEN 325 MG/1
650 TABLET ORAL ONCE
Status: COMPLETED | OUTPATIENT
Start: 2020-02-22 | End: 2020-02-22

## 2020-02-22 RX ORDER — DIPHENHYDRAMINE HCL 25 MG
25 TABLET ORAL ONCE
Status: COMPLETED | OUTPATIENT
Start: 2020-02-22 | End: 2020-02-22

## 2020-02-22 RX ADMIN — DIPHENHYDRAMINE HCL 25 MG: 25 TABLET ORAL at 08:06

## 2020-02-22 RX ADMIN — IMMUNE GLOBULIN (HUMAN) 60 G: 10 INJECTION INTRAVENOUS; SUBCUTANEOUS at 08:20

## 2020-02-22 RX ADMIN — ACETAMINOPHEN 650 MG: 325 TABLET ORAL at 08:06

## 2020-02-24 ENCOUNTER — HOSPITAL ENCOUNTER (OUTPATIENT)
Dept: ONCOLOGY | Facility: HOSPITAL | Age: 35
Setting detail: INFUSION SERIES
Discharge: HOME | End: 2020-02-24

## 2020-02-24 VITALS
HEART RATE: 90 BPM | SYSTOLIC BLOOD PRESSURE: 106 MMHG | DIASTOLIC BLOOD PRESSURE: 68 MMHG | BODY MASS INDEX: 23.7 KG/M2 | RESPIRATION RATE: 18 BRPM | TEMPERATURE: 97.9 F | WEIGHT: 128.8 LBS | HEIGHT: 62 IN

## 2020-02-24 DIAGNOSIS — D69.3 IMMUNE THROMBOCYTOPENIC PURPURA (HCC): Primary | ICD-10-CM

## 2020-02-24 DIAGNOSIS — D69.6 THROMBOCYTOPENIA (HCC): ICD-10-CM

## 2020-02-24 LAB
BASOPHILS # BLD AUTO: 0.04 10*3/MM3 (ref 0–0.2)
BASOPHILS NFR BLD AUTO: 0.7 % (ref 0–1.5)
DEPRECATED RDW RBC AUTO: 37 FL (ref 37–54)
EOSINOPHIL # BLD AUTO: 0.03 10*3/MM3 (ref 0–0.4)
EOSINOPHIL NFR BLD AUTO: 0.5 % (ref 0.3–6.2)
ERYTHROCYTE [DISTWIDTH] IN BLOOD BY AUTOMATED COUNT: 12 % (ref 12.3–15.4)
HCT VFR BLD AUTO: 30.7 % (ref 34–46.6)
HGB BLD-MCNC: 10.6 G/DL (ref 12–15.9)
LYMPHOCYTES # BLD AUTO: 1.26 10*3/MM3 (ref 0.7–3.1)
LYMPHOCYTES NFR BLD AUTO: 22.1 % (ref 19.6–45.3)
MCH RBC QN AUTO: 30.2 PG (ref 26.6–33)
MCHC RBC AUTO-ENTMCNC: 34.5 G/DL (ref 31.5–35.7)
MCV RBC AUTO: 87.5 FL (ref 79–97)
MONOCYTES # BLD AUTO: 0.69 10*3/MM3 (ref 0.1–0.9)
MONOCYTES NFR BLD AUTO: 12.1 % (ref 5–12)
NEUTROPHILS # BLD AUTO: 3.69 10*3/MM3 (ref 1.7–7)
NEUTROPHILS NFR BLD AUTO: 64.6 % (ref 42.7–76)
PLATELET # BLD AUTO: 231 10*3/MM3 (ref 140–450)
PMV BLD AUTO: 10.5 FL (ref 6–12)
RBC # BLD AUTO: 3.51 10*6/MM3 (ref 3.77–5.28)
WBC NRBC COR # BLD: 5.71 10*3/MM3 (ref 3.4–10.8)

## 2020-02-24 PROCEDURE — 85025 COMPLETE CBC W/AUTO DIFF WBC: CPT

## 2020-02-24 PROCEDURE — 25010000002 ROMIPLOSTIM PER 10 MCG: Performed by: INTERNAL MEDICINE

## 2020-02-24 PROCEDURE — 96372 THER/PROPH/DIAG INJ SC/IM: CPT

## 2020-02-24 PROCEDURE — 85025 COMPLETE CBC W/AUTO DIFF WBC: CPT | Performed by: NURSE PRACTITIONER

## 2020-02-24 PROCEDURE — 36415 COLL VENOUS BLD VENIPUNCTURE: CPT

## 2020-02-24 RX ADMIN — ROMIPLOSTIM 470 MCG: 500 INJECTION, POWDER, LYOPHILIZED, FOR SOLUTION SUBCUTANEOUS at 11:44

## 2020-02-25 ENCOUNTER — TELEPHONE (OUTPATIENT)
Dept: ONCOLOGY | Facility: CLINIC | Age: 35
End: 2020-02-25

## 2020-02-25 ENCOUNTER — HOSPITAL ENCOUNTER (EMERGENCY)
Facility: HOSPITAL | Age: 35
Discharge: HOME | End: 2020-02-25
Admitting: EMERGENCY MEDICINE

## 2020-02-25 ENCOUNTER — APPOINTMENT (OUTPATIENT)
Dept: GENERAL RADIOLOGY | Facility: HOSPITAL | Age: 35
End: 2020-02-25

## 2020-02-25 VITALS
HEART RATE: 66 BPM | OXYGEN SATURATION: 100 % | HEIGHT: 62 IN | SYSTOLIC BLOOD PRESSURE: 119 MMHG | RESPIRATION RATE: 16 BRPM | TEMPERATURE: 98.4 F | BODY MASS INDEX: 24.1 KG/M2 | WEIGHT: 130.95 LBS | DIASTOLIC BLOOD PRESSURE: 78 MMHG

## 2020-02-25 DIAGNOSIS — S00.511A ABRASION OF LIP, INITIAL ENCOUNTER: ICD-10-CM
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
## 2020-02-25 DIAGNOSIS — R07.89 CHEST WALL PAIN: ICD-10-CM

## 2020-02-25 DIAGNOSIS — R51.9 FACIAL PAIN: ICD-10-CM

## 2020-02-25 LAB
BASOPHILS # BLD AUTO: 0 10*3/MM3 (ref 0–0.2)
BASOPHILS NFR BLD AUTO: 0.8 % (ref 0–1.5)
DEPRECATED RDW RBC AUTO: 38.5 FL (ref 37–54)
EOSINOPHIL # BLD AUTO: 0.1 10*3/MM3 (ref 0–0.4)
EOSINOPHIL NFR BLD AUTO: 0.8 % (ref 0.3–6.2)
ERYTHROCYTE [DISTWIDTH] IN BLOOD BY AUTOMATED COUNT: 12.5 % (ref 12.3–15.4)
HCT VFR BLD AUTO: 33.4 % (ref 34–46.6)
HGB BLD-MCNC: 11.8 G/DL (ref 12–15.9)
LYMPHOCYTES # BLD AUTO: 0.9 10*3/MM3 (ref 0.7–3.1)
LYMPHOCYTES NFR BLD AUTO: 13.6 % (ref 19.6–45.3)
MCH RBC QN AUTO: 30.3 PG (ref 26.6–33)
MCHC RBC AUTO-ENTMCNC: 35.3 G/DL (ref 31.5–35.7)
MCV RBC AUTO: 85.9 FL (ref 79–97)
MONOCYTES # BLD AUTO: 0.7 10*3/MM3 (ref 0.1–0.9)
MONOCYTES NFR BLD AUTO: 10.9 % (ref 5–12)
NEUTROPHILS # BLD AUTO: 4.7 10*3/MM3 (ref 1.7–7)
NEUTROPHILS NFR BLD AUTO: 73.9 % (ref 42.7–76)
NRBC BLD AUTO-RTO: 0 /100 WBC (ref 0–0.2)
PLATELET # BLD AUTO: 240 10*3/MM3 (ref 140–450)
PMV BLD AUTO: 7.6 FL (ref 6–12)
RBC # BLD AUTO: 3.89 10*6/MM3 (ref 3.77–5.28)
WBC NRBC COR # BLD: 6.3 10*3/MM3 (ref 3.4–10.8)

## 2020-02-25 PROCEDURE — 85025 COMPLETE CBC W/AUTO DIFF WBC: CPT | Performed by: PHYSICIAN ASSISTANT

## 2020-02-25 PROCEDURE — 70150 X-RAY EXAM OF FACIAL BONES: CPT

## 2020-02-25 PROCEDURE — 99283 EMERGENCY DEPT VISIT LOW MDM: CPT

## 2020-02-25 PROCEDURE — 71046 X-RAY EXAM CHEST 2 VIEWS: CPT

## 2020-02-25 NOTE — TELEPHONE ENCOUNTER
I called patient and left voicemail stating that unfortunately we do not have the results of her bone marrow biopsy performed at [facility].  If she would like the results of the bone marrow performed at [facility] then she would need to contact them regarding that information. I let the patient know that results for those can usually take 1-2 weeks before they are completed. I let patient know that if there was anything that I could do to help to contact me tomorrow and I would try to investigate this further. We typically do not give out bone marrow results over the phone per policy, but if there is a special circumstance I may.

## 2020-02-25 NOTE — TELEPHONE ENCOUNTER
Pt called requesting her Bone Marrow Biopsy results performed at [facility]. Please call pt back with these results at [phone] with these results.
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
## 2020-02-26 NOTE — DISCHARGE INSTRUCTIONS
Take over-the-counter pain medicine as needed.     Follow-up with your primary care provider in 3-5 days.  If you do not have a primary care provider call [phone] for help in finding one, or you may follow up with [facility] at [phone].    Follow-up with your dentist if your dental pain continues    Return to ED for any new or worsening symptoms

## 2020-03-05 ENCOUNTER — HOSPITAL ENCOUNTER (OUTPATIENT)
Dept: ONCOLOGY | Facility: HOSPITAL | Age: 35
Setting detail: INFUSION SERIES
Discharge: HOME | End: 2020-03-05

## 2020-03-05 VITALS
HEART RATE: 83 BPM | HEIGHT: 62 IN | RESPIRATION RATE: 20 BRPM | DIASTOLIC BLOOD PRESSURE: 79 MMHG | WEIGHT: 130 LBS | BODY MASS INDEX: 23.92 KG/M2 | SYSTOLIC BLOOD PRESSURE: 125 MMHG

## 2020-03-05 DIAGNOSIS — D69.6 THROMBOCYTOPENIA (HCC): ICD-10-CM

## 2020-03-05 DIAGNOSIS — D69.3 IMMUNE THROMBOCYTOPENIC PURPURA (HCC): Primary | ICD-10-CM

## 2020-03-05 LAB
BASOPHILS # BLD AUTO: 0.02 10*3/MM3 (ref 0–0.2)
BASOPHILS NFR BLD AUTO: 0.4 % (ref 0–1.5)
DEPRECATED RDW RBC AUTO: 38.6 FL (ref 37–54)
EOSINOPHIL # BLD AUTO: 0.12 10*3/MM3 (ref 0–0.4)
EOSINOPHIL NFR BLD AUTO: 2.2 % (ref 0.3–6.2)
ERYTHROCYTE [DISTWIDTH] IN BLOOD BY AUTOMATED COUNT: 12.6 % (ref 12.3–15.4)
HCT VFR BLD AUTO: 33 % (ref 34–46.6)
HGB BLD-MCNC: 11.4 G/DL (ref 12–15.9)
LYMPHOCYTES # BLD AUTO: 1.69 10*3/MM3 (ref 0.7–3.1)
LYMPHOCYTES NFR BLD AUTO: 31.6 % (ref 19.6–45.3)
MCH RBC QN AUTO: 29.9 PG (ref 26.6–33)
MCHC RBC AUTO-ENTMCNC: 34.5 G/DL (ref 31.5–35.7)
MCV RBC AUTO: 86.6 FL (ref 79–97)
MONOCYTES # BLD AUTO: 0.71 10*3/MM3 (ref 0.1–0.9)
MONOCYTES NFR BLD AUTO: 13.3 % (ref 5–12)
NEUTROPHILS # BLD AUTO: 2.81 10*3/MM3 (ref 1.7–7)
NEUTROPHILS NFR BLD AUTO: 52.5 % (ref 42.7–76)
PLATELET # BLD AUTO: 65 10*3/MM3 (ref 140–450)
PMV BLD AUTO: 10.7 FL (ref 6–12)
RBC # BLD AUTO: 3.81 10*6/MM3 (ref 3.77–5.28)
WBC NRBC COR # BLD: 5.35 10*3/MM3 (ref 3.4–10.8)

## 2020-03-05 PROCEDURE — 36416 COLLJ CAPILLARY BLOOD SPEC: CPT

## 2020-03-05 PROCEDURE — 25010000002 ROMIPLOSTIM PER 10 MCG: Performed by: NURSE PRACTITIONER

## 2020-03-05 PROCEDURE — 96372 THER/PROPH/DIAG INJ SC/IM: CPT

## 2020-03-05 PROCEDURE — 85025 COMPLETE CBC W/AUTO DIFF WBC: CPT | Performed by: NURSE PRACTITIONER

## 2020-03-05 RX ADMIN — ROMIPLOSTIM 470 MCG: 500 INJECTION, POWDER, LYOPHILIZED, FOR SOLUTION SUBCUTANEOUS at 09:18
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
## 2020-03-10 NOTE — PROGRESS NOTES
HEMATOLOGY ONCOLOGY FOLLOW UP        Patient name: Tiny Akhtar  : 1985  MRN: [id number]  Primary Care Physician: Jose Ingram, APRN  Referring Physician: Jose Ingram, *  Reason For Consult:     Chief Complaint   Patient presents with   • Follow-up     Immune thrombocytopenic purpura        History of Present Illness:  Tiny Akhtar is a 34 y.o. female with a past medical history of thrombocytopenia, anemia, and kidney stones.  She presented to [facility] Emergency Department on 2019 after she had blood work drawn earlier in the day that showed a platelet count of 9,000 at this facility.  She had previously been admitted through the ED on 2019 reporting bruising on her ankles and in her mouth.  She received 2 units of platelets and started on steroids. She was discharged on 19.The patient's last platelet count was 17,000 on 19 after a platelet transfusion.  She had been on steroids for four days.  The patient reported continued  subcutaneous bruising. She denied any headaches, nosebleeds, or gingival bleeding.  She was admitted for IV steroids and further management.     · 10/7/2019 CT abdomen and pelvis: 3 mm stone at the left ureterovesical junction causing mild to moderate obstruction.Spleen reported as normal size.  · 10/7/2019 WBC 10.2, hemoglobin 12.2, platelets 268    · 2019 WBC 5.7, hemoglobin 12.8, Platelets 5000,  Transfused 2 units platelets.  initial presentation for diagnosis acute thrombocytopenia,. Started on Solumedrol 125 mg IV Q6Hr  · 2019 platelets 5000.  Patient transfused with platelets.  Platelets increased to 13,000  · 19  Bone marrow biopsy - Smears of bone marrow aspirate with cell block (clot) preparation and marrow biopsy:  Hypocellular for age bone marrow (50%),  Adequate numbers of unremarkable megakaryocytes,  Absent iron stores,  Negative for involvement by malignant lymphoma.  Flow  cytometry-CD 56 expression on granulocytes and monocytes.  Chromosomal analysis -46XX  · 11/20/2019 platelet count 17,000 . Switched to Prednisone 1 mg/kg ( 50 mg daily)  · 11/21/2019 platelets 17,000  · 11/22/2019  Readmitted . Restarted Solumedrol  mg Q6H . platelets 9000  · 11/23/19 platelets 13,000  · 11/23/2019 hematology/Oncology was consulted on this patient known to our service and previously consulted for acute thrombocytopenia on 11/18/19. She was followed by Uri Sagastume MD for ITP. The patient had been transfused and started on IV steroids Prednisone while inpatient 11/18/19 to 11/20/19.  She was transitioned to oral steroids at discharge.     · 11/23/2019 Hepatitis panel negative, TSH 0.33, H. pylori IgG 1.43 High, IgM negative, JOSE negative, HIV antibodies negative  · 11/23/2019 patient received IV Venofer.  Patient received meningococcal vaccine, Haemophilus vaccine, pneumococcus vaccine  · 11/23/2019 platelets 13,000  · 11/24/2019 platelets 52,000.  Platelets responded to steroids. On IV solumedrol. Started on Megace 40 mg po QID for menorrhagia.  · Pt discharged on Prednisone 50 mg  · 11/27/2019 platelets 8000.  Patient's failed steroids ( on prednisone 50 mg daily).. Started Tranexamic acid 1300 mg TID  · 11/29/2019  READMITTED platelets 12,000  · 11/29/19-Cycle 1 Day one of RITUXIMAB and continues on megestrol  · 11/30/2019 WBC 11.2, hemoglobin 12.1, platelets 6000  · 12/1/2019 platelets 11,000.  Patient received IVIG 30 g  · 12/2/2019 iron 72, iron saturation 17% low, TIBC 416, ferritin 231, platelets 56,000  · 12/3/2019 platelets 129,000  · 12/4/2019 Prednisone decreased to 40 mg daily  · 12/6/2019 patient received week 2 of Rituximab.  Platelets 24,000, hemoglobin 11.8  · 12/8/2019 platelets 5000, hemoglobin 11.6, PT 10.5, INR 1.  Patient received IVIG 50 g  · 12/9/2019 platelets 11,000  · 12/11/2019 platelets 16,000  · 12/13/2019: Platelets 13,000 Patient received week 3 of  her Rituximab. Prednisone decreased to 20 mg daily  · 12/14/2019 patient received IVIG 55 g, platelets 12,000.   · 12/16/2019 platelets 24,000. Prednisone decreased to 10 mg daily  · 12/18/2019 platelets 12,000  · 12/19/2019 platelets 11,000  · 12/20/19-week 4 Rituximab and Megestrol, platelets 12,000. Prednisone DCed.  · 12/26/2019 platelets 7000  · 12/27/19-patient was started on NPLATE   1 mcg/kg, platelets 8000  · 12/30/2019 platelets 6000.  After transfusion 31,000  · 01/03/2020- NPLATE 2 mcg/kg, platelets 10,000  · 01/10/2020-NPLATE 3 mcg/kg.  continues on Megace megestrol, platelets 4000  · 1/15/2020: WBC 5.9, hemoglobin 12.5, platelets 2000k,   · 1/15/2020: Serum copper 114 normal, IgA 81 normal,Anticardiolipin antibodies negative, beta-2 glycoprotein antibodies negative, phosphatidylserine antibodies negative,  · 1/16/2020 patient received IVIG 55 g, platelets 4000  · 1/17/2020 patient received IVIG 55 g, platelets 36,000  · 01/17/2020: IVIG WBC 4.27, hemoglobin, 11.5, platelets  [id number]  · 1/20/2020 patient received Nplate 6 mcg/kg, WBC 5.1, hemoglobin 11.9, platelets 44,  · 1/27/2020 patient received Nplate 6 mcg/kg platelets 100,000  · 1/29/2020 platelets 74,000  · 1/30/2020 patient seen at [facility] hematology for second opinion.  Recommended to continue course of current treatment.  · 2/3/2020 patient received Nplate 6 mcg/kg.  WBC 5.9, hemoglobin 12, platelets 81k  · 2/10/2020 patient received Nplate 7 mcg/kg platelets 34,000   · 2/12/2020 WBC 5.1, hemoglobin 12.8, platelets 24K  · 2/13/2020: Patient advised to start dexamethasone 40 mg x 4 days course   · 2/17/2020: Platelets 11,000  · 2/21/2020 platelets 0.  Patient received IVIG 1 g/kg.  60 g  · 2/21/2020: Patient transfused platelets   · 2/22/2020 platelets 65K. , hemoglobin 10.4, patient received IVIG 60 g  · 2/24/2020 platelets 231K, hemoglobin 10.6, patient received Nplate 8 mcg/kg  · 3/5/2020: WBC 5.3, hemoglobin 11.4, platelets  65,000.  Patient received Nplate 8 mcg/kg  · 3/11/2020 hemoglobin 12.1, platelets 97,000            Subjective:03/11/2020  Patient is here for a follow up of ITP.  She states she is somewhat tired and weak today, but denies any other issues. She currently takes Megace 20mg TID PRN. Her most recent Nplate 8 mcg/kg injection was on 03/05/2020.  Patient does not have any bleeding issues.  She received IVIG last month.          Past Medical History:   Diagnosis Date   • Anxiety    • Iron deficiency anemia    • Kidney stone 10/2019       No past surgical history on file.      Current Outpatient Medications:   •  acetaminophen (TYLENOL) 325 MG tablet, Take 650 mg by mouth Every 6 (Six) Hours As Needed for Mild Pain  or Moderate Pain ., Disp: , Rfl:   •  amphetamine-dextroamphetamine (ADDERALL) 10 MG tablet, , Disp: , Rfl:   •  ferrous sulfate 324 (65 Fe) MG tablet delayed-release EC tablet, Take 1 tablet by mouth Daily With Breakfast., Disp: 30 tablet, Rfl: 0  •  megestrol (MEGACE) 20 MG tablet, Take 20 mg by mouth 3 (Three) Times a Day., Disp: , Rfl:   •  NON FORMULARY, Take 1 tablet by mouth Daily. Balance OTC, Disp: , Rfl:   •  pantoprazole (PROTONIX) 20 MG EC tablet, , Disp: , Rfl:   •  polyethylene glycol (MIRALAX) powder, Take 17 g by mouth Daily As Needed (constipation)., Disp: 225 g, Rfl: 0  •  Tranexamic Acid 650 MG tablet, Take 2 tablets by mouth 3 (Three) Times a Day., Disp: 30 tablet, Rfl: 0    No Known Allergies    Family History   Problem Relation Age of Onset   • Thrombocytopenia Nephew         ITP   • Diabetes Mother    • Diabetes Father    • Cancer Paternal Grandmother        Cancer-related family history includes Cancer in her paternal grandmother.    Social History     Tobacco Use   • Smoking status: Never Smoker   • Smokeless tobacco: Never Used   Substance Use Topics   • Alcohol use: No     Frequency: Never   • Drug use: No         I have reviewed the history of present illness, past medical history,  "family history, social history, lab results, all notes and other records since the patient was last seen on  11/21/2019.    ROS:     Review of Systems   Constitutional: Positive for fatigue. Negative for chills and fever.   HENT: Negative for ear pain, mouth sores, nosebleeds and sore throat.    Eyes: Negative for photophobia and visual disturbance.   Respiratory: Negative for wheezing and stridor.    Cardiovascular: Negative for chest pain and palpitations.   Gastrointestinal: Negative for abdominal pain, diarrhea, nausea and vomiting.   Endocrine: Negative for cold intolerance and heat intolerance.   Genitourinary: Negative for dysuria and hematuria.   Musculoskeletal: Negative for joint swelling and neck stiffness.   Skin: Negative for color change and rash.   Neurological: Negative for seizures and syncope.   Hematological: Negative for adenopathy.        No obvious bleeding   Psychiatric/Behavioral: Negative for agitation, confusion and hallucinations.       Objective:    Vitals:    03/11/20 1014   BP: 104/70   Pulse: 61   Resp: 18   Temp: 98.5 °F (36.9 °C)   Weight: 59.1 kg (130 lb 3.2 oz)   Height: 157.5 cm (62\")   PainSc: 0-No pain     ECOG  (0) Fully active, able to carry on all predisease performance without restriction    Physical Exam:   Physical Exam   Constitutional: She is oriented to person, place, and time. She appears well-developed and well-nourished.   HENT:   Head: Normocephalic and atraumatic.   Nose: Nose normal.   Mouth/Throat: Oropharynx is clear and moist. No oropharyngeal exudate.   Eyes: Conjunctivae and EOM are normal. No scleral icterus.   Neck: Normal range of motion. No tracheal deviation present. No thyromegaly present.   Cardiovascular: Regular rhythm and normal heart sounds.   Pulmonary/Chest: No stridor.   Abdominal: Soft. Bowel sounds are normal. She exhibits no distension and no mass. There is no tenderness.   Musculoskeletal: Normal range of motion. She exhibits no edema or " deformity.   Lymphadenopathy:     She has no cervical adenopathy.   Neurological: She is alert and oriented to person, place, and time. No sensory deficit.   Skin: Skin is warm.   Psychiatric: Her behavior is normal.   Vitals reviewed.            Lab Results - Last 18 Months   Lab Units 03/11/20  0939 03/05/20  0846 02/25/20  1824   WBC 10*3/mm3 5.45 5.35 6.30   HEMOGLOBIN g/dL 12.1 11.4* 11.8*   HEMATOCRIT % 35.1 33.0* 33.4*   PLATELETS 10*3/mm3 97* 65* 240   MCV fL 86.7 86.6 85.9     Lab Results - Last 18 Months   Lab Units 12/08/19  2022 12/02/19  0354 12/01/19  0320  11/29/19  1217  11/22/19  2248 11/18/19  0918   SODIUM mmol/L 137 137 138   < > 136   < > 139 140   POTASSIUM mmol/L 3.6 4.1 4.2   < > 4.2   < > 3.5 3.8   CHLORIDE mmol/L 100 101 100   < > 98   < > 103 105   CO2 mmol/L 26.0 27.0 23.0   < > 23.0   < > 23.0 26.0   BUN mg/dL 17 16 15   < > 17   < > 17 16   CREATININE mg/dL 0.55* 0.55* 0.65   < > 0.68   < > 0.54* 0.60   CALCIUM mg/dL 9.2 9.3 9.3   < > 9.5   < > 9.1 9.5   BILIRUBIN mg/dL  --   --   --   --  0.3  --  0.3 0.3   ALK PHOS U/L  --   --   --   --  36*  --  36* 36*   ALT (SGPT) U/L  --   --   --   --  31  --  38* 13   AST (SGOT) U/L  --   --   --   --  16  --  16 17   GLUCOSE mg/dL 103* 107* 171*   < > 93   < > 153* 102*    < > = values in this interval not displayed.       Lab Results   Component Value Date    GLUCOSE 103 (H) 12/08/2019    BUN 17 12/08/2019    CREATININE 0.55 (L) 12/08/2019    EGFRIFNONA 128 12/08/2019    BCR 30.9 (H) 12/08/2019    K 3.6 12/08/2019    CO2 26.0 12/08/2019    CALCIUM 9.2 12/08/2019    PROTENTOTREF 5.9 (L) 11/23/2019    ALBUMIN 4.10 11/29/2019    LABIL2 1.5 11/23/2019    AST 16 11/29/2019    ALT 31 11/29/2019       Lab Results - Last 18 Months   Lab Units 12/08/19 2022 11/18/19  0918   INR  1.00 1.00   APTT seconds  --  23.5*       Lab Results   Component Value Date    IRON 72 12/02/2019    TIBC 416 12/02/2019    FERRITIN 231.70 (H) 12/02/2019       Lab Results    Component Value Date    FOLATE 11.20 11/23/2019       No results found for: OCCULTBLD    Lab Results   Component Value Date    RETICCTPCT 3.13 (H) 12/01/2019       Lab Results   Component Value Date    SKNKATQV09 461 11/23/2019     No results found for: SPEP, UPEP  LDH   Date Value Ref Range Status   11/23/2019 123 (L) 135 - 214 U/L Final     Lab Results   Component Value Date    JOSE Negative 11/23/2019     Lab Results   Component Value Date    HAPTOGLOBIN 136 11/23/2019     Lab Results   Component Value Date    PTT 23.5 (L) 11/18/2019    INR 1.00 12/08/2019     No results found for:   No results found for: CEA  No components found for: CA-19-9  No results found for: PSA        Assessment/Plan     Assessment:  1. Acute idiopathic thrombocytopenic purpura ITP. : Refractory to steroids: She was diagnosed on 11/18/19  by mouth.  S/p bone marrow biopsy 11/18/19 - hypocellular for age bone marrow (50%),  adequate numbers of unremarkable megakaryocytes,  absent iron stores,  negative for involvement by malignant lymphoma.  Flow cytometry-CD 56 expression on granulocytes and monocytes.  . Normal karyotype 46XX.  HIV testing negative. TSH normal.   She does have a transient responses to IVIG.  Received 4 weekly doses of Rituximab starting 11/29/2019 to 12/20/2019.  She did not respond to rituximab right away and still awaiting response.  Started on Nplate 12/27/2019 at 1 mcg/kg.  Dose increased to 3 mcg/kg as of 1/10/2020 and 7 mcg/kg as of last dose..  IVIG was repeated on 1/16/2020 and 1/17/2020 with good response that lasted for about 3 weeks.. In mid February 2020:  2. Anemia: History of HAILE:  Bone marrow biopsy on 11/18/19 showed absent iron stores. Anemia work-up: reticulocytes 1.53, ferritin 15.38, iron 40, iron saturation 9, transferrin 310, TIBC 462, .   3. Iron deficiency anemia - S/p Venofer 400 mg IV x 1 dose on 11/23/19  3. Left renal stone: 3 mm kidney stone visualized on CT A/P on 10/7/19.   Asymptomatic.  4. Anxiety/weight loss:  management per Primary team   5. Hx of Menstrual bleeding: LMPD 11/23/19 -On Megace  to suppress menstrual cycle . Also on Lysteda (tranexamic acid)     PLAN      1. Continue Nplate while we await response to rituximab.   We will go up to 10 mcg/kg with the next dose.  2. She continues to respond very well to IVIG and the response this may last 3 to 4 weeks..  3. Patient has failed rechallenge with dexamethasone  4. Rituximab can have very delayed response in some cases up to 3 months.  5. Recommend platelet transfusions only if platelets extremely low ( less than 3K) or if patient has bleeding.  6. Continue Megace  7. Continue Lysteda, tranexamic acid as needed for platelets less than 10,000.  8. Continue Protonix  9. Consider splenectomy/fostamatinib if patient fails rituximab and Nplate.   10. Continue weekly CBCs  11. Will follow patient back in 3 to 4 weeks          There are no diagnoses linked to this encounter.  No orders of the defined types were placed in this encounter.              Thank you very much for providing the opportunity to participate in this patient’s care. Please do not hesitate to call if there are any other questions.    I have reviewed all relevant labs results,outside reports, imaging,,notes, vitals, medications and plan with the patient today.    Portions of the note have been Scribed by medical assistant/Nurse.  I have reviewed and made changes accordingly.

## 2020-03-11 ENCOUNTER — OFFICE VISIT (OUTPATIENT)
Dept: ONCOLOGY | Facility: CLINIC | Age: 35
End: 2020-03-11

## 2020-03-11 ENCOUNTER — TELEPHONE (OUTPATIENT)
Dept: ONCOLOGY | Facility: HOSPITAL | Age: 35
End: 2020-03-11

## 2020-03-11 ENCOUNTER — HOSPITAL ENCOUNTER (OUTPATIENT)
Dept: ONCOLOGY | Facility: HOSPITAL | Age: 35
Setting detail: INFUSION SERIES
End: 2020-03-11

## 2020-03-11 ENCOUNTER — HOSPITAL ENCOUNTER (OUTPATIENT)
Dept: ONCOLOGY | Facility: HOSPITAL | Age: 35
Setting detail: INFUSION SERIES
Discharge: HOME | End: 2020-03-11

## 2020-03-11 ENCOUNTER — OFFICE VISIT (OUTPATIENT)
Dept: LAB | Facility: HOSPITAL | Age: 35
End: 2020-03-11

## 2020-03-11 VITALS
DIASTOLIC BLOOD PRESSURE: 70 MMHG | BODY MASS INDEX: 23.96 KG/M2 | WEIGHT: 130.2 LBS | HEIGHT: 62 IN | SYSTOLIC BLOOD PRESSURE: 104 MMHG | HEART RATE: 61 BPM | TEMPERATURE: 98.5 F | RESPIRATION RATE: 18 BRPM

## 2020-03-11 DIAGNOSIS — D50.9 IRON DEFICIENCY ANEMIA, UNSPECIFIED IRON DEFICIENCY ANEMIA TYPE: ICD-10-CM

## 2020-03-11 DIAGNOSIS — D69.3 IMMUNE THROMBOCYTOPENIC PURPURA (HCC): Primary | Chronic | ICD-10-CM

## 2020-03-11 DIAGNOSIS — D69.6 THROMBOCYTOPENIA (HCC): ICD-10-CM

## 2020-03-11 DIAGNOSIS — D69.3 IMMUNE THROMBOCYTOPENIC PURPURA (HCC): ICD-10-CM

## 2020-03-11 LAB
BASOPHILS # BLD AUTO: 0.03 10*3/MM3 (ref 0–0.2)
BASOPHILS NFR BLD AUTO: 0.6 % (ref 0–1.5)
DEPRECATED RDW RBC AUTO: 38.6 FL (ref 37–54)
EOSINOPHIL # BLD AUTO: 0.17 10*3/MM3 (ref 0–0.4)
EOSINOPHIL NFR BLD AUTO: 3.1 % (ref 0.3–6.2)
ERYTHROCYTE [DISTWIDTH] IN BLOOD BY AUTOMATED COUNT: 12.5 % (ref 12.3–15.4)
HCT VFR BLD AUTO: 35.1 % (ref 34–46.6)
HGB BLD-MCNC: 12.1 G/DL (ref 12–15.9)
LYMPHOCYTES # BLD AUTO: 1.71 10*3/MM3 (ref 0.7–3.1)
LYMPHOCYTES NFR BLD AUTO: 31.4 % (ref 19.6–45.3)
MCH RBC QN AUTO: 29.9 PG (ref 26.6–33)
MCHC RBC AUTO-ENTMCNC: 34.5 G/DL (ref 31.5–35.7)
MCV RBC AUTO: 86.7 FL (ref 79–97)
MONOCYTES # BLD AUTO: 0.68 10*3/MM3 (ref 0.1–0.9)
MONOCYTES NFR BLD AUTO: 12.5 % (ref 5–12)
NEUTROPHILS # BLD AUTO: 2.86 10*3/MM3 (ref 1.7–7)
NEUTROPHILS NFR BLD AUTO: 52.4 % (ref 42.7–76)
PLATELET # BLD AUTO: 97 10*3/MM3 (ref 140–450)
PMV BLD AUTO: 11.8 FL (ref 6–12)
RBC # BLD AUTO: 4.05 10*6/MM3 (ref 3.77–5.28)
WBC NRBC COR # BLD: 5.45 10*3/MM3 (ref 3.4–10.8)

## 2020-03-11 PROCEDURE — 99215 OFFICE O/P EST HI 40 MIN: CPT | Performed by: INTERNAL MEDICINE

## 2020-03-11 PROCEDURE — 85025 COMPLETE CBC W/AUTO DIFF WBC: CPT

## 2020-03-11 PROCEDURE — 36415 COLL VENOUS BLD VENIPUNCTURE: CPT

## 2020-03-11 NOTE — TELEPHONE ENCOUNTER
Called pt to let her know that her next appointment is scheduled for tomorrow, 3/12/20, at 0930. Pt verbalized understanding.

## 2020-03-11 NOTE — PROGRESS NOTES
Pt here for MD visit and to receive nplate injection. Dr Llanes increased nplate dose to 590mcg today, however insurance authorization not received for nplate 590mcg. Insurance auth received for nplate 558mcg. Asked Talisha Araujo NP if  nplate 558mcg ok for today, due to not having an insurance auth for the 590mcg. New order received for nplate 558mcg. Informed pt and she asked if she could return tomorrow for nplate injection. Will ask Carey to schedule appointment for tomorrow. Told pt I'd call her with appointment time. Pt verbalized understanding.

## 2020-03-12 ENCOUNTER — APPOINTMENT (OUTPATIENT)
Dept: ONCOLOGY | Facility: HOSPITAL | Age: 35
End: 2020-03-12

## 2020-03-13 ENCOUNTER — HOSPITAL ENCOUNTER (OUTPATIENT)
Dept: ONCOLOGY | Facility: HOSPITAL | Age: 35
Setting detail: INFUSION SERIES
Discharge: HOME | End: 2020-03-13

## 2020-03-13 VITALS
TEMPERATURE: 98 F | RESPIRATION RATE: 18 BRPM | WEIGHT: 129.8 LBS | HEART RATE: 65 BPM | BODY MASS INDEX: 23.89 KG/M2 | DIASTOLIC BLOOD PRESSURE: 65 MMHG | HEIGHT: 62 IN | SYSTOLIC BLOOD PRESSURE: 95 MMHG

## 2020-03-13 DIAGNOSIS — D69.6 THROMBOCYTOPENIA (HCC): Primary | ICD-10-CM

## 2020-03-13 LAB
BASOPHILS # BLD AUTO: 0.03 10*3/MM3 (ref 0–0.2)
BASOPHILS NFR BLD AUTO: 0.6 % (ref 0–1.5)
DEPRECATED RDW RBC AUTO: 38.1 FL (ref 37–54)
EOSINOPHIL # BLD AUTO: 0.16 10*3/MM3 (ref 0–0.4)
EOSINOPHIL NFR BLD AUTO: 3.1 % (ref 0.3–6.2)
ERYTHROCYTE [DISTWIDTH] IN BLOOD BY AUTOMATED COUNT: 12.7 % (ref 12.3–15.4)
HCT VFR BLD AUTO: 32.6 % (ref 34–46.6)
HGB BLD-MCNC: 11.6 G/DL (ref 12–15.9)
LYMPHOCYTES # BLD AUTO: 1.73 10*3/MM3 (ref 0.7–3.1)
LYMPHOCYTES NFR BLD AUTO: 33.2 % (ref 19.6–45.3)
MCH RBC QN AUTO: 30.4 PG (ref 26.6–33)
MCHC RBC AUTO-ENTMCNC: 35.6 G/DL (ref 31.5–35.7)
MCV RBC AUTO: 85.3 FL (ref 79–97)
MONOCYTES # BLD AUTO: 0.63 10*3/MM3 (ref 0.1–0.9)
MONOCYTES NFR BLD AUTO: 12.1 % (ref 5–12)
NEUTROPHILS # BLD AUTO: 2.66 10*3/MM3 (ref 1.7–7)
NEUTROPHILS NFR BLD AUTO: 51 % (ref 42.7–76)
PLATELET # BLD AUTO: 106 10*3/MM3 (ref 140–450)
PMV BLD AUTO: 11.6 FL (ref 6–12)
RBC # BLD AUTO: 3.82 10*6/MM3 (ref 3.77–5.28)
WBC NRBC COR # BLD: 5.21 10*3/MM3 (ref 3.4–10.8)

## 2020-03-13 PROCEDURE — 25010000002 ROMIPLOSTIM 125 MCG RECONSTITUTED SOLUTION: Performed by: NURSE PRACTITIONER

## 2020-03-13 PROCEDURE — 25010000002 ROMIPLOSTIM PER 10 MCG: Performed by: NURSE PRACTITIONER

## 2020-03-13 PROCEDURE — 85025 COMPLETE CBC W/AUTO DIFF WBC: CPT | Performed by: INTERNAL MEDICINE

## 2020-03-13 PROCEDURE — 96372 THER/PROPH/DIAG INJ SC/IM: CPT

## 2020-03-13 PROCEDURE — 36416 COLLJ CAPILLARY BLOOD SPEC: CPT

## 2020-03-13 RX ORDER — WATER 1000 ML/1000ML
INJECTION, SOLUTION INTRAVENOUS
Status: DISCONTINUED
Start: 2020-03-13 | End: 2020-03-14 | Stop reason: HOSPADM

## 2020-03-17 ENCOUNTER — TELEPHONE (OUTPATIENT)
Dept: ONCOLOGY | Facility: HOSPITAL | Age: 35
End: 2020-03-17

## 2020-03-18 ENCOUNTER — HOSPITAL ENCOUNTER (OUTPATIENT)
Dept: ONCOLOGY | Facility: HOSPITAL | Age: 35
Setting detail: INFUSION SERIES
Discharge: HOME | End: 2020-03-18

## 2020-03-18 VITALS
HEART RATE: 60 BPM | WEIGHT: 131.3 LBS | RESPIRATION RATE: 20 BRPM | TEMPERATURE: 98.2 F | BODY MASS INDEX: 24.16 KG/M2 | SYSTOLIC BLOOD PRESSURE: 107 MMHG | HEIGHT: 62 IN | DIASTOLIC BLOOD PRESSURE: 70 MMHG

## 2020-03-18 DIAGNOSIS — D69.6 THROMBOCYTOPENIA (HCC): Primary | ICD-10-CM

## 2020-03-18 LAB
BASOPHILS # BLD AUTO: 0.05 10*3/MM3 (ref 0–0.2)
BASOPHILS NFR BLD AUTO: 0.9 % (ref 0–1.5)
DEPRECATED RDW RBC AUTO: 38.4 FL (ref 37–54)
EOSINOPHIL # BLD AUTO: 0.17 10*3/MM3 (ref 0–0.4)
EOSINOPHIL NFR BLD AUTO: 3 % (ref 0.3–6.2)
ERYTHROCYTE [DISTWIDTH] IN BLOOD BY AUTOMATED COUNT: 12.6 % (ref 12.3–15.4)
HCT VFR BLD AUTO: 35.2 % (ref 34–46.6)
HGB BLD-MCNC: 12.3 G/DL (ref 12–15.9)
LYMPHOCYTES # BLD AUTO: 1.67 10*3/MM3 (ref 0.7–3.1)
LYMPHOCYTES NFR BLD AUTO: 29.1 % (ref 19.6–45.3)
MCH RBC QN AUTO: 29.5 PG (ref 26.6–33)
MCHC RBC AUTO-ENTMCNC: 34.9 G/DL (ref 31.5–35.7)
MCV RBC AUTO: 84.4 FL (ref 79–97)
MONOCYTES # BLD AUTO: 0.76 10*3/MM3 (ref 0.1–0.9)
MONOCYTES NFR BLD AUTO: 13.3 % (ref 5–12)
NEUTROPHILS # BLD AUTO: 3.08 10*3/MM3 (ref 1.7–7)
NEUTROPHILS NFR BLD AUTO: 53.7 % (ref 42.7–76)
PLATELET # BLD AUTO: 25 10*3/MM3 (ref 140–450)
RBC # BLD AUTO: 4.17 10*6/MM3 (ref 3.77–5.28)
WBC NRBC COR # BLD: 5.73 10*3/MM3 (ref 3.4–10.8)

## 2020-03-18 PROCEDURE — 25010000002 ROMIPLOSTIM PER 10 MCG: Performed by: INTERNAL MEDICINE

## 2020-03-18 PROCEDURE — 96372 THER/PROPH/DIAG INJ SC/IM: CPT

## 2020-03-18 PROCEDURE — 36415 COLL VENOUS BLD VENIPUNCTURE: CPT

## 2020-03-18 PROCEDURE — 85025 COMPLETE CBC W/AUTO DIFF WBC: CPT | Performed by: INTERNAL MEDICINE

## 2020-03-18 PROCEDURE — 25010000002 ROMIPLOSTIM 125 MCG RECONSTITUTED SOLUTION: Performed by: INTERNAL MEDICINE

## 2020-03-18 RX ORDER — WATER 1000 ML/1000ML
INJECTION, SOLUTION INTRAVENOUS
Status: DISCONTINUED
Start: 2020-03-18 | End: 2020-03-19 | Stop reason: HOSPADM

## 2020-03-18 RX ADMIN — ROMIPLOSTIM 90 MCG: 125 INJECTION, POWDER, LYOPHILIZED, FOR SOLUTION SUBCUTANEOUS at 09:38

## 2020-03-18 RX ADMIN — ROMIPLOSTIM 500 MCG: 500 INJECTION, POWDER, LYOPHILIZED, FOR SOLUTION SUBCUTANEOUS at 09:39

## 2020-03-18 NOTE — PROGRESS NOTES
Trice/Citlali -     Please bring patient in early Friday 3/20/20 for a CBC per Dr. Llanes.     Electronically signed by FARZAD Banegas, 03/18/20, 6:21 PM.

## 2020-03-19 ENCOUNTER — TELEPHONE (OUTPATIENT)
Dept: ONCOLOGY | Facility: CLINIC | Age: 35
End: 2020-03-19

## 2020-03-19 NOTE — TELEPHONE ENCOUNTER
I called and left a message for patient to call us back and make an appt to get a CBC drawn tomorrow morning Per Dr. Llanes.

## 2020-03-20 ENCOUNTER — LAB (OUTPATIENT)
Dept: LAB | Facility: HOSPITAL | Age: 35
End: 2020-03-20

## 2020-03-20 DIAGNOSIS — D69.3 IMMUNE THROMBOCYTOPENIC PURPURA (HCC): Chronic | ICD-10-CM

## 2020-03-20 LAB
BASOPHILS # BLD AUTO: 0.06 10*3/MM3 (ref 0–0.2)
BASOPHILS NFR BLD AUTO: 1 % (ref 0–1.5)
DEPRECATED RDW RBC AUTO: 38.8 FL (ref 37–54)
EOSINOPHIL # BLD AUTO: 0.15 10*3/MM3 (ref 0–0.4)
EOSINOPHIL NFR BLD AUTO: 2.6 % (ref 0.3–6.2)
ERYTHROCYTE [DISTWIDTH] IN BLOOD BY AUTOMATED COUNT: 12.8 % (ref 12.3–15.4)
HCT VFR BLD AUTO: 36.1 % (ref 34–46.6)
HGB BLD-MCNC: 12.4 G/DL (ref 12–15.9)
LYMPHOCYTES # BLD AUTO: 1.74 10*3/MM3 (ref 0.7–3.1)
LYMPHOCYTES NFR BLD AUTO: 30.4 % (ref 19.6–45.3)
MCH RBC QN AUTO: 29.2 PG (ref 26.6–33)
MCHC RBC AUTO-ENTMCNC: 34.3 G/DL (ref 31.5–35.7)
MCV RBC AUTO: 84.9 FL (ref 79–97)
MONOCYTES # BLD AUTO: 0.58 10*3/MM3 (ref 0.1–0.9)
MONOCYTES NFR BLD AUTO: 10.1 % (ref 5–12)
NEUTROPHILS # BLD AUTO: 3.19 10*3/MM3 (ref 1.7–7)
NEUTROPHILS NFR BLD AUTO: 55.9 % (ref 42.7–76)
PLATELET # BLD AUTO: 56 10*3/MM3 (ref 140–450)
PMV BLD AUTO: 13.5 FL (ref 6–12)
RBC # BLD AUTO: 4.25 10*6/MM3 (ref 3.77–5.28)
WBC NRBC COR # BLD: 5.72 10*3/MM3 (ref 3.4–10.8)

## 2020-03-20 PROCEDURE — 85025 COMPLETE CBC W/AUTO DIFF WBC: CPT

## 2020-03-20 PROCEDURE — 36415 COLL VENOUS BLD VENIPUNCTURE: CPT

## 2020-03-24 ENCOUNTER — TELEPHONE (OUTPATIENT)
Dept: ONCOLOGY | Facility: HOSPITAL | Age: 35
End: 2020-03-24

## 2020-03-24 NOTE — TELEPHONE ENCOUNTER
LM for the patient to call my direct line to discuss prescreening questions prior to her treatment tomorrow.

## 2020-03-25 ENCOUNTER — HOSPITAL ENCOUNTER (OUTPATIENT)
Dept: ONCOLOGY | Facility: HOSPITAL | Age: 35
Setting detail: INFUSION SERIES
Discharge: HOME | End: 2020-03-25

## 2020-03-25 VITALS
SYSTOLIC BLOOD PRESSURE: 100 MMHG | TEMPERATURE: 98.1 F | DIASTOLIC BLOOD PRESSURE: 66 MMHG | HEART RATE: 54 BPM | RESPIRATION RATE: 20 BRPM | WEIGHT: 130.6 LBS | HEIGHT: 62 IN | BODY MASS INDEX: 24.03 KG/M2

## 2020-03-25 DIAGNOSIS — D69.6 THROMBOCYTOPENIA (HCC): Primary | ICD-10-CM

## 2020-03-25 LAB
BASOPHILS # BLD AUTO: 0.03 10*3/MM3 (ref 0–0.2)
BASOPHILS NFR BLD AUTO: 0.6 % (ref 0–1.5)
DEPRECATED RDW RBC AUTO: 37.7 FL (ref 37–54)
EOSINOPHIL # BLD AUTO: 0.14 10*3/MM3 (ref 0–0.4)
EOSINOPHIL NFR BLD AUTO: 2.9 % (ref 0.3–6.2)
ERYTHROCYTE [DISTWIDTH] IN BLOOD BY AUTOMATED COUNT: 12.5 % (ref 12.3–15.4)
HCT VFR BLD AUTO: 32.3 % (ref 34–46.6)
HGB BLD-MCNC: 11.1 G/DL (ref 12–15.9)
LYMPHOCYTES # BLD AUTO: 1.42 10*3/MM3 (ref 0.7–3.1)
LYMPHOCYTES NFR BLD AUTO: 29.7 % (ref 19.6–45.3)
MCH RBC QN AUTO: 29.3 PG (ref 26.6–33)
MCHC RBC AUTO-ENTMCNC: 34.4 G/DL (ref 31.5–35.7)
MCV RBC AUTO: 85.2 FL (ref 79–97)
MONOCYTES # BLD AUTO: 0.46 10*3/MM3 (ref 0.1–0.9)
MONOCYTES NFR BLD AUTO: 9.6 % (ref 5–12)
NEUTROPHILS # BLD AUTO: 2.73 10*3/MM3 (ref 1.7–7)
NEUTROPHILS NFR BLD AUTO: 57.2 % (ref 42.7–76)
PLATELET # BLD AUTO: 32 10*3/MM3 (ref 140–450)
PMV BLD AUTO: 13.8 FL (ref 6–12)
RBC # BLD AUTO: 3.79 10*6/MM3 (ref 3.77–5.28)
WBC NRBC COR # BLD: 4.78 10*3/MM3 (ref 3.4–10.8)

## 2020-03-25 PROCEDURE — 36415 COLL VENOUS BLD VENIPUNCTURE: CPT

## 2020-03-25 PROCEDURE — 25010000002 ROMIPLOSTIM 125 MCG RECONSTITUTED SOLUTION: Performed by: NURSE PRACTITIONER

## 2020-03-25 PROCEDURE — 85025 COMPLETE CBC W/AUTO DIFF WBC: CPT | Performed by: INTERNAL MEDICINE

## 2020-03-25 PROCEDURE — 25010000002 ROMIPLOSTIM PER 10 MCG: Performed by: NURSE PRACTITIONER

## 2020-03-25 PROCEDURE — 96372 THER/PROPH/DIAG INJ SC/IM: CPT

## 2020-03-25 RX ORDER — WATER 1000 ML/1000ML
INJECTION, SOLUTION INTRAVENOUS
Status: DISCONTINUED
Start: 2020-03-25 | End: 2020-03-26 | Stop reason: HOSPADM

## 2020-03-25 RX ADMIN — ROMIPLOSTIM 500 MCG: 500 INJECTION, POWDER, LYOPHILIZED, FOR SOLUTION SUBCUTANEOUS at 09:26

## 2020-03-25 RX ADMIN — ROMIPLOSTIM 125 MCG: 125 INJECTION, POWDER, LYOPHILIZED, FOR SOLUTION SUBCUTANEOUS at 09:26

## 2020-03-31 LAB
CYTO UR: NORMAL
LAB AP CASE REPORT: NORMAL
LAB AP CLINICAL INFORMATION: NORMAL
LAB AP OUTSIDE REPORT, ADDENDUM: NORMAL
PATH REPORT.FINAL DX SPEC: NORMAL
PATH REPORT.GROSS SPEC: NORMAL

## 2020-04-01 ENCOUNTER — TELEPHONE (OUTPATIENT)
Dept: ONCOLOGY | Facility: CLINIC | Age: 35
End: 2020-04-01
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
## 2020-04-02 ENCOUNTER — OFFICE VISIT (OUTPATIENT)
Dept: ONCOLOGY | Facility: CLINIC | Age: 35
End: 2020-04-02

## 2020-04-02 ENCOUNTER — OFFICE VISIT (OUTPATIENT)
Dept: LAB | Facility: HOSPITAL | Age: 35
End: 2020-04-02

## 2020-04-02 VITALS
RESPIRATION RATE: 16 BRPM | WEIGHT: 130.2 LBS | HEIGHT: 62 IN | HEART RATE: 67 BPM | DIASTOLIC BLOOD PRESSURE: 76 MMHG | BODY MASS INDEX: 23.96 KG/M2 | SYSTOLIC BLOOD PRESSURE: 112 MMHG | TEMPERATURE: 98.2 F

## 2020-04-02 DIAGNOSIS — D69.6 THROMBOCYTOPENIA (HCC): ICD-10-CM

## 2020-04-02 DIAGNOSIS — D69.3 IMMUNE THROMBOCYTOPENIC PURPURA (HCC): Primary | Chronic | ICD-10-CM

## 2020-04-02 LAB
BASOPHILS # BLD AUTO: 0.04 10*3/MM3 (ref 0–0.2)
BASOPHILS NFR BLD AUTO: 0.5 % (ref 0–1.5)
DEPRECATED RDW RBC AUTO: 38.4 FL (ref 37–54)
EOSINOPHIL # BLD AUTO: 0.16 10*3/MM3 (ref 0–0.4)
EOSINOPHIL NFR BLD AUTO: 2.2 % (ref 0.3–6.2)
ERYTHROCYTE [DISTWIDTH] IN BLOOD BY AUTOMATED COUNT: 12.6 % (ref 12.3–15.4)
HCT VFR BLD AUTO: 35.6 % (ref 34–46.6)
HGB BLD-MCNC: 11.9 G/DL (ref 12–15.9)
LYMPHOCYTES # BLD AUTO: 1.91 10*3/MM3 (ref 0.7–3.1)
LYMPHOCYTES NFR BLD AUTO: 25.9 % (ref 19.6–45.3)
MCH RBC QN AUTO: 28.7 PG (ref 26.6–33)
MCHC RBC AUTO-ENTMCNC: 33.4 G/DL (ref 31.5–35.7)
MCV RBC AUTO: 85.8 FL (ref 79–97)
MONOCYTES # BLD AUTO: 0.6 10*3/MM3 (ref 0.1–0.9)
MONOCYTES NFR BLD AUTO: 8.1 % (ref 5–12)
NEUTROPHILS # BLD AUTO: 4.66 10*3/MM3 (ref 1.7–7)
NEUTROPHILS NFR BLD AUTO: 63.3 % (ref 42.7–76)
PLATELET # BLD AUTO: 43 10*3/MM3 (ref 140–450)
PMV BLD AUTO: 11.8 FL (ref 6–12)
RBC # BLD AUTO: 4.15 10*6/MM3 (ref 3.77–5.28)
WBC NRBC COR # BLD: 7.37 10*3/MM3 (ref 3.4–10.8)

## 2020-04-02 PROCEDURE — 36415 COLL VENOUS BLD VENIPUNCTURE: CPT

## 2020-04-02 PROCEDURE — 85025 COMPLETE CBC W/AUTO DIFF WBC: CPT

## 2020-04-02 PROCEDURE — 99214 OFFICE O/P EST MOD 30 MIN: CPT | Performed by: INTERNAL MEDICINE

## 2020-04-03 ENCOUNTER — HOSPITAL ENCOUNTER (OUTPATIENT)
Dept: ONCOLOGY | Facility: HOSPITAL | Age: 35
Setting detail: INFUSION SERIES
Discharge: HOME | End: 2020-04-03

## 2020-04-03 DIAGNOSIS — D69.6 THROMBOCYTOPENIA (HCC): ICD-10-CM

## 2020-04-03 DIAGNOSIS — D69.3 IMMUNE THROMBOCYTOPENIC PURPURA (HCC): Primary | Chronic | ICD-10-CM

## 2020-04-03 LAB
BASOPHILS # BLD AUTO: 0.05 10*3/MM3 (ref 0–0.2)
BASOPHILS NFR BLD AUTO: 0.7 % (ref 0–1.5)
DEPRECATED RDW RBC AUTO: 37.8 FL (ref 37–54)
EOSINOPHIL # BLD AUTO: 0.13 10*3/MM3 (ref 0–0.4)
EOSINOPHIL NFR BLD AUTO: 1.7 % (ref 0.3–6.2)
ERYTHROCYTE [DISTWIDTH] IN BLOOD BY AUTOMATED COUNT: 12.6 % (ref 12.3–15.4)
HCT VFR BLD AUTO: 36 % (ref 34–46.6)
HGB BLD-MCNC: 12.4 G/DL (ref 12–15.9)
LYMPHOCYTES # BLD AUTO: 2.1 10*3/MM3 (ref 0.7–3.1)
LYMPHOCYTES NFR BLD AUTO: 27.6 % (ref 19.6–45.3)
MCH RBC QN AUTO: 29 PG (ref 26.6–33)
MCHC RBC AUTO-ENTMCNC: 34.4 G/DL (ref 31.5–35.7)
MCV RBC AUTO: 84.3 FL (ref 79–97)
MONOCYTES # BLD AUTO: 0.66 10*3/MM3 (ref 0.1–0.9)
MONOCYTES NFR BLD AUTO: 8.7 % (ref 5–12)
NEUTROPHILS # BLD AUTO: 4.66 10*3/MM3 (ref 1.7–7)
NEUTROPHILS NFR BLD AUTO: 61.3 % (ref 42.7–76)
PLATELET # BLD AUTO: 27 10*3/MM3 (ref 140–450)
PMV BLD AUTO: 11.9 FL (ref 6–12)
RBC # BLD AUTO: 4.27 10*6/MM3 (ref 3.77–5.28)
WBC NRBC COR # BLD: 7.6 10*3/MM3 (ref 3.4–10.8)

## 2020-04-03 PROCEDURE — 25010000002 ROMIPLOSTIM PER 10 MCG: Performed by: NURSE PRACTITIONER

## 2020-04-03 PROCEDURE — 25010000002 ROMIPLOSTIM 125 MCG RECONSTITUTED SOLUTION: Performed by: NURSE PRACTITIONER

## 2020-04-03 PROCEDURE — 85025 COMPLETE CBC W/AUTO DIFF WBC: CPT | Performed by: NURSE PRACTITIONER

## 2020-04-03 PROCEDURE — 36416 COLLJ CAPILLARY BLOOD SPEC: CPT

## 2020-04-03 PROCEDURE — 96372 THER/PROPH/DIAG INJ SC/IM: CPT

## 2020-04-03 RX ORDER — WATER 1000 ML/1000ML
INJECTION, SOLUTION INTRAVENOUS
Status: DISCONTINUED
Start: 2020-04-03 | End: 2020-04-04 | Stop reason: HOSPADM

## 2020-04-03 RX ADMIN — ROMIPLOSTIM 500 MCG: 500 INJECTION, POWDER, LYOPHILIZED, FOR SOLUTION SUBCUTANEOUS at 11:19

## 2020-04-03 RX ADMIN — ROMIPLOSTIM 90 MCG: 125 INJECTION, POWDER, LYOPHILIZED, FOR SOLUTION SUBCUTANEOUS at 11:20

## 2020-04-09 ENCOUNTER — HOSPITAL ENCOUNTER (OUTPATIENT)
Dept: ONCOLOGY | Facility: HOSPITAL | Age: 35
Discharge: HOME | End: 2020-04-09
Admitting: INTERNAL MEDICINE

## 2020-04-09 ENCOUNTER — LAB (OUTPATIENT)
Dept: LAB | Facility: HOSPITAL | Age: 35
End: 2020-04-09

## 2020-04-09 VITALS
SYSTOLIC BLOOD PRESSURE: 132 MMHG | BODY MASS INDEX: 23.92 KG/M2 | HEIGHT: 62 IN | DIASTOLIC BLOOD PRESSURE: 59 MMHG | HEART RATE: 69 BPM | TEMPERATURE: 98.2 F | RESPIRATION RATE: 18 BRPM | WEIGHT: 130 LBS

## 2020-04-09 DIAGNOSIS — D69.6 THROMBOCYTOPENIA (HCC): ICD-10-CM

## 2020-04-09 DIAGNOSIS — R58 ECCHYMOSIS: Primary | ICD-10-CM

## 2020-04-09 DIAGNOSIS — D69.3 IMMUNE THROMBOCYTOPENIC PURPURA (HCC): Primary | ICD-10-CM

## 2020-04-09 LAB
BASOPHILS # BLD AUTO: 0.06 10*3/MM3 (ref 0–0.2)
BASOPHILS NFR BLD AUTO: 0.9 % (ref 0–1.5)
DEPRECATED RDW RBC AUTO: 38 FL (ref 37–54)
EOSINOPHIL # BLD AUTO: 0.16 10*3/MM3 (ref 0–0.4)
EOSINOPHIL NFR BLD AUTO: 2.5 % (ref 0.3–6.2)
ERYTHROCYTE [DISTWIDTH] IN BLOOD BY AUTOMATED COUNT: 12.7 % (ref 12.3–15.4)
HCT VFR BLD AUTO: 33.8 % (ref 34–46.6)
HGB BLD-MCNC: 11.4 G/DL (ref 12–15.9)
LYMPHOCYTES # BLD AUTO: 1.81 10*3/MM3 (ref 0.7–3.1)
LYMPHOCYTES NFR BLD AUTO: 27.7 % (ref 19.6–45.3)
MCH RBC QN AUTO: 28.6 PG (ref 26.6–33)
MCHC RBC AUTO-ENTMCNC: 33.7 G/DL (ref 31.5–35.7)
MCV RBC AUTO: 84.7 FL (ref 79–97)
MONOCYTES # BLD AUTO: 0.7 10*3/MM3 (ref 0.1–0.9)
MONOCYTES NFR BLD AUTO: 10.7 % (ref 5–12)
NEUTROPHILS # BLD AUTO: 3.8 10*3/MM3 (ref 1.7–7)
NEUTROPHILS NFR BLD AUTO: 58.2 % (ref 42.7–76)
PLATELET # BLD AUTO: 24 10*3/MM3 (ref 140–450)
PMV BLD AUTO: 15.1 FL (ref 6–12)
RBC # BLD AUTO: 3.99 10*6/MM3 (ref 3.77–5.28)
WBC NRBC COR # BLD: 6.53 10*3/MM3 (ref 3.4–10.8)

## 2020-04-09 PROCEDURE — 85025 COMPLETE CBC W/AUTO DIFF WBC: CPT

## 2020-04-09 PROCEDURE — 36415 COLL VENOUS BLD VENIPUNCTURE: CPT

## 2020-04-09 NOTE — PROGRESS NOTES
Patient sent to RN review for Platelets 24 patient is c/o of brusing on both legs with knots under them that started on 4/8/20 and gums bleeding off and on. Spoke with Talisha RUSSELL and she is ordering PTT AND PT/INR for 4/10/20 when patient comes in for NPLATE

## 2020-04-10 ENCOUNTER — HOSPITAL ENCOUNTER (OUTPATIENT)
Dept: ONCOLOGY | Facility: HOSPITAL | Age: 35
Setting detail: INFUSION SERIES
Discharge: HOME | End: 2020-04-10

## 2020-04-10 VITALS
BODY MASS INDEX: 23.92 KG/M2 | HEART RATE: 74 BPM | WEIGHT: 130 LBS | DIASTOLIC BLOOD PRESSURE: 61 MMHG | RESPIRATION RATE: 18 BRPM | HEIGHT: 62 IN | TEMPERATURE: 98.4 F | SYSTOLIC BLOOD PRESSURE: 100 MMHG

## 2020-04-10 DIAGNOSIS — R58 ECCHYMOSIS: ICD-10-CM

## 2020-04-10 DIAGNOSIS — D69.6 THROMBOCYTOPENIA (HCC): Primary | ICD-10-CM

## 2020-04-10 LAB
APTT PPP: 24.5 SECONDS (ref 24–31)
BASOPHILS # BLD AUTO: 0.05 10*3/MM3 (ref 0–0.2)
BASOPHILS NFR BLD AUTO: 0.9 % (ref 0–1.5)
DEPRECATED RDW RBC AUTO: 38.1 FL (ref 37–54)
EOSINOPHIL # BLD AUTO: 0.15 10*3/MM3 (ref 0–0.4)
EOSINOPHIL NFR BLD AUTO: 2.6 % (ref 0.3–6.2)
ERYTHROCYTE [DISTWIDTH] IN BLOOD BY AUTOMATED COUNT: 12.8 % (ref 12.3–15.4)
HCT VFR BLD AUTO: 34.4 % (ref 34–46.6)
HGB BLD-MCNC: 11.6 G/DL (ref 12–15.9)
INR PPP: 1.02 (ref 0.9–1.1)
LYMPHOCYTES # BLD AUTO: 1.67 10*3/MM3 (ref 0.7–3.1)
LYMPHOCYTES NFR BLD AUTO: 29.1 % (ref 19.6–45.3)
MCH RBC QN AUTO: 28.6 PG (ref 26.6–33)
MCHC RBC AUTO-ENTMCNC: 33.7 G/DL (ref 31.5–35.7)
MCV RBC AUTO: 84.7 FL (ref 79–97)
MONOCYTES # BLD AUTO: 0.5 10*3/MM3 (ref 0.1–0.9)
MONOCYTES NFR BLD AUTO: 8.7 % (ref 5–12)
NEUTROPHILS # BLD AUTO: 3.36 10*3/MM3 (ref 1.7–7)
NEUTROPHILS NFR BLD AUTO: 58.7 % (ref 42.7–76)
PLATELET # BLD AUTO: 35 10*3/MM3 (ref 140–450)
PROTHROMBIN TIME: 10.7 SECONDS (ref 9.6–11.7)
RBC # BLD AUTO: 4.06 10*6/MM3 (ref 3.77–5.28)
WBC NRBC COR # BLD: 5.73 10*3/MM3 (ref 3.4–10.8)

## 2020-04-10 PROCEDURE — 85730 THROMBOPLASTIN TIME PARTIAL: CPT | Performed by: NURSE PRACTITIONER

## 2020-04-10 PROCEDURE — 25010000002 ROMIPLOSTIM 125 MCG RECONSTITUTED SOLUTION: Performed by: NURSE PRACTITIONER

## 2020-04-10 PROCEDURE — 96372 THER/PROPH/DIAG INJ SC/IM: CPT

## 2020-04-10 PROCEDURE — 85610 PROTHROMBIN TIME: CPT | Performed by: NURSE PRACTITIONER

## 2020-04-10 PROCEDURE — 25010000002 ROMIPLOSTIM PER 10 MCG: Performed by: NURSE PRACTITIONER

## 2020-04-10 PROCEDURE — 36415 COLL VENOUS BLD VENIPUNCTURE: CPT

## 2020-04-10 PROCEDURE — 85025 COMPLETE CBC W/AUTO DIFF WBC: CPT | Performed by: INTERNAL MEDICINE

## 2020-04-10 RX ORDER — WATER 1000 ML/1000ML
INJECTION, SOLUTION INTRAVENOUS
Status: DISCONTINUED
Start: 2020-04-10 | End: 2020-04-11 | Stop reason: HOSPADM

## 2020-04-10 RX ADMIN — ROMIPLOSTIM 590 MCG: 125 INJECTION, POWDER, LYOPHILIZED, FOR SOLUTION SUBCUTANEOUS at 09:08

## 2020-04-17 ENCOUNTER — HOSPITAL ENCOUNTER (OUTPATIENT)
Dept: ONCOLOGY | Facility: HOSPITAL | Age: 35
Setting detail: INFUSION SERIES
Discharge: HOME | End: 2020-04-17

## 2020-04-17 VITALS
BODY MASS INDEX: 23.92 KG/M2 | DIASTOLIC BLOOD PRESSURE: 74 MMHG | WEIGHT: 130 LBS | HEART RATE: 71 BPM | SYSTOLIC BLOOD PRESSURE: 116 MMHG | RESPIRATION RATE: 18 BRPM | TEMPERATURE: 98.7 F | HEIGHT: 62 IN

## 2020-04-17 DIAGNOSIS — D69.6 THROMBOCYTOPENIA (HCC): Primary | ICD-10-CM

## 2020-04-17 LAB
BASOPHILS # BLD AUTO: 0.06 10*3/MM3 (ref 0–0.2)
BASOPHILS NFR BLD AUTO: 1 % (ref 0–1.5)
DEPRECATED RDW RBC AUTO: 40.3 FL (ref 37–54)
EOSINOPHIL # BLD AUTO: 0.15 10*3/MM3 (ref 0–0.4)
EOSINOPHIL NFR BLD AUTO: 2.6 % (ref 0.3–6.2)
ERYTHROCYTE [DISTWIDTH] IN BLOOD BY AUTOMATED COUNT: 13.1 % (ref 12.3–15.4)
HCT VFR BLD AUTO: 33.3 % (ref 34–46.6)
HGB BLD-MCNC: 11.1 G/DL (ref 12–15.9)
LYMPHOCYTES # BLD AUTO: 1.54 10*3/MM3 (ref 0.7–3.1)
LYMPHOCYTES NFR BLD AUTO: 26.5 % (ref 19.6–45.3)
MCH RBC QN AUTO: 28.4 PG (ref 26.6–33)
MCHC RBC AUTO-ENTMCNC: 33.3 G/DL (ref 31.5–35.7)
MCV RBC AUTO: 85.2 FL (ref 79–97)
MONOCYTES # BLD AUTO: 0.49 10*3/MM3 (ref 0.1–0.9)
MONOCYTES NFR BLD AUTO: 8.4 % (ref 5–12)
NEUTROPHILS # BLD AUTO: 3.58 10*3/MM3 (ref 1.7–7)
NEUTROPHILS NFR BLD AUTO: 61.5 % (ref 42.7–76)
PLATELET # BLD AUTO: 17 10*3/MM3 (ref 140–450)
PMV BLD AUTO: 14.3 FL (ref 6–12)
RBC # BLD AUTO: 3.91 10*6/MM3 (ref 3.77–5.28)
WBC NRBC COR # BLD: 5.82 10*3/MM3 (ref 3.4–10.8)

## 2020-04-17 PROCEDURE — 96372 THER/PROPH/DIAG INJ SC/IM: CPT

## 2020-04-17 PROCEDURE — 85025 COMPLETE CBC W/AUTO DIFF WBC: CPT | Performed by: INTERNAL MEDICINE

## 2020-04-17 PROCEDURE — 36415 COLL VENOUS BLD VENIPUNCTURE: CPT

## 2020-04-17 PROCEDURE — 25010000002 ROMIPLOSTIM PER 10 MCG: Performed by: INTERNAL MEDICINE

## 2020-04-17 PROCEDURE — 25010000002 ROMIPLOSTIM 125 MCG RECONSTITUTED SOLUTION: Performed by: INTERNAL MEDICINE

## 2020-04-17 RX ADMIN — ROMIPLOSTIM 500 MCG: 500 INJECTION, POWDER, LYOPHILIZED, FOR SOLUTION SUBCUTANEOUS at 09:20

## 2020-04-17 RX ADMIN — ROMIPLOSTIM 90 MCG: 125 INJECTION, POWDER, LYOPHILIZED, FOR SOLUTION SUBCUTANEOUS at 09:19

## 2020-04-17 NOTE — PROGRESS NOTES
Pt plt count is 17 today. I spoke with Shannon Burgos NP and she states no transfusion today but to bring pt back on Monday 4/20/2020 for CBC. Pt verbalized understanding of appointment and to call us with any further concerns or bleeding.

## 2020-04-20 ENCOUNTER — HOSPITAL ENCOUNTER (OUTPATIENT)
Dept: ONCOLOGY | Facility: HOSPITAL | Age: 35
Discharge: HOME | End: 2020-04-20
Admitting: NURSE PRACTITIONER

## 2020-04-20 ENCOUNTER — TELEPHONE (OUTPATIENT)
Dept: ONCOLOGY | Facility: CLINIC | Age: 35
End: 2020-04-20

## 2020-04-20 ENCOUNTER — LAB (OUTPATIENT)
Dept: LAB | Facility: HOSPITAL | Age: 35
End: 2020-04-20

## 2020-04-20 DIAGNOSIS — D69.3 IMMUNE THROMBOCYTOPENIC PURPURA (HCC): Chronic | ICD-10-CM

## 2020-04-20 LAB
BASOPHILS # BLD AUTO: 0.05 10*3/MM3 (ref 0–0.2)
BASOPHILS NFR BLD AUTO: 1.1 % (ref 0–1.5)
DEPRECATED RDW RBC AUTO: 40.3 FL (ref 37–54)
EOSINOPHIL # BLD AUTO: 0.11 10*3/MM3 (ref 0–0.4)
EOSINOPHIL NFR BLD AUTO: 2.4 % (ref 0.3–6.2)
ERYTHROCYTE [DISTWIDTH] IN BLOOD BY AUTOMATED COUNT: 13 % (ref 12.3–15.4)
HCT VFR BLD AUTO: 32.6 % (ref 34–46.6)
HGB BLD-MCNC: 10.6 G/DL (ref 12–15.9)
LYMPHOCYTES # BLD AUTO: 1.04 10*3/MM3 (ref 0.7–3.1)
LYMPHOCYTES NFR BLD AUTO: 22.3 % (ref 19.6–45.3)
MCH RBC QN AUTO: 28.1 PG (ref 26.6–33)
MCHC RBC AUTO-ENTMCNC: 32.5 G/DL (ref 31.5–35.7)
MCV RBC AUTO: 86.5 FL (ref 79–97)
MONOCYTES # BLD AUTO: 0.44 10*3/MM3 (ref 0.1–0.9)
MONOCYTES NFR BLD AUTO: 9.4 % (ref 5–12)
NEUTROPHILS # BLD AUTO: 3.03 10*3/MM3 (ref 1.7–7)
NEUTROPHILS NFR BLD AUTO: 64.8 % (ref 42.7–76)
PLATELET # BLD AUTO: 7 10*3/MM3 (ref 140–450)
RBC # BLD AUTO: 3.77 10*6/MM3 (ref 3.77–5.28)
WBC NRBC COR # BLD: 4.67 10*3/MM3 (ref 3.4–10.8)

## 2020-04-20 PROCEDURE — 85025 COMPLETE CBC W/AUTO DIFF WBC: CPT

## 2020-04-20 PROCEDURE — 36415 COLL VENOUS BLD VENIPUNCTURE: CPT

## 2020-04-20 NOTE — TELEPHONE ENCOUNTER
Called for auth for Privigen per María Elena Dubon's request.     Was given a pending auth number.     They said Privigen was authorized as of 4/20/20.     Electronically signed by FARZAD Banegas, 04/20/20, 1:02 PM.

## 2020-04-21 ENCOUNTER — HOSPITAL ENCOUNTER (OUTPATIENT)
Dept: ONCOLOGY | Facility: HOSPITAL | Age: 35
Setting detail: INFUSION SERIES
Discharge: HOME | End: 2020-04-21

## 2020-04-21 VITALS
HEART RATE: 60 BPM | RESPIRATION RATE: 20 BRPM | HEIGHT: 62 IN | WEIGHT: 130 LBS | BODY MASS INDEX: 23.92 KG/M2 | SYSTOLIC BLOOD PRESSURE: 109 MMHG | TEMPERATURE: 97.8 F | DIASTOLIC BLOOD PRESSURE: 70 MMHG

## 2020-04-21 DIAGNOSIS — D69.6 THROMBOCYTOPENIA (HCC): Primary | ICD-10-CM

## 2020-04-21 LAB
BASOPHILS # BLD AUTO: 0.06 10*3/MM3 (ref 0–0.2)
BASOPHILS NFR BLD AUTO: 1.3 % (ref 0–1.5)
DEPRECATED RDW RBC AUTO: 40.6 FL (ref 37–54)
EOSINOPHIL # BLD AUTO: 0.14 10*3/MM3 (ref 0–0.4)
EOSINOPHIL NFR BLD AUTO: 3.1 % (ref 0.3–6.2)
ERYTHROCYTE [DISTWIDTH] IN BLOOD BY AUTOMATED COUNT: 13.3 % (ref 12.3–15.4)
HCT VFR BLD AUTO: 34.1 % (ref 34–46.6)
HGB BLD-MCNC: 11.3 G/DL (ref 12–15.9)
LYMPHOCYTES # BLD AUTO: 1.31 10*3/MM3 (ref 0.7–3.1)
LYMPHOCYTES NFR BLD AUTO: 28.7 % (ref 19.6–45.3)
MCH RBC QN AUTO: 28.3 PG (ref 26.6–33)
MCHC RBC AUTO-ENTMCNC: 33.1 G/DL (ref 31.5–35.7)
MCV RBC AUTO: 85.5 FL (ref 79–97)
MONOCYTES # BLD AUTO: 0.7 10*3/MM3 (ref 0.1–0.9)
MONOCYTES NFR BLD AUTO: 15.4 % (ref 5–12)
NEUTROPHILS # BLD AUTO: 2.35 10*3/MM3 (ref 1.7–7)
NEUTROPHILS NFR BLD AUTO: 51.5 % (ref 42.7–76)
PLATELET # BLD AUTO: 2 10*3/MM3 (ref 140–450)
RBC # BLD AUTO: 3.99 10*6/MM3 (ref 3.77–5.28)
WBC NRBC COR # BLD: 4.56 10*3/MM3 (ref 3.4–10.8)

## 2020-04-21 PROCEDURE — 25010000002 IMMUNE GLOBULIN (HUMAN) 20 GM/200ML SOLUTION: Performed by: NURSE PRACTITIONER

## 2020-04-21 PROCEDURE — 36415 COLL VENOUS BLD VENIPUNCTURE: CPT

## 2020-04-21 PROCEDURE — 85025 COMPLETE CBC W/AUTO DIFF WBC: CPT | Performed by: INTERNAL MEDICINE

## 2020-04-21 PROCEDURE — 96365 THER/PROPH/DIAG IV INF INIT: CPT

## 2020-04-21 PROCEDURE — 63710000001 DIPHENHYDRAMINE PER 50 MG: Performed by: INTERNAL MEDICINE

## 2020-04-21 PROCEDURE — 96366 THER/PROPH/DIAG IV INF ADDON: CPT

## 2020-04-21 PROCEDURE — 25010000002 IMMUNE GLOBULIN (HUMAN) 40 GM/400ML SOLUTION: Performed by: NURSE PRACTITIONER

## 2020-04-21 RX ORDER — SODIUM CHLORIDE 9 MG/ML
250 INJECTION, SOLUTION INTRAVENOUS ONCE
Status: COMPLETED | OUTPATIENT
Start: 2020-04-21 | End: 2020-04-21

## 2020-04-21 RX ORDER — DIPHENHYDRAMINE HCL 25 MG
25 CAPSULE ORAL ONCE
Status: COMPLETED | OUTPATIENT
Start: 2020-04-21 | End: 2020-04-21

## 2020-04-21 RX ORDER — ACETAMINOPHEN 325 MG/1
650 TABLET ORAL ONCE
Status: COMPLETED | OUTPATIENT
Start: 2020-04-21 | End: 2020-04-21

## 2020-04-21 RX ADMIN — HUMAN IMMUNOGLOBULIN G 40 G: 40 LIQUID INTRAVENOUS at 08:54

## 2020-04-21 RX ADMIN — HUMAN IMMUNOGLOBULIN G 20 G: 20 LIQUID INTRAVENOUS at 12:16

## 2020-04-21 RX ADMIN — DIPHENHYDRAMINE HYDROCHLORIDE 25 MG: 25 CAPSULE ORAL at 08:24

## 2020-04-21 RX ADMIN — SODIUM CHLORIDE 250 ML: 900 INJECTION, SOLUTION INTRAVENOUS at 08:39

## 2020-04-21 RX ADMIN — ACETAMINOPHEN 650 MG: 325 TABLET ORAL at 08:23

## 2020-04-21 NOTE — PROGRESS NOTES
Per Dr. Llanes pt to receive 60gm Privigen. Pharmacy has 40gm in stock, with 20gm on back order. Per Dr. Shraddha leon to give 40gm IV humble. 4/22/2020 if unable to obtain full dose.

## 2020-04-22 ENCOUNTER — HOSPITAL ENCOUNTER (OUTPATIENT)
Dept: ONCOLOGY | Facility: HOSPITAL | Age: 35
Setting detail: INFUSION SERIES
Discharge: HOME | End: 2020-04-22

## 2020-04-22 VITALS
SYSTOLIC BLOOD PRESSURE: 111 MMHG | HEART RATE: 105 BPM | WEIGHT: 130.6 LBS | BODY MASS INDEX: 24.03 KG/M2 | RESPIRATION RATE: 20 BRPM | TEMPERATURE: 98.5 F | HEIGHT: 62 IN | DIASTOLIC BLOOD PRESSURE: 67 MMHG

## 2020-04-22 DIAGNOSIS — D69.6 THROMBOCYTOPENIA (HCC): Primary | ICD-10-CM

## 2020-04-22 LAB
BASOPHILS # BLD AUTO: 0.04 10*3/MM3 (ref 0–0.2)
BASOPHILS NFR BLD AUTO: 1 % (ref 0–1.5)
DEPRECATED RDW RBC AUTO: 40.2 FL (ref 37–54)
EOSINOPHIL # BLD AUTO: 0.12 10*3/MM3 (ref 0–0.4)
EOSINOPHIL NFR BLD AUTO: 2.9 % (ref 0.3–6.2)
ERYTHROCYTE [DISTWIDTH] IN BLOOD BY AUTOMATED COUNT: 13.1 % (ref 12.3–15.4)
HCT VFR BLD AUTO: 32.6 % (ref 34–46.6)
HGB BLD-MCNC: 10.7 G/DL (ref 12–15.9)
LYMPHOCYTES # BLD AUTO: 1.22 10*3/MM3 (ref 0.7–3.1)
LYMPHOCYTES NFR BLD AUTO: 29.3 % (ref 19.6–45.3)
MCH RBC QN AUTO: 28.2 PG (ref 26.6–33)
MCHC RBC AUTO-ENTMCNC: 32.8 G/DL (ref 31.5–35.7)
MCV RBC AUTO: 85.8 FL (ref 79–97)
MONOCYTES # BLD AUTO: 0.49 10*3/MM3 (ref 0.1–0.9)
MONOCYTES NFR BLD AUTO: 11.8 % (ref 5–12)
NEUTROPHILS # BLD AUTO: 2.3 10*3/MM3 (ref 1.7–7)
NEUTROPHILS NFR BLD AUTO: 55 % (ref 42.7–76)
PLATELET # BLD AUTO: 77 10*3/MM3 (ref 140–450)
PMV BLD AUTO: 12.8 FL (ref 6–12)
RBC # BLD AUTO: 3.8 10*6/MM3 (ref 3.77–5.28)
WBC NRBC COR # BLD: 4.17 10*3/MM3 (ref 3.4–10.8)

## 2020-04-22 PROCEDURE — 25010000002 IMMUNE GLOBULIN (HUMAN) 40 GM/400ML SOLUTION: Performed by: INTERNAL MEDICINE

## 2020-04-22 PROCEDURE — 63710000001 DIPHENHYDRAMINE PER 50 MG: Performed by: INTERNAL MEDICINE

## 2020-04-22 PROCEDURE — 85025 COMPLETE CBC W/AUTO DIFF WBC: CPT | Performed by: INTERNAL MEDICINE

## 2020-04-22 PROCEDURE — 96365 THER/PROPH/DIAG IV INF INIT: CPT

## 2020-04-22 PROCEDURE — 96366 THER/PROPH/DIAG IV INF ADDON: CPT

## 2020-04-22 RX ORDER — SODIUM CHLORIDE 9 MG/ML
250 INJECTION, SOLUTION INTRAVENOUS ONCE
Status: DISCONTINUED | OUTPATIENT
Start: 2020-04-22 | End: 2020-04-23 | Stop reason: HOSPADM

## 2020-04-22 RX ORDER — ACETAMINOPHEN 325 MG/1
650 TABLET ORAL ONCE
Status: COMPLETED | OUTPATIENT
Start: 2020-04-22 | End: 2020-04-22

## 2020-04-22 RX ORDER — DIPHENHYDRAMINE HCL 25 MG
25 CAPSULE ORAL ONCE
Status: COMPLETED | OUTPATIENT
Start: 2020-04-22 | End: 2020-04-22

## 2020-04-22 RX ADMIN — HUMAN IMMUNOGLOBULIN G 40 G: 40 LIQUID INTRAVENOUS at 12:02

## 2020-04-22 RX ADMIN — ACETAMINOPHEN 650 MG: 325 TABLET ORAL at 11:58

## 2020-04-22 RX ADMIN — DIPHENHYDRAMINE HYDROCHLORIDE 25 MG: 25 CAPSULE ORAL at 11:58

## 2020-04-22 NOTE — PROGRESS NOTES
Pt's plt 77 today. Informed Dr Llanes & MD said to go ahead with IVIG today as ordered. Also pt received nplate injection on Mon & is scheduled to receive it again on Fri. Asked Dr Llanes about it and he said to have pt come back on Fri for nplate as scheduled. Informed pt and she verbalized understanding.

## 2020-04-24 ENCOUNTER — HOSPITAL ENCOUNTER (OUTPATIENT)
Dept: ONCOLOGY | Facility: HOSPITAL | Age: 35
Setting detail: INFUSION SERIES
Discharge: HOME | End: 2020-04-24

## 2020-04-24 VITALS
WEIGHT: 130.1 LBS | TEMPERATURE: 98.6 F | BODY MASS INDEX: 23.94 KG/M2 | RESPIRATION RATE: 20 BRPM | DIASTOLIC BLOOD PRESSURE: 68 MMHG | HEIGHT: 62 IN | SYSTOLIC BLOOD PRESSURE: 102 MMHG | HEART RATE: 56 BPM

## 2020-04-24 DIAGNOSIS — D69.6 THROMBOCYTOPENIA (HCC): ICD-10-CM

## 2020-04-24 DIAGNOSIS — D69.3 IMMUNE THROMBOCYTOPENIC PURPURA (HCC): Primary | Chronic | ICD-10-CM

## 2020-04-24 LAB
BASOPHILS # BLD AUTO: 0.04 10*3/MM3 (ref 0–0.2)
BASOPHILS NFR BLD AUTO: 1 % (ref 0–1.5)
DEPRECATED RDW RBC AUTO: 38.8 FL (ref 37–54)
EOSINOPHIL # BLD AUTO: 0.16 10*3/MM3 (ref 0–0.4)
EOSINOPHIL NFR BLD AUTO: 3.9 % (ref 0.3–6.2)
ERYTHROCYTE [DISTWIDTH] IN BLOOD BY AUTOMATED COUNT: 13 % (ref 12.3–15.4)
HCT VFR BLD AUTO: 32 % (ref 34–46.6)
HGB BLD-MCNC: 10.7 G/DL (ref 12–15.9)
LYMPHOCYTES # BLD AUTO: 1.19 10*3/MM3 (ref 0.7–3.1)
LYMPHOCYTES NFR BLD AUTO: 28.9 % (ref 19.6–45.3)
MCH RBC QN AUTO: 28.3 PG (ref 26.6–33)
MCHC RBC AUTO-ENTMCNC: 33.4 G/DL (ref 31.5–35.7)
MCV RBC AUTO: 84.7 FL (ref 79–97)
MONOCYTES # BLD AUTO: 0.52 10*3/MM3 (ref 0.1–0.9)
MONOCYTES NFR BLD AUTO: 12.6 % (ref 5–12)
NEUTROPHILS # BLD AUTO: 2.21 10*3/MM3 (ref 1.7–7)
NEUTROPHILS NFR BLD AUTO: 53.6 % (ref 42.7–76)
PLATELET # BLD AUTO: 182 10*3/MM3 (ref 140–450)
PMV BLD AUTO: 10.7 FL (ref 6–12)
RBC # BLD AUTO: 3.78 10*6/MM3 (ref 3.77–5.28)
WBC NRBC COR # BLD: 4.12 10*3/MM3 (ref 3.4–10.8)

## 2020-04-24 PROCEDURE — 25010000002 ROMIPLOSTIM 125 MCG RECONSTITUTED SOLUTION: Performed by: NURSE PRACTITIONER

## 2020-04-24 PROCEDURE — 96372 THER/PROPH/DIAG INJ SC/IM: CPT

## 2020-04-24 PROCEDURE — 85025 COMPLETE CBC W/AUTO DIFF WBC: CPT | Performed by: NURSE PRACTITIONER

## 2020-04-24 PROCEDURE — 36416 COLLJ CAPILLARY BLOOD SPEC: CPT

## 2020-04-24 PROCEDURE — 25010000002 ROMIPLOSTIM PER 10 MCG: Performed by: NURSE PRACTITIONER

## 2020-04-24 RX ORDER — WATER 1000 ML/1000ML
INJECTION, SOLUTION INTRAVENOUS
Status: DISCONTINUED
Start: 2020-04-24 | End: 2020-04-25 | Stop reason: HOSPADM

## 2020-04-24 RX ADMIN — ROMIPLOSTIM 90 MCG: 125 INJECTION, POWDER, LYOPHILIZED, FOR SOLUTION SUBCUTANEOUS at 09:27

## 2020-04-24 RX ADMIN — ROMIPLOSTIM 500 MCG: 500 INJECTION, POWDER, LYOPHILIZED, FOR SOLUTION SUBCUTANEOUS at 09:26

## 2020-05-01 ENCOUNTER — HOSPITAL ENCOUNTER (OUTPATIENT)
Dept: ONCOLOGY | Facility: HOSPITAL | Age: 35
Setting detail: INFUSION SERIES
End: 2020-05-01

## 2020-05-01 ENCOUNTER — HOSPITAL ENCOUNTER (OUTPATIENT)
Dept: ONCOLOGY | Facility: HOSPITAL | Age: 35
Setting detail: INFUSION SERIES
Discharge: HOME | End: 2020-05-01

## 2020-05-01 ENCOUNTER — OFFICE VISIT (OUTPATIENT)
Dept: ONCOLOGY | Facility: CLINIC | Age: 35
End: 2020-05-01

## 2020-05-01 VITALS
BODY MASS INDEX: 23.92 KG/M2 | TEMPERATURE: 97.3 F | RESPIRATION RATE: 18 BRPM | HEART RATE: 65 BPM | SYSTOLIC BLOOD PRESSURE: 102 MMHG | DIASTOLIC BLOOD PRESSURE: 64 MMHG | HEIGHT: 62 IN | WEIGHT: 130 LBS

## 2020-05-01 VITALS
RESPIRATION RATE: 20 BRPM | BODY MASS INDEX: 23.96 KG/M2 | TEMPERATURE: 97.3 F | WEIGHT: 130.2 LBS | HEIGHT: 62 IN | DIASTOLIC BLOOD PRESSURE: 64 MMHG | SYSTOLIC BLOOD PRESSURE: 102 MMHG | HEART RATE: 65 BPM

## 2020-05-01 DIAGNOSIS — D69.6 THROMBOCYTOPENIA (HCC): Primary | ICD-10-CM

## 2020-05-01 DIAGNOSIS — D69.3 IMMUNE THROMBOCYTOPENIC PURPURA (HCC): ICD-10-CM

## 2020-05-01 DIAGNOSIS — D50.9 IRON DEFICIENCY ANEMIA, UNSPECIFIED IRON DEFICIENCY ANEMIA TYPE: Primary | ICD-10-CM

## 2020-05-01 DIAGNOSIS — D69.3 IMMUNE THROMBOCYTOPENIC PURPURA (HCC): Chronic | ICD-10-CM

## 2020-05-01 LAB
BASOPHILS # BLD AUTO: 0.03 10*3/MM3 (ref 0–0.2)
BASOPHILS NFR BLD AUTO: 0.6 % (ref 0–1.5)
DEPRECATED RDW RBC AUTO: 38.7 FL (ref 37–54)
EOSINOPHIL # BLD AUTO: 0.13 10*3/MM3 (ref 0–0.4)
EOSINOPHIL NFR BLD AUTO: 2.4 % (ref 0.3–6.2)
ERYTHROCYTE [DISTWIDTH] IN BLOOD BY AUTOMATED COUNT: 12.9 % (ref 12.3–15.4)
HCT VFR BLD AUTO: 33 % (ref 34–46.6)
HGB BLD-MCNC: 11 G/DL (ref 12–15.9)
LYMPHOCYTES # BLD AUTO: 1.41 10*3/MM3 (ref 0.7–3.1)
LYMPHOCYTES NFR BLD AUTO: 26.5 % (ref 19.6–45.3)
MCH RBC QN AUTO: 28 PG (ref 26.6–33)
MCHC RBC AUTO-ENTMCNC: 33.3 G/DL (ref 31.5–35.7)
MCV RBC AUTO: 84 FL (ref 79–97)
MONOCYTES # BLD AUTO: 0.6 10*3/MM3 (ref 0.1–0.9)
MONOCYTES NFR BLD AUTO: 11.3 % (ref 5–12)
NEUTROPHILS # BLD AUTO: 3.16 10*3/MM3 (ref 1.7–7)
NEUTROPHILS NFR BLD AUTO: 59.2 % (ref 42.7–76)
PLATELET # BLD AUTO: 179 10*3/MM3 (ref 140–450)
PMV BLD AUTO: 11.6 FL (ref 6–12)
RBC # BLD AUTO: 3.93 10*6/MM3 (ref 3.77–5.28)
WBC NRBC COR # BLD: 5.33 10*3/MM3 (ref 3.4–10.8)

## 2020-05-01 PROCEDURE — 85025 COMPLETE CBC W/AUTO DIFF WBC: CPT | Performed by: INTERNAL MEDICINE

## 2020-05-01 PROCEDURE — 99215 OFFICE O/P EST HI 40 MIN: CPT | Performed by: INTERNAL MEDICINE

## 2020-05-01 PROCEDURE — 25010000002 ROMIPLOSTIM 125 MCG RECONSTITUTED SOLUTION: Performed by: INTERNAL MEDICINE

## 2020-05-01 PROCEDURE — 96372 THER/PROPH/DIAG INJ SC/IM: CPT

## 2020-05-01 PROCEDURE — 25010000002 ROMIPLOSTIM PER 10 MCG: Performed by: INTERNAL MEDICINE

## 2020-05-01 RX ORDER — TRANEXAMIC ACID 650 MG/1
1300 TABLET ORAL 3 TIMES DAILY
Qty: 30 TABLET | Refills: 0 | Status: SHIPPED | OUTPATIENT
Start: 2020-05-01 | End: 2020-10-23

## 2020-05-01 RX ORDER — WATER 1000 ML/1000ML
INJECTION, SOLUTION INTRAVENOUS
Status: DISCONTINUED
Start: 2020-05-01 | End: 2020-05-02 | Stop reason: HOSPADM

## 2020-05-01 RX ADMIN — ROMIPLOSTIM 90 MCG: 125 INJECTION, POWDER, LYOPHILIZED, FOR SOLUTION SUBCUTANEOUS at 09:42

## 2020-05-01 RX ADMIN — ROMIPLOSTIM 500 MCG: 500 INJECTION, POWDER, LYOPHILIZED, FOR SOLUTION SUBCUTANEOUS at 09:44
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
## 2020-05-08 ENCOUNTER — HOSPITAL ENCOUNTER (OUTPATIENT)
Dept: ONCOLOGY | Facility: HOSPITAL | Age: 35
Setting detail: INFUSION SERIES
Discharge: HOME | End: 2020-05-08

## 2020-05-08 VITALS
HEART RATE: 62 BPM | TEMPERATURE: 97.7 F | DIASTOLIC BLOOD PRESSURE: 65 MMHG | WEIGHT: 130.6 LBS | SYSTOLIC BLOOD PRESSURE: 95 MMHG | HEIGHT: 62 IN | RESPIRATION RATE: 18 BRPM | BODY MASS INDEX: 24.03 KG/M2

## 2020-05-08 DIAGNOSIS — D69.6 THROMBOCYTOPENIA (HCC): Primary | ICD-10-CM

## 2020-05-08 LAB — PLATELET # BLD AUTO: 33 10*3/MM3 (ref 140–450)

## 2020-05-08 PROCEDURE — 85049 AUTOMATED PLATELET COUNT: CPT | Performed by: NURSE PRACTITIONER

## 2020-05-08 PROCEDURE — 96372 THER/PROPH/DIAG INJ SC/IM: CPT

## 2020-05-08 PROCEDURE — 25010000002 ROMIPLOSTIM 125 MCG RECONSTITUTED SOLUTION: Performed by: NURSE PRACTITIONER

## 2020-05-08 RX ADMIN — ROMIPLOSTIM 590 MCG: 125 INJECTION, POWDER, LYOPHILIZED, FOR SOLUTION SUBCUTANEOUS at 09:26

## 2020-05-08 NOTE — PROGRESS NOTES
Pt here today for NPlate with a count of 33. The injection was given and note sent to LUIS E Burgos regarding possible additional treatment needed.

## 2020-05-12 ENCOUNTER — TELEPHONE (OUTPATIENT)
Dept: ONCOLOGY | Facility: CLINIC | Age: 35
End: 2020-05-12

## 2020-05-12 DIAGNOSIS — D69.6 THROMBOCYTOPENIA (HCC): Primary | ICD-10-CM

## 2020-05-13 ENCOUNTER — LAB (OUTPATIENT)
Dept: LAB | Facility: HOSPITAL | Age: 35
End: 2020-05-13

## 2020-05-13 DIAGNOSIS — D69.6 THROMBOCYTOPENIA (HCC): ICD-10-CM

## 2020-05-13 LAB
BASOPHILS # BLD AUTO: 0.03 10*3/MM3 (ref 0–0.2)
BASOPHILS NFR BLD AUTO: 0.6 % (ref 0–1.5)
DEPRECATED RDW RBC AUTO: 37 FL (ref 37–54)
EOSINOPHIL # BLD AUTO: 0.15 10*3/MM3 (ref 0–0.4)
EOSINOPHIL NFR BLD AUTO: 3.1 % (ref 0.3–6.2)
ERYTHROCYTE [DISTWIDTH] IN BLOOD BY AUTOMATED COUNT: 12.7 % (ref 12.3–15.4)
HCT VFR BLD AUTO: 35.4 % (ref 34–46.6)
HGB BLD-MCNC: 11.9 G/DL (ref 12–15.9)
LYMPHOCYTES # BLD AUTO: 1.34 10*3/MM3 (ref 0.7–3.1)
LYMPHOCYTES NFR BLD AUTO: 27.9 % (ref 19.6–45.3)
MCH RBC QN AUTO: 27.5 PG (ref 26.6–33)
MCHC RBC AUTO-ENTMCNC: 33.6 G/DL (ref 31.5–35.7)
MCV RBC AUTO: 81.9 FL (ref 79–97)
MONOCYTES # BLD AUTO: 0.57 10*3/MM3 (ref 0.1–0.9)
MONOCYTES NFR BLD AUTO: 11.9 % (ref 5–12)
NEUTROPHILS # BLD AUTO: 2.72 10*3/MM3 (ref 1.7–7)
NEUTROPHILS NFR BLD AUTO: 56.5 % (ref 42.7–76)
PLATELET # BLD AUTO: 46 10*3/MM3 (ref 140–450)
RBC # BLD AUTO: 4.32 10*6/MM3 (ref 3.77–5.28)
WBC NRBC COR # BLD: 4.81 10*3/MM3 (ref 3.4–10.8)

## 2020-05-13 PROCEDURE — 85025 COMPLETE CBC W/AUTO DIFF WBC: CPT

## 2020-05-13 PROCEDURE — 36415 COLL VENOUS BLD VENIPUNCTURE: CPT

## 2020-05-15 ENCOUNTER — HOSPITAL ENCOUNTER (OUTPATIENT)
Dept: ONCOLOGY | Facility: HOSPITAL | Age: 35
Setting detail: INFUSION SERIES
End: 2020-05-15

## 2020-05-15 ENCOUNTER — TELEPHONE (OUTPATIENT)
Dept: ONCOLOGY | Facility: CLINIC | Age: 35
End: 2020-05-15

## 2020-05-15 ENCOUNTER — HOSPITAL ENCOUNTER (OUTPATIENT)
Dept: ONCOLOGY | Facility: HOSPITAL | Age: 35
Setting detail: INFUSION SERIES
Discharge: HOME | End: 2020-05-15

## 2020-05-15 VITALS
BODY MASS INDEX: 23.74 KG/M2 | HEART RATE: 60 BPM | SYSTOLIC BLOOD PRESSURE: 110 MMHG | WEIGHT: 129 LBS | HEIGHT: 62 IN | RESPIRATION RATE: 12 BRPM | DIASTOLIC BLOOD PRESSURE: 73 MMHG | TEMPERATURE: 98.4 F | OXYGEN SATURATION: 97 %

## 2020-05-15 DIAGNOSIS — D69.6 THROMBOCYTOPENIA (HCC): Primary | ICD-10-CM

## 2020-05-15 LAB
BASOPHILS # BLD AUTO: 0.05 10*3/MM3 (ref 0–0.2)
BASOPHILS NFR BLD AUTO: 0.8 % (ref 0–1.5)
DEPRECATED RDW RBC AUTO: 38.4 FL (ref 37–54)
EOSINOPHIL # BLD AUTO: 0.11 10*3/MM3 (ref 0–0.4)
EOSINOPHIL NFR BLD AUTO: 1.8 % (ref 0.3–6.2)
ERYTHROCYTE [DISTWIDTH] IN BLOOD BY AUTOMATED COUNT: 12.8 % (ref 12.3–15.4)
HCT VFR BLD AUTO: 33.5 % (ref 34–46.6)
HGB BLD-MCNC: 11 G/DL (ref 12–15.9)
LYMPHOCYTES # BLD AUTO: 1.64 10*3/MM3 (ref 0.7–3.1)
LYMPHOCYTES NFR BLD AUTO: 27 % (ref 19.6–45.3)
MCH RBC QN AUTO: 27.6 PG (ref 26.6–33)
MCHC RBC AUTO-ENTMCNC: 32.8 G/DL (ref 31.5–35.7)
MCV RBC AUTO: 84.2 FL (ref 79–97)
MONOCYTES # BLD AUTO: 0.53 10*3/MM3 (ref 0.1–0.9)
MONOCYTES NFR BLD AUTO: 8.7 % (ref 5–12)
NEUTROPHILS # BLD AUTO: 3.75 10*3/MM3 (ref 1.7–7)
NEUTROPHILS NFR BLD AUTO: 61.7 % (ref 42.7–76)
PLATELET # BLD AUTO: 64 10*3/MM3 (ref 140–450)
PMV BLD AUTO: 12.4 FL (ref 6–12)
RBC # BLD AUTO: 3.98 10*6/MM3 (ref 3.77–5.28)
WBC NRBC COR # BLD: 6.08 10*3/MM3 (ref 3.4–10.8)

## 2020-05-15 PROCEDURE — 85025 COMPLETE CBC W/AUTO DIFF WBC: CPT | Performed by: INTERNAL MEDICINE

## 2020-05-15 PROCEDURE — 36415 COLL VENOUS BLD VENIPUNCTURE: CPT

## 2020-05-15 PROCEDURE — 25010000002 ROMIPLOSTIM PER 10 MCG: Performed by: NURSE PRACTITIONER

## 2020-05-15 PROCEDURE — 25010000002 ROMIPLOSTIM 125 MCG RECONSTITUTED SOLUTION: Performed by: NURSE PRACTITIONER

## 2020-05-15 PROCEDURE — 96372 THER/PROPH/DIAG INJ SC/IM: CPT

## 2020-05-15 RX ORDER — WATER 1000 ML/1000ML
INJECTION, SOLUTION INTRAVENOUS
Status: DISCONTINUED
Start: 2020-05-15 | End: 2020-05-16 | Stop reason: HOSPADM

## 2020-05-15 RX ADMIN — ROMIPLOSTIM 500 MCG: 500 INJECTION, POWDER, LYOPHILIZED, FOR SOLUTION SUBCUTANEOUS at 15:26

## 2020-05-15 RX ADMIN — ROMIPLOSTIM 90 MCG: 125 INJECTION, POWDER, LYOPHILIZED, FOR SOLUTION SUBCUTANEOUS at 15:26

## 2020-05-15 NOTE — TELEPHONE ENCOUNTER
PT HAS AN APPT TODAY @ 10AM FOR INFUSION PT WANTS TO RESCHEDULE FOR LATER TODAY OR Monday     TRIED CALLING OFFICE WITH PT ON PHONE NO ANSWER     PT CONTACT # [phone]

## 2020-05-22 ENCOUNTER — HOSPITAL ENCOUNTER (OUTPATIENT)
Dept: ONCOLOGY | Facility: HOSPITAL | Age: 35
Setting detail: INFUSION SERIES
Discharge: HOME | End: 2020-05-22

## 2020-05-22 VITALS
TEMPERATURE: 97.8 F | HEIGHT: 62 IN | SYSTOLIC BLOOD PRESSURE: 100 MMHG | DIASTOLIC BLOOD PRESSURE: 49 MMHG | HEART RATE: 59 BPM | BODY MASS INDEX: 23.22 KG/M2 | WEIGHT: 126.2 LBS

## 2020-05-22 DIAGNOSIS — D69.6 THROMBOCYTOPENIA (HCC): ICD-10-CM

## 2020-05-22 DIAGNOSIS — D69.3 IMMUNE THROMBOCYTOPENIC PURPURA (HCC): Primary | Chronic | ICD-10-CM

## 2020-05-22 LAB
BASOPHILS # BLD AUTO: 0.07 10*3/MM3 (ref 0–0.2)
BASOPHILS NFR BLD AUTO: 1.1 % (ref 0–1.5)
DEPRECATED RDW RBC AUTO: 37.9 FL (ref 37–54)
EOSINOPHIL # BLD AUTO: 0.16 10*3/MM3 (ref 0–0.4)
EOSINOPHIL NFR BLD AUTO: 2.5 % (ref 0.3–6.2)
ERYTHROCYTE [DISTWIDTH] IN BLOOD BY AUTOMATED COUNT: 13.1 % (ref 12.3–15.4)
HCT VFR BLD AUTO: 32.5 % (ref 34–46.6)
HGB BLD-MCNC: 11.2 G/DL (ref 12–15.9)
LYMPHOCYTES # BLD AUTO: 1.57 10*3/MM3 (ref 0.7–3.1)
LYMPHOCYTES NFR BLD AUTO: 24.6 % (ref 19.6–45.3)
MCH RBC QN AUTO: 28.2 PG (ref 26.6–33)
MCHC RBC AUTO-ENTMCNC: 34.5 G/DL (ref 31.5–35.7)
MCV RBC AUTO: 81.9 FL (ref 79–97)
MONOCYTES # BLD AUTO: 0.58 10*3/MM3 (ref 0.1–0.9)
MONOCYTES NFR BLD AUTO: 9.1 % (ref 5–12)
NEUTROPHILS # BLD AUTO: 4.01 10*3/MM3 (ref 1.7–7)
NEUTROPHILS NFR BLD AUTO: 62.7 % (ref 42.7–76)
PLATELET # BLD AUTO: 28 10*3/MM3 (ref 140–450)
PMV BLD AUTO: 14.5 FL (ref 6–12)
RBC # BLD AUTO: 3.97 10*6/MM3 (ref 3.77–5.28)
WBC NRBC COR # BLD: 6.39 10*3/MM3 (ref 3.4–10.8)

## 2020-05-22 PROCEDURE — 96372 THER/PROPH/DIAG INJ SC/IM: CPT

## 2020-05-22 PROCEDURE — 25010000002 ROMIPLOSTIM PER 10 MCG: Performed by: NURSE PRACTITIONER

## 2020-05-22 PROCEDURE — 85025 COMPLETE CBC W/AUTO DIFF WBC: CPT | Performed by: NURSE PRACTITIONER

## 2020-05-22 PROCEDURE — 25010000002 ROMIPLOSTIM 125 MCG RECONSTITUTED SOLUTION: Performed by: NURSE PRACTITIONER

## 2020-05-22 PROCEDURE — 36416 COLLJ CAPILLARY BLOOD SPEC: CPT

## 2020-05-22 RX ORDER — FAMOTIDINE 10 MG/ML
20 INJECTION, SOLUTION INTRAVENOUS AS NEEDED
Status: CANCELLED | OUTPATIENT
Start: 2020-06-01

## 2020-05-22 RX ORDER — DIPHENHYDRAMINE HYDROCHLORIDE 50 MG/ML
50 INJECTION INTRAMUSCULAR; INTRAVENOUS AS NEEDED
Status: CANCELLED | OUTPATIENT
Start: 2020-06-01

## 2020-05-22 RX ORDER — WATER 1000 ML/1000ML
INJECTION, SOLUTION INTRAVENOUS
Status: DISCONTINUED
Start: 2020-05-22 | End: 2020-05-23 | Stop reason: HOSPADM

## 2020-05-22 RX ORDER — MEPERIDINE HYDROCHLORIDE 50 MG/ML
25 INJECTION INTRAMUSCULAR; INTRAVENOUS; SUBCUTANEOUS
Status: CANCELLED | OUTPATIENT
Start: 2020-05-28

## 2020-05-22 RX ORDER — DIPHENHYDRAMINE HYDROCHLORIDE 50 MG/ML
50 INJECTION INTRAMUSCULAR; INTRAVENOUS AS NEEDED
Status: CANCELLED | OUTPATIENT
Start: 2020-05-28

## 2020-05-22 RX ORDER — ACETAMINOPHEN 325 MG/1
650 TABLET ORAL ONCE
Status: CANCELLED | OUTPATIENT
Start: 2020-05-28

## 2020-05-22 RX ORDER — SODIUM CHLORIDE 9 MG/ML
250 INJECTION, SOLUTION INTRAVENOUS ONCE
Status: CANCELLED | OUTPATIENT
Start: 2020-06-01

## 2020-05-22 RX ORDER — DIPHENHYDRAMINE HCL 25 MG
25 CAPSULE ORAL ONCE
Status: CANCELLED | OUTPATIENT
Start: 2020-05-28

## 2020-05-22 RX ORDER — DIPHENHYDRAMINE HCL 25 MG
25 CAPSULE ORAL ONCE
Status: CANCELLED | OUTPATIENT
Start: 2020-06-01

## 2020-05-22 RX ORDER — MEPERIDINE HYDROCHLORIDE 50 MG/ML
25 INJECTION INTRAMUSCULAR; INTRAVENOUS; SUBCUTANEOUS
Status: CANCELLED | OUTPATIENT
Start: 2020-06-01

## 2020-05-22 RX ORDER — FAMOTIDINE 10 MG/ML
20 INJECTION, SOLUTION INTRAVENOUS AS NEEDED
Status: CANCELLED | OUTPATIENT
Start: 2020-05-28

## 2020-05-22 RX ORDER — SODIUM CHLORIDE 9 MG/ML
250 INJECTION, SOLUTION INTRAVENOUS ONCE
Status: CANCELLED | OUTPATIENT
Start: 2020-05-28

## 2020-05-22 RX ORDER — ACETAMINOPHEN 325 MG/1
650 TABLET ORAL ONCE
Status: CANCELLED | OUTPATIENT
Start: 2020-06-01

## 2020-05-22 RX ADMIN — ROMIPLOSTIM 500 MCG: 500 INJECTION, POWDER, LYOPHILIZED, FOR SOLUTION SUBCUTANEOUS at 09:09

## 2020-05-22 RX ADMIN — ROMIPLOSTIM 85 MCG: 125 INJECTION, POWDER, LYOPHILIZED, FOR SOLUTION SUBCUTANEOUS at 09:10

## 2020-05-22 NOTE — PROGRESS NOTES
Received verbal order per Dr. Llanes to place treatment plan for fostamatinib 100 mg BID.  Treatment plan placed and sent for signature.

## 2020-05-25 ENCOUNTER — HOSPITAL ENCOUNTER (OUTPATIENT)
Dept: INFUSION THERAPY | Facility: HOSPITAL | Age: 35
Setting detail: INFUSION SERIES
Discharge: HOME | End: 2020-05-25

## 2020-05-25 DIAGNOSIS — D69.6 THROMBOCYTOPENIA (HCC): ICD-10-CM

## 2020-05-25 LAB
ANISOCYTOSIS BLD QL: NORMAL
BASOPHILS # BLD AUTO: 0.1 10*3/MM3 (ref 0–0.2)
BASOPHILS NFR BLD AUTO: 1.1 % (ref 0–1.5)
DEPRECATED RDW RBC AUTO: 39.8 FL (ref 37–54)
EOSINOPHIL # BLD AUTO: 0.1 10*3/MM3 (ref 0–0.4)
EOSINOPHIL NFR BLD AUTO: 2.8 % (ref 0.3–6.2)
ERYTHROCYTE [DISTWIDTH] IN BLOOD BY AUTOMATED COUNT: 13.9 % (ref 12.3–15.4)
HCT VFR BLD AUTO: 33.1 % (ref 34–46.6)
HGB BLD-MCNC: 11.2 G/DL (ref 12–15.9)
LARGE PLATELETS: NORMAL
LYMPHOCYTES # BLD AUTO: 1.2 10*3/MM3 (ref 0.7–3.1)
LYMPHOCYTES NFR BLD AUTO: 22.5 % (ref 19.6–45.3)
MCH RBC QN AUTO: 27.4 PG (ref 26.6–33)
MCHC RBC AUTO-ENTMCNC: 33.7 G/DL (ref 31.5–35.7)
MCV RBC AUTO: 81.2 FL (ref 79–97)
MICROCYTES BLD QL: NORMAL
MONOCYTES # BLD AUTO: 0.4 10*3/MM3 (ref 0.1–0.9)
MONOCYTES NFR BLD AUTO: 7.8 % (ref 5–12)
NEUTROPHILS # BLD AUTO: 3.5 10*3/MM3 (ref 1.7–7)
NEUTROPHILS NFR BLD AUTO: 65.8 % (ref 42.7–76)
NRBC BLD AUTO-RTO: 0 /100 WBC (ref 0–0.2)
PATHOLOGY REVIEW: YES
PLATELET # BLD AUTO: 24 10*3/MM3 (ref 140–450)
PMV BLD AUTO: 10.6 FL (ref 6–12)
RBC # BLD AUTO: 4.08 10*6/MM3 (ref 3.77–5.28)
SMALL PLATELETS BLD QL SMEAR: NORMAL
WBC MORPH BLD: NORMAL
WBC NRBC COR # BLD: 5.3 10*3/MM3 (ref 3.4–10.8)

## 2020-05-25 PROCEDURE — 36415 COLL VENOUS BLD VENIPUNCTURE: CPT

## 2020-05-25 PROCEDURE — 85025 COMPLETE CBC W/AUTO DIFF WBC: CPT | Performed by: INTERNAL MEDICINE

## 2020-05-25 PROCEDURE — 85007 BL SMEAR W/DIFF WBC COUNT: CPT | Performed by: INTERNAL MEDICINE

## 2020-05-25 PROCEDURE — 36416 COLLJ CAPILLARY BLOOD SPEC: CPT

## 2020-05-26 LAB
LAB AP CASE REPORT: NORMAL
PATH REPORT.FINAL DX SPEC: NORMAL

## 2020-05-28 ENCOUNTER — TELEPHONE (OUTPATIENT)
Dept: ONCOLOGY | Facility: CLINIC | Age: 35
End: 2020-05-28

## 2020-05-28 ENCOUNTER — HOSPITAL ENCOUNTER (OUTPATIENT)
Dept: ONCOLOGY | Facility: HOSPITAL | Age: 35
Setting detail: INFUSION SERIES
Discharge: HOME | End: 2020-05-28

## 2020-05-28 VITALS
TEMPERATURE: 98.4 F | HEART RATE: 68 BPM | BODY MASS INDEX: 23.55 KG/M2 | OXYGEN SATURATION: 99 % | WEIGHT: 128 LBS | SYSTOLIC BLOOD PRESSURE: 106 MMHG | DIASTOLIC BLOOD PRESSURE: 70 MMHG | RESPIRATION RATE: 12 BRPM | HEIGHT: 62 IN

## 2020-05-28 DIAGNOSIS — D69.6 THROMBOCYTOPENIA (HCC): Primary | ICD-10-CM

## 2020-05-28 DIAGNOSIS — D69.6 THROMBOCYTOPENIA (HCC): ICD-10-CM

## 2020-05-28 DIAGNOSIS — D69.3 IMMUNE THROMBOCYTOPENIC PURPURA (HCC): Primary | ICD-10-CM

## 2020-05-28 LAB
BASOPHILS # BLD AUTO: 0.04 10*3/MM3 (ref 0–0.2)
BASOPHILS NFR BLD AUTO: 0.9 % (ref 0–1.5)
DEPRECATED RDW RBC AUTO: 38.7 FL (ref 37–54)
EOSINOPHIL # BLD AUTO: 0.14 10*3/MM3 (ref 0–0.4)
EOSINOPHIL NFR BLD AUTO: 3 % (ref 0.3–6.2)
ERYTHROCYTE [DISTWIDTH] IN BLOOD BY AUTOMATED COUNT: 13.2 % (ref 12.3–15.4)
HCT VFR BLD AUTO: 35.2 % (ref 34–46.6)
HGB BLD-MCNC: 11.8 G/DL (ref 12–15.9)
LYMPHOCYTES # BLD AUTO: 1.32 10*3/MM3 (ref 0.7–3.1)
LYMPHOCYTES NFR BLD AUTO: 28.6 % (ref 19.6–45.3)
MCH RBC QN AUTO: 27.7 PG (ref 26.6–33)
MCHC RBC AUTO-ENTMCNC: 33.5 G/DL (ref 31.5–35.7)
MCV RBC AUTO: 82.6 FL (ref 79–97)
MONOCYTES # BLD AUTO: 0.46 10*3/MM3 (ref 0.1–0.9)
MONOCYTES NFR BLD AUTO: 10 % (ref 5–12)
NEUTROPHILS # BLD AUTO: 2.65 10*3/MM3 (ref 1.7–7)
NEUTROPHILS NFR BLD AUTO: 57.5 % (ref 42.7–76)
PLATELET # BLD AUTO: 45 10*3/MM3 (ref 140–450)
RBC # BLD AUTO: 4.26 10*6/MM3 (ref 3.77–5.28)
WBC NRBC COR # BLD: 4.61 10*3/MM3 (ref 3.4–10.8)

## 2020-05-28 PROCEDURE — 85025 COMPLETE CBC W/AUTO DIFF WBC: CPT | Performed by: INTERNAL MEDICINE

## 2020-05-28 PROCEDURE — 36415 COLL VENOUS BLD VENIPUNCTURE: CPT

## 2020-05-28 PROCEDURE — 25010000002 ROMIPLOSTIM 125 MCG RECONSTITUTED SOLUTION: Performed by: INTERNAL MEDICINE

## 2020-05-28 PROCEDURE — 25010000002 ROMIPLOSTIM PER 10 MCG: Performed by: INTERNAL MEDICINE

## 2020-05-28 PROCEDURE — 96372 THER/PROPH/DIAG INJ SC/IM: CPT

## 2020-05-28 RX ORDER — ONDANSETRON HYDROCHLORIDE 8 MG/1
8 TABLET, FILM COATED ORAL 3 TIMES DAILY PRN
Qty: 30 TABLET | Refills: 5 | Status: SHIPPED | OUTPATIENT
Start: 2020-05-28

## 2020-05-28 RX ORDER — WATER 1000 ML/1000ML
INJECTION, SOLUTION INTRAVENOUS
Status: COMPLETED
Start: 2020-05-28 | End: 2020-05-28

## 2020-05-28 RX ADMIN — WATER 10 ML: 1 INJECTION INTRAMUSCULAR; INTRAVENOUS; SUBCUTANEOUS at 08:54

## 2020-05-28 RX ADMIN — ROMIPLOSTIM 500 MCG: 500 INJECTION, POWDER, LYOPHILIZED, FOR SOLUTION SUBCUTANEOUS at 08:53

## 2020-05-28 RX ADMIN — ROMIPLOSTIM 85 MCG: 125 INJECTION, POWDER, LYOPHILIZED, FOR SOLUTION SUBCUTANEOUS at 08:53

## 2020-05-28 NOTE — PROGRESS NOTES
Patient is here today for IVIG. Spoke with Dr. Llanes regarding platelets of 45. Per treatment plan hold if above 10. Dr. Llanes stated to hold IVIG today. We will give her Nplate today and she is to come in for plt recheck tomorrow. Patient verbalized understanding.

## 2020-05-29 ENCOUNTER — OFFICE VISIT (OUTPATIENT)
Dept: ONCOLOGY | Facility: CLINIC | Age: 35
End: 2020-05-29

## 2020-05-29 ENCOUNTER — HOSPITAL ENCOUNTER (OUTPATIENT)
Dept: ONCOLOGY | Facility: HOSPITAL | Age: 35
Setting detail: INFUSION SERIES
Discharge: HOME | End: 2020-05-29

## 2020-05-29 VITALS
WEIGHT: 126.3 LBS | OXYGEN SATURATION: 100 % | BODY MASS INDEX: 23.24 KG/M2 | HEART RATE: 63 BPM | HEIGHT: 62 IN | SYSTOLIC BLOOD PRESSURE: 105 MMHG | RESPIRATION RATE: 20 BRPM | DIASTOLIC BLOOD PRESSURE: 64 MMHG | TEMPERATURE: 99.6 F

## 2020-05-29 DIAGNOSIS — D69.3 IMMUNE THROMBOCYTOPENIC PURPURA (HCC): Chronic | ICD-10-CM

## 2020-05-29 DIAGNOSIS — D69.6 THROMBOCYTOPENIA (HCC): ICD-10-CM

## 2020-05-29 DIAGNOSIS — D69.6 THROMBOCYTOPENIA (HCC): Chronic | ICD-10-CM

## 2020-05-29 DIAGNOSIS — Z79.899 USE OF MEDICATION WITH TERATOGENIC POTENTIAL IN FEMALE OF REPRODUCTIVE AGE: Primary | ICD-10-CM

## 2020-05-29 LAB
BASOPHILS # BLD AUTO: 0.06 10*3/MM3 (ref 0–0.2)
BASOPHILS NFR BLD AUTO: 1 % (ref 0–1.5)
DEPRECATED RDW RBC AUTO: 37.4 FL (ref 37–54)
EOSINOPHIL # BLD AUTO: 0.14 10*3/MM3 (ref 0–0.4)
EOSINOPHIL NFR BLD AUTO: 2.4 % (ref 0.3–6.2)
ERYTHROCYTE [DISTWIDTH] IN BLOOD BY AUTOMATED COUNT: 13.1 % (ref 12.3–15.4)
HCT VFR BLD AUTO: 34.7 % (ref 34–46.6)
HGB BLD-MCNC: 11.9 G/DL (ref 12–15.9)
LYMPHOCYTES # BLD AUTO: 1.91 10*3/MM3 (ref 0.7–3.1)
LYMPHOCYTES NFR BLD AUTO: 32.9 % (ref 19.6–45.3)
MCH RBC QN AUTO: 27.9 PG (ref 26.6–33)
MCHC RBC AUTO-ENTMCNC: 34.3 G/DL (ref 31.5–35.7)
MCV RBC AUTO: 81.3 FL (ref 79–97)
MONOCYTES # BLD AUTO: 0.67 10*3/MM3 (ref 0.1–0.9)
MONOCYTES NFR BLD AUTO: 11.5 % (ref 5–12)
NEUTROPHILS # BLD AUTO: 3.03 10*3/MM3 (ref 1.7–7)
NEUTROPHILS NFR BLD AUTO: 52.2 % (ref 42.7–76)
PLATELET # BLD AUTO: 26 10*3/MM3 (ref 140–450)
RBC # BLD AUTO: 4.27 10*6/MM3 (ref 3.77–5.28)
WBC NRBC COR # BLD: 5.81 10*3/MM3 (ref 3.4–10.8)

## 2020-05-29 PROCEDURE — 85025 COMPLETE CBC W/AUTO DIFF WBC: CPT | Performed by: INTERNAL MEDICINE

## 2020-05-29 PROCEDURE — 99215 OFFICE O/P EST HI 40 MIN: CPT | Performed by: INTERNAL MEDICINE

## 2020-05-29 PROCEDURE — 36415 COLL VENOUS BLD VENIPUNCTURE: CPT

## 2020-05-29 NOTE — PROGRESS NOTES
HEMATOLOGY ONCOLOGY FOLLOW UP        Patient name: Tiny Akhtar  : 1985  MRN: [id number]  Primary Care Physician: Jose Ingram, APRN  Referring Physician: Jose Ingram, *  Reason For Consult:     Chief Complaint   Patient presents with   • Follow-up     Immune thrombocytopenic purpura      Refractory ITP  History of Present Illness:  Tiny Akhtar is a 34 y.o. female with a past medical history of thrombocytopenia, anemia, and kidney stones.  She presented to [facility] Emergency Department on 2019 after she had blood work drawn earlier in the day that showed a platelet count of 9,000 at this facility.  She had previously been admitted through the ED on 2019 reporting bruising on her ankles and in her mouth.  She received 2 units of platelets and started on steroids. She was discharged on 19.The patient's last platelet count was 17,000 on 19 after a platelet transfusion.  She had been on steroids for four days.  The patient reported continued  subcutaneous bruising. She denied any headaches, nosebleeds, or gingival bleeding.  She was admitted for IV steroids and further management.     · 10/7/2019 CT abdomen and pelvis: 3 mm stone at the left ureterovesical junction causing mild to moderate obstruction.Spleen reported as normal size.  · 10/7/2019 WBC 10.2, hemoglobin 12.2, platelets 268    · 2019 WBC 5.7, hemoglobin 12.8, Platelets 5000,  Transfused 2 units platelets.  initial presentation for diagnosis acute thrombocytopenia,. Started on Solumedrol 125 mg IV Q6Hr  · 2019 platelets 5000.  Patient transfused with platelets.  Platelets increased to 13,000  · 19  Bone marrow biopsy - Smears of bone marrow aspirate with cell block (clot) preparation and marrow biopsy:  Hypocellular for age bone marrow (50%),  Adequate numbers of unremarkable megakaryocytes,  Absent iron stores,  Negative for involvement by malignant  lymphoma.  Flow cytometry-CD 56 expression on granulocytes and monocytes.  Chromosomal analysis -46XX  · 11/20/2019 platelet count 17,000 . Switched to Prednisone 1 mg/kg ( 50 mg daily)  · 11/21/2019 platelets 17,000  · 11/22/2019  Readmitted . Restarted Solumedrol  mg Q6H . platelets 9000  · 11/23/19 platelets 13,000  · 11/23/2019 hematology/Oncology was consulted on this patient known to our service and previously consulted for acute thrombocytopenia on 11/18/19. She was followed by Uri Sagastume MD for ITP. The patient had been transfused and started on IV steroids Prednisone while inpatient 11/18/19 to 11/20/19.  She was transitioned to oral steroids at discharge.     · 11/23/2019 Hepatitis panel negative, TSH 0.33, H. pylori IgG 1.43 High, IgM negative, JOSE negative, HIV antibodies negative  · 11/23/2019 patient received IV Venofer.  Patient received meningococcal vaccine, Haemophilus vaccine, pneumococcus vaccine  · 11/23/2019 platelets 13,000  · 11/24/2019 platelets 52,000.  Platelets responded to steroids. On IV solumedrol. Started on Megace 40 mg po QID for menorrhagia.  · Pt discharged on Prednisone 50 mg  · 11/27/2019 platelets 8000.  Patient's failed steroids ( on prednisone 50 mg daily).. Started Tranexamic acid 1300 mg TID  · 11/29/2019  READMITTED platelets 12,000  · 11/29/19-Cycle 1 Day one of RITUXIMAB and continues on megestrol  · 11/30/2019 WBC 11.2, hemoglobin 12.1, platelets 6000  · 12/1/2019 platelets 11,000.  Patient received IVIG 30 g  · 12/2/2019 iron 72, iron saturation 17% low, TIBC 416, ferritin 231, platelets 56,000  · 12/3/2019 platelets 129,000  · 12/4/2019 Prednisone decreased to 40 mg daily  · 12/6/2019 patient received week 2 of Rituximab.  Platelets 24,000, hemoglobin 11.8  · 12/8/2019 platelets 5000, hemoglobin 11.6, PT 10.5, INR 1.  Patient received IVIG 50 g  · 12/9/2019 platelets 11,000  · 12/11/2019 platelets 16,000  · 12/13/2019: Platelets 13,000 Patient  received week 3 of her Rituximab. Prednisone decreased to 20 mg daily  · 12/14/2019 patient received IVIG 55 g, platelets 12,000.   · 12/16/2019 platelets 24,000. Prednisone decreased to 10 mg daily  · 12/18/2019 platelets 12,000  · 12/19/2019 platelets 11,000  · 12/20/19-week 4 Rituximab and Megestrol, platelets 12,000. Prednisone DCed.  · 12/26/2019 platelets 7000  · 12/27/19-patient was started on NPLATE   1 mcg/kg, platelets 8000  · 12/30/2019 platelets 6000.  After transfusion 31,000  · 01/03/2020- NPLATE 2 mcg/kg, platelets 10,000  · 01/10/2020-NPLATE 3 mcg/kg.  continues on Megace megestrol, platelets 4000  · 1/15/2020: WBC 5.9, hemoglobin 12.5, platelets 2000k,   · 1/15/2020: Serum copper 114 normal, IgA 81 normal,Anticardiolipin antibodies negative, beta-2 glycoprotein antibodies negative, phosphatidylserine antibodies negative,  · 1/16/2020 patient received IVIG 55 g, platelets 4000  · 1/17/2020 patient received IVIG 55 g, platelets 36,000  · 01/17/2020: IVIG WBC 4.27, hemoglobin, 11.5, platelets  53025  · 1/20/2020 patient received Nplate 6 mcg/kg, WBC 5.1, hemoglobin 11.9, platelets 44,  · 1/27/2020 patient received Nplate 6 mcg/kg platelets 100,000  · 1/29/2020 platelets 74,000  · 1/30/2020 patient seen at [facility] hematology for second opinion.  Recommended to continue course of current treatment.  · 2/3/2020 patient received Nplate 6 mcg/kg.  WBC 5.9, hemoglobin 12, platelets 81k  · 2/10/2020 patient received Nplate 7 mcg/kg platelets 34,000   · 2/12/2020 WBC 5.1, hemoglobin 12.8, platelets 24K  · 2/13/2020: Patient advised to start dexamethasone 40 mg x 4 days course   · 2/17/2020: Platelets 11,000  · 2/21/2020 platelets 0.  Patient received IVIG 1 g/kg.  60 g  · 2/21/2020: Patient transfused platelets   · 2/22/2020 platelets 65K. , hemoglobin 10.4, patient received IVIG 60 g  · 2/24/2020 platelets 231K, hemoglobin 10.6, patient received Nplate 8 mcg/kg  · 3/5/2020: WBC 5.3,  hemoglobin 11.4, platelets 65,000.  Patient received Nplate 8 mcg/kg  · 3/11/2020 hemoglobin 12.1, platelets 97,000  · 3/13/2020 WBC 5.2, hemoglobin 11.6, platelets 106.  Patient received Nplate 500 micrograms  · 3/18/2020 WBC 5.7, hemoglobin 12.3, platelets 25,000, patient received Nplate 500 mcg  · 3/20/2020 WBC 5.7, hemoglobin 12.4, platelets 56,000,  · 3/25/2020 platelets 32,000, received Nplate 500 mcg  · 4/2/2020: WBC 7.3, hemoglobin 11.9, platelets 43,000, MCV 85.8  · 4/3/2020 platelets 27, Nplate 400 mcg  · 4/9/2020 platelets 24k  · 4/10/2020 platelets 35K.  Nplate 590 mcg  · 4/17/2020: Platelets 42350, Nplate 500 mcg  · 4/21/2020: Platelets 2000, patient received IVIG 60 g  · 4/22/2020 platelets 77K, hemoglobin 10.7, WBC 4.1, patient received IVIG 60 g  · 4/24/2020: Platelets 182K, hemoglobin 10.7, received Nplate 5 mcg  · 5/1/2020: Patient received Nplate 400 mcg platelets 179  · 5/8/2020: Patient received Nplate 590 mcg, platelets 33,000  · 5/13/2020: Platelets 46,000  · 5/15/2020 patient received Nplate, platelets 64,000  · 5/22/2020: Platelets 28,000  · 5/28/2020 patient received Nplate 500 mcg.  Platelets 45,000  · 5/29/2020 platelets 26,000        Subjective:  Patient is here for a follow up of ITP.  Her platelets have been going up and down in the recent past.  Denies any bleeding issues.  She has not required any IVIG before the Memorial Day weekend.    Today however platelets have dropped to 26,000.  She needs IVIG but she cannot get it today due to commitments.  She wants to get it on Monday.  Denies experiencing any excess menstrual bleeding at this time.  She is not taking any Lysteda.  Has not had any nosebleeds.  Has not required any platelet transfusions in the interim..  Initially not on any steroids.  She has some minor bruising.            Past Medical History:   Diagnosis Date   • Anxiety    • Iron deficiency anemia    • Kidney stone 10/2019       No past surgical history on  file.      Current Outpatient Medications:   •  acetaminophen (TYLENOL) 325 MG tablet, Take 650 mg by mouth Every 6 (Six) Hours As Needed for Mild Pain  or Moderate Pain ., Disp: , Rfl:   •  amphetamine-dextroamphetamine (ADDERALL) 10 MG tablet, , Disp: , Rfl:   •  ferrous sulfate 324 (65 Fe) MG tablet delayed-release EC tablet, Take 1 tablet by mouth Daily With Breakfast., Disp: 30 tablet, Rfl: 0  •  Fostamatinib Disodium 100 MG tablet, Take 100 mg by mouth 2 (Two) Times a Day. May take with or without food.  Do not crush or chew tablets., Disp: 60 tablet, Rfl: 3  •  megestrol (MEGACE) 20 MG tablet, Take 20 mg by mouth 3 (Three) Times a Day., Disp: , Rfl:   •  NON FORMULARY, Take 1 tablet by mouth Daily. Balance OTC, Disp: , Rfl:   •  ondansetron (ZOFRAN) 8 MG tablet, Take 1 tablet by mouth 3 (Three) Times a Day As Needed for Nausea or Vomiting., Disp: 30 tablet, Rfl: 5  •  pantoprazole (PROTONIX) 20 MG EC tablet, , Disp: , Rfl:   •  polyethylene glycol (MIRALAX) powder, Take 17 g by mouth Daily As Needed (constipation)., Disp: 225 g, Rfl: 0  •  Tranexamic Acid 650 MG tablet, Take 2 tablets by mouth 3 (Three) Times a Day., Disp: 30 tablet, Rfl: 0    No Known Allergies    Family History   Problem Relation Age of Onset   • Thrombocytopenia Nephew         ITP   • Diabetes Mother    • Diabetes Father    • Cancer Paternal Grandmother        Cancer-related family history includes Cancer in her paternal grandmother.    Social History     Tobacco Use   • Smoking status: Never Smoker   • Smokeless tobacco: Never Used   Substance Use Topics   • Alcohol use: No     Frequency: Never   • Drug use: No         I have reviewed the history of present illness, past medical history, family history, social history, lab results, all notes and other records since the patient was last seen on  11/21/2019.    ROS:     Review of Systems   Constitutional: Negative for chills, fatigue and fever.   HENT: Negative for ear pain, mouth sores,  nosebleeds and sore throat.    Eyes: Negative for photophobia and visual disturbance.   Respiratory: Negative for wheezing and stridor.    Cardiovascular: Negative for chest pain and palpitations.   Gastrointestinal: Negative for abdominal pain, diarrhea, nausea and vomiting.   Endocrine: Negative for cold intolerance and heat intolerance.   Genitourinary: Negative for dysuria and hematuria.   Musculoskeletal: Negative for joint swelling and neck stiffness.   Skin: Negative for color change and rash.   Neurological: Negative for seizures and syncope.   Hematological: Negative for adenopathy.        No obvious bleeding   Psychiatric/Behavioral: Negative for agitation, confusion and hallucinations.       Objective:    There were no vitals filed for this visit.  ECOG  (0) Fully active, able to carry on all predisease performance without restriction    Physical Exam:   Physical Exam   Constitutional: She is oriented to person, place, and time. She appears well-developed and well-nourished.   HENT:   Head: Normocephalic and atraumatic.   Nose: Nose normal.   Mouth/Throat: Oropharynx is clear and moist. No oropharyngeal exudate.   Eyes: Conjunctivae and EOM are normal. No scleral icterus.   Neck: Normal range of motion. No tracheal deviation present. No thyromegaly present.   Cardiovascular: Regular rhythm and normal heart sounds.   Pulmonary/Chest: No stridor.   Abdominal: Soft. Bowel sounds are normal. She exhibits no distension and no mass. There is no tenderness.   Musculoskeletal: Normal range of motion. She exhibits no edema or deformity.   Lymphadenopathy:     She has no cervical adenopathy.   Neurological: She is alert and oriented to person, place, and time. No sensory deficit.   Skin: Skin is warm.   Some bruising   Psychiatric: Her behavior is normal.   Vitals reviewed.            Lab Results - Last 18 Months   Lab Units 05/29/20  1053 05/28/20  0810 05/25/20  0719   WBC 10*3/mm3 5.81 4.61 5.30   HEMOGLOBIN  g/dL 11.9* 11.8* 11.2*   HEMATOCRIT % 34.7 35.2 33.1*   PLATELETS 10*3/mm3 26* 45* 24*   MCV fL 81.3 82.6 81.2     Lab Results - Last 18 Months   Lab Units 12/08/19 2022 12/02/19  0354 12/01/19  0320  11/29/19  1217  11/22/19  2248 11/18/19  0918   SODIUM mmol/L 137 137 138   < > 136   < > 139 140   POTASSIUM mmol/L 3.6 4.1 4.2   < > 4.2   < > 3.5 3.8   CHLORIDE mmol/L 100 101 100   < > 98   < > 103 105   CO2 mmol/L 26.0 27.0 23.0   < > 23.0   < > 23.0 26.0   BUN mg/dL 17 16 15   < > 17   < > 17 16   CREATININE mg/dL 0.55* 0.55* 0.65   < > 0.68   < > 0.54* 0.60   CALCIUM mg/dL 9.2 9.3 9.3   < > 9.5   < > 9.1 9.5   BILIRUBIN mg/dL  --   --   --   --  0.3  --  0.3 0.3   ALK PHOS U/L  --   --   --   --  36*  --  36* 36*   ALT (SGPT) U/L  --   --   --   --  31  --  38* 13   AST (SGOT) U/L  --   --   --   --  16  --  16 17   GLUCOSE mg/dL 103* 107* 171*   < > 93   < > 153* 102*    < > = values in this interval not displayed.       Lab Results   Component Value Date    GLUCOSE 103 (H) 12/08/2019    BUN 17 12/08/2019    CREATININE 0.55 (L) 12/08/2019    EGFRIFNONA 128 12/08/2019    BCR 30.9 (H) 12/08/2019    K 3.6 12/08/2019    CO2 26.0 12/08/2019    CALCIUM 9.2 12/08/2019    PROTENTOTREF 5.9 (L) 11/23/2019    ALBUMIN 4.10 11/29/2019    LABIL2 1.5 11/23/2019    AST 16 11/29/2019    ALT 31 11/29/2019       Lab Results - Last 18 Months   Lab Units 04/10/20  0841 12/08/19 2022 11/18/19  0918   INR  1.02 1.00 1.00   APTT seconds 24.5  --  23.5*       Lab Results   Component Value Date    IRON 72 12/02/2019    TIBC 416 12/02/2019    FERRITIN 231.70 (H) 12/02/2019       Lab Results   Component Value Date    FOLATE 11.20 11/23/2019       No results found for: OCCULTBLD    Lab Results   Component Value Date    RETICCTPCT 3.13 (H) 12/01/2019       Lab Results   Component Value Date    CDPRVDWB86 461 11/23/2019     No results found for: SPEP, UPEP  LDH   Date Value Ref Range Status   11/23/2019 123 (L) 135 - 214 U/L Final     Lab  Results   Component Value Date    JOSE Negative 11/23/2019     Lab Results   Component Value Date    HAPTOGLOBIN 136 11/23/2019     Lab Results   Component Value Date    PTT 24.5 04/10/2020    INR 1.02 04/10/2020     No results found for:   No results found for: CEA  No components found for: CA-19-9  No results found for: PSA        Assessment/Plan     Assessment:  1. Refractory idiopathic thrombocytopenic purpura ITP. : Refractory to steroids: She was diagnosed on 11/18/19  by mouth.  S/p bone marrow biopsy 11/18/19 - hypocellular for age bone marrow (50%),  adequate numbers of unremarkable megakaryocytes,  absent iron stores,  negative for involvement by malignant lymphoma.  Flow cytometry-CD 56 expression on granulocytes and monocytes.  . Normal karyotype 46XX.  HIV testing negative. TSH normal.   She does have a transient responses to IVIG.  Received 4 weekly doses of Rituximab starting 11/29/2019 to 12/20/2019.  She did not respond to rituximab right away and still awaiting response.  Started on Nplate 12/27/2019 at 1 mcg/kg.  Dose increased to 3 mcg/kg as of 1/10/2020 and 7 mcg/kg as of last dose..  IVIG was repeated on 1/16/2020 and 1/17/2020 with good response that lasted for about 3 weeks.. Currently on Nplate 10 mcg/kg, however platelets have dropped significantly on 4/21/2020 and we had to give her IVIG  2. Anemia: History of HAILE:  Bone marrow biopsy on 11/18/19 showed absent iron stores. Anemia work-up: reticulocytes 1.53, ferritin 15.38, iron 40, iron saturation 9, transferrin 310, TIBC 462, .   3. Iron deficiency anemia - S/p Venofer 400 mg IV x 1 dose on 11/23/19  3. Left renal stone: 3 mm kidney stone visualized on CT A/P on 10/7/19.  Asymptomatic.  4. Anxiety/weight loss:  management per Primary team   5. Hx of Menstrual bleeding: LMPD 11/23/19 -On Megace  to suppress menstrual cycle . Also on Lysteda (tranexamic acid)     PLAN      1. We will discontinue Nplate, since patient continues  to have drop in her platelet counts, with erratic responses.   2. Due to critically low platelets, will arrange IVIG.  However patient wants to receive it Monday.  3. We will switch treatment from Nplate to fostamatinib, for better response and also to improve quality of life.  Patient is having numerous visits to clinic with critical but erratic platelet counts.  4. Explained the risks and benefits of fostamatinib.  Patient agreeable and wants to proceed with the treatment.  5. Using IVIG as needed  6. Patient has failed rechallenge with dexamethasone.  No plans to reinitiate steroids.  7. Recommend platelet transfusions only if platelets extremely low ( less than 2K) or if patient has bleeding.  8. Continue Megace as needed for vaginal bleeding.  9. Continue Lysteda, tranexamic acid as needed for platelets less than 10,000.  10. Continue Protonix  11. Continue weekly CBCs  12. Will follow patient back in we will follow-up in 4 weeks.          Use of medication with teratogenic potential in female of reproductive age  - POC Pregnancy, Urine    Orders Placed This Encounter   Procedures   • POC Pregnancy, Urine     Please collect on 06/01/2020     Standing Status:   Future     Standing Expiration Date:   5/29/2021     Scheduling Instructions:      Please collect on 06/01/2020               Thank you very much for providing the opportunity to participate in this patient’s care. Please do not hesitate to call if there are any other questions.    I have reviewed all relevant labs results,outside reports, imaging,,notes, vitals, medications and plan with the patient today.    Portions of the note have been Scribed by medical assistant/Nurse.  I have reviewed and made changes accordingly.      Electronically signed by Forrest Llanes MD, 04/02/20, 1:49 PM.

## 2020-06-01 ENCOUNTER — HOSPITAL ENCOUNTER (OUTPATIENT)
Dept: ONCOLOGY | Facility: HOSPITAL | Age: 35
Setting detail: INFUSION SERIES
Discharge: HOME | End: 2020-06-01

## 2020-06-01 VITALS
DIASTOLIC BLOOD PRESSURE: 70 MMHG | WEIGHT: 128 LBS | TEMPERATURE: 98.3 F | BODY MASS INDEX: 23.55 KG/M2 | HEART RATE: 106 BPM | RESPIRATION RATE: 20 BRPM | SYSTOLIC BLOOD PRESSURE: 110 MMHG | HEIGHT: 62 IN

## 2020-06-01 DIAGNOSIS — D69.6 THROMBOCYTOPENIA (HCC): Primary | ICD-10-CM

## 2020-06-01 LAB
BASOPHILS # BLD AUTO: 0.05 10*3/MM3 (ref 0–0.2)
BASOPHILS NFR BLD AUTO: 0.9 % (ref 0–1.5)
DEPRECATED RDW RBC AUTO: 38.3 FL (ref 37–54)
EOSINOPHIL # BLD AUTO: 0.18 10*3/MM3 (ref 0–0.4)
EOSINOPHIL NFR BLD AUTO: 3.4 % (ref 0.3–6.2)
ERYTHROCYTE [DISTWIDTH] IN BLOOD BY AUTOMATED COUNT: 13.1 % (ref 12.3–15.4)
HCT VFR BLD AUTO: 34.9 % (ref 34–46.6)
HGB BLD-MCNC: 11.5 G/DL (ref 12–15.9)
LYMPHOCYTES # BLD AUTO: 1.29 10*3/MM3 (ref 0.7–3.1)
LYMPHOCYTES NFR BLD AUTO: 24.1 % (ref 19.6–45.3)
MCH RBC QN AUTO: 27.2 PG (ref 26.6–33)
MCHC RBC AUTO-ENTMCNC: 33 G/DL (ref 31.5–35.7)
MCV RBC AUTO: 82.5 FL (ref 79–97)
MONOCYTES # BLD AUTO: 0.73 10*3/MM3 (ref 0.1–0.9)
MONOCYTES NFR BLD AUTO: 13.6 % (ref 5–12)
NEUTROPHILS # BLD AUTO: 3.1 10*3/MM3 (ref 1.7–7)
NEUTROPHILS NFR BLD AUTO: 58 % (ref 42.7–76)
PLATELET # BLD AUTO: 11 10*3/MM3 (ref 140–450)
RBC # BLD AUTO: 4.23 10*6/MM3 (ref 3.77–5.28)
WBC NRBC COR # BLD: 5.35 10*3/MM3 (ref 3.4–10.8)

## 2020-06-01 PROCEDURE — 85025 COMPLETE CBC W/AUTO DIFF WBC: CPT | Performed by: INTERNAL MEDICINE

## 2020-06-01 PROCEDURE — 36415 COLL VENOUS BLD VENIPUNCTURE: CPT

## 2020-06-01 PROCEDURE — 96365 THER/PROPH/DIAG IV INF INIT: CPT

## 2020-06-01 PROCEDURE — 25010000002 IMMUNE GLOBULIN (HUMAN) 10 GM/100ML SOLUTION: Performed by: INTERNAL MEDICINE

## 2020-06-01 PROCEDURE — 63710000001 DIPHENHYDRAMINE PER 50 MG: Performed by: INTERNAL MEDICINE

## 2020-06-01 PROCEDURE — 96366 THER/PROPH/DIAG IV INF ADDON: CPT

## 2020-06-01 PROCEDURE — 25010000002 IMMUNE GLOBULIN (HUMAN) 40 GM/400ML SOLUTION: Performed by: NURSE PRACTITIONER

## 2020-06-01 RX ORDER — ACETAMINOPHEN 325 MG/1
650 TABLET ORAL ONCE
Status: COMPLETED | OUTPATIENT
Start: 2020-06-01 | End: 2020-06-01

## 2020-06-01 RX ORDER — DIPHENHYDRAMINE HCL 25 MG
25 CAPSULE ORAL ONCE
Status: COMPLETED | OUTPATIENT
Start: 2020-06-01 | End: 2020-06-01

## 2020-06-01 RX ORDER — SODIUM CHLORIDE 9 MG/ML
250 INJECTION, SOLUTION INTRAVENOUS ONCE
Status: DISCONTINUED | OUTPATIENT
Start: 2020-06-01 | End: 2020-06-02 | Stop reason: HOSPADM

## 2020-06-01 RX ADMIN — ACETAMINOPHEN 650 MG: 325 TABLET ORAL at 08:42

## 2020-06-01 RX ADMIN — HUMAN IMMUNOGLOBULIN G 40 G: 40 LIQUID INTRAVENOUS at 09:12

## 2020-06-01 RX ADMIN — DIPHENHYDRAMINE HYDROCHLORIDE 25 MG: 25 CAPSULE ORAL at 08:42

## 2020-06-01 RX ADMIN — HUMAN IMMUNOGLOBULIN G 10 G: 10 LIQUID INTRAVENOUS at 12:35

## 2020-06-02 ENCOUNTER — HOSPITAL ENCOUNTER (OUTPATIENT)
Dept: ONCOLOGY | Facility: HOSPITAL | Age: 35
Setting detail: INFUSION SERIES
Discharge: HOME | End: 2020-06-02

## 2020-06-02 VITALS
HEART RATE: 66 BPM | SYSTOLIC BLOOD PRESSURE: 114 MMHG | TEMPERATURE: 99.8 F | WEIGHT: 126 LBS | OXYGEN SATURATION: 100 % | HEIGHT: 62 IN | RESPIRATION RATE: 20 BRPM | BODY MASS INDEX: 23.19 KG/M2 | DIASTOLIC BLOOD PRESSURE: 73 MMHG

## 2020-06-02 DIAGNOSIS — D69.6 THROMBOCYTOPENIA (HCC): Primary | ICD-10-CM

## 2020-06-02 DIAGNOSIS — D69.3 IMMUNE THROMBOCYTOPENIC PURPURA (HCC): Chronic | ICD-10-CM

## 2020-06-02 LAB
B-HCG UR QL: NEGATIVE
BASOPHILS # BLD AUTO: 0.07 10*3/MM3 (ref 0–0.2)
BASOPHILS NFR BLD AUTO: 1.2 % (ref 0–1.5)
DEPRECATED RDW RBC AUTO: 37.3 FL (ref 37–54)
EOSINOPHIL # BLD AUTO: 0.14 10*3/MM3 (ref 0–0.4)
EOSINOPHIL NFR BLD AUTO: 2.5 % (ref 0.3–6.2)
ERYTHROCYTE [DISTWIDTH] IN BLOOD BY AUTOMATED COUNT: 13.1 % (ref 12.3–15.4)
HCT VFR BLD AUTO: 35.8 % (ref 34–46.6)
HGB BLD-MCNC: 12 G/DL (ref 12–15.9)
LYMPHOCYTES # BLD AUTO: 1.41 10*3/MM3 (ref 0.7–3.1)
LYMPHOCYTES NFR BLD AUTO: 24.9 % (ref 19.6–45.3)
MCH RBC QN AUTO: 27.3 PG (ref 26.6–33)
MCHC RBC AUTO-ENTMCNC: 33.5 G/DL (ref 31.5–35.7)
MCV RBC AUTO: 81.4 FL (ref 79–97)
MONOCYTES # BLD AUTO: 1.1 10*3/MM3 (ref 0.1–0.9)
MONOCYTES NFR BLD AUTO: 19.4 % (ref 5–12)
NEUTROPHILS # BLD AUTO: 2.94 10*3/MM3 (ref 1.7–7)
NEUTROPHILS NFR BLD AUTO: 52 % (ref 42.7–76)
PLATELET # BLD AUTO: 117 10*3/MM3 (ref 140–450)
PMV BLD AUTO: 12.8 FL (ref 6–12)
RBC # BLD AUTO: 4.4 10*6/MM3 (ref 3.77–5.28)
WBC NRBC COR # BLD: 5.66 10*3/MM3 (ref 3.4–10.8)

## 2020-06-02 PROCEDURE — 36416 COLLJ CAPILLARY BLOOD SPEC: CPT

## 2020-06-02 PROCEDURE — 81025 URINE PREGNANCY TEST: CPT

## 2020-06-02 PROCEDURE — 85025 COMPLETE CBC W/AUTO DIFF WBC: CPT | Performed by: INTERNAL MEDICINE

## 2020-06-02 NOTE — PROGRESS NOTES
Pt here for IGG no new complaints from yesterday. Per Dr. Llanes hold dose today plt count 117. Pt to have CBC recheck on Thursday 6/4. I also gave pt education paperwork on [employer] to review. Pt verbalized understanding of today's visit.

## 2020-06-03 ENCOUNTER — TELEPHONE (OUTPATIENT)
Dept: ONCOLOGY | Facility: CLINIC | Age: 35
End: 2020-06-03

## 2020-06-03 NOTE — TELEPHONE ENCOUNTER
MURIEL FROM [facility] IS NEEDING A NEW PRESCRIPTION FOR Fostamatinib Disodium 100 MG tablet THE PRESCRIPTION THEY RECEIVED WAS ADDRESSED TO [facility] THEY NEED ONE ADDRESSED TO [facility]    FAX # [phone]  PHONE # [phone]

## 2020-06-04 ENCOUNTER — APPOINTMENT (OUTPATIENT)
Dept: LAB | Facility: HOSPITAL | Age: 35
End: 2020-06-04

## 2020-06-05 ENCOUNTER — LAB (OUTPATIENT)
Dept: LAB | Facility: HOSPITAL | Age: 35
End: 2020-06-05

## 2020-06-05 DIAGNOSIS — D69.6 THROMBOCYTOPENIA (HCC): ICD-10-CM

## 2020-06-05 LAB
BASOPHILS # BLD AUTO: 0.04 10*3/MM3 (ref 0–0.2)
BASOPHILS NFR BLD AUTO: 0.7 % (ref 0–1.5)
DEPRECATED RDW RBC AUTO: 38.8 FL (ref 37–54)
EOSINOPHIL # BLD AUTO: 0.17 10*3/MM3 (ref 0–0.4)
EOSINOPHIL NFR BLD AUTO: 3 % (ref 0.3–6.2)
ERYTHROCYTE [DISTWIDTH] IN BLOOD BY AUTOMATED COUNT: 13.4 % (ref 12.3–15.4)
HCT VFR BLD AUTO: 38.2 % (ref 34–46.6)
HGB BLD-MCNC: 12.8 G/DL (ref 12–15.9)
LYMPHOCYTES # BLD AUTO: 1.61 10*3/MM3 (ref 0.7–3.1)
LYMPHOCYTES NFR BLD AUTO: 28.4 % (ref 19.6–45.3)
MCH RBC QN AUTO: 27.1 PG (ref 26.6–33)
MCHC RBC AUTO-ENTMCNC: 33.5 G/DL (ref 31.5–35.7)
MCV RBC AUTO: 80.9 FL (ref 79–97)
MONOCYTES # BLD AUTO: 0.72 10*3/MM3 (ref 0.1–0.9)
MONOCYTES NFR BLD AUTO: 12.7 % (ref 5–12)
NEUTROPHILS # BLD AUTO: 3.12 10*3/MM3 (ref 1.7–7)
NEUTROPHILS NFR BLD AUTO: 55.2 % (ref 42.7–76)
PLATELET # BLD AUTO: 123 10*3/MM3 (ref 140–450)
PMV BLD AUTO: 11.8 FL (ref 6–12)
RBC # BLD AUTO: 4.72 10*6/MM3 (ref 3.77–5.28)
WBC NRBC COR # BLD: 5.66 10*3/MM3 (ref 3.4–10.8)

## 2020-06-05 PROCEDURE — 85025 COMPLETE CBC W/AUTO DIFF WBC: CPT

## 2020-06-11 ENCOUNTER — TELEPHONE (OUTPATIENT)
Dept: ONCOLOGY | Facility: HOSPITAL | Age: 35
End: 2020-06-11

## 2020-06-15 ENCOUNTER — TELEPHONE (OUTPATIENT)
Dept: ONCOLOGY | Facility: CLINIC | Age: 35
End: 2020-06-15

## 2020-06-15 NOTE — TELEPHONE ENCOUNTER
Attempted to contact Tiny to let her know that her medication has been shipped to this facility and she can pick it up when she comes for labs on 06/16.--Left voicemail

## 2020-06-16 ENCOUNTER — HOSPITAL ENCOUNTER (OUTPATIENT)
Dept: ONCOLOGY | Facility: HOSPITAL | Age: 35
Setting detail: INFUSION SERIES
Discharge: HOME | End: 2020-06-16

## 2020-06-16 ENCOUNTER — LAB (OUTPATIENT)
Dept: LAB | Facility: HOSPITAL | Age: 35
End: 2020-06-16

## 2020-06-16 VITALS
BODY MASS INDEX: 24 KG/M2 | DIASTOLIC BLOOD PRESSURE: 62 MMHG | TEMPERATURE: 98 F | OXYGEN SATURATION: 100 % | WEIGHT: 130.4 LBS | HEART RATE: 55 BPM | HEIGHT: 62 IN | SYSTOLIC BLOOD PRESSURE: 104 MMHG | RESPIRATION RATE: 12 BRPM

## 2020-06-16 DIAGNOSIS — D69.3 IMMUNE THROMBOCYTOPENIC PURPURA (HCC): Chronic | ICD-10-CM

## 2020-06-16 DIAGNOSIS — D69.6 THROMBOCYTOPENIA (HCC): Primary | ICD-10-CM

## 2020-06-16 LAB
BASOPHILS # BLD AUTO: 0.07 10*3/MM3 (ref 0–0.2)
BASOPHILS NFR BLD AUTO: 1.1 % (ref 0–1.5)
DEPRECATED RDW RBC AUTO: 38.8 FL (ref 37–54)
EOSINOPHIL # BLD AUTO: 0.16 10*3/MM3 (ref 0–0.4)
EOSINOPHIL NFR BLD AUTO: 2.4 % (ref 0.3–6.2)
ERYTHROCYTE [DISTWIDTH] IN BLOOD BY AUTOMATED COUNT: 13.5 % (ref 12.3–15.4)
HCT VFR BLD AUTO: 34.6 % (ref 34–46.6)
HGB BLD-MCNC: 11.7 G/DL (ref 12–15.9)
LYMPHOCYTES # BLD AUTO: 2.05 10*3/MM3 (ref 0.7–3.1)
LYMPHOCYTES NFR BLD AUTO: 30.8 % (ref 19.6–45.3)
MCH RBC QN AUTO: 27.2 PG (ref 26.6–33)
MCHC RBC AUTO-ENTMCNC: 33.8 G/DL (ref 31.5–35.7)
MCV RBC AUTO: 80.5 FL (ref 79–97)
MONOCYTES # BLD AUTO: 0.6 10*3/MM3 (ref 0.1–0.9)
MONOCYTES NFR BLD AUTO: 9 % (ref 5–12)
NEUTROPHILS # BLD AUTO: 3.77 10*3/MM3 (ref 1.7–7)
NEUTROPHILS NFR BLD AUTO: 56.7 % (ref 42.7–76)
PLATELET # BLD AUTO: 8 10*3/MM3 (ref 140–450)
RBC # BLD AUTO: 4.3 10*6/MM3 (ref 3.77–5.28)
WBC NRBC COR # BLD: 6.65 10*3/MM3 (ref 3.4–10.8)

## 2020-06-16 PROCEDURE — 25010000002 IMMUNE GLOBULIN (HUMAN) 10 GM/100ML SOLUTION: Performed by: INTERNAL MEDICINE

## 2020-06-16 PROCEDURE — 96365 THER/PROPH/DIAG IV INF INIT: CPT

## 2020-06-16 PROCEDURE — 63710000001 DIPHENHYDRAMINE PER 50 MG: Performed by: INTERNAL MEDICINE

## 2020-06-16 PROCEDURE — 96366 THER/PROPH/DIAG IV INF ADDON: CPT

## 2020-06-16 PROCEDURE — 25010000002 IMMUNE GLOBULIN (HUMAN) 20 GM/200ML SOLUTION: Performed by: INTERNAL MEDICINE

## 2020-06-16 PROCEDURE — 36415 COLL VENOUS BLD VENIPUNCTURE: CPT

## 2020-06-16 PROCEDURE — 85025 COMPLETE CBC W/AUTO DIFF WBC: CPT

## 2020-06-16 RX ORDER — SODIUM CHLORIDE 9 MG/ML
250 INJECTION, SOLUTION INTRAVENOUS ONCE
Status: CANCELLED | OUTPATIENT
Start: 2020-06-16

## 2020-06-16 RX ORDER — DIPHENHYDRAMINE HCL 25 MG
25 CAPSULE ORAL ONCE
Status: COMPLETED | OUTPATIENT
Start: 2020-06-16 | End: 2020-06-16

## 2020-06-16 RX ORDER — ACETAMINOPHEN 325 MG/1
650 TABLET ORAL ONCE
Status: CANCELLED | OUTPATIENT
Start: 2020-06-16

## 2020-06-16 RX ORDER — SODIUM CHLORIDE 9 MG/ML
250 INJECTION, SOLUTION INTRAVENOUS ONCE
Status: DISCONTINUED | OUTPATIENT
Start: 2020-06-16 | End: 2020-06-18 | Stop reason: HOSPADM

## 2020-06-16 RX ORDER — DIPHENHYDRAMINE HCL 25 MG
25 CAPSULE ORAL ONCE
Status: CANCELLED | OUTPATIENT
Start: 2020-06-16

## 2020-06-16 RX ORDER — ACETAMINOPHEN 325 MG/1
650 TABLET ORAL ONCE
Status: COMPLETED | OUTPATIENT
Start: 2020-06-16 | End: 2020-06-16

## 2020-06-16 RX ADMIN — HUMAN IMMUNOGLOBULIN G 20 G: 20 LIQUID INTRAVENOUS at 10:51

## 2020-06-16 RX ADMIN — DIPHENHYDRAMINE HYDROCHLORIDE 25 MG: 25 CAPSULE ORAL at 10:44

## 2020-06-16 RX ADMIN — ACETAMINOPHEN 650 MG: 325 TABLET ORAL at 10:44

## 2020-06-16 RX ADMIN — HUMAN IMMUNOGLOBULIN G 10 G: 10 LIQUID INTRAVENOUS at 14:20

## 2020-06-16 RX ADMIN — HUMAN IMMUNOGLOBULIN G 20 G: 20 LIQUID INTRAVENOUS at 13:18

## 2020-06-16 NOTE — PROGRESS NOTES
Patient came in today for a cbc. Platelets of 8, patient reports bruising and bleeding gums. Per Dr. Llanes patient to get IVIG today and tomorrow. Patient given IVIG and added to tomorrows schedule as well. Patient verbalized understanding and has no other complaints at this time.

## 2020-06-17 ENCOUNTER — HOSPITAL ENCOUNTER (OUTPATIENT)
Dept: ONCOLOGY | Facility: HOSPITAL | Age: 35
Setting detail: INFUSION SERIES
Discharge: HOME | End: 2020-06-17

## 2020-06-17 VITALS
TEMPERATURE: 97.9 F | BODY MASS INDEX: 23.92 KG/M2 | SYSTOLIC BLOOD PRESSURE: 113 MMHG | WEIGHT: 130 LBS | RESPIRATION RATE: 18 BRPM | HEART RATE: 56 BPM | HEIGHT: 62 IN | DIASTOLIC BLOOD PRESSURE: 75 MMHG

## 2020-06-17 DIAGNOSIS — D69.6 THROMBOCYTOPENIA (HCC): Primary | ICD-10-CM

## 2020-06-17 LAB
BASOPHILS # BLD AUTO: 0.03 10*3/MM3 (ref 0–0.2)
BASOPHILS NFR BLD AUTO: 0.7 % (ref 0–1.5)
DEPRECATED RDW RBC AUTO: 39.2 FL (ref 37–54)
EOSINOPHIL # BLD AUTO: 0.13 10*3/MM3 (ref 0–0.4)
EOSINOPHIL NFR BLD AUTO: 2.8 % (ref 0.3–6.2)
ERYTHROCYTE [DISTWIDTH] IN BLOOD BY AUTOMATED COUNT: 13.6 % (ref 12.3–15.4)
HCT VFR BLD AUTO: 31 % (ref 34–46.6)
HGB BLD-MCNC: 10.3 G/DL (ref 12–15.9)
LYMPHOCYTES # BLD AUTO: 1.37 10*3/MM3 (ref 0.7–3.1)
LYMPHOCYTES NFR BLD AUTO: 29.8 % (ref 19.6–45.3)
MCH RBC QN AUTO: 27.2 PG (ref 26.6–33)
MCHC RBC AUTO-ENTMCNC: 33.2 G/DL (ref 31.5–35.7)
MCV RBC AUTO: 82 FL (ref 79–97)
MONOCYTES # BLD AUTO: 0.38 10*3/MM3 (ref 0.1–0.9)
MONOCYTES NFR BLD AUTO: 8.3 % (ref 5–12)
NEUTROPHILS # BLD AUTO: 2.69 10*3/MM3 (ref 1.7–7)
NEUTROPHILS NFR BLD AUTO: 58.4 % (ref 42.7–76)
PLATELET # BLD AUTO: 47 10*3/MM3 (ref 140–450)
PMV BLD AUTO: 13.4 FL (ref 6–12)
RBC # BLD AUTO: 3.78 10*6/MM3 (ref 3.77–5.28)
WBC NRBC COR # BLD: 4.6 10*3/MM3 (ref 3.4–10.8)

## 2020-06-17 PROCEDURE — 25010000002 IMMUNE GLOBULIN (HUMAN) 20 GM/200ML SOLUTION: Performed by: INTERNAL MEDICINE

## 2020-06-17 PROCEDURE — 96365 THER/PROPH/DIAG IV INF INIT: CPT

## 2020-06-17 PROCEDURE — 63710000001 DIPHENHYDRAMINE PER 50 MG: Performed by: NURSE PRACTITIONER

## 2020-06-17 PROCEDURE — 85025 COMPLETE CBC W/AUTO DIFF WBC: CPT | Performed by: INTERNAL MEDICINE

## 2020-06-17 PROCEDURE — 25010000002 IMMUNE GLOBULIN (HUMAN) 5 GM/50ML SOLUTION: Performed by: INTERNAL MEDICINE

## 2020-06-17 PROCEDURE — 96366 THER/PROPH/DIAG IV INF ADDON: CPT

## 2020-06-17 PROCEDURE — 36415 COLL VENOUS BLD VENIPUNCTURE: CPT

## 2020-06-17 RX ORDER — ACETAMINOPHEN 325 MG/1
650 TABLET ORAL ONCE
Status: COMPLETED | OUTPATIENT
Start: 2020-06-17 | End: 2020-06-17

## 2020-06-17 RX ORDER — SODIUM CHLORIDE 9 MG/ML
250 INJECTION, SOLUTION INTRAVENOUS ONCE
Status: DISCONTINUED | OUTPATIENT
Start: 2020-06-17 | End: 2020-06-18 | Stop reason: HOSPADM

## 2020-06-17 RX ORDER — DIPHENHYDRAMINE HCL 25 MG
25 CAPSULE ORAL ONCE
Status: COMPLETED | OUTPATIENT
Start: 2020-06-17 | End: 2020-06-17

## 2020-06-17 RX ADMIN — HUMAN IMMUNOGLOBULIN G 20 G: 20 LIQUID INTRAVENOUS at 11:06

## 2020-06-17 RX ADMIN — HUMAN IMMUNOGLOBULIN G 20 G: 20 LIQUID INTRAVENOUS at 08:53

## 2020-06-17 RX ADMIN — HUMAN IMMUNOGLOBULIN G 5 G: 5 LIQUID INTRAVENOUS at 12:22

## 2020-06-17 RX ADMIN — ACETAMINOPHEN 650 MG: 325 TABLET ORAL at 08:33

## 2020-06-17 RX ADMIN — HUMAN IMMUNOGLOBULIN G 5 G: 5 LIQUID INTRAVENOUS at 12:11

## 2020-06-17 RX ADMIN — DIPHENHYDRAMINE HYDROCHLORIDE 25 MG: 25 CAPSULE ORAL at 08:33

## 2020-06-17 NOTE — PROGRESS NOTES
Patient is here today for IVIG. Patient states she is feeling better since yesterdays IVIG infusion. Platelets are up from 8 yesterday to 47 today. Per Shannon Burgos NP give todays IVIG. Patient verbalized understanding.

## 2020-06-25 ENCOUNTER — OFFICE VISIT (OUTPATIENT)
Dept: ONCOLOGY | Facility: CLINIC | Age: 35
End: 2020-06-25

## 2020-06-25 ENCOUNTER — LAB (OUTPATIENT)
Dept: LAB | Facility: HOSPITAL | Age: 35
End: 2020-06-25

## 2020-06-25 VITALS
WEIGHT: 129.8 LBS | TEMPERATURE: 98.6 F | HEIGHT: 62 IN | RESPIRATION RATE: 18 BRPM | HEART RATE: 56 BPM | DIASTOLIC BLOOD PRESSURE: 67 MMHG | BODY MASS INDEX: 23.89 KG/M2 | SYSTOLIC BLOOD PRESSURE: 114 MMHG

## 2020-06-25 DIAGNOSIS — D69.3 IMMUNE THROMBOCYTOPENIC PURPURA (HCC): ICD-10-CM

## 2020-06-25 DIAGNOSIS — D69.6 THROMBOCYTOPENIA (HCC): Primary | ICD-10-CM

## 2020-06-25 DIAGNOSIS — D69.6 THROMBOCYTOPENIA (HCC): ICD-10-CM

## 2020-06-25 LAB
ALBUMIN SERPL-MCNC: 4.5 G/DL (ref 3.5–5.2)
ALBUMIN/GLOB SERPL: 1.1 G/DL
ALP SERPL-CCNC: 42 U/L (ref 39–117)
ALT SERPL W P-5'-P-CCNC: 11 U/L (ref 1–33)
ANION GAP SERPL CALCULATED.3IONS-SCNC: 9 MMOL/L (ref 5–15)
AST SERPL-CCNC: 20 U/L (ref 1–32)
BASOPHILS # BLD AUTO: 0.03 10*3/MM3 (ref 0–0.2)
BASOPHILS NFR BLD AUTO: 0.5 % (ref 0–1.5)
BILIRUB SERPL-MCNC: 0.5 MG/DL (ref 0.2–1.2)
BUN BLD-MCNC: ABNORMAL MG/DL
BUN/CREAT SERPL: ABNORMAL
CALCIUM SPEC-SCNC: 9.6 MG/DL (ref 8.6–10.5)
CHLORIDE SERPL-SCNC: 101 MMOL/L (ref 98–107)
CO2 SERPL-SCNC: 27 MMOL/L (ref 22–29)
CREAT BLD-MCNC: 0.67 MG/DL (ref 0.57–1)
DEPRECATED RDW RBC AUTO: 40.1 FL (ref 37–54)
EOSINOPHIL # BLD AUTO: 0.07 10*3/MM3 (ref 0–0.4)
EOSINOPHIL NFR BLD AUTO: 1.3 % (ref 0.3–6.2)
ERYTHROCYTE [DISTWIDTH] IN BLOOD BY AUTOMATED COUNT: 14 % (ref 12.3–15.4)
GFR SERPL CREATININE-BSD FRML MDRD: 102 ML/MIN/1.73
GLOBULIN UR ELPH-MCNC: 4.2 GM/DL
GLUCOSE BLD-MCNC: 69 MG/DL (ref 65–99)
HCT VFR BLD AUTO: 35.6 % (ref 34–46.6)
HGB BLD-MCNC: 11.5 G/DL (ref 12–15.9)
LYMPHOCYTES # BLD AUTO: 1.99 10*3/MM3 (ref 0.7–3.1)
LYMPHOCYTES NFR BLD AUTO: 36.1 % (ref 19.6–45.3)
MCH RBC QN AUTO: 26.7 PG (ref 26.6–33)
MCHC RBC AUTO-ENTMCNC: 32.3 G/DL (ref 31.5–35.7)
MCV RBC AUTO: 82.6 FL (ref 79–97)
MONOCYTES # BLD AUTO: 0.58 10*3/MM3 (ref 0.1–0.9)
MONOCYTES NFR BLD AUTO: 10.5 % (ref 5–12)
NEUTROPHILS # BLD AUTO: 2.85 10*3/MM3 (ref 1.7–7)
NEUTROPHILS NFR BLD AUTO: 51.6 % (ref 42.7–76)
PLATELET # BLD AUTO: 33 10*3/MM3 (ref 140–450)
PMV BLD AUTO: 12.6 FL (ref 6–12)
POTASSIUM BLD-SCNC: 3.8 MMOL/L (ref 3.5–5.2)
PROT SERPL-MCNC: 8.7 G/DL (ref 6–8.5)
RBC # BLD AUTO: 4.31 10*6/MM3 (ref 3.77–5.28)
SODIUM BLD-SCNC: 137 MMOL/L (ref 136–145)
WBC NRBC COR # BLD: 5.52 10*3/MM3 (ref 3.4–10.8)

## 2020-06-25 PROCEDURE — 85025 COMPLETE CBC W/AUTO DIFF WBC: CPT

## 2020-06-25 PROCEDURE — 36415 COLL VENOUS BLD VENIPUNCTURE: CPT

## 2020-06-25 PROCEDURE — 80053 COMPREHEN METABOLIC PANEL: CPT | Performed by: INTERNAL MEDICINE

## 2020-06-25 PROCEDURE — 99214 OFFICE O/P EST MOD 30 MIN: CPT | Performed by: INTERNAL MEDICINE

## 2020-06-25 NOTE — PROGRESS NOTES
HEMATOLOGY ONCOLOGY FOLLOW UP        Patient name: Tiny Akhtar  : 1985  MRN: [id number]  Primary Care Physician: Jose Ingram, APRN  Referring Physician: Jose Ingram, *  Reason For Consult:     Chief Complaint   Patient presents with   • Follow-up     Immune thrombocytopenic purpura     Refractory ITP  History of Present Illness:  Tiny Akhtar is a 34 y.o. female with a past medical history of thrombocytopenia, anemia, and kidney stones.  She presented to [facility] Emergency Department on 2019 after she had blood work drawn earlier in the day that showed a platelet count of 9,000 at this facility.  She had previously been admitted through the ED on 2019 reporting bruising on her ankles and in her mouth.  She received 2 units of platelets and started on steroids. She was discharged on 19.The patient's last platelet count was 17,000 on 19 after a platelet transfusion.  She had been on steroids for four days.  The patient reported continued  subcutaneous bruising. She denied any headaches, nosebleeds, or gingival bleeding.  She was admitted for IV steroids and further management.     · 10/7/2019 CT abdomen and pelvis: 3 mm stone at the left ureterovesical junction causing mild to moderate obstruction.Spleen reported as normal size.  · 10/7/2019 WBC 10.2, hemoglobin 12.2, platelets 268    · 2019 WBC 5.7, hemoglobin 12.8, Platelets 5000,  Transfused 2 units platelets.  initial presentation for diagnosis acute thrombocytopenia,. Started on Solumedrol 125 mg IV Q6Hr  · 2019 platelets 5000.  Patient transfused with platelets.  Platelets increased to 13,000  · 19  Bone marrow biopsy - Smears of bone marrow aspirate with cell block (clot) preparation and marrow biopsy:  Hypocellular for age bone marrow (50%),  Adequate numbers of unremarkable megakaryocytes,  Absent iron stores,  Negative for involvement by malignant  lymphoma.  Flow cytometry-CD 56 expression on granulocytes and monocytes.  Chromosomal analysis -46XX  · 11/20/2019 platelet count 17,000 . Switched to Prednisone 1 mg/kg ( 50 mg daily)  · 11/21/2019 platelets 17,000  · 11/22/2019  Readmitted . Restarted Solumedrol  mg Q6H . platelets 9000  · 11/23/19 platelets 13,000  · 11/23/2019 hematology/Oncology was consulted on this patient known to our service and previously consulted for acute thrombocytopenia on 11/18/19. She was followed by Uri Sagastume MD for ITP. The patient had been transfused and started on IV steroids Prednisone while inpatient 11/18/19 to 11/20/19.  She was transitioned to oral steroids at discharge.     · 11/23/2019 Hepatitis panel negative, TSH 0.33, H. pylori IgG 1.43 High, IgM negative, JOSE negative, HIV antibodies negative  · 11/23/2019 patient received IV Venofer.  Patient received meningococcal vaccine, Haemophilus vaccine, pneumococcus vaccine  · 11/23/2019 platelets 13,000  · 11/24/2019 platelets 52,000.  Platelets responded to steroids. On IV solumedrol. Started on Megace 40 mg po QID for menorrhagia.  · Pt discharged on Prednisone 50 mg  · 11/27/2019 platelets 8000.  Patient's failed steroids ( on prednisone 50 mg daily).. Started Tranexamic acid 1300 mg TID  · 11/29/2019  READMITTED platelets 12,000  · 11/29/19-Cycle 1 Day one of RITUXIMAB and continues on megestrol  · 11/30/2019 WBC 11.2, hemoglobin 12.1, platelets 6000  · 12/1/2019 platelets 11,000.  Patient received IVIG 30 g  · 12/2/2019 iron 72, iron saturation 17% low, TIBC 416, ferritin 231, platelets 56,000  · 12/3/2019 platelets 129,000  · 12/4/2019 Prednisone decreased to 40 mg daily  · 12/6/2019 patient received week 2 of Rituximab.  Platelets 24,000, hemoglobin 11.8  · 12/8/2019 platelets 5000, hemoglobin 11.6, PT 10.5, INR 1.  Patient received IVIG 50 g  · 12/9/2019 platelets 11,000  · 12/11/2019 platelets 16,000  · 12/13/2019: Platelets 13,000 Patient  received week 3 of her Rituximab. Prednisone decreased to 20 mg daily  · 12/14/2019 patient received IVIG 55 g, platelets 12,000.   · 12/16/2019 platelets 24,000. Prednisone decreased to 10 mg daily  · 12/18/2019 platelets 12,000  · 12/19/2019 platelets 11,000  · 12/20/19-week 4 Rituximab and Megestrol, platelets 12,000. Prednisone DCed.  · 12/26/2019 platelets 7000  · 12/27/19-patient was started on NPLATE   1 mcg/kg, platelets 8000  · 12/30/2019 platelets 6000.  After transfusion 31,000  · 01/03/2020- NPLATE 2 mcg/kg, platelets 10,000  · 01/10/2020-NPLATE 3 mcg/kg.  continues on Megace megestrol, platelets 4000  · 1/15/2020: WBC 5.9, hemoglobin 12.5, platelets 2000k,   · 1/15/2020: Serum copper 114 normal, IgA 81 normal,Anticardiolipin antibodies negative, beta-2 glycoprotein antibodies negative, phosphatidylserine antibodies negative,  · 1/16/2020 patient received IVIG 55 g, platelets 4000  · 1/17/2020 patient received IVIG 55 g, platelets 36,000  · 01/17/2020: IVIG WBC 4.27, hemoglobin, 11.5, platelets  25873  · 1/20/2020 patient received Nplate 6 mcg/kg, WBC 5.1, hemoglobin 11.9, platelets 44,  · 1/27/2020 patient received Nplate 6 mcg/kg platelets 100,000  · 1/29/2020 platelets 74,000  · 1/30/2020 patient seen at [facility] hematology for second opinion.  Recommended to continue course of current treatment.  · 2/3/2020 patient received Nplate 6 mcg/kg.  WBC 5.9, hemoglobin 12, platelets 81k  · 2/10/2020 patient received Nplate 7 mcg/kg platelets 34,000   · 2/12/2020 WBC 5.1, hemoglobin 12.8, platelets 24K  · 2/13/2020: Patient advised to start dexamethasone 40 mg x 4 days course   · 2/17/2020: Platelets 11,000  · 2/21/2020 platelets 0.  Patient received IVIG 1 g/kg.  60 g  · 2/21/2020: Patient transfused platelets   · 2/22/2020 platelets 65K. , hemoglobin 10.4, patient received IVIG 60 g  · 2/24/2020 platelets 231K, hemoglobin 10.6, patient received Nplate 8 mcg/kg  · 3/5/2020: WBC 5.3,  hemoglobin 11.4, platelets 65,000.  Patient received Nplate 8 mcg/kg  · 3/11/2020 hemoglobin 12.1, platelets 97,000  · 3/13/2020 WBC 5.2, hemoglobin 11.6, platelets 106.  Patient received Nplate 500 micrograms  · 3/18/2020 WBC 5.7, hemoglobin 12.3, platelets 25,000, patient received Nplate 500 mcg  · 3/20/2020 WBC 5.7, hemoglobin 12.4, platelets 56,000,  · 3/25/2020 platelets 32,000, received Nplate 500 mcg  · 4/2/2020: WBC 7.3, hemoglobin 11.9, platelets 43,000, MCV 85.8  · 4/3/2020 platelets 27, Nplate 400 mcg  · 4/9/2020 platelets 24k  · 4/10/2020 platelets 35K.  Nplate 590 mcg  · 4/17/2020: Platelets 07385, Nplate 500 mcg  · 4/21/2020: Platelets 2000, patient received IVIG 60 g  · 4/22/2020 platelets 77K, hemoglobin 10.7, WBC 4.1, patient received IVIG 60 g  · 4/24/2020: Platelets 182K, hemoglobin 10.7, received Nplate 5 mcg  · 5/1/2020: Patient received Nplate 400 mcg platelets 179  · 5/8/2020: Patient received Nplate 590 mcg, platelets 33,000  · 5/13/2020: Platelets 46,000  · 5/15/2020 patient received Nplate, platelets 64,000  · 5/22/2020: Platelets 28,000  · 5/28/2020 patient received Nplate 500 mcg.  Platelets 45,000  · 5/29/2020 platelets 26,000.    · 6/1/2020: Platelets 11,000.  Nplate discontinued due to treatment failure.  Patient received IVIG 10 g  · 6/2/2020 platelets 117  · 6/5/2020: Platelets 123K  · 6/16/2020 platelets 8000.  Patient received IVIG 10 g  · 6/17/2020 platelets 47,000.  Patient received IVIG 20 g  · 6/18/2020: Patient started taking fostamatinib  · 6/25/2020: Platelets 33,000.            Subjective:  Patient is here for a follow up regarding ITP. She has recently started on a new medication last Wednesday or Thursday and states that she has not experienced any side effects. She states she had some diarrhea a few days after the medication and has occasional stomach aches. She denies any dizziness, HTN, or elevated WBC. She has not had cycle since starting the new medication. She  states that she should be able to go back to work next week.  Does not report any bleeding issues.  She underwent IVIG on 06/17/2020 and is still feeling well from that.  She denies experiencing any excess menstrual bleeding at this time. Has not had any nosebleeds.        Past Medical History:   Diagnosis Date   • Anxiety    • Iron deficiency anemia    • Kidney stone 10/2019       No past surgical history on file.      Current Outpatient Medications:   •  acetaminophen (TYLENOL) 325 MG tablet, Take 650 mg by mouth Every 6 (Six) Hours As Needed for Mild Pain  or Moderate Pain ., Disp: , Rfl:   •  amphetamine-dextroamphetamine (ADDERALL) 10 MG tablet, , Disp: , Rfl:   •  ferrous sulfate 324 (65 Fe) MG tablet delayed-release EC tablet, Take 1 tablet by mouth Daily With Breakfast., Disp: 30 tablet, Rfl: 0  •  Fostamatinib Disodium 100 MG tablet, Take 100 mg by mouth 2 (Two) Times a Day. May take with or without food.  Do not crush or chew tablets., Disp: 60 tablet, Rfl: 3  •  megestrol (MEGACE) 20 MG tablet, Take 20 mg by mouth 3 (Three) Times a Day., Disp: , Rfl:   •  NON FORMULARY, Take 1 tablet by mouth Daily. Balance OTC, Disp: , Rfl:   •  ondansetron (ZOFRAN) 8 MG tablet, Take 1 tablet by mouth 3 (Three) Times a Day As Needed for Nausea or Vomiting., Disp: 30 tablet, Rfl: 5  •  pantoprazole (PROTONIX) 20 MG EC tablet, , Disp: , Rfl:   •  polyethylene glycol (MIRALAX) powder, Take 17 g by mouth Daily As Needed (constipation)., Disp: 225 g, Rfl: 0  •  Tranexamic Acid 650 MG tablet, Take 2 tablets by mouth 3 (Three) Times a Day., Disp: 30 tablet, Rfl: 0    No Known Allergies    Family History   Problem Relation Age of Onset   • Thrombocytopenia Nephew         ITP   • Diabetes Mother    • Diabetes Father    • Cancer Paternal Grandmother        Cancer-related family history includes Cancer in her paternal grandmother.    Social History     Tobacco Use   • Smoking status: Never Smoker   • Smokeless tobacco: Never Used  "  Substance Use Topics   • Alcohol use: No     Frequency: Never   • Drug use: No         I have reviewed the history of present illness, past medical history, family history, social history, lab results, all notes and other records since the patient was last seen on  11/21/2019.    ROS:     Review of Systems   Constitutional: Negative for chills and fever.   HENT: Negative for ear pain, mouth sores, nosebleeds and sore throat.    Eyes: Negative for photophobia and visual disturbance.   Respiratory: Negative for wheezing and stridor.    Cardiovascular: Negative for chest pain and palpitations.   Gastrointestinal: Negative for abdominal pain, diarrhea, nausea and vomiting.   Endocrine: Negative for cold intolerance and heat intolerance.   Genitourinary: Negative for dysuria and hematuria.   Musculoskeletal: Negative for joint swelling and neck stiffness.   Skin: Negative for color change and rash.   Neurological: Negative for seizures and syncope.   Hematological: Negative for adenopathy.        No obvious bleeding   Psychiatric/Behavioral: Negative for agitation, confusion and hallucinations.       Objective:    Vitals:    06/25/20 1018   BP: 114/67   Pulse: 56   Resp: 18   Temp: 98.6 °F (37 °C)   Weight: 58.9 kg (129 lb 12.8 oz)   Height: 157.5 cm (62\")   PainSc: 0-No pain     ECOG  (0) Fully active, able to carry on all predisease performance without restriction    Physical Exam:   Physical Exam   Constitutional: She is oriented to person, place, and time. No distress.   HENT:   Head: Normocephalic and atraumatic.   Eyes: Conjunctivae and EOM are normal. Right eye exhibits no discharge. Left eye exhibits no discharge. No scleral icterus.   Neck: Normal range of motion. Neck supple. No thyromegaly present.   Cardiovascular: Normal rate, regular rhythm and normal heart sounds. Exam reveals no gallop and no friction rub.   Pulmonary/Chest: Effort normal. No stridor. No respiratory distress. She has no wheezes. "   Abdominal: Soft. Bowel sounds are normal. She exhibits no mass. There is no tenderness. There is no rebound and no guarding.   Musculoskeletal: Normal range of motion. She exhibits no tenderness.   Lymphadenopathy:     She has no cervical adenopathy.   Neurological: She is alert and oriented to person, place, and time. She exhibits normal muscle tone.   Skin: Skin is warm. No rash noted. She is not diaphoretic. No erythema.   Psychiatric: She has a normal mood and affect. Her behavior is normal.   Nursing note and vitals reviewed.            Lab Results - Last 18 Months   Lab Units 06/25/20  0959 06/17/20  0812 06/16/20  0945   WBC 10*3/mm3 5.52 4.60 6.65   HEMOGLOBIN g/dL 11.5* 10.3* 11.7*   HEMATOCRIT % 35.6 31.0* 34.6   PLATELETS 10*3/mm3 33* 47* 8*   MCV fL 82.6 82.0 80.5     Lab Results - Last 18 Months   Lab Units 12/08/19  2022 12/02/19  0354 12/01/19  0320  11/29/19  1217  11/22/19  2248 11/18/19  0918   SODIUM mmol/L 137 137 138   < > 136   < > 139 140   POTASSIUM mmol/L 3.6 4.1 4.2   < > 4.2   < > 3.5 3.8   CHLORIDE mmol/L 100 101 100   < > 98   < > 103 105   CO2 mmol/L 26.0 27.0 23.0   < > 23.0   < > 23.0 26.0   BUN mg/dL 17 16 15   < > 17   < > 17 16   CREATININE mg/dL 0.55* 0.55* 0.65   < > 0.68   < > 0.54* 0.60   CALCIUM mg/dL 9.2 9.3 9.3   < > 9.5   < > 9.1 9.5   BILIRUBIN mg/dL  --   --   --   --  0.3  --  0.3 0.3   ALK PHOS U/L  --   --   --   --  36*  --  36* 36*   ALT (SGPT) U/L  --   --   --   --  31  --  38* 13   AST (SGOT) U/L  --   --   --   --  16  --  16 17   GLUCOSE mg/dL 103* 107* 171*   < > 93   < > 153* 102*    < > = values in this interval not displayed.       Lab Results   Component Value Date    GLUCOSE 103 (H) 12/08/2019    BUN 17 12/08/2019    CREATININE 0.55 (L) 12/08/2019    EGFRIFNONA 128 12/08/2019    BCR 30.9 (H) 12/08/2019    K 3.6 12/08/2019    CO2 26.0 12/08/2019    CALCIUM 9.2 12/08/2019    PROTENTOTREF 5.9 (L) 11/23/2019    ALBUMIN 4.10 11/29/2019    LABIL2 1.5  11/23/2019    AST 16 11/29/2019    ALT 31 11/29/2019       Lab Results - Last 18 Months   Lab Units 04/10/20  0841 12/08/19 2022 11/18/19  0918   INR  1.02 1.00 1.00   APTT seconds 24.5  --  23.5*       Lab Results   Component Value Date    IRON 72 12/02/2019    TIBC 416 12/02/2019    FERRITIN 231.70 (H) 12/02/2019       Lab Results   Component Value Date    FOLATE 11.20 11/23/2019       No results found for: OCCULTBLD    Lab Results   Component Value Date    RETICCTPCT 3.13 (H) 12/01/2019       Lab Results   Component Value Date    ROSNSDMA02 461 11/23/2019     No results found for: SPEP, UPEP  LDH   Date Value Ref Range Status   11/23/2019 123 (L) 135 - 214 U/L Final     Lab Results   Component Value Date    JOSE Negative 11/23/2019     Lab Results   Component Value Date    HAPTOGLOBIN 136 11/23/2019     Lab Results   Component Value Date    PTT 24.5 04/10/2020    INR 1.02 04/10/2020     No results found for:   No results found for: CEA  No components found for: CA-19-9  No results found for: PSA        Assessment/Plan     Assessment:  2. Refractory idiopathic thrombocytopenic purpura ITP. : Refractory to steroids: She was diagnosed on 11/18/19  by mouth.  S/p bone marrow biopsy 11/18/19 - hypocellular for age bone marrow (50%),  adequate numbers of unremarkable megakaryocytes,  absent iron stores,  negative for involvement by malignant lymphoma.  Flow cytometry-CD 56 expression on granulocytes and monocytes.  . Normal karyotype 46XX.  HIV testing negative. TSH normal.   She does have a transient responses to IVIG.  Received 4 weekly doses of Rituximab starting 11/29/2019 to 12/20/2019.  She did not respond to rituximab right away and still awaiting response.  Started on Nplate 12/27/2019 at 1 mcg/kg.  Dose was escalated and patient seem to have erratic treatment response.  She continues to require IVIG occasionally while on Nplate.  Nplate was discontinued by end of May 2020 due to treatment failure and she  was switched to fostamatinib which was started on 6/18/2020.   3. Anemia: History of HAILE:  Bone marrow biopsy on 11/18/19 showed absent iron stores. Anemia work-up: reticulocytes 1.53, ferritin 15.38, iron 40, iron saturation 9, transferrin 310, TIBC 462, .   4. History of iron deficiency anemia - S/p Venofer 400 mg IV x 1 dose on 11/23/19  3. Left renal stone: 3 mm kidney stone visualized on CT A/P on 10/7/19.  Asymptomatic.  4. Anxiety/weight loss:  management per Primary team   5. Hx of Menstrual bleeding: LMPD 11/23/19 -On Megace  to suppress menstrual cycle . Also on Lysteda (tranexamic acid)     PLAN      1. Continue fostamatinib.  Patient tolerating well.  Monitor for symptoms of diarrhea, LFTs, hypertension etc..   2. We will monitor CBC and CMP weekly at least initially.  3. Nplate has been discontinued.  4. Using IVIG as needed, only for severe thrombocytopenia with platelets less than 10,000  5. Patient has failed rechallenge with dexamethasone.  No plans to reinitiate steroids.  6. Recommend platelet transfusions only if platelets extremely low ( less than 2K) or if patient has bleeding.  7. Continue Megace as needed for vaginal bleeding.  8. Continue Lysteda, tranexamic acid as needed for platelets less than 10,000.  9. May stop Protonix  10. Continue weekly CBCs.  Will monitor CMP as well  11. Will follow patient back in we will follow-up in 4 weeks.          There are no diagnoses linked to this encounter.  No orders of the defined types were placed in this encounter.              Thank you very much for providing the opportunity to participate in this patient’s care. Please do not hesitate to call if there are any other questions.    I have reviewed all relevant labs results,outside reports, imaging,,notes, vitals, medications and plan with the patient today.    Portions of the note have been Scribed by medical assistant/Nurse.  I have reviewed and made changes accordingly.      Electronically  signed by Forrest Llanes MD, 06/25/20, 10:28 AM.

## 2020-06-26 LAB — BUN BLD-MCNC: 13 MG/DL (ref 6–20)

## 2020-07-02 ENCOUNTER — APPOINTMENT (OUTPATIENT)
Dept: LAB | Facility: HOSPITAL | Age: 35
End: 2020-07-02

## 2020-07-02 ENCOUNTER — TELEPHONE (OUTPATIENT)
Dept: ONCOLOGY | Facility: CLINIC | Age: 35
End: 2020-07-02

## 2020-07-02 NOTE — TELEPHONE ENCOUNTER
PT missed 7/2/20 appt, pt would like to reschedule for Monday morning. Please call pt to reschedule @[phone]

## 2020-07-06 ENCOUNTER — LAB (OUTPATIENT)
Dept: LAB | Facility: HOSPITAL | Age: 35
End: 2020-07-06

## 2020-07-06 ENCOUNTER — TELEPHONE (OUTPATIENT)
Dept: ONCOLOGY | Facility: HOSPITAL | Age: 35
End: 2020-07-06

## 2020-07-06 DIAGNOSIS — D69.3 IMMUNE THROMBOCYTOPENIC PURPURA (HCC): Chronic | ICD-10-CM

## 2020-07-06 LAB
BASOPHILS # BLD AUTO: 0.03 10*3/MM3 (ref 0–0.2)
BASOPHILS NFR BLD AUTO: 0.6 % (ref 0–1.5)
DEPRECATED RDW RBC AUTO: 46.4 FL (ref 37–54)
EOSINOPHIL # BLD AUTO: 0.14 10*3/MM3 (ref 0–0.4)
EOSINOPHIL NFR BLD AUTO: 2.8 % (ref 0.3–6.2)
ERYTHROCYTE [DISTWIDTH] IN BLOOD BY AUTOMATED COUNT: 15.7 % (ref 12.3–15.4)
HCT VFR BLD AUTO: 35.9 % (ref 34–46.6)
HGB BLD-MCNC: 12.1 G/DL (ref 12–15.9)
LYMPHOCYTES # BLD AUTO: 2.05 10*3/MM3 (ref 0.7–3.1)
LYMPHOCYTES NFR BLD AUTO: 40.5 % (ref 19.6–45.3)
MCH RBC QN AUTO: 27.9 PG (ref 26.6–33)
MCHC RBC AUTO-ENTMCNC: 33.7 G/DL (ref 31.5–35.7)
MCV RBC AUTO: 82.9 FL (ref 79–97)
MONOCYTES # BLD AUTO: 0.36 10*3/MM3 (ref 0.1–0.9)
MONOCYTES NFR BLD AUTO: 7.1 % (ref 5–12)
NEUTROPHILS NFR BLD AUTO: 2.48 10*3/MM3 (ref 1.7–7)
NEUTROPHILS NFR BLD AUTO: 49 % (ref 42.7–76)
PLATELET # BLD AUTO: 14 10*3/MM3 (ref 140–450)
RBC # BLD AUTO: 4.33 10*6/MM3 (ref 3.77–5.28)
WBC # BLD AUTO: 5.06 10*3/MM3 (ref 3.4–10.8)

## 2020-07-06 PROCEDURE — 36415 COLL VENOUS BLD VENIPUNCTURE: CPT

## 2020-07-06 PROCEDURE — 85025 COMPLETE CBC W/AUTO DIFF WBC: CPT

## 2020-07-06 NOTE — TELEPHONE ENCOUNTER
Patient is here for a CBC and her platelets are 14. She denies any bleeding but has a dark half dollar sized bruise on her right leg, a small lighter bruise on her right wrist and some petechia on her chest that have all popped up within the last week. I discussed this with Usha Ching NP and she said to have patient come back tomorrow for repeat CBC.

## 2020-07-07 ENCOUNTER — LAB (OUTPATIENT)
Dept: LAB | Facility: HOSPITAL | Age: 35
End: 2020-07-07

## 2020-07-07 DIAGNOSIS — D69.3 IMMUNE THROMBOCYTOPENIC PURPURA (HCC): Chronic | ICD-10-CM

## 2020-07-07 LAB
BASOPHILS # BLD AUTO: 0.02 10*3/MM3 (ref 0–0.2)
BASOPHILS NFR BLD AUTO: 0.4 % (ref 0–1.5)
DEPRECATED RDW RBC AUTO: 45.9 FL (ref 37–54)
EOSINOPHIL # BLD AUTO: 0.15 10*3/MM3 (ref 0–0.4)
EOSINOPHIL NFR BLD AUTO: 2.9 % (ref 0.3–6.2)
ERYTHROCYTE [DISTWIDTH] IN BLOOD BY AUTOMATED COUNT: 15.7 % (ref 12.3–15.4)
HCT VFR BLD AUTO: 34.8 % (ref 34–46.6)
HGB BLD-MCNC: 11.8 G/DL (ref 12–15.9)
LYMPHOCYTES # BLD AUTO: 1.94 10*3/MM3 (ref 0.7–3.1)
LYMPHOCYTES NFR BLD AUTO: 37.8 % (ref 19.6–45.3)
MCH RBC QN AUTO: 28 PG (ref 26.6–33)
MCHC RBC AUTO-ENTMCNC: 33.9 G/DL (ref 31.5–35.7)
MCV RBC AUTO: 82.5 FL (ref 79–97)
MONOCYTES # BLD AUTO: 0.29 10*3/MM3 (ref 0.1–0.9)
MONOCYTES NFR BLD AUTO: 5.7 % (ref 5–12)
NEUTROPHILS NFR BLD AUTO: 2.73 10*3/MM3 (ref 1.7–7)
NEUTROPHILS NFR BLD AUTO: 53.2 % (ref 42.7–76)
PLATELET # BLD AUTO: 13 10*3/MM3 (ref 140–450)
RBC # BLD AUTO: 4.22 10*6/MM3 (ref 3.77–5.28)
WBC # BLD AUTO: 5.13 10*3/MM3 (ref 3.4–10.8)

## 2020-07-07 PROCEDURE — 85025 COMPLETE CBC W/AUTO DIFF WBC: CPT

## 2020-07-08 ENCOUNTER — LAB (OUTPATIENT)
Dept: LAB | Facility: HOSPITAL | Age: 35
End: 2020-07-08

## 2020-07-08 ENCOUNTER — TELEPHONE (OUTPATIENT)
Dept: ONCOLOGY | Facility: CLINIC | Age: 35
End: 2020-07-08

## 2020-07-08 DIAGNOSIS — D69.3 IMMUNE THROMBOCYTOPENIC PURPURA (HCC): Primary | ICD-10-CM

## 2020-07-08 LAB
BASOPHILS # BLD AUTO: 0.04 10*3/MM3 (ref 0–0.2)
BASOPHILS NFR BLD AUTO: 0.9 % (ref 0–1.5)
DEPRECATED RDW RBC AUTO: 45.1 FL (ref 37–54)
EOSINOPHIL # BLD AUTO: 0.13 10*3/MM3 (ref 0–0.4)
EOSINOPHIL NFR BLD AUTO: 2.8 % (ref 0.3–6.2)
ERYTHROCYTE [DISTWIDTH] IN BLOOD BY AUTOMATED COUNT: 15.5 % (ref 12.3–15.4)
HCT VFR BLD AUTO: 34.9 % (ref 34–46.6)
HGB BLD-MCNC: 11.7 G/DL (ref 12–15.9)
LYMPHOCYTES # BLD AUTO: 1.69 10*3/MM3 (ref 0.7–3.1)
LYMPHOCYTES NFR BLD AUTO: 37 % (ref 19.6–45.3)
MCH RBC QN AUTO: 27.4 PG (ref 26.6–33)
MCHC RBC AUTO-ENTMCNC: 33.5 G/DL (ref 31.5–35.7)
MCV RBC AUTO: 81.7 FL (ref 79–97)
MONOCYTES # BLD AUTO: 0.26 10*3/MM3 (ref 0.1–0.9)
MONOCYTES NFR BLD AUTO: 5.7 % (ref 5–12)
NEUTROPHILS NFR BLD AUTO: 2.45 10*3/MM3 (ref 1.7–7)
NEUTROPHILS NFR BLD AUTO: 53.6 % (ref 42.7–76)
PLATELET # BLD AUTO: 11 10*3/MM3 (ref 140–450)
RBC # BLD AUTO: 4.27 10*6/MM3 (ref 3.77–5.28)
WBC # BLD AUTO: 4.57 10*3/MM3 (ref 3.4–10.8)

## 2020-07-08 PROCEDURE — 85025 COMPLETE CBC W/AUTO DIFF WBC: CPT

## 2020-07-08 NOTE — TELEPHONE ENCOUNTER
I spoke to a representative at [facility] regarding ordered infusion for Gamunex tomorrow 7/9/2020. The representative gave me the reference number: [id number]. She stated the request was still in the review process and an answer will be determined by tomorrow 7/9/2020.  Representative was notified patient is scheduled to come in tomorrow for her infusion. The representative attempted to get a nurse reviewer on the phone, but was unable to get a hold of the nurse.  I left my callback number and was told that I should receive a call back today before 5 pm EST or tomorrow regarding the request.    Electronically signed by FARZAD Savage, 07/08/20, 3:43 PM.

## 2020-07-08 NOTE — TELEPHONE ENCOUNTER
I spoke with Merlyn a nurse reviewer at [facility].  She said the request should be finalized in the next few minutes.  However, [employer] would only approve 2 doses of Gamunex/IVIG.  The patient must meet the platelet requirements of less than 30 to receive future doses.  If patient requires further treatment with Gamunex/IVIG, another request must be sent in for future doses per [employer] policy.    Electronically signed by FARZAD Savage, 07/08/20, 3:51 PM.

## 2020-07-09 ENCOUNTER — TELEPHONE (OUTPATIENT)
Dept: ONCOLOGY | Facility: HOSPITAL | Age: 35
End: 2020-07-09

## 2020-07-09 ENCOUNTER — HOSPITAL ENCOUNTER (OUTPATIENT)
Dept: ONCOLOGY | Facility: HOSPITAL | Age: 35
Setting detail: INFUSION SERIES
Discharge: HOME | End: 2020-07-09

## 2020-07-09 VITALS
HEIGHT: 62 IN | WEIGHT: 130 LBS | DIASTOLIC BLOOD PRESSURE: 75 MMHG | TEMPERATURE: 98.3 F | HEART RATE: 60 BPM | BODY MASS INDEX: 23.92 KG/M2 | RESPIRATION RATE: 12 BRPM | SYSTOLIC BLOOD PRESSURE: 117 MMHG | OXYGEN SATURATION: 100 %

## 2020-07-09 DIAGNOSIS — D69.6 THROMBOCYTOPENIA (HCC): Primary | ICD-10-CM

## 2020-07-09 LAB
BASOPHILS # BLD AUTO: 0.02 10*3/MM3 (ref 0–0.2)
BASOPHILS NFR BLD AUTO: 0.5 % (ref 0–1.5)
DEPRECATED RDW RBC AUTO: 48 FL (ref 37–54)
EOSINOPHIL # BLD AUTO: 0.12 10*3/MM3 (ref 0–0.4)
EOSINOPHIL NFR BLD AUTO: 2.7 % (ref 0.3–6.2)
ERYTHROCYTE [DISTWIDTH] IN BLOOD BY AUTOMATED COUNT: 16.1 % (ref 12.3–15.4)
HCT VFR BLD AUTO: 36 % (ref 34–46.6)
HGB BLD-MCNC: 12 G/DL (ref 12–15.9)
LYMPHOCYTES # BLD AUTO: 1.8 10*3/MM3 (ref 0.7–3.1)
LYMPHOCYTES NFR BLD AUTO: 40.7 % (ref 19.6–45.3)
MCH RBC QN AUTO: 27.8 PG (ref 26.6–33)
MCHC RBC AUTO-ENTMCNC: 33.3 G/DL (ref 31.5–35.7)
MCV RBC AUTO: 83.3 FL (ref 79–97)
MONOCYTES # BLD AUTO: 0.3 10*3/MM3 (ref 0.1–0.9)
MONOCYTES NFR BLD AUTO: 6.8 % (ref 5–12)
NEUTROPHILS NFR BLD AUTO: 2.18 10*3/MM3 (ref 1.7–7)
NEUTROPHILS NFR BLD AUTO: 49.3 % (ref 42.7–76)
PLATELET # BLD AUTO: 11 10*3/MM3 (ref 140–450)
RBC # BLD AUTO: 4.32 10*6/MM3 (ref 3.77–5.28)
WBC # BLD AUTO: 4.42 10*3/MM3 (ref 3.4–10.8)

## 2020-07-09 PROCEDURE — 25010000002 IMMUNE GLOBULIN (HUMAN) 20 GM/200ML SOLUTION: Performed by: INTERNAL MEDICINE

## 2020-07-09 PROCEDURE — 96365 THER/PROPH/DIAG IV INF INIT: CPT

## 2020-07-09 PROCEDURE — 25010000002 IMMUNE GLOBULIN (HUMAN) 10 GM/100ML SOLUTION: Performed by: INTERNAL MEDICINE

## 2020-07-09 PROCEDURE — 63710000001 DIPHENHYDRAMINE PER 50 MG: Performed by: INTERNAL MEDICINE

## 2020-07-09 PROCEDURE — 85025 COMPLETE CBC W/AUTO DIFF WBC: CPT | Performed by: INTERNAL MEDICINE

## 2020-07-09 PROCEDURE — 36415 COLL VENOUS BLD VENIPUNCTURE: CPT

## 2020-07-09 PROCEDURE — 96366 THER/PROPH/DIAG IV INF ADDON: CPT

## 2020-07-09 RX ORDER — SODIUM CHLORIDE 9 MG/ML
250 INJECTION, SOLUTION INTRAVENOUS ONCE
Status: DISCONTINUED | OUTPATIENT
Start: 2020-07-09 | End: 2020-07-10 | Stop reason: HOSPADM

## 2020-07-09 RX ORDER — ACETAMINOPHEN 325 MG/1
650 TABLET ORAL ONCE
Status: COMPLETED | OUTPATIENT
Start: 2020-07-09 | End: 2020-07-09

## 2020-07-09 RX ORDER — DIPHENHYDRAMINE HCL 25 MG
25 CAPSULE ORAL ONCE
Status: COMPLETED | OUTPATIENT
Start: 2020-07-09 | End: 2020-07-09

## 2020-07-09 RX ADMIN — DIPHENHYDRAMINE HYDROCHLORIDE 25 MG: 25 CAPSULE ORAL at 10:29

## 2020-07-09 RX ADMIN — ACETAMINOPHEN 650 MG: 325 TABLET ORAL at 10:29

## 2020-07-09 RX ADMIN — IMMUNE GLOBULIN (HUMAN) 10 G: 10 INJECTION INTRAVENOUS; SUBCUTANEOUS at 10:41

## 2020-07-09 RX ADMIN — IMMUNE GLOBULIN (HUMAN) 20 G: 10 INJECTION INTRAVENOUS; SUBCUTANEOUS at 12:14

## 2020-07-09 RX ADMIN — IMMUNE GLOBULIN (HUMAN) 20 G: 10 INJECTION INTRAVENOUS; SUBCUTANEOUS at 13:23

## 2020-07-09 NOTE — TELEPHONE ENCOUNTER
Patient is up in the lab for a CBC. Her platelets are 11 today. Discussed with Dr. Llanes and patient needs to come in for IVIG Thursday and Friday.

## 2020-07-09 NOTE — TELEPHONE ENCOUNTER
I spoke with Neeta Beasley this morning and she is checking on the status of the insurance auth. I attempted to call Tiny and let her know that regardless if the auth goes through today we will still need her to come in to check a CBC.       [address] informed me that we do have an auth for 2 doses of IVIG. Patient called back and will be here at 1000.

## 2020-07-09 NOTE — TELEPHONE ENCOUNTER
At this time we do not have an insurance auth for IVIG. I called patient and moved her appt time Thursday from 0830 to 1000 in hopes that we would have an auth by then. Patient agreeable.

## 2020-07-10 ENCOUNTER — HOSPITAL ENCOUNTER (OUTPATIENT)
Dept: ONCOLOGY | Facility: HOSPITAL | Age: 35
Setting detail: INFUSION SERIES
Discharge: HOME | End: 2020-07-10

## 2020-07-10 VITALS
DIASTOLIC BLOOD PRESSURE: 63 MMHG | HEIGHT: 62 IN | TEMPERATURE: 98 F | WEIGHT: 130 LBS | HEART RATE: 80 BPM | OXYGEN SATURATION: 98 % | RESPIRATION RATE: 12 BRPM | SYSTOLIC BLOOD PRESSURE: 103 MMHG | BODY MASS INDEX: 23.92 KG/M2

## 2020-07-10 DIAGNOSIS — D69.6 THROMBOCYTOPENIA (HCC): Primary | ICD-10-CM

## 2020-07-10 LAB
BASOPHILS # BLD AUTO: 0.01 10*3/MM3 (ref 0–0.2)
BASOPHILS NFR BLD AUTO: 0.3 % (ref 0–1.5)
DEPRECATED RDW RBC AUTO: 48.5 FL (ref 37–54)
EOSINOPHIL # BLD AUTO: 0.07 10*3/MM3 (ref 0–0.4)
EOSINOPHIL NFR BLD AUTO: 1.8 % (ref 0.3–6.2)
ERYTHROCYTE [DISTWIDTH] IN BLOOD BY AUTOMATED COUNT: 16.2 % (ref 12.3–15.4)
HCT VFR BLD AUTO: 32.3 % (ref 34–46.6)
HGB BLD-MCNC: 10.6 G/DL (ref 12–15.9)
LYMPHOCYTES # BLD AUTO: 1.44 10*3/MM3 (ref 0.7–3.1)
LYMPHOCYTES NFR BLD AUTO: 36.9 % (ref 19.6–45.3)
MCH RBC QN AUTO: 27.7 PG (ref 26.6–33)
MCHC RBC AUTO-ENTMCNC: 32.8 G/DL (ref 31.5–35.7)
MCV RBC AUTO: 84.6 FL (ref 79–97)
MONOCYTES # BLD AUTO: 0.29 10*3/MM3 (ref 0.1–0.9)
MONOCYTES NFR BLD AUTO: 7.4 % (ref 5–12)
NEUTROPHILS NFR BLD AUTO: 2.09 10*3/MM3 (ref 1.7–7)
NEUTROPHILS NFR BLD AUTO: 53.6 % (ref 42.7–76)
PLATELET # BLD AUTO: 45 10*3/MM3 (ref 140–450)
PMV BLD AUTO: 13.3 FL (ref 6–12)
RBC # BLD AUTO: 3.82 10*6/MM3 (ref 3.77–5.28)
WBC # BLD AUTO: 3.9 10*3/MM3 (ref 3.4–10.8)

## 2020-07-10 PROCEDURE — 25010000002 IMMUNE GLOBULIN (HUMAN) 20 GM/200ML SOLUTION: Performed by: INTERNAL MEDICINE

## 2020-07-10 PROCEDURE — 85025 COMPLETE CBC W/AUTO DIFF WBC: CPT | Performed by: INTERNAL MEDICINE

## 2020-07-10 PROCEDURE — 96366 THER/PROPH/DIAG IV INF ADDON: CPT

## 2020-07-10 PROCEDURE — 36415 COLL VENOUS BLD VENIPUNCTURE: CPT

## 2020-07-10 PROCEDURE — 96365 THER/PROPH/DIAG IV INF INIT: CPT

## 2020-07-10 PROCEDURE — 25010000002 IMMUNE GLOBULIN (HUMAN) 10 GM/100ML SOLUTION: Performed by: INTERNAL MEDICINE

## 2020-07-10 PROCEDURE — 63710000001 DIPHENHYDRAMINE PER 50 MG: Performed by: INTERNAL MEDICINE

## 2020-07-10 RX ORDER — ACETAMINOPHEN 325 MG/1
650 TABLET ORAL ONCE
Status: COMPLETED | OUTPATIENT
Start: 2020-07-10 | End: 2020-07-10

## 2020-07-10 RX ORDER — DIPHENHYDRAMINE HCL 25 MG
25 CAPSULE ORAL ONCE
Status: COMPLETED | OUTPATIENT
Start: 2020-07-10 | End: 2020-07-10

## 2020-07-10 RX ORDER — SODIUM CHLORIDE 9 MG/ML
250 INJECTION, SOLUTION INTRAVENOUS ONCE
Status: DISCONTINUED | OUTPATIENT
Start: 2020-07-10 | End: 2020-07-11 | Stop reason: HOSPADM

## 2020-07-10 RX ADMIN — IMMUNE GLOBULIN (HUMAN) 20 G: 10 INJECTION INTRAVENOUS; SUBCUTANEOUS at 10:35

## 2020-07-10 RX ADMIN — ACETAMINOPHEN 650 MG: 325 TABLET ORAL at 08:43

## 2020-07-10 RX ADMIN — IMMUNE GLOBULIN (HUMAN) 10 G: 10 INJECTION INTRAVENOUS; SUBCUTANEOUS at 08:55

## 2020-07-10 RX ADMIN — IMMUNE GLOBULIN (HUMAN) 20 G: 10 INJECTION INTRAVENOUS; SUBCUTANEOUS at 11:37

## 2020-07-10 RX ADMIN — DIPHENHYDRAMINE HYDROCHLORIDE 25 MG: 25 CAPSULE ORAL at 08:43

## 2020-07-15 NOTE — PROGRESS NOTES
HEMATOLOGY ONCOLOGY FOLLOW UP        Patient name: Tiny Akhtar  : 1985  MRN: [id number]  Primary Care Physician: Jose Ingram, APRN  Referring Physician: Jose Ingram, *  Reason For Consult:     Chief Complaint   Patient presents with   • Follow-up     idiopathic thrombocytopenic purpura      Refractory ITP  History of Present Illness:  Tiny Akhtar is a 34 y.o. female with a past medical history of thrombocytopenia, anemia, and kidney stones.  She presented to [facility] Emergency Department on 2019 after she had blood work drawn earlier in the day that showed a platelet count of 9,000 at this facility.  She had previously been admitted through the ED on 2019 reporting bruising on her ankles and in her mouth.  She received 2 units of platelets and started on steroids. She was discharged on 19.The patient's last platelet count was 17,000 on 19 after a platelet transfusion.  She had been on steroids for four days.  The patient reported continued  subcutaneous bruising. She denied any headaches, nosebleeds, or gingival bleeding.  She was admitted for IV steroids and further management.     · 10/7/2019 CT abdomen and pelvis: 3 mm stone at the left ureterovesical junction causing mild to moderate obstruction.Spleen reported as normal size.  · 10/7/2019 WBC 10.2, hemoglobin 12.2, platelets 268  · 2019 WBC 5.7, hemoglobin 12.8, Platelets 5000,  Transfused 2 units platelets.  initial presentation for diagnosis acute thrombocytopenia,. Started on Solumedrol 125 mg IV Q6Hr  · 2019 platelets 5000.  Patient transfused with platelets.  Platelets increased to 13,000  · 19  Bone marrow biopsy - Smears of bone marrow aspirate with cell block (clot) preparation and marrow biopsy:  Hypocellular for age bone marrow (50%),  Adequate numbers of unremarkable megakaryocytes,  Absent iron stores,  Negative for involvement by malignant  lymphoma.  Flow cytometry-CD 56 expression on granulocytes and monocytes.  Chromosomal analysis -46XX  · 11/20/2019 platelet count 17,000 . Switched to Prednisone 1 mg/kg ( 50 mg daily)  · 11/21/2019 platelets 17,000  · 11/22/2019  Readmitted . Restarted Solumedrol  mg Q6H . platelets 9000  · 11/23/19 platelets 13,000  · 11/23/2019 hematology/Oncology was consulted on this patient known to our service and previously consulted for acute thrombocytopenia on 11/18/19. She was followed by Uri Sagastume MD for ITP. The patient had been transfused and started on IV steroids Prednisone while inpatient 11/18/19 to 11/20/19.  She was transitioned to oral steroids at discharge.   · 11/23/2019 Hepatitis panel negative, TSH 0.33, H. pylori IgG 1.43 High, IgM negative, JOSE negative, HIV antibodies negative  · 11/23/2019 patient received IV Venofer.  Patient received meningococcal vaccine, Haemophilus vaccine, pneumococcus vaccine  · 11/23/2019 platelets 13,000  · 11/24/2019 platelets 52,000.  Platelets responded to steroids. On IV solumedrol. Started on Megace 40 mg po QID for menorrhagia.  · Pt discharged on Prednisone 50 mg  · 11/27/2019 platelets 8000.  Patient's failed steroids ( on prednisone 50 mg daily).. Started Tranexamic acid 1300 mg TID  · 11/29/2019  READMITTED platelets 12,000  · 11/29/19-Cycle 1 Day one of RITUXIMAB and continues on megestrol  · 11/30/2019 WBC 11.2, hemoglobin 12.1, platelets 6000  · 12/1/2019 platelets 11,000.  Patient received IVIG 30 g  · 12/2/2019 iron 72, iron saturation 17% low, TIBC 416, ferritin 231, platelets 56,000  · 12/3/2019 platelets 129,000  · 12/4/2019 Prednisone decreased to 40 mg daily  · 12/6/2019 patient received week 2 of Rituximab.  Platelets 24,000, hemoglobin 11.8  · 12/8/2019 platelets 5000, hemoglobin 11.6, PT 10.5, INR 1.  Patient received IVIG 50 g  · 12/9/2019 platelets 11,000  · 12/11/2019 platelets 16,000  · 12/13/2019: Platelets 13,000 Patient  received week 3 of her Rituximab. Prednisone decreased to 20 mg daily  · 12/14/2019 patient received IVIG 55 g, platelets 12,000.   · 12/16/2019 platelets 24,000. Prednisone decreased to 10 mg daily  · 12/18/2019 platelets 12,000  · 12/19/2019 platelets 11,000  · 12/20/19-week 4 Rituximab and Megestrol, platelets 12,000. Prednisone DCed.  · 12/26/2019 platelets 7000  · 12/27/19-patient was started on NPLATE   1 mcg/kg, platelets 8000  · 12/30/2019 platelets 6000.  After transfusion 31,000  · 01/03/2020- NPLATE 2 mcg/kg, platelets 10,000  · 01/10/2020-NPLATE 3 mcg/kg.  continues on Megace megestrol, platelets 4000  · 1/15/2020: WBC 5.9, hemoglobin 12.5, platelets 2000k,   · 1/15/2020: Serum copper 114 normal, IgA 81 normal,Anticardiolipin antibodies negative, beta-2 glycoprotein antibodies negative, phosphatidylserine antibodies negative,  · 1/16/2020 patient received IVIG 55 g, platelets 4000  · 1/17/2020 patient received IVIG 55 g, platelets 36,000  · 01/17/2020: IVIG WBC 4.27, hemoglobin, 11.5, platelets  [id number]  · 1/20/2020 patient received Nplate 6 mcg/kg, WBC 5.1, hemoglobin 11.9, platelets 44,  · 1/27/2020 patient received Nplate 6 mcg/kg platelets 100,000  · 1/29/2020 platelets 74,000  · 1/30/2020 patient seen at [facility] hematology for second opinion.  Recommended to continue course of current treatment.  · 2/3/2020 patient received Nplate 6 mcg/kg.  WBC 5.9, hemoglobin 12, platelets 81k  · 2/10/2020 patient received Nplate 7 mcg/kg platelets 34,000   · 2/12/2020 WBC 5.1, hemoglobin 12.8, platelets 24K  · 2/13/2020: Patient advised to start dexamethasone 40 mg x 4 days course   · 2/17/2020: Platelets 11,000  · 2/21/2020 platelets 0.  Patient received IVIG 1 g/kg.  60 g  · 2/21/2020: Patient transfused platelets   · 2/22/2020 platelets 65K. , hemoglobin 10.4, patient received IVIG 60 g  · 2/24/2020 platelets 231K, hemoglobin 10.6, patient received Nplate 8 mcg/kg  · 3/5/2020: WBC 5.3,  hemoglobin 11.4, platelets 65,000.  Patient received Nplate 8 mcg/kg  · 3/11/2020 hemoglobin 12.1, platelets 97,000  · 3/13/2020 WBC 5.2, hemoglobin 11.6, platelets 106.  Patient received Nplate 500 micrograms  · 3/18/2020 WBC 5.7, hemoglobin 12.3, platelets 25,000, patient received Nplate 500 mcg  · 3/20/2020 WBC 5.7, hemoglobin 12.4, platelets 56,000,  · 3/25/2020 platelets 32,000, received Nplate 500 mcg  · 4/2/2020: WBC 7.3, hemoglobin 11.9, platelets 43,000, MCV 85.8  · 4/3/2020 platelets 27, Nplate 400 mcg  · 4/9/2020 platelets 24k  · 4/10/2020 platelets 35K.  Nplate 590 mcg  · 4/17/2020: Platelets 35665, Nplate 500 mcg  · 4/21/2020: Platelets 2000, patient received IVIG 60 g  · 4/22/2020 platelets 77K, hemoglobin 10.7, WBC 4.1, patient received IVIG 60 g  · 4/24/2020: Platelets 182K, hemoglobin 10.7, received Nplate 5 mcg  · 5/1/2020: Patient received Nplate 400 mcg platelets 179  · 5/8/2020: Patient received Nplate 590 mcg, platelets 33,000  · 5/13/2020: Platelets 46,000  · 5/15/2020 patient received Nplate, platelets 64,000  · 5/22/2020: Platelets 28,000  · 5/28/2020 patient received Nplate 500 mcg.  Platelets 45,000  · 5/29/2020 platelets 26,000.    · 6/1/2020: Platelets 11,000.  Nplate discontinued due to treatment failure.  Patient received IVIG 1 g/kg  · 6/2/2020 platelets 117  · 6/5/2020: Platelets 123K  · 6/16/2020 platelets 8000.  Patient received IVIG 1 g/kg   · 6/17/2020 platelets 47,000.  Patient received IVIG 1 g/kg  · 6/18/2020: Patient started taking fostamatinib  · 6/25/2020: Platelets 33,000.  WBC 5.5, hemoglobin 11.5, platelets 33, creatinine 1.6,  · 7/6/2020: WBC 5.06, hemoglobin 12.1, platelets 14,000,  · 7/9/2020: WBC 4.4, hemoglobin 12.0, platelets 11,000, patient received IVIG 1 g/kg  · 7/10/2020: Patient received IVIG 1 g/kg, platelet count 45,000  · 7/16/2020: WBC 2.27, hemoglobin 10.6, platelets 36,000      Subjective:  Patient is here for a follow up regarding ITP.  She is currently  taking Tavalisse (fostamatinib )and has been on it for around a month. She states she has some abdominal pain, but no other issues. She denies any dizziness, HTN, or elevated WBC. She does not report any excessive bruising or bleeding. She denies any nose bleeds.   She underwent IVIG on 07/10/2020.  Does not report any menstrual bleeding at this time.  Does not take any NSAIDs.    Past Medical History:   Diagnosis Date   • Anxiety    • Iron deficiency anemia    • Kidney stone 10/2019     No past surgical history on file.      Current Outpatient Medications:   •  acetaminophen (TYLENOL) 325 MG tablet, Take 650 mg by mouth Every 6 (Six) Hours As Needed for Mild Pain  or Moderate Pain ., Disp: , Rfl:   •  amphetamine-dextroamphetamine (ADDERALL) 10 MG tablet, , Disp: , Rfl:   •  ferrous sulfate 324 (65 Fe) MG tablet delayed-release EC tablet, Take 1 tablet by mouth Daily With Breakfast., Disp: 30 tablet, Rfl: 0  •  Fostamatinib Disodium 100 MG tablet, Take 100 mg by mouth 2 (Two) Times a Day. May take with or without food.  Do not crush or chew tablets., Disp: 60 tablet, Rfl: 3  •  megestrol (MEGACE) 20 MG tablet, Take 20 mg by mouth 3 (Three) Times a Day., Disp: , Rfl:   •  NON FORMULARY, Take 1 tablet by mouth Daily. Balance OTC, Disp: , Rfl:   •  ondansetron (ZOFRAN) 8 MG tablet, Take 1 tablet by mouth 3 (Three) Times a Day As Needed for Nausea or Vomiting., Disp: 30 tablet, Rfl: 5  •  pantoprazole (PROTONIX) 20 MG EC tablet, , Disp: , Rfl:   •  polyethylene glycol (MIRALAX) powder, Take 17 g by mouth Daily As Needed (constipation)., Disp: 225 g, Rfl: 0  •  Tranexamic Acid 650 MG tablet, Take 2 tablets by mouth 3 (Three) Times a Day., Disp: 30 tablet, Rfl: 0    No Known Allergies    Family History   Problem Relation Age of Onset   • Thrombocytopenia Nephew         ITP   • Diabetes Mother    • Diabetes Father    • Cancer Paternal Grandmother      Cancer-related family history includes Cancer in her paternal  "grandmother.    Social History     Tobacco Use   • Smoking status: Never Smoker   • Smokeless tobacco: Never Used   Substance Use Topics   • Alcohol use: No     Frequency: Never   • Drug use: No     I have reviewed the history of present illness, past medical history, family history, social history, lab results, all notes and other records since the patient was last seen on  11/21/2019.    ROS:     Review of Systems   Constitutional: Negative for chills and fever.   HENT: Negative for ear pain, mouth sores, nosebleeds and sore throat.    Eyes: Negative for photophobia and visual disturbance.   Respiratory: Negative for wheezing and stridor.    Cardiovascular: Negative for chest pain and palpitations.   Gastrointestinal: Negative for abdominal pain, diarrhea, nausea and vomiting.   Endocrine: Negative for cold intolerance and heat intolerance.   Genitourinary: Negative for dysuria and hematuria.   Musculoskeletal: Negative for joint swelling and neck stiffness.   Skin: Negative for color change and rash.   Neurological: Negative for seizures and syncope.   Hematological: Negative for adenopathy.        No obvious bleeding   Psychiatric/Behavioral: Negative for agitation, confusion and hallucinations.     Objective:    Vitals:    07/16/20 1333   BP: 111/72   Pulse: 67   Resp: 18   Temp: 98.7 °F (37.1 °C)   TempSrc: Oral   Weight: 57.6 kg (127 lb)   Height: 157.5 cm (62\")   PainSc: 0-No pain     ECOG  (0) Fully active, able to carry on all predisease performance without restriction    Physical Exam:   Physical Exam   Constitutional: She is oriented to person, place, and time. No distress.   HENT:   Head: Normocephalic and atraumatic.   Eyes: Conjunctivae and EOM are normal. Right eye exhibits no discharge. Left eye exhibits no discharge. No scleral icterus.   Neck: Normal range of motion. Neck supple. No thyromegaly present.   Cardiovascular: Normal rate, regular rhythm and normal heart sounds. Exam reveals no gallop " and no friction rub.   Pulmonary/Chest: Effort normal. No stridor. No respiratory distress. She has no wheezes.   Abdominal: Soft. Bowel sounds are normal. She exhibits no mass. There is no tenderness. There is no rebound and no guarding.   Musculoskeletal: Normal range of motion. She exhibits no tenderness.   Lymphadenopathy:     She has no cervical adenopathy.   Neurological: She is alert and oriented to person, place, and time. She exhibits normal muscle tone.   Skin: Skin is warm. No rash noted. She is not diaphoretic. No erythema.   Psychiatric: She has a normal mood and affect. Her behavior is normal.   Nursing note and vitals reviewed.    I have reexamined the patient and the results are consistent with the previously documented exam. Forrest Llanes MD     Lab Results - Last 18 Months   Lab Units 07/16/20  1329 07/10/20  0807 07/09/20  1004   WBC 10*3/mm3 2.27* 3.90 4.42   HEMOGLOBIN g/dL 10.6* 10.6* 12.0   HEMATOCRIT % 31.3* 32.3* 36.0   PLATELETS 10*3/mm3 36* 45* 11*   MCV fL 82.6 84.6 83.3     Lab Results - Last 18 Months   Lab Units 06/25/20  0959 12/08/19  2022 12/02/19  0354  11/29/19  1217  11/22/19  2248   SODIUM mmol/L 137 137 137   < > 136   < > 139   POTASSIUM mmol/L 3.8 3.6 4.1   < > 4.2   < > 3.5   CHLORIDE mmol/L 101 100 101   < > 98   < > 103   CO2 mmol/L 27.0 26.0 27.0   < > 23.0   < > 23.0   BUN  13 17 16   < > 17   < > 17   CREATININE mg/dL 0.67 0.55* 0.55*   < > 0.68   < > 0.54*   CALCIUM mg/dL 9.6 9.2 9.3   < > 9.5   < > 9.1   BILIRUBIN mg/dL 0.5  --   --   --  0.3  --  0.3   ALK PHOS U/L 42  --   --   --  36*  --  36*   ALT (SGPT) U/L 11  --   --   --  31  --  38*   AST (SGOT) U/L 20  --   --   --  16  --  16   GLUCOSE mg/dL 69 103* 107*   < > 93   < > 153*    < > = values in this interval not displayed.     Lab Results   Component Value Date    GLUCOSE 69 06/25/2020    BUN  06/25/2020      Comment:      Testing performed by alternate method    BUN 13 06/25/2020    CREATININE 0.67  06/25/2020    EGFRIFNONA 102 06/25/2020    BCR  06/25/2020      Comment:      Testing not performed    K 3.8 06/25/2020    CO2 27.0 06/25/2020    CALCIUM 9.6 06/25/2020    PROTENTOTREF 5.9 (L) 11/23/2019    ALBUMIN 4.50 06/25/2020    LABIL2 1.5 11/23/2019    AST 20 06/25/2020    ALT 11 06/25/2020     Lab Results - Last 18 Months   Lab Units 04/10/20  0841 12/08/19 2022 11/18/19  0918   INR  1.02 1.00 1.00   APTT seconds 24.5  --  23.5*     Lab Results   Component Value Date    IRON 72 12/02/2019    TIBC 416 12/02/2019    FERRITIN 231.70 (H) 12/02/2019       Lab Results   Component Value Date    FOLATE 11.20 11/23/2019     No results found for: OCCULTBLD    Lab Results   Component Value Date    RETICCTPCT 3.13 (H) 12/01/2019     Lab Results   Component Value Date    WEAGXIAB34 461 11/23/2019     No results found for: SPEP, UPEP  LDH   Date Value Ref Range Status   11/23/2019 123 (L) 135 - 214 U/L Final     Lab Results   Component Value Date    JOSE Negative 11/23/2019     Lab Results   Component Value Date    HAPTOGLOBIN 136 11/23/2019     Lab Results   Component Value Date    PTT 24.5 04/10/2020    INR 1.02 04/10/2020     No results found for:   No results found for: CEA  No components found for: CA-19-9  No results found for: PSA    Assessment/Plan     Assessment:    1. Refractory idiopathic thrombocytopenic purpura ITP. : Refractory to steroids: She was diagnosed on 11/18/19  by mouth.  S/p bone marrow biopsy 11/18/19 - hypocellular for age bone marrow (50%),  adequate numbers of unremarkable megakaryocytes,  absent iron stores,  negative for involvement by malignant lymphoma.  Flow cytometry-CD 56 expression on granulocytes and monocytes.  . Normal karyotype 46XX.  HIV testing negative. TSH normal.   She does have a transient responses to IVIG.  Received 4 weekly doses of Rituximab starting 11/29/2019 to 12/20/2019.  She did not respond to rituximab right away and still awaiting response.  Started on Nplate  12/27/2019 at 1 mcg/kg.  Dose was escalated and patient seem to have erratic treatment response.  She continues to require IVIG occasionally while on Nplate.  Nplate was discontinued by end of May 2020 due to treatment failure and she was switched to fostamatinib which was started on 6/18/2020.  However she has not had enough response to therapy yet.  IVIG requirements are increasing.  2. Leukopenia/neutropenia: Could be from fostamatinib.  Monitor closely.  3. Anemia: History of HAILE:  Bone marrow biopsy on 11/18/19 showed absent iron stores. Anemia work-up: reticulocytes 1.53, ferritin 15.38, iron 40, iron saturation 9, transferrin 310, TIBC 462, .   2. History of iron deficiency anemia - S/p Venofer 400 mg IV x 1 dose on 11/23/19  3. Left renal stone: 3 mm kidney stone visualized on CT A/P on 10/7/19.  Asymptomatic.  4. Anxiety/weight loss:  management per Primary team   5. Hx of Menstrual bleeding: LMPD 11/23/19 -On Megace  to suppress menstrual cycle . Also on Lysteda (tranexamic acid)     PLAN  1. Continue fostamatinib.  Monitor for leukopenia, neutropenia, diarrhea, LFTs, hypertension etc..   2. Monitor CBC twice a week  3. Nplate has been discontinued.  However it does appear her counts may have been better while she was receiving Nplate.  4. IVIG requirements have increased using IVIG as needed, only for severe thrombocytopenia with platelets less than 10,000.  She has not had as much response as before.  5. Patient has failed rechallenge with dexamethasone.  No plans to reinitiate steroids.  6. Will consider bone marrow biopsy again if all the counts worsen.  7. Recommend platelet transfusions only if platelets extremely low ( less than 2K) or if patient has bleeding.  8. Continue Megace as needed for vaginal bleeding.  9. Continue Lysteda, tranexamic acid as needed for platelets less than 10,000.  10. May stop Protonix  11. Advised patient to be seen by Dr. [facility]redia at the [facility]  for second opinion.  12. Will follow patient back in we will follow-up in 3 weeks          Immune thrombocytopenic purpura (CMS/HCC)  - CBC & Differential  - Ambulatory Referral to Hematology  - CBC & Differential    Orders Placed This Encounter   Procedures   • Ambulatory Referral to Hematology     Referral Priority:   Routine     Referral Type:   Consultation     Referral Reason:   Specialty Services Required     Requested Specialty:   Hematology     Number of Visits Requested:   1   • CBC & Differential     Standing Status:   Future     Number of Occurrences:   1     Standing Expiration Date:   7/15/2021     Order Specific Question:   Manual Differential     Answer:   No   • CBC & Differential     Monday and Thursday     Standing Status:   Standing     Number of Occurrences:   50     Standing Expiration Date:   7/16/2021          I have reviewed and confirmed the accuracy of the patient's history: Chief complaint, HPI, ROS and Subjective as entered by the MA/LPN/RN. Forrest Llanes MD 07/16/20     Thank you very much for providing the opportunity to participate in this patient’s care. Please do not hesitate to call if there are any other questions.    I have reviewed all relevant labs results,outside reports, imaging,,notes, vitals, medications and plan with the patient today.    Portions of the note have been Scribed by medical assistant/Nurse.  I have reviewed and made changes accordingly.      Electronically signed by Forrest Llanes MD, 06/25/20, 10:28 AM.

## 2020-07-16 ENCOUNTER — OFFICE VISIT (OUTPATIENT)
Dept: ONCOLOGY | Facility: CLINIC | Age: 35
End: 2020-07-16

## 2020-07-16 ENCOUNTER — LAB (OUTPATIENT)
Dept: LAB | Facility: HOSPITAL | Age: 35
End: 2020-07-16

## 2020-07-16 VITALS
SYSTOLIC BLOOD PRESSURE: 111 MMHG | TEMPERATURE: 98.7 F | DIASTOLIC BLOOD PRESSURE: 72 MMHG | HEIGHT: 62 IN | RESPIRATION RATE: 18 BRPM | HEART RATE: 67 BPM | BODY MASS INDEX: 23.37 KG/M2 | WEIGHT: 127 LBS

## 2020-07-16 DIAGNOSIS — D69.3 IMMUNE THROMBOCYTOPENIC PURPURA (HCC): ICD-10-CM

## 2020-07-16 DIAGNOSIS — D69.3 IMMUNE THROMBOCYTOPENIC PURPURA (HCC): Primary | ICD-10-CM

## 2020-07-16 DIAGNOSIS — D69.6 THROMBOCYTOPENIA (HCC): ICD-10-CM

## 2020-07-16 LAB
ALBUMIN SERPL-MCNC: 4.6 G/DL (ref 3.5–5.2)
ALBUMIN/GLOB SERPL: 1.2 G/DL
ALP SERPL-CCNC: 45 U/L (ref 39–117)
ALT SERPL W P-5'-P-CCNC: 16 U/L (ref 1–33)
ANION GAP SERPL CALCULATED.3IONS-SCNC: 12 MMOL/L (ref 5–15)
AST SERPL-CCNC: 27 U/L (ref 1–32)
BASOPHILS # BLD AUTO: 0.01 10*3/MM3 (ref 0–0.2)
BASOPHILS NFR BLD AUTO: 0.4 % (ref 0–1.5)
BILIRUB SERPL-MCNC: 0.6 MG/DL (ref 0–1.2)
BUN SERPL-MCNC: 13 MG/DL (ref 6–20)
BUN SERPL-MCNC: NORMAL MG/DL
BUN/CREAT SERPL: NORMAL
CALCIUM SPEC-SCNC: 9.2 MG/DL (ref 8.6–10.5)
CHLORIDE SERPL-SCNC: 100 MMOL/L (ref 98–107)
CO2 SERPL-SCNC: 25 MMOL/L (ref 22–29)
CREAT SERPL-MCNC: 0.73 MG/DL (ref 0.57–1)
DEPRECATED RDW RBC AUTO: 48 FL (ref 37–54)
EOSINOPHIL # BLD AUTO: 0.02 10*3/MM3 (ref 0–0.4)
EOSINOPHIL NFR BLD AUTO: 0.9 % (ref 0.3–6.2)
ERYTHROCYTE [DISTWIDTH] IN BLOOD BY AUTOMATED COUNT: 16.8 % (ref 12.3–15.4)
GFR SERPL CREATININE-BSD FRML MDRD: 92 ML/MIN/1.73
GLOBULIN UR ELPH-MCNC: 3.9 GM/DL
GLUCOSE SERPL-MCNC: 95 MG/DL (ref 65–99)
HCT VFR BLD AUTO: 31.3 % (ref 34–46.6)
HGB BLD-MCNC: 10.6 G/DL (ref 12–15.9)
LYMPHOCYTES # BLD AUTO: 0.83 10*3/MM3 (ref 0.7–3.1)
LYMPHOCYTES NFR BLD AUTO: 36.6 % (ref 19.6–45.3)
MCH RBC QN AUTO: 28 PG (ref 26.6–33)
MCHC RBC AUTO-ENTMCNC: 33.9 G/DL (ref 31.5–35.7)
MCV RBC AUTO: 82.6 FL (ref 79–97)
MONOCYTES # BLD AUTO: 0.18 10*3/MM3 (ref 0.1–0.9)
MONOCYTES NFR BLD AUTO: 7.9 % (ref 5–12)
NEUTROPHILS NFR BLD AUTO: 1.23 10*3/MM3 (ref 1.7–7)
NEUTROPHILS NFR BLD AUTO: 54.2 % (ref 42.7–76)
PLATELET # BLD AUTO: 36 10*3/MM3 (ref 140–450)
PMV BLD AUTO: 11.3 FL (ref 6–12)
POTASSIUM SERPL-SCNC: 3.8 MMOL/L (ref 3.5–5.2)
PROT SERPL-MCNC: 8.5 G/DL (ref 6–8.5)
RBC # BLD AUTO: 3.79 10*6/MM3 (ref 3.77–5.28)
SODIUM SERPL-SCNC: 137 MMOL/L (ref 136–145)
WBC # BLD AUTO: 2.27 10*3/MM3 (ref 3.4–10.8)

## 2020-07-16 PROCEDURE — 85025 COMPLETE CBC W/AUTO DIFF WBC: CPT

## 2020-07-16 PROCEDURE — 80053 COMPREHEN METABOLIC PANEL: CPT

## 2020-07-16 PROCEDURE — 36415 COLL VENOUS BLD VENIPUNCTURE: CPT

## 2020-07-16 PROCEDURE — 99215 OFFICE O/P EST HI 40 MIN: CPT | Performed by: INTERNAL MEDICINE

## 2020-07-20 ENCOUNTER — LAB (OUTPATIENT)
Dept: LAB | Facility: HOSPITAL | Age: 35
End: 2020-07-20

## 2020-07-20 ENCOUNTER — HOSPITAL ENCOUNTER (OUTPATIENT)
Dept: ONCOLOGY | Facility: HOSPITAL | Age: 35
Setting detail: INFUSION SERIES
Discharge: HOME | End: 2020-07-20

## 2020-07-20 DIAGNOSIS — D69.3 IMMUNE THROMBOCYTOPENIC PURPURA (HCC): ICD-10-CM

## 2020-07-20 DIAGNOSIS — D69.6 THROMBOCYTOPENIA (HCC): Primary | ICD-10-CM

## 2020-07-20 LAB
BASOPHILS # BLD AUTO: 0.01 10*3/MM3 (ref 0–0.2)
BASOPHILS NFR BLD AUTO: 0.3 % (ref 0–1.5)
DEPRECATED RDW RBC AUTO: 51.1 FL (ref 37–54)
EOSINOPHIL # BLD AUTO: 0.07 10*3/MM3 (ref 0–0.4)
EOSINOPHIL NFR BLD AUTO: 1.8 % (ref 0.3–6.2)
ERYTHROCYTE [DISTWIDTH] IN BLOOD BY AUTOMATED COUNT: 17.3 % (ref 12.3–15.4)
HCT VFR BLD AUTO: 33.2 % (ref 34–46.6)
HGB BLD-MCNC: 11.3 G/DL (ref 12–15.9)
LYMPHOCYTES # BLD AUTO: 1.66 10*3/MM3 (ref 0.7–3.1)
LYMPHOCYTES NFR BLD AUTO: 42.5 % (ref 19.6–45.3)
MCH RBC QN AUTO: 28.5 PG (ref 26.6–33)
MCHC RBC AUTO-ENTMCNC: 34 G/DL (ref 31.5–35.7)
MCV RBC AUTO: 83.8 FL (ref 79–97)
MONOCYTES # BLD AUTO: 0.37 10*3/MM3 (ref 0.1–0.9)
MONOCYTES NFR BLD AUTO: 9.5 % (ref 5–12)
NEUTROPHILS NFR BLD AUTO: 1.8 10*3/MM3 (ref 1.7–7)
NEUTROPHILS NFR BLD AUTO: 45.9 % (ref 42.7–76)
PLATELET # BLD AUTO: 19 10*3/MM3 (ref 140–450)
RBC # BLD AUTO: 3.96 10*6/MM3 (ref 3.77–5.28)
WBC # BLD AUTO: 3.91 10*3/MM3 (ref 3.4–10.8)

## 2020-07-20 PROCEDURE — 85025 COMPLETE CBC W/AUTO DIFF WBC: CPT

## 2020-07-20 NOTE — PROGRESS NOTES
Notified Keyona FREGOSO in regards to platelet ct 19 and last platelet ct 36 and she gave instruction to question patient if any new bruising or bleeding,if not to return on Thursday as scheduled. Patient denies any bleeding or new bruising. Patient gave bleeding precautions and informed to keep appointment on Thursday 07/23/2020.Patient verbalized understanding. Dona HIDALGO

## 2020-07-23 ENCOUNTER — LAB (OUTPATIENT)
Dept: LAB | Facility: HOSPITAL | Age: 35
End: 2020-07-23

## 2020-07-23 ENCOUNTER — TELEPHONE (OUTPATIENT)
Dept: ONCOLOGY | Facility: HOSPITAL | Age: 35
End: 2020-07-23

## 2020-07-23 DIAGNOSIS — D69.3 IMMUNE THROMBOCYTOPENIC PURPURA (HCC): ICD-10-CM

## 2020-07-23 DIAGNOSIS — D69.6 THROMBOCYTOPENIA (HCC): Primary | ICD-10-CM

## 2020-07-23 LAB
BASOPHILS # BLD AUTO: 0.02 10*3/MM3 (ref 0–0.2)
BASOPHILS NFR BLD AUTO: 0.5 % (ref 0–1.5)
DEPRECATED RDW RBC AUTO: 51.7 FL (ref 37–54)
EOSINOPHIL # BLD AUTO: 0.08 10*3/MM3 (ref 0–0.4)
EOSINOPHIL NFR BLD AUTO: 2.1 % (ref 0.3–6.2)
ERYTHROCYTE [DISTWIDTH] IN BLOOD BY AUTOMATED COUNT: 17.8 % (ref 12.3–15.4)
HCT VFR BLD AUTO: 33.5 % (ref 34–46.6)
HGB BLD-MCNC: 11.6 G/DL (ref 12–15.9)
LYMPHOCYTES # BLD AUTO: 1.62 10*3/MM3 (ref 0.7–3.1)
LYMPHOCYTES NFR BLD AUTO: 42.9 % (ref 19.6–45.3)
MCH RBC QN AUTO: 28.7 PG (ref 26.6–33)
MCHC RBC AUTO-ENTMCNC: 34.6 G/DL (ref 31.5–35.7)
MCV RBC AUTO: 82.9 FL (ref 79–97)
MONOCYTES # BLD AUTO: 0.3 10*3/MM3 (ref 0.1–0.9)
MONOCYTES NFR BLD AUTO: 7.9 % (ref 5–12)
NEUTROPHILS NFR BLD AUTO: 1.76 10*3/MM3 (ref 1.7–7)
NEUTROPHILS NFR BLD AUTO: 46.6 % (ref 42.7–76)
PLATELET # BLD AUTO: 12 10*3/MM3 (ref 140–450)
RBC # BLD AUTO: 4.04 10*6/MM3 (ref 3.77–5.28)
WBC # BLD AUTO: 3.78 10*3/MM3 (ref 3.4–10.8)

## 2020-07-23 PROCEDURE — 85025 COMPLETE CBC W/AUTO DIFF WBC: CPT

## 2020-07-23 NOTE — TELEPHONE ENCOUNTER
Patient was here for CBC and her platelets are 12. I discussed this with Dr. Llanes and he wants her to get another dose of IVIG. He wants to try and get it authorized for 1 dose [id number]. Scheduled patient for tomorrow so if we do get the auth we will treat her with IVIG tomorrow. If we don't have an auth by then we will at least recheck her CBC and then get her scheduled for next week.

## 2020-07-24 ENCOUNTER — HOSPITAL ENCOUNTER (OUTPATIENT)
Dept: ONCOLOGY | Facility: HOSPITAL | Age: 35
Setting detail: INFUSION SERIES
Discharge: HOME | End: 2020-07-24

## 2020-07-24 VITALS
HEIGHT: 62 IN | RESPIRATION RATE: 14 BRPM | SYSTOLIC BLOOD PRESSURE: 111 MMHG | BODY MASS INDEX: 23.37 KG/M2 | OXYGEN SATURATION: 99 % | DIASTOLIC BLOOD PRESSURE: 68 MMHG | TEMPERATURE: 97.9 F | WEIGHT: 127 LBS | HEART RATE: 60 BPM

## 2020-07-24 DIAGNOSIS — D69.6 THROMBOCYTOPENIA (HCC): Primary | ICD-10-CM

## 2020-07-24 LAB
BASOPHILS # BLD AUTO: 0.01 10*3/MM3 (ref 0–0.2)
BASOPHILS NFR BLD AUTO: 0.3 % (ref 0–1.5)
DEPRECATED RDW RBC AUTO: 53.3 FL (ref 37–54)
EOSINOPHIL # BLD AUTO: 0.08 10*3/MM3 (ref 0–0.4)
EOSINOPHIL NFR BLD AUTO: 2.4 % (ref 0.3–6.2)
ERYTHROCYTE [DISTWIDTH] IN BLOOD BY AUTOMATED COUNT: 17.7 % (ref 12.3–15.4)
HCT VFR BLD AUTO: 34.6 % (ref 34–46.6)
HGB BLD-MCNC: 11.7 G/DL (ref 12–15.9)
IGA1 MFR SER: 74 MG/DL (ref 70–400)
IGG1 SER-MCNC: 2180 MG/DL (ref 700–1600)
IGM SERPL-MCNC: 111 MG/DL (ref 40–230)
LYMPHOCYTES # BLD AUTO: 1.11 10*3/MM3 (ref 0.7–3.1)
LYMPHOCYTES NFR BLD AUTO: 32.9 % (ref 19.6–45.3)
MCH RBC QN AUTO: 28.7 PG (ref 26.6–33)
MCHC RBC AUTO-ENTMCNC: 33.8 G/DL (ref 31.5–35.7)
MCV RBC AUTO: 85 FL (ref 79–97)
MONOCYTES # BLD AUTO: 0.29 10*3/MM3 (ref 0.1–0.9)
MONOCYTES NFR BLD AUTO: 8.6 % (ref 5–12)
NEUTROPHILS NFR BLD AUTO: 1.88 10*3/MM3 (ref 1.7–7)
NEUTROPHILS NFR BLD AUTO: 55.8 % (ref 42.7–76)
PLATELET # BLD AUTO: 8 10*3/MM3 (ref 140–450)
RBC # BLD AUTO: 4.07 10*6/MM3 (ref 3.77–5.28)
WBC # BLD AUTO: 3.37 10*3/MM3 (ref 3.4–10.8)

## 2020-07-24 PROCEDURE — 96365 THER/PROPH/DIAG IV INF INIT: CPT

## 2020-07-24 PROCEDURE — 36415 COLL VENOUS BLD VENIPUNCTURE: CPT

## 2020-07-24 PROCEDURE — 96366 THER/PROPH/DIAG IV INF ADDON: CPT

## 2020-07-24 PROCEDURE — 25010000002 IMMUNE GLOBULIN (HUMAN) 20 GM/200ML SOLUTION: Performed by: INTERNAL MEDICINE

## 2020-07-24 PROCEDURE — 82784 ASSAY IGA/IGD/IGG/IGM EACH: CPT | Performed by: INTERNAL MEDICINE

## 2020-07-24 PROCEDURE — 63710000001 DIPHENHYDRAMINE PER 50 MG: Performed by: INTERNAL MEDICINE

## 2020-07-24 PROCEDURE — 25010000002 IMMUNE GLOBULIN (HUMAN) 10 GM/100ML SOLUTION: Performed by: INTERNAL MEDICINE

## 2020-07-24 PROCEDURE — 85025 COMPLETE CBC W/AUTO DIFF WBC: CPT | Performed by: INTERNAL MEDICINE

## 2020-07-24 RX ORDER — SODIUM CHLORIDE 9 MG/ML
250 INJECTION, SOLUTION INTRAVENOUS ONCE
Status: DISCONTINUED | OUTPATIENT
Start: 2020-07-24 | End: 2020-07-25 | Stop reason: HOSPADM

## 2020-07-24 RX ORDER — DIPHENHYDRAMINE HCL 25 MG
25 CAPSULE ORAL ONCE
Status: COMPLETED | OUTPATIENT
Start: 2020-07-24 | End: 2020-07-24

## 2020-07-24 RX ORDER — ACETAMINOPHEN 325 MG/1
650 TABLET ORAL ONCE
Status: COMPLETED | OUTPATIENT
Start: 2020-07-24 | End: 2020-07-24

## 2020-07-24 RX ADMIN — IMMUNE GLOBULIN (HUMAN) 20 G: 10 INJECTION INTRAVENOUS; SUBCUTANEOUS at 11:54

## 2020-07-24 RX ADMIN — IMMUNE GLOBULIN (HUMAN) 10 G: 10 INJECTION INTRAVENOUS; SUBCUTANEOUS at 09:09

## 2020-07-24 RX ADMIN — DIPHENHYDRAMINE HYDROCHLORIDE 25 MG: 25 CAPSULE ORAL at 09:00

## 2020-07-24 RX ADMIN — IMMUNE GLOBULIN (HUMAN) 20 G: 10 INJECTION INTRAVENOUS; SUBCUTANEOUS at 10:41

## 2020-07-24 RX ADMIN — ACETAMINOPHEN 650 MG: 325 TABLET ORAL at 09:00

## 2020-07-27 ENCOUNTER — LAB (OUTPATIENT)
Dept: LAB | Facility: HOSPITAL | Age: 35
End: 2020-07-27

## 2020-07-27 DIAGNOSIS — D69.3 IMMUNE THROMBOCYTOPENIC PURPURA (HCC): ICD-10-CM

## 2020-07-27 LAB
BASOPHILS # BLD AUTO: 0.02 10*3/MM3 (ref 0–0.2)
BASOPHILS NFR BLD AUTO: 0.5 % (ref 0–1.5)
DEPRECATED RDW RBC AUTO: 51.4 FL (ref 37–54)
EOSINOPHIL # BLD AUTO: 0.02 10*3/MM3 (ref 0–0.4)
EOSINOPHIL NFR BLD AUTO: 0.5 % (ref 0.3–6.2)
ERYTHROCYTE [DISTWIDTH] IN BLOOD BY AUTOMATED COUNT: 17.3 % (ref 12.3–15.4)
HCT VFR BLD AUTO: 33.9 % (ref 34–46.6)
HGB BLD-MCNC: 11.7 G/DL (ref 12–15.9)
LYMPHOCYTES # BLD AUTO: 1.69 10*3/MM3 (ref 0.7–3.1)
LYMPHOCYTES NFR BLD AUTO: 45.3 % (ref 19.6–45.3)
MCH RBC QN AUTO: 28.7 PG (ref 26.6–33)
MCHC RBC AUTO-ENTMCNC: 34.5 G/DL (ref 31.5–35.7)
MCV RBC AUTO: 83.3 FL (ref 79–97)
MONOCYTES # BLD AUTO: 0.5 10*3/MM3 (ref 0.1–0.9)
MONOCYTES NFR BLD AUTO: 13.4 % (ref 5–12)
NEUTROPHILS NFR BLD AUTO: 1.5 10*3/MM3 (ref 1.7–7)
NEUTROPHILS NFR BLD AUTO: 40.3 % (ref 42.7–76)
PLATELET # BLD AUTO: 27 10*3/MM3 (ref 140–450)
PMV BLD AUTO: 12.7 FL (ref 6–12)
RBC # BLD AUTO: 4.07 10*6/MM3 (ref 3.77–5.28)
WBC # BLD AUTO: 3.73 10*3/MM3 (ref 3.4–10.8)

## 2020-07-27 PROCEDURE — 85025 COMPLETE CBC W/AUTO DIFF WBC: CPT

## 2020-07-27 NOTE — PROGRESS NOTES
Discussed platelet count of 27,000 with Dr. Llanes.  Patient to have CBC repeated tomorrow.  Patient notified and v/u.

## 2020-07-30 ENCOUNTER — LAB (OUTPATIENT)
Dept: LAB | Facility: HOSPITAL | Age: 35
End: 2020-07-30

## 2020-07-30 DIAGNOSIS — D69.3 IMMUNE THROMBOCYTOPENIC PURPURA (HCC): ICD-10-CM

## 2020-07-30 LAB
BASOPHILS # BLD AUTO: 0.02 10*3/MM3 (ref 0–0.2)
BASOPHILS NFR BLD AUTO: 0.5 % (ref 0–1.5)
DEPRECATED RDW RBC AUTO: 49.8 FL (ref 37–54)
EOSINOPHIL # BLD AUTO: 0.04 10*3/MM3 (ref 0–0.4)
EOSINOPHIL NFR BLD AUTO: 1 % (ref 0.3–6.2)
ERYTHROCYTE [DISTWIDTH] IN BLOOD BY AUTOMATED COUNT: 16.7 % (ref 12.3–15.4)
HCT VFR BLD AUTO: 34 % (ref 34–46.6)
HGB BLD-MCNC: 11.6 G/DL (ref 12–15.9)
LYMPHOCYTES # BLD AUTO: 2.33 10*3/MM3 (ref 0.7–3.1)
LYMPHOCYTES NFR BLD AUTO: 55.9 % (ref 19.6–45.3)
MCH RBC QN AUTO: 28.2 PG (ref 26.6–33)
MCHC RBC AUTO-ENTMCNC: 34.1 G/DL (ref 31.5–35.7)
MCV RBC AUTO: 82.7 FL (ref 79–97)
MONOCYTES # BLD AUTO: 0.47 10*3/MM3 (ref 0.1–0.9)
MONOCYTES NFR BLD AUTO: 11.3 % (ref 5–12)
NEUTROPHILS NFR BLD AUTO: 1.31 10*3/MM3 (ref 1.7–7)
NEUTROPHILS NFR BLD AUTO: 31.3 % (ref 42.7–76)
PLATELET # BLD AUTO: 21 10*3/MM3 (ref 140–450)
PMV BLD AUTO: 13.4 FL (ref 6–12)
RBC # BLD AUTO: 4.11 10*6/MM3 (ref 3.77–5.28)
WBC # BLD AUTO: 4.17 10*3/MM3 (ref 3.4–10.8)

## 2020-07-30 PROCEDURE — 85025 COMPLETE CBC W/AUTO DIFF WBC: CPT

## 2020-08-03 ENCOUNTER — LAB (OUTPATIENT)
Dept: LAB | Facility: HOSPITAL | Age: 35
End: 2020-08-03

## 2020-08-03 ENCOUNTER — HOSPITAL ENCOUNTER (OUTPATIENT)
Dept: ONCOLOGY | Facility: HOSPITAL | Age: 35
Setting detail: INFUSION SERIES
Discharge: HOME | End: 2020-08-03

## 2020-08-03 VITALS
BODY MASS INDEX: 23.37 KG/M2 | HEART RATE: 69 BPM | SYSTOLIC BLOOD PRESSURE: 107 MMHG | DIASTOLIC BLOOD PRESSURE: 69 MMHG | RESPIRATION RATE: 18 BRPM | TEMPERATURE: 98.4 F | WEIGHT: 127 LBS | HEIGHT: 62 IN

## 2020-08-03 DIAGNOSIS — D69.6 THROMBOCYTOPENIA (HCC): Primary | ICD-10-CM

## 2020-08-03 DIAGNOSIS — D69.3 IMMUNE THROMBOCYTOPENIC PURPURA (HCC): ICD-10-CM

## 2020-08-03 LAB
BASOPHILS # BLD AUTO: 0.02 10*3/MM3 (ref 0–0.2)
BASOPHILS NFR BLD AUTO: 0.5 % (ref 0–1.5)
DEPRECATED RDW RBC AUTO: 51.3 FL (ref 37–54)
EOSINOPHIL # BLD AUTO: 0.04 10*3/MM3 (ref 0–0.4)
EOSINOPHIL NFR BLD AUTO: 1 % (ref 0.3–6.2)
ERYTHROCYTE [DISTWIDTH] IN BLOOD BY AUTOMATED COUNT: 16.7 % (ref 12.3–15.4)
HCT VFR BLD AUTO: 33.7 % (ref 34–46.6)
HGB BLD-MCNC: 11.4 G/DL (ref 12–15.9)
IGA1 MFR SER: 77 MG/DL (ref 70–400)
IGG1 SER-MCNC: 2160 MG/DL (ref 700–1600)
IGM SERPL-MCNC: 121 MG/DL (ref 40–230)
LYMPHOCYTES # BLD AUTO: 1.7 10*3/MM3 (ref 0.7–3.1)
LYMPHOCYTES NFR BLD AUTO: 41.3 % (ref 19.6–45.3)
MCH RBC QN AUTO: 28.9 PG (ref 26.6–33)
MCHC RBC AUTO-ENTMCNC: 33.8 G/DL (ref 31.5–35.7)
MCV RBC AUTO: 85.3 FL (ref 79–97)
MONOCYTES # BLD AUTO: 0.49 10*3/MM3 (ref 0.1–0.9)
MONOCYTES NFR BLD AUTO: 11.9 % (ref 5–12)
NEUTROPHILS NFR BLD AUTO: 1.87 10*3/MM3 (ref 1.7–7)
NEUTROPHILS NFR BLD AUTO: 45.3 % (ref 42.7–76)
PLATELET # BLD AUTO: 7 10*3/MM3 (ref 140–450)
RBC # BLD AUTO: 3.95 10*6/MM3 (ref 3.77–5.28)
WBC # BLD AUTO: 4.12 10*3/MM3 (ref 3.4–10.8)

## 2020-08-03 PROCEDURE — 63710000001 DIPHENHYDRAMINE PER 50 MG: Performed by: INTERNAL MEDICINE

## 2020-08-03 PROCEDURE — 82784 ASSAY IGA/IGD/IGG/IGM EACH: CPT | Performed by: INTERNAL MEDICINE

## 2020-08-03 PROCEDURE — 25010000002 IMMUNE GLOBULIN (HUMAN) 10 GM/100ML SOLUTION: Performed by: INTERNAL MEDICINE

## 2020-08-03 PROCEDURE — 96365 THER/PROPH/DIAG IV INF INIT: CPT

## 2020-08-03 PROCEDURE — 85025 COMPLETE CBC W/AUTO DIFF WBC: CPT

## 2020-08-03 PROCEDURE — 96366 THER/PROPH/DIAG IV INF ADDON: CPT

## 2020-08-03 RX ORDER — ACETAMINOPHEN 325 MG/1
650 TABLET ORAL ONCE
Status: COMPLETED | OUTPATIENT
Start: 2020-08-03 | End: 2020-08-03

## 2020-08-03 RX ORDER — DIPHENHYDRAMINE HCL 25 MG
25 CAPSULE ORAL ONCE
Status: COMPLETED | OUTPATIENT
Start: 2020-08-03 | End: 2020-08-03

## 2020-08-03 RX ADMIN — IMMUNE GLOBULIN (HUMAN) 10 G: 10 INJECTION INTRAVENOUS; SUBCUTANEOUS at 15:07

## 2020-08-03 RX ADMIN — ACETAMINOPHEN 650 MG: 325 TABLET ORAL at 12:10

## 2020-08-03 RX ADMIN — IMMUNE GLOBULIN (HUMAN) 10 G: 10 INJECTION INTRAVENOUS; SUBCUTANEOUS at 12:11

## 2020-08-03 RX ADMIN — IMMUNE GLOBULIN (HUMAN) 10 G: 10 INJECTION INTRAVENOUS; SUBCUTANEOUS at 14:36

## 2020-08-03 RX ADMIN — DIPHENHYDRAMINE HYDROCHLORIDE 25 MG: 25 CAPSULE ORAL at 12:10

## 2020-08-03 RX ADMIN — IMMUNE GLOBULIN (HUMAN) 10 G: 10 INJECTION INTRAVENOUS; SUBCUTANEOUS at 15:39

## 2020-08-03 RX ADMIN — IMMUNE GLOBULIN (HUMAN) 10 G: 10 INJECTION INTRAVENOUS; SUBCUTANEOUS at 14:04

## 2020-08-03 NOTE — PROGRESS NOTES
Notified  of platelets 7 and and he is going to put new orders in chart and send patient back to chemo area. I notified patient that a nurse from chemo area would talk with her.

## 2020-08-03 NOTE — PROGRESS NOTES
Spoke with Katiana in Auths dept and she verified okay to give IVIG today with current auth since patient only had one dose and even though it was less than 14 days since last dose.     Per Dr Llanes he would like patient to get another dose of IVIG tomorrow and was putting in orders, Messaged Katiana to advise and advised patient to call us tomorrow if she hasn't heard from us by noon.

## 2020-08-04 ENCOUNTER — HOSPITAL ENCOUNTER (OUTPATIENT)
Dept: ONCOLOGY | Facility: HOSPITAL | Age: 35
Setting detail: INFUSION SERIES
Discharge: HOME | End: 2020-08-04

## 2020-08-04 VITALS
BODY MASS INDEX: 23.23 KG/M2 | DIASTOLIC BLOOD PRESSURE: 62 MMHG | TEMPERATURE: 98.2 F | HEART RATE: 97 BPM | WEIGHT: 127 LBS | SYSTOLIC BLOOD PRESSURE: 101 MMHG

## 2020-08-04 DIAGNOSIS — D69.6 THROMBOCYTOPENIA (HCC): Primary | ICD-10-CM

## 2020-08-04 LAB
BASOPHILS # BLD AUTO: 0.01 10*3/MM3 (ref 0–0.2)
BASOPHILS NFR BLD AUTO: 0.3 % (ref 0–1.5)
DEPRECATED RDW RBC AUTO: 50.7 FL (ref 37–54)
EOSINOPHIL # BLD AUTO: 0.01 10*3/MM3 (ref 0–0.4)
EOSINOPHIL NFR BLD AUTO: 0.3 % (ref 0.3–6.2)
ERYTHROCYTE [DISTWIDTH] IN BLOOD BY AUTOMATED COUNT: 16.6 % (ref 12.3–15.4)
HCT VFR BLD AUTO: 31.2 % (ref 34–46.6)
HGB BLD-MCNC: 10.4 G/DL (ref 12–15.9)
LYMPHOCYTES # BLD AUTO: 0.87 10*3/MM3 (ref 0.7–3.1)
LYMPHOCYTES NFR BLD AUTO: 29.2 % (ref 19.6–45.3)
MCH RBC QN AUTO: 28.6 PG (ref 26.6–33)
MCHC RBC AUTO-ENTMCNC: 33.3 G/DL (ref 31.5–35.7)
MCV RBC AUTO: 85.7 FL (ref 79–97)
MONOCYTES # BLD AUTO: 0.2 10*3/MM3 (ref 0.1–0.9)
MONOCYTES NFR BLD AUTO: 6.7 % (ref 5–12)
NEUTROPHILS NFR BLD AUTO: 1.89 10*3/MM3 (ref 1.7–7)
NEUTROPHILS NFR BLD AUTO: 63.5 % (ref 42.7–76)
PLATELET # BLD AUTO: 39 10*3/MM3 (ref 140–450)
PMV BLD AUTO: 12 FL (ref 6–12)
RBC # BLD AUTO: 3.64 10*6/MM3 (ref 3.77–5.28)
WBC # BLD AUTO: 2.98 10*3/MM3 (ref 3.4–10.8)

## 2020-08-04 PROCEDURE — 25010000002 IMMUNE GLOBULIN (HUMAN) 10 GM/100ML SOLUTION: Performed by: INTERNAL MEDICINE

## 2020-08-04 PROCEDURE — 85025 COMPLETE CBC W/AUTO DIFF WBC: CPT | Performed by: INTERNAL MEDICINE

## 2020-08-04 PROCEDURE — 96366 THER/PROPH/DIAG IV INF ADDON: CPT

## 2020-08-04 PROCEDURE — 25010000002 IMMUNE GLOBULIN (HUMAN) 20 GM/200ML SOLUTION: Performed by: INTERNAL MEDICINE

## 2020-08-04 PROCEDURE — 63710000001 DIPHENHYDRAMINE PER 50 MG: Performed by: INTERNAL MEDICINE

## 2020-08-04 PROCEDURE — 36415 COLL VENOUS BLD VENIPUNCTURE: CPT

## 2020-08-04 PROCEDURE — 96365 THER/PROPH/DIAG IV INF INIT: CPT

## 2020-08-04 RX ORDER — DIPHENHYDRAMINE HCL 25 MG
25 CAPSULE ORAL ONCE
Status: COMPLETED | OUTPATIENT
Start: 2020-08-04 | End: 2020-08-04

## 2020-08-04 RX ORDER — ACETAMINOPHEN 325 MG/1
650 TABLET ORAL ONCE
Status: COMPLETED | OUTPATIENT
Start: 2020-08-04 | End: 2020-08-04

## 2020-08-04 RX ADMIN — DIPHENHYDRAMINE HYDROCHLORIDE 25 MG: 25 CAPSULE ORAL at 11:42

## 2020-08-04 RX ADMIN — ACETAMINOPHEN 650 MG: 325 TABLET ORAL at 11:42

## 2020-08-04 RX ADMIN — IMMUNE GLOBULIN (HUMAN) 20 G: 10 INJECTION INTRAVENOUS; SUBCUTANEOUS at 11:43

## 2020-08-04 RX ADMIN — IMMUNE GLOBULIN (HUMAN) 20 G: 10 INJECTION INTRAVENOUS; SUBCUTANEOUS at 13:59

## 2020-08-04 RX ADMIN — IMMUNE GLOBULIN (HUMAN) 10 G: 10 INJECTION INTRAVENOUS; SUBCUTANEOUS at 14:57

## 2020-08-04 NOTE — ADDENDUM NOTE
Encounter addended by: Amirah Smith RN on: 8/4/2020 4:52 PM   Actions taken: Charge Capture section accepted

## 2020-08-04 NOTE — PROGRESS NOTES
Received call from Dr. Llanes that he wants the patient to resume Nplate at 10 mcg/kg tomorrow.  Katiana in authorization notified, orders are still in supportive plan 3.

## 2020-08-05 ENCOUNTER — HOSPITAL ENCOUNTER (OUTPATIENT)
Dept: ONCOLOGY | Facility: HOSPITAL | Age: 35
Setting detail: INFUSION SERIES
Discharge: HOME | End: 2020-08-05

## 2020-08-05 ENCOUNTER — TELEPHONE (OUTPATIENT)
Dept: ONCOLOGY | Facility: CLINIC | Age: 35
End: 2020-08-05

## 2020-08-05 VITALS
RESPIRATION RATE: 18 BRPM | TEMPERATURE: 98.7 F | HEART RATE: 85 BPM | DIASTOLIC BLOOD PRESSURE: 67 MMHG | HEIGHT: 62 IN | BODY MASS INDEX: 23.37 KG/M2 | WEIGHT: 127 LBS | SYSTOLIC BLOOD PRESSURE: 127 MMHG

## 2020-08-05 DIAGNOSIS — D69.6 THROMBOCYTOPENIA (HCC): Primary | ICD-10-CM

## 2020-08-05 LAB
BASOPHILS # BLD AUTO: 0.05 10*3/MM3 (ref 0–0.2)
BASOPHILS NFR BLD AUTO: 1.4 % (ref 0–1.5)
DEPRECATED RDW RBC AUTO: 49.9 FL (ref 37–54)
EOSINOPHIL # BLD AUTO: 0.01 10*3/MM3 (ref 0–0.4)
EOSINOPHIL NFR BLD AUTO: 0.3 % (ref 0.3–6.2)
ERYTHROCYTE [DISTWIDTH] IN BLOOD BY AUTOMATED COUNT: 16.6 % (ref 12.3–15.4)
HCT VFR BLD AUTO: 29 % (ref 34–46.6)
HGB BLD-MCNC: 10.1 G/DL (ref 12–15.9)
LYMPHOCYTES # BLD AUTO: 0.8 10*3/MM3 (ref 0.7–3.1)
LYMPHOCYTES NFR BLD AUTO: 21.7 % (ref 19.6–45.3)
MCH RBC QN AUTO: 29.4 PG (ref 26.6–33)
MCHC RBC AUTO-ENTMCNC: 34.8 G/DL (ref 31.5–35.7)
MCV RBC AUTO: 84.3 FL (ref 79–97)
MONOCYTES # BLD AUTO: 0.28 10*3/MM3 (ref 0.1–0.9)
MONOCYTES NFR BLD AUTO: 7.6 % (ref 5–12)
NEUTROPHILS NFR BLD AUTO: 2.54 10*3/MM3 (ref 1.7–7)
NEUTROPHILS NFR BLD AUTO: 69 % (ref 42.7–76)
PLATELET # BLD AUTO: 53 10*3/MM3 (ref 140–450)
PMV BLD AUTO: 11.5 FL (ref 6–12)
RBC # BLD AUTO: 3.44 10*6/MM3 (ref 3.77–5.28)
WBC # BLD AUTO: 3.68 10*3/MM3 (ref 3.4–10.8)

## 2020-08-05 PROCEDURE — 25010000002 ROMIPLOSTIM PER 10 MCG: Performed by: INTERNAL MEDICINE

## 2020-08-05 PROCEDURE — 25010000002 ROMIPLOSTIM 125 MCG RECONSTITUTED SOLUTION: Performed by: INTERNAL MEDICINE

## 2020-08-05 PROCEDURE — 96372 THER/PROPH/DIAG INJ SC/IM: CPT

## 2020-08-05 PROCEDURE — 36416 COLLJ CAPILLARY BLOOD SPEC: CPT

## 2020-08-05 PROCEDURE — 85025 COMPLETE CBC W/AUTO DIFF WBC: CPT | Performed by: INTERNAL MEDICINE

## 2020-08-05 RX ADMIN — ROMIPLOSTIM 85 MCG: 125 INJECTION, POWDER, LYOPHILIZED, FOR SOLUTION SUBCUTANEOUS at 14:12

## 2020-08-05 RX ADMIN — ROMIPLOSTIM 500 MCG: 500 INJECTION, POWDER, LYOPHILIZED, FOR SOLUTION SUBCUTANEOUS at 14:12

## 2020-08-05 NOTE — TELEPHONE ENCOUNTER
LEFT [facility] OFFICE. CALLED TO SEE IF PATIENT HAS BEEN SCHEDULED. IF PATIENT HAS BEEN SEEN FAX RECORDS. IF PATIENT HAS FUTURE APPT TO CALL ME WITH THE APPT. IF PATIENT HAS NOT BEEN SEEN CALL ME AND LET ME KNOW.

## 2020-08-06 ENCOUNTER — APPOINTMENT (OUTPATIENT)
Dept: LAB | Facility: HOSPITAL | Age: 35
End: 2020-08-06

## 2020-08-12 ENCOUNTER — HOSPITAL ENCOUNTER (OUTPATIENT)
Dept: ONCOLOGY | Facility: HOSPITAL | Age: 35
Setting detail: INFUSION SERIES
End: 2020-08-12

## 2020-08-14 ENCOUNTER — HOSPITAL ENCOUNTER (OUTPATIENT)
Dept: ONCOLOGY | Facility: HOSPITAL | Age: 35
Setting detail: INFUSION SERIES
Discharge: HOME | End: 2020-08-14

## 2020-08-14 VITALS
SYSTOLIC BLOOD PRESSURE: 116 MMHG | WEIGHT: 128 LBS | RESPIRATION RATE: 18 BRPM | HEIGHT: 62 IN | DIASTOLIC BLOOD PRESSURE: 70 MMHG | BODY MASS INDEX: 23.55 KG/M2 | HEART RATE: 82 BPM | TEMPERATURE: 99.1 F

## 2020-08-14 DIAGNOSIS — D69.6 THROMBOCYTOPENIA (HCC): Primary | ICD-10-CM

## 2020-08-14 LAB
BASOPHILS # BLD AUTO: 0.01 10*3/MM3 (ref 0–0.2)
BASOPHILS NFR BLD AUTO: 0.2 % (ref 0–1.5)
DEPRECATED RDW RBC AUTO: 50.7 FL (ref 37–54)
EOSINOPHIL # BLD AUTO: 0.02 10*3/MM3 (ref 0–0.4)
EOSINOPHIL NFR BLD AUTO: 0.5 % (ref 0.3–6.2)
ERYTHROCYTE [DISTWIDTH] IN BLOOD BY AUTOMATED COUNT: 16.3 % (ref 12.3–15.4)
HCT VFR BLD AUTO: 31.4 % (ref 34–46.6)
HGB BLD-MCNC: 10.7 G/DL (ref 12–15.9)
LYMPHOCYTES # BLD AUTO: 1.26 10*3/MM3 (ref 0.7–3.1)
LYMPHOCYTES NFR BLD AUTO: 30.2 % (ref 19.6–45.3)
MCH RBC QN AUTO: 29.6 PG (ref 26.6–33)
MCHC RBC AUTO-ENTMCNC: 34.1 G/DL (ref 31.5–35.7)
MCV RBC AUTO: 87 FL (ref 79–97)
MONOCYTES # BLD AUTO: 0.43 10*3/MM3 (ref 0.1–0.9)
MONOCYTES NFR BLD AUTO: 10.3 % (ref 5–12)
NEUTROPHILS NFR BLD AUTO: 2.45 10*3/MM3 (ref 1.7–7)
NEUTROPHILS NFR BLD AUTO: 58.8 % (ref 42.7–76)
PLATELET # BLD AUTO: 206 10*3/MM3 (ref 140–450)
PMV BLD AUTO: 10.7 FL (ref 6–12)
RBC # BLD AUTO: 3.61 10*6/MM3 (ref 3.77–5.28)
WBC # BLD AUTO: 4.17 10*3/MM3 (ref 3.4–10.8)

## 2020-08-14 PROCEDURE — 36415 COLL VENOUS BLD VENIPUNCTURE: CPT

## 2020-08-14 PROCEDURE — 25010000002 ROMIPLOSTIM PER 10 MCG: Performed by: INTERNAL MEDICINE

## 2020-08-14 PROCEDURE — 96372 THER/PROPH/DIAG INJ SC/IM: CPT

## 2020-08-14 PROCEDURE — 85025 COMPLETE CBC W/AUTO DIFF WBC: CPT | Performed by: INTERNAL MEDICINE

## 2020-08-14 RX ORDER — WATER 1000 ML/1000ML
INJECTION, SOLUTION INTRAVENOUS
Status: DISCONTINUED
Start: 2020-08-14 | End: 2020-08-15 | Stop reason: HOSPADM

## 2020-08-14 RX ADMIN — ROMIPLOSTIM 500 MCG: 500 INJECTION, POWDER, LYOPHILIZED, FOR SOLUTION SUBCUTANEOUS at 14:31

## 2020-08-17 ENCOUNTER — TELEPHONE (OUTPATIENT)
Dept: ONCOLOGY | Facility: CLINIC | Age: 35
End: 2020-08-17

## 2020-08-17 NOTE — TELEPHONE ENCOUNTER
Pearl from [facility] needs to get recent labs and office note, medication list faxed to her     Fax # [phone]  Phone # [phone]

## 2020-08-17 NOTE — TELEPHONE ENCOUNTER
Geno is wanting to know if pt is still supposed to be on Tavalisse(Fostamatinib).    Spoke with Camille paredes [facility]-advised that pt is discontinuing treatment with Tavalisse.   Camille r/b and v/u.

## 2020-08-19 PROCEDURE — U0003 INFECTIOUS AGENT DETECTION BY NUCLEIC ACID (DNA OR RNA); SEVERE ACUTE RESPIRATORY SYNDROME CORONAVIRUS 2 (SARS-COV-2) (CORONAVIRUS DISEASE [COVID-19]), AMPLIFIED PROBE TECHNIQUE, MAKING USE OF HIGH THROUGHPUT TECHNOLOGIES AS DESCRIBED BY CMS-2020-01-R: HCPCS | Performed by: FAMILY MEDICINE

## 2020-08-20 ENCOUNTER — TELEPHONE (OUTPATIENT)
Dept: ONCOLOGY | Facility: HOSPITAL | Age: 35
End: 2020-08-20

## 2020-08-20 NOTE — TELEPHONE ENCOUNTER
Pt states that she was tested for COVID on 8/19/2020 and results are pending. I advised her to return call to office when she has results. Pt also states that she seen [facility] MD and he wants her to discontinue Nplate injections. Pt will need to see Dr. Llanes after she gets negative covid result. Pt verbalized understanding to call office.

## 2020-08-21 ENCOUNTER — HOSPITAL ENCOUNTER (OUTPATIENT)
Dept: ONCOLOGY | Facility: HOSPITAL | Age: 35
Setting detail: INFUSION SERIES
End: 2020-08-21

## 2020-08-31 ENCOUNTER — TELEPHONE (OUTPATIENT)
Dept: ONCOLOGY | Facility: HOSPITAL | Age: 35
End: 2020-08-31

## 2020-08-31 NOTE — TELEPHONE ENCOUNTER
Received call from the patient stating that she saw the MD that Dr. Llanes had referred her to today in [place].  Her platelet count is 6 today and he recommended that she get IGG and also started her on an oral medication that she could not remember the name of.  Discussed with Dr. Llanes.  Verbal order given to proceed with IGG.  The patient would like to come on 9/2/20 so that she can give work some notice.  Discussed with pharmacy, they will not have drug until 9/2/20.  Patient given appointment at 1030 and advised to report to ER for any injury or signs of bleeding.  Patient v/u.

## 2020-09-02 ENCOUNTER — TELEPHONE (OUTPATIENT)
Dept: ONCOLOGY | Facility: CLINIC | Age: 35
End: 2020-09-02

## 2020-09-02 ENCOUNTER — HOSPITAL ENCOUNTER (OUTPATIENT)
Dept: ONCOLOGY | Facility: HOSPITAL | Age: 35
Setting detail: INFUSION SERIES
Discharge: HOME | End: 2020-09-02

## 2020-09-02 VITALS
SYSTOLIC BLOOD PRESSURE: 106 MMHG | DIASTOLIC BLOOD PRESSURE: 74 MMHG | WEIGHT: 128 LBS | TEMPERATURE: 98 F | BODY MASS INDEX: 23.55 KG/M2 | HEART RATE: 69 BPM | HEIGHT: 62 IN | RESPIRATION RATE: 18 BRPM

## 2020-09-02 DIAGNOSIS — D69.6 THROMBOCYTOPENIA (HCC): Primary | ICD-10-CM

## 2020-09-02 LAB
BASOPHILS # BLD AUTO: 0.05 10*3/MM3 (ref 0–0.2)
BASOPHILS NFR BLD AUTO: 0.9 % (ref 0–1.5)
DEPRECATED RDW RBC AUTO: 42.2 FL (ref 37–54)
EOSINOPHIL # BLD AUTO: 0.05 10*3/MM3 (ref 0–0.4)
EOSINOPHIL NFR BLD AUTO: 0.9 % (ref 0.3–6.2)
ERYTHROCYTE [DISTWIDTH] IN BLOOD BY AUTOMATED COUNT: 14.1 % (ref 12.3–15.4)
HCT VFR BLD AUTO: 29.4 % (ref 34–46.6)
HGB BLD-MCNC: 10.1 G/DL (ref 12–15.9)
IGA1 MFR SER: 72 MG/DL (ref 70–400)
IGG1 SER-MCNC: 1332 MG/DL (ref 700–1600)
IGM SERPL-MCNC: 117 MG/DL (ref 40–230)
LYMPHOCYTES # BLD AUTO: 1.74 10*3/MM3 (ref 0.7–3.1)
LYMPHOCYTES NFR BLD AUTO: 31.6 % (ref 19.6–45.3)
MCH RBC QN AUTO: 29.3 PG (ref 26.6–33)
MCHC RBC AUTO-ENTMCNC: 34.4 G/DL (ref 31.5–35.7)
MCV RBC AUTO: 85.2 FL (ref 79–97)
MONOCYTES # BLD AUTO: 0.46 10*3/MM3 (ref 0.1–0.9)
MONOCYTES NFR BLD AUTO: 8.3 % (ref 5–12)
NEUTROPHILS NFR BLD AUTO: 3.21 10*3/MM3 (ref 1.7–7)
NEUTROPHILS NFR BLD AUTO: 58.3 % (ref 42.7–76)
PLATELET # BLD AUTO: 3 10*3/MM3 (ref 140–450)
RBC # BLD AUTO: 3.45 10*6/MM3 (ref 3.77–5.28)
WBC # BLD AUTO: 5.51 10*3/MM3 (ref 3.4–10.8)

## 2020-09-02 PROCEDURE — 25010000002 IMMUNE GLOBULIN (HUMAN) 2.5 GM/25ML SOLUTION: Performed by: INTERNAL MEDICINE

## 2020-09-02 PROCEDURE — 63710000001 DIPHENHYDRAMINE PER 50 MG: Performed by: INTERNAL MEDICINE

## 2020-09-02 PROCEDURE — 85025 COMPLETE CBC W/AUTO DIFF WBC: CPT | Performed by: INTERNAL MEDICINE

## 2020-09-02 PROCEDURE — 96366 THER/PROPH/DIAG IV INF ADDON: CPT

## 2020-09-02 PROCEDURE — 82784 ASSAY IGA/IGD/IGG/IGM EACH: CPT | Performed by: INTERNAL MEDICINE

## 2020-09-02 PROCEDURE — 36415 COLL VENOUS BLD VENIPUNCTURE: CPT

## 2020-09-02 PROCEDURE — 25010000002 IMMUNE GLOBULIN (HUMAN) 40 GM/400ML SOLUTION: Performed by: INTERNAL MEDICINE

## 2020-09-02 PROCEDURE — 96365 THER/PROPH/DIAG IV INF INIT: CPT

## 2020-09-02 RX ORDER — SODIUM CHLORIDE 9 MG/ML
250 INJECTION, SOLUTION INTRAVENOUS ONCE
Status: COMPLETED | OUTPATIENT
Start: 2020-09-02 | End: 2020-09-02

## 2020-09-02 RX ORDER — MYCOPHENOLATE MOFETIL 500 MG/1
500 TABLET ORAL
COMMUNITY
Start: 2020-08-31 | End: 2020-09-30

## 2020-09-02 RX ORDER — DIPHENHYDRAMINE HCL 25 MG
25 CAPSULE ORAL ONCE
Status: COMPLETED | OUTPATIENT
Start: 2020-09-02 | End: 2020-09-02

## 2020-09-02 RX ORDER — ACETAMINOPHEN 325 MG/1
650 TABLET ORAL ONCE
Status: COMPLETED | OUTPATIENT
Start: 2020-09-02 | End: 2020-09-02

## 2020-09-02 RX ADMIN — SODIUM CHLORIDE 250 ML: 900 INJECTION, SOLUTION INTRAVENOUS at 12:01

## 2020-09-02 RX ADMIN — IMMUNE GLOBULIN (HUMAN) 2.5 G: 10 INJECTION INTRAVENOUS; SUBCUTANEOUS at 15:56

## 2020-09-02 RX ADMIN — IMMUNE GLOBULIN (HUMAN) 40 G: 10 INJECTION INTRAVENOUS; SUBCUTANEOUS at 12:01

## 2020-09-02 RX ADMIN — IMMUNE GLOBULIN (HUMAN) 2.5 G: 10 INJECTION INTRAVENOUS; SUBCUTANEOUS at 15:34

## 2020-09-02 RX ADMIN — ACETAMINOPHEN 650 MG: 325 TABLET ORAL at 11:15

## 2020-09-02 RX ADMIN — IMMUNE GLOBULIN (HUMAN) 2.5 G: 10 INJECTION INTRAVENOUS; SUBCUTANEOUS at 16:02

## 2020-09-02 RX ADMIN — DIPHENHYDRAMINE HYDROCHLORIDE 25 MG: 25 CAPSULE ORAL at 11:15

## 2020-09-02 RX ADMIN — IMMUNE GLOBULIN (HUMAN) 2.5 G: 10 INJECTION INTRAVENOUS; SUBCUTANEOUS at 15:42

## 2020-09-02 NOTE — PROGRESS NOTES
PT here today for IVIG and platelets were 3. TA Ching notified of plan to treat today and tomorrow and notified KAITLYNN Yoon of need for appts. Bleeding precautions discussed.

## 2020-09-03 ENCOUNTER — HOSPITAL ENCOUNTER (OUTPATIENT)
Dept: ONCOLOGY | Facility: HOSPITAL | Age: 35
Setting detail: INFUSION SERIES
Discharge: HOME | End: 2020-09-03

## 2020-09-03 VITALS
RESPIRATION RATE: 18 BRPM | BODY MASS INDEX: 23.55 KG/M2 | TEMPERATURE: 98.2 F | DIASTOLIC BLOOD PRESSURE: 68 MMHG | HEIGHT: 62 IN | HEART RATE: 68 BPM | SYSTOLIC BLOOD PRESSURE: 99 MMHG | WEIGHT: 128 LBS

## 2020-09-03 DIAGNOSIS — D69.6 THROMBOCYTOPENIA (HCC): Primary | ICD-10-CM

## 2020-09-03 LAB
BASOPHILS # BLD AUTO: 0.04 10*3/MM3 (ref 0–0.2)
BASOPHILS NFR BLD AUTO: 0.8 % (ref 0–1.5)
DEPRECATED RDW RBC AUTO: 42.7 FL (ref 37–54)
EOSINOPHIL # BLD AUTO: 0.03 10*3/MM3 (ref 0–0.4)
EOSINOPHIL NFR BLD AUTO: 0.6 % (ref 0.3–6.2)
ERYTHROCYTE [DISTWIDTH] IN BLOOD BY AUTOMATED COUNT: 14.1 % (ref 12.3–15.4)
HCT VFR BLD AUTO: 28.2 % (ref 34–46.6)
HGB BLD-MCNC: 9.3 G/DL (ref 12–15.9)
LYMPHOCYTES # BLD AUTO: 1.33 10*3/MM3 (ref 0.7–3.1)
LYMPHOCYTES NFR BLD AUTO: 28.2 % (ref 19.6–45.3)
MCH RBC QN AUTO: 28.4 PG (ref 26.6–33)
MCHC RBC AUTO-ENTMCNC: 33 G/DL (ref 31.5–35.7)
MCV RBC AUTO: 86 FL (ref 79–97)
MONOCYTES # BLD AUTO: 0.43 10*3/MM3 (ref 0.1–0.9)
MONOCYTES NFR BLD AUTO: 9.1 % (ref 5–12)
NEUTROPHILS NFR BLD AUTO: 2.88 10*3/MM3 (ref 1.7–7)
NEUTROPHILS NFR BLD AUTO: 61.3 % (ref 42.7–76)
PLATELET # BLD AUTO: 50 10*3/MM3 (ref 140–450)
PMV BLD AUTO: 12.7 FL (ref 6–12)
RBC # BLD AUTO: 3.28 10*6/MM3 (ref 3.77–5.28)
WBC # BLD AUTO: 4.71 10*3/MM3 (ref 3.4–10.8)

## 2020-09-03 PROCEDURE — 96360 HYDRATION IV INFUSION INIT: CPT

## 2020-09-03 PROCEDURE — 96361 HYDRATE IV INFUSION ADD-ON: CPT

## 2020-09-03 PROCEDURE — 96366 THER/PROPH/DIAG IV INF ADDON: CPT

## 2020-09-03 PROCEDURE — 96365 THER/PROPH/DIAG IV INF INIT: CPT

## 2020-09-03 PROCEDURE — 25010000002 IMMUNE GLOBULIN (HUMAN) 40 GM/400ML SOLUTION: Performed by: INTERNAL MEDICINE

## 2020-09-03 PROCEDURE — 36415 COLL VENOUS BLD VENIPUNCTURE: CPT

## 2020-09-03 PROCEDURE — 63710000001 DIPHENHYDRAMINE PER 50 MG: Performed by: INTERNAL MEDICINE

## 2020-09-03 PROCEDURE — 25010000002 IMMUNE GLOBULIN (HUMAN) 10 GM/100ML SOLUTION: Performed by: INTERNAL MEDICINE

## 2020-09-03 PROCEDURE — 85025 COMPLETE CBC W/AUTO DIFF WBC: CPT | Performed by: INTERNAL MEDICINE

## 2020-09-03 RX ORDER — SODIUM CHLORIDE 9 MG/ML
250 INJECTION, SOLUTION INTRAVENOUS ONCE
Status: COMPLETED | OUTPATIENT
Start: 2020-09-03 | End: 2020-09-03

## 2020-09-03 RX ORDER — ACETAMINOPHEN 325 MG/1
650 TABLET ORAL ONCE
Status: COMPLETED | OUTPATIENT
Start: 2020-09-03 | End: 2020-09-03

## 2020-09-03 RX ORDER — DIPHENHYDRAMINE HCL 25 MG
25 CAPSULE ORAL ONCE
Status: COMPLETED | OUTPATIENT
Start: 2020-09-03 | End: 2020-09-03

## 2020-09-03 RX ADMIN — IMMUNE GLOBULIN (HUMAN) 40 G: 10 INJECTION INTRAVENOUS; SUBCUTANEOUS at 12:47

## 2020-09-03 RX ADMIN — ACETAMINOPHEN 650 MG: 325 TABLET ORAL at 11:08

## 2020-09-03 RX ADMIN — SODIUM CHLORIDE 250 ML: 900 INJECTION, SOLUTION INTRAVENOUS at 11:10

## 2020-09-03 RX ADMIN — IMMUNE GLOBULIN (HUMAN) 10 G: 10 INJECTION INTRAVENOUS; SUBCUTANEOUS at 11:13

## 2020-09-03 RX ADMIN — DIPHENHYDRAMINE HYDROCHLORIDE 25 MG: 25 CAPSULE ORAL at 11:09

## 2020-09-03 NOTE — PROGRESS NOTES
Pt here for day two IVIG.  Pt has IV in from yesterday's treatment.  IV flushes and positive blood return noted.  CBC drawn and platelets are 50 today.  Pt w/ no complaints at this time.

## 2020-09-11 ENCOUNTER — APPOINTMENT (OUTPATIENT)
Dept: LAB | Facility: HOSPITAL | Age: 35
End: 2020-09-11

## 2020-09-11 ENCOUNTER — DOCUMENTATION (OUTPATIENT)
Dept: ONCOLOGY | Facility: HOSPITAL | Age: 35
End: 2020-09-11

## 2020-09-11 ENCOUNTER — OFFICE VISIT (OUTPATIENT)
Dept: ONCOLOGY | Facility: CLINIC | Age: 35
End: 2020-09-11

## 2020-09-11 VITALS
HEART RATE: 61 BPM | RESPIRATION RATE: 16 BRPM | HEIGHT: 62 IN | BODY MASS INDEX: 23.7 KG/M2 | WEIGHT: 128.8 LBS | SYSTOLIC BLOOD PRESSURE: 111 MMHG | DIASTOLIC BLOOD PRESSURE: 70 MMHG | TEMPERATURE: 97.5 F

## 2020-09-11 DIAGNOSIS — D69.3 IMMUNE THROMBOCYTOPENIC PURPURA (HCC): Primary | ICD-10-CM

## 2020-09-11 LAB
BASOPHILS # BLD AUTO: 0.02 10*3/MM3 (ref 0–0.2)
BASOPHILS NFR BLD AUTO: 0.5 % (ref 0–1.5)
DEPRECATED RDW RBC AUTO: 40.9 FL (ref 37–54)
EOSINOPHIL # BLD AUTO: 0.09 10*3/MM3 (ref 0–0.4)
EOSINOPHIL NFR BLD AUTO: 2.4 % (ref 0.3–6.2)
ERYTHROCYTE [DISTWIDTH] IN BLOOD BY AUTOMATED COUNT: 13.5 % (ref 12.3–15.4)
HCT VFR BLD AUTO: 27.2 % (ref 34–46.6)
HGB BLD-MCNC: 9.1 G/DL (ref 12–15.9)
LYMPHOCYTES # BLD AUTO: 1.6 10*3/MM3 (ref 0.7–3.1)
LYMPHOCYTES NFR BLD AUTO: 42.4 % (ref 19.6–45.3)
MCH RBC QN AUTO: 28.6 PG (ref 26.6–33)
MCHC RBC AUTO-ENTMCNC: 33.5 G/DL (ref 31.5–35.7)
MCV RBC AUTO: 85.5 FL (ref 79–97)
MONOCYTES # BLD AUTO: 0.35 10*3/MM3 (ref 0.1–0.9)
MONOCYTES NFR BLD AUTO: 9.3 % (ref 5–12)
NEUTROPHILS NFR BLD AUTO: 1.71 10*3/MM3 (ref 1.7–7)
NEUTROPHILS NFR BLD AUTO: 45.4 % (ref 42.7–76)
PLATELET # BLD AUTO: 29 10*3/MM3 (ref 140–450)
PMV BLD AUTO: 12.1 FL (ref 6–12)
RBC # BLD AUTO: 3.18 10*6/MM3 (ref 3.77–5.28)
WBC # BLD AUTO: 3.77 10*3/MM3 (ref 3.4–10.8)

## 2020-09-11 PROCEDURE — 99215 OFFICE O/P EST HI 40 MIN: CPT | Performed by: INTERNAL MEDICINE

## 2020-09-11 PROCEDURE — 36415 COLL VENOUS BLD VENIPUNCTURE: CPT | Performed by: INTERNAL MEDICINE

## 2020-09-11 PROCEDURE — 85025 COMPLETE CBC W/AUTO DIFF WBC: CPT | Performed by: INTERNAL MEDICINE

## 2020-09-11 NOTE — PROGRESS NOTES
HEMATOLOGY ONCOLOGY FOLLOW UP        Patient name: Tiny Akhtar  : 1985  MRN: [id number]  Primary Care Physician: Jose Ingram, APRN  Referring Physician: Jose Ingram, *  Reason For Consult:     Chief Complaint   Patient presents with   • Follow-up     Immune thrombocytopenic purpura     Refractory ITP  History of Present Illness:  Tiny Akhtar is a 34 y.o. female with a past medical history of thrombocytopenia, anemia, and kidney stones.  She presented to [facility] Emergency Department on 2019 after she had blood work drawn earlier in the day that showed a platelet count of 9,000 at this facility.  She had previously been admitted through the ED on 2019 reporting bruising on her ankles and in her mouth.  She received 2 units of platelets and started on steroids. She was discharged on 19.The patient's last platelet count was 17,000 on 19 after a platelet transfusion.  She had been on steroids for four days.  The patient reported continued  subcutaneous bruising. She denied any headaches, nosebleeds, or gingival bleeding.  She was admitted for IV steroids and further management.     · 10/7/2019 CT abdomen and pelvis: 3 mm stone at the left ureterovesical junction causing mild to moderate obstruction.Spleen reported as normal size.  · 10/7/2019 WBC 10.2, hemoglobin 12.2, platelets 268  · 2019 WBC 5.7, hemoglobin 12.8, Platelets 5000,  Transfused 2 units platelets.  initial presentation for diagnosis acute thrombocytopenia,. Started on Solumedrol 125 mg IV Q6Hr  · 2019 platelets 5000.  Patient transfused with platelets.  Platelets increased to 13,000  · 19  Bone marrow biopsy - Smears of bone marrow aspirate with cell block (clot) preparation and marrow biopsy:  Hypocellular for age bone marrow (50%),  Adequate numbers of unremarkable megakaryocytes,  Absent iron stores,  Negative for involvement by malignant  lymphoma.  Flow cytometry-CD 56 expression on granulocytes and monocytes.  Chromosomal analysis -46XX  · 11/20/2019 platelet count 17,000 . Switched to Prednisone 1 mg/kg ( 50 mg daily)  · 11/21/2019 platelets 17,000  · 11/22/2019  Readmitted . Restarted Solumedrol  mg Q6H . platelets 9000  · 11/23/19 platelets 13,000  · 11/23/2019 hematology/Oncology was consulted on this patient known to our service and previously consulted for acute thrombocytopenia on 11/18/19. She was followed by Uri Sagastume MD for ITP. The patient had been transfused and started on IV steroids Prednisone while inpatient 11/18/19 to 11/20/19.  She was transitioned to oral steroids at discharge.   · 11/23/2019 Hepatitis panel negative, TSH 0.33, H. pylori IgG 1.43 High, IgM negative, JOSE negative, HIV antibodies negative  · 11/23/2019 patient received IV Venofer.  Patient received meningococcal vaccine, Haemophilus vaccine, pneumococcus vaccine  · 11/23/2019 platelets 13,000  · 11/24/2019 platelets 52,000.  Platelets responded to steroids. On IV solumedrol. Started on Megace 40 mg po QID for menorrhagia.  · Pt discharged on Prednisone 50 mg  · 11/27/2019 platelets 8000.  Patient's failed steroids ( on prednisone 50 mg daily).. Started Tranexamic acid 1300 mg TID  · 11/29/2019  READMITTED platelets 12,000  · 11/29/19-Cycle 1 Day one of RITUXIMAB and continues on megestrol  · 11/30/2019 WBC 11.2, hemoglobin 12.1, platelets 6000  · 12/1/2019 platelets 11,000.  Patient received IVIG 30 g  · 12/2/2019 iron 72, iron saturation 17% low, TIBC 416, ferritin 231, platelets 56,000  · 12/3/2019 platelets 129,000  · 12/4/2019 Prednisone decreased to 40 mg daily  · 12/6/2019 patient received week 2 of Rituximab.  Platelets 24,000, hemoglobin 11.8  · 12/8/2019 platelets 5000, hemoglobin 11.6, PT 10.5, INR 1.  Patient received IVIG 50 g  · 12/9/2019 platelets 11,000  · 12/11/2019 platelets 16,000  · 12/13/2019: Platelets 13,000 Patient  received week 3 of her Rituximab. Prednisone decreased to 20 mg daily  · 12/14/2019 patient received IVIG 55 g, platelets 12,000.   · 12/16/2019 platelets 24,000. Prednisone decreased to 10 mg daily  · 12/18/2019 platelets 12,000  · 12/19/2019 platelets 11,000  · 12/20/19-week 4 Rituximab and Megestrol, platelets 12,000. Prednisone DCed.  · 12/26/2019 platelets 7000  · 12/27/19-patient was started on NPLATE   1 mcg/kg, platelets 8000  · 12/30/2019 platelets 6000.  After transfusion 31,000  · 01/03/2020- NPLATE 2 mcg/kg, platelets 10,000  · 01/10/2020-NPLATE 3 mcg/kg.  continues on Megace megestrol, platelets 4000  · 1/15/2020: WBC 5.9, hemoglobin 12.5, platelets 2000k,   · 1/15/2020: Serum copper 114 normal, IgA 81 normal,Anticardiolipin antibodies negative, beta-2 glycoprotein antibodies negative, phosphatidylserine antibodies negative,  · 1/16/2020 patient received IVIG 55 g, platelets 4000  · 1/17/2020 patient received IVIG 55 g, platelets 36,000  · 01/17/2020: IVIG WBC 4.27, hemoglobin, 11.5, platelets  69895  · 1/20/2020 patient received Nplate 6 mcg/kg, WBC 5.1, hemoglobin 11.9, platelets 44,  · 1/27/2020 patient received Nplate 6 mcg/kg platelets 100,000  · 1/29/2020 platelets 74,000  · 1/30/2020 patient seen at [facility] hematology for second opinion.  Recommended to continue course of current treatment.  · 2/3/2020 patient received Nplate 6 mcg/kg.  WBC 5.9, hemoglobin 12, platelets 81k  · 2/10/2020 patient received Nplate 7 mcg/kg platelets 34,000   · 2/12/2020 WBC 5.1, hemoglobin 12.8, platelets 24K  · 2/13/2020: Patient advised to start dexamethasone 40 mg x 4 days course   · 2/17/2020: Platelets 11,000  · 2/21/2020 platelets 0.  Patient received IVIG 1 g/kg.  60 g  · 2/21/2020: Patient transfused platelets   · 2/22/2020 platelets 65K. , hemoglobin 10.4, patient received IVIG 60 g  · 2/24/2020 platelets 231K, hemoglobin 10.6, patient received Nplate 8 mcg/kg  · 3/5/2020: WBC 5.3,  hemoglobin 11.4, platelets 65,000.  Patient received Nplate 8 mcg/kg  · 3/11/2020 hemoglobin 12.1, platelets 97,000  · 3/13/2020 WBC 5.2, hemoglobin 11.6, platelets 106.  Patient received Nplate 500 micrograms  · 3/18/2020 WBC 5.7, hemoglobin 12.3, platelets 25,000, patient received Nplate 500 mcg  · 3/20/2020 WBC 5.7, hemoglobin 12.4, platelets 56,000,  · 3/25/2020 platelets 32,000, received Nplate 500 mcg  · 4/2/2020: WBC 7.3, hemoglobin 11.9, platelets 43,000, MCV 85.8  · 4/3/2020 platelets 27, Nplate 400 mcg  · 4/9/2020 platelets 24k  · 4/10/2020 platelets 35K.  Nplate 590 mcg  · 4/17/2020: Platelets 39060, Nplate 500 mcg  · 4/21/2020: Platelets 2000, patient received IVIG 60 g  · 4/22/2020 platelets 77K, hemoglobin 10.7, WBC 4.1, patient received IVIG 60 g  · 4/24/2020: Platelets 182K, hemoglobin 10.7, received Nplate 5 mcg  · 5/1/2020: Patient received Nplate 400 mcg platelets 179  · 5/8/2020: Patient received Nplate 590 mcg, platelets 33,000  · 5/13/2020: Platelets 46,000  · 5/15/2020 patient received Nplate, platelets 64,000  · 5/22/2020: Platelets 28,000  · 5/28/2020 patient received Nplate 500 mcg.  Platelets 45,000  · 5/29/2020 platelets 26,000.    · 6/1/2020: Platelets 11,000.  Nplate discontinued due to treatment failure.  Patient received IVIG 1 g/kg  · 6/2/2020 platelets 117  · 6/5/2020: Platelets 123K  · 6/16/2020 platelets 8000.  Patient received IVIG 1 g/kg   · 6/17/2020 platelets 47,000.  Patient received IVIG 1 g/kg  · 6/18/2020: Patient started taking fostamatinib  · 6/25/2020: Platelets 33,000.  WBC 5.5, hemoglobin 11.5, platelets 33, creatinine 1.6,  · 7/6/2020: WBC 5.06, hemoglobin 12.1, platelets 14,000,  · 7/9/2020: WBC 4.4, hemoglobin 12.0, platelets 11,000, patient received IVIG 1 g/kg  · 7/10/2020: Patient received IVIG 1 g/kg, platelet count 45,000  · 7/16/2020: WBC 2.27, hemoglobin 10.6, platelets 36,000  · 7/24/2020 patient received IVIG 1 g/kg, WBC 3.3, hemoglobin 11.7, platelet  count 8000, IgA 74, IgG 2180, IgM 111,   · 7/30/2020: WBC 4.17, Hgb 11.6,  21K  · 8/3/2020 patient received IVIG 1 g/kg, WBC 4.1, hemoglobin 11.8, platelet count 7000  · 8/4/2020: WBC 2.98, hemoglobin 10.4, platelet count 39,000, IVIG 1 g/kg  · 8/5/2020 WBC 3.6, hemoglobin 10.1, platelet count 53,000, patient received Nplate 500 mcg  · 8/14/2020 platelet count 206, hemoglobin 10.7, Nplate 500 mcg  · 9/2/2020: WBC 5.5, hemoglobin 10.1, platelet count 3000.  Patient received IVIG 1 g/kg  · 9/3/2020: Platelet count 50,000 patient received IVIG 1 g/kg  · 9/11/2020: WBC 3.77, hemoglobin 9.1 platelet count 29,000        Subjective:  Patient is here for a follow up regarding ITP.  In the interim she was seen at [facility], Dr Heredia.  She has not started taking CellCept yet.  She does not report any bleeding.  She has not had any Nplate injection since 8/14/2020.  She had IVIG on 09/02 and 09/03. She denies any dizziness, HTN, or elevated WBC. She does not report any excessive bleeding, but states she has always bruised easily. She denies any nose bleeds.       Past Medical History:   Diagnosis Date   • Anxiety    • Iron deficiency anemia    • Kidney stone 10/2019     No past surgical history on file.      Current Outpatient Medications:   •  acetaminophen (TYLENOL) 325 MG tablet, Take 650 mg by mouth Every 6 (Six) Hours As Needed for Mild Pain  or Moderate Pain ., Disp: , Rfl:   •  amphetamine-dextroamphetamine (ADDERALL) 10 MG tablet, , Disp: , Rfl:   •  ferrous sulfate 324 (65 Fe) MG tablet delayed-release EC tablet, Take 1 tablet by mouth Daily With Breakfast., Disp: 30 tablet, Rfl: 0  •  Fostamatinib Disodium 100 MG tablet, Take 100 mg by mouth 2 (Two) Times a Day. May take with or without food.  Do not crush or chew tablets., Disp: 60 tablet, Rfl: 3  •  megestrol (MEGACE) 20 MG tablet, Take 20 mg by mouth 3 (Three) Times a Day., Disp: , Rfl:   •  mycophenolate (CELLCEPT) 500 MG tablet, 500 mg., Disp: ,  Rfl:   •  NON FORMULARY, Take 1 tablet by mouth Daily. Balance OTC, Disp: , Rfl:   •  ondansetron (ZOFRAN) 8 MG tablet, Take 1 tablet by mouth 3 (Three) Times a Day As Needed for Nausea or Vomiting., Disp: 30 tablet, Rfl: 5  •  pantoprazole (PROTONIX) 20 MG EC tablet, , Disp: , Rfl:   •  polyethylene glycol (MIRALAX) powder, Take 17 g by mouth Daily As Needed (constipation)., Disp: 225 g, Rfl: 0  •  Tranexamic Acid 650 MG tablet, Take 2 tablets by mouth 3 (Three) Times a Day., Disp: 30 tablet, Rfl: 0    No Known Allergies    Family History   Problem Relation Age of Onset   • Thrombocytopenia Nephew         ITP   • Diabetes Mother    • Diabetes Father    • Cancer Paternal Grandmother      Cancer-related family history includes Cancer in her paternal grandmother.    Social History     Tobacco Use   • Smoking status: Never Smoker   • Smokeless tobacco: Never Used   Substance Use Topics   • Alcohol use: No     Frequency: Never   • Drug use: No     I have reviewed the history of present illness, past medical history, family history, social history, lab results, all notes and other records since the patient was last seen on  11/21/2019.    ROS:     Review of Systems   Constitutional: Negative for chills and fever.   HENT: Negative for ear pain, mouth sores, nosebleeds and sore throat.    Eyes: Negative for photophobia and visual disturbance.   Respiratory: Negative for wheezing and stridor.    Cardiovascular: Negative for chest pain and palpitations.   Gastrointestinal: Negative for abdominal pain, diarrhea, nausea and vomiting.   Endocrine: Negative for cold intolerance and heat intolerance.   Genitourinary: Negative for dysuria and hematuria.   Musculoskeletal: Negative for joint swelling and neck stiffness.   Skin: Negative for color change and rash.   Neurological: Negative for seizures and syncope.   Hematological: Negative for adenopathy. Bruises/bleeds easily.        No obvious bleeding   Psychiatric/Behavioral:  "Negative for agitation, confusion and hallucinations.     Objective:    Vitals:    09/11/20 1045   BP: 111/70   Pulse: 61   Resp: 16   Temp: 97.5 °F (36.4 °C)   Weight: 58.4 kg (128 lb 12.8 oz)   Height: 157.5 cm (62\")     ECOG  (0) Fully active, able to carry on all predisease performance without restriction    Physical Exam:   Physical Exam   Constitutional: She is oriented to person, place, and time. No distress.   HENT:   Head: Normocephalic and atraumatic.   Eyes: Conjunctivae and EOM are normal. Right eye exhibits no discharge. Left eye exhibits no discharge. No scleral icterus.   Neck: Normal range of motion. Neck supple. No thyromegaly present.   Cardiovascular: Normal rate, regular rhythm and normal heart sounds. Exam reveals no gallop and no friction rub.   Pulmonary/Chest: Effort normal. No stridor. No respiratory distress. She has no wheezes.   Abdominal: Soft. Bowel sounds are normal. She exhibits no mass. There is no tenderness. There is no rebound and no guarding.   Musculoskeletal: Normal range of motion. She exhibits no tenderness.   Lymphadenopathy:     She has no cervical adenopathy.   Neurological: She is alert and oriented to person, place, and time. She exhibits normal muscle tone.   Skin: Skin is warm. No rash noted. She is not diaphoretic. No erythema.   Psychiatric: She has a normal mood and affect. Her behavior is normal.   Nursing note and vitals reviewed.    I have reexamined the patient and the results are consistent with the previously documented exam. Forrest Llanes MD     Lab Results - Last 18 Months   Lab Units 09/11/20  1038 09/03/20  1052 09/02/20  1019   WBC 10*3/mm3 3.77 4.71 5.51   HEMOGLOBIN g/dL 9.1* 9.3* 10.1*   HEMATOCRIT % 27.2* 28.2* 29.4*   PLATELETS 10*3/mm3 29* 50* 3*   MCV fL 85.5 86.0 85.2     Lab Results - Last 18 Months   Lab Units 07/16/20  1329 06/25/20  0959 12/08/19  2022  11/29/19  1217   SODIUM mmol/L 137 137 137   < > 136   POTASSIUM mmol/L 3.8 3.8 3.6   < > " 4.2   CHLORIDE mmol/L 100 101 100   < > 98   CO2 mmol/L 25.0 27.0 26.0   < > 23.0   BUN  13 13 17   < > 17   CREATININE mg/dL 0.73 0.67 0.55*   < > 0.68   CALCIUM mg/dL 9.2 9.6 9.2   < > 9.5   BILIRUBIN mg/dL 0.6 0.5  --   --  0.3   ALK PHOS U/L 45 42  --   --  36*   ALT (SGPT) U/L 16 11  --   --  31   AST (SGOT) U/L 27 20  --   --  16   GLUCOSE mg/dL 95 69 103*   < > 93    < > = values in this interval not displayed.     Lab Results   Component Value Date    GLUCOSE 95 07/16/2020    BUN  07/16/2020      Comment:      Testing performed by alternate method    BUN 13 07/16/2020    CREATININE 0.73 07/16/2020    EGFRIFNONA 92 07/16/2020    BCR  07/16/2020      Comment:      Testing not performed    K 3.8 07/16/2020    CO2 25.0 07/16/2020    CALCIUM 9.2 07/16/2020    PROTENTOTREF 5.9 (L) 11/23/2019    ALBUMIN 4.60 07/16/2020    LABIL2 1.5 11/23/2019    AST 27 07/16/2020    ALT 16 07/16/2020     Lab Results - Last 18 Months   Lab Units 04/10/20  0841 12/08/19 2022 11/18/19  0918   INR  1.02 1.00 1.00   APTT seconds 24.5  --  23.5*     Lab Results   Component Value Date    IRON 72 12/02/2019    TIBC 416 12/02/2019    FERRITIN 231.70 (H) 12/02/2019       Lab Results   Component Value Date    FOLATE 11.20 11/23/2019     No results found for: OCCULTBLD    Lab Results   Component Value Date    RETICCTPCT 3.13 (H) 12/01/2019     Lab Results   Component Value Date    MNOGIYMN09 461 11/23/2019     No results found for: SPEP, UPEP  LDH   Date Value Ref Range Status   11/23/2019 123 (L) 135 - 214 U/L Final     Lab Results   Component Value Date    JOSE Negative 11/23/2019     Lab Results   Component Value Date    HAPTOGLOBIN 136 11/23/2019     Lab Results   Component Value Date    PTT 24.5 04/10/2020    INR 1.02 04/10/2020     No results found for:   No results found for: CEA  No components found for: CA-19-9  No results found for: PSA    Assessment/Plan     Assessment:    1. Refractory idiopathic thrombocytopenic purpura ITP.  : Refractory to steroids: She was diagnosed on 11/18/19  by mouth.  S/p bone marrow biopsy 11/18/19 - hypocellular for age bone marrow (50%),  adequate numbers of unremarkable megakaryocytes,  absent iron stores,  negative for involvement by malignant lymphoma.  Flow cytometry-CD 56 expression on granulocytes and monocytes.  . Normal karyotype 46XX.  HIV testing negative. TSH normal.   She does have a transient responses to IVIG.  Received 4 weekly doses of Rituximab starting 11/29/2019 to 12/20/2019.  She did not respond to rituximab right away and still awaiting response.  Started on Nplate 12/27/2019 at 1 mcg/kg.  Dose was escalated and patient seem to have erratic treatment response.  She continues to require IVIG occasionally while on Nplate.  Nplate was discontinued by end of May 2020 due to treatment failure and she was switched to fostamatinib which was started on 6/18/2020.  However she has not had enough response to therapy yet.  IVIG requirements are increasing.  Patient failed fostamatinib and was DCed at the end of July 2020.  She resumed Nplate on 8/5/2020 and had response.  Patient is going to start CellCept.  2. Leukopenia/neutropenia: Continues to have slight leukopenia even after discontinuing fostamatinib..  3. Anemia: History of HAILE:  Bone marrow biopsy on 11/18/19 showed absent iron stores.  Patient was given iron replacement.   2. History of iron deficiency anemia - S/p Venofer 400 mg IV x 1 dose on 11/23/19  3. Left renal stone: 3 mm kidney stone visualized on CT A/P on 10/7/19.  Asymptomatic.  4. Anxiety/weight loss:  management per Primary team   5. Hx of Menstrual bleeding: LMPD 11/23/19 -On Megace  to suppress menstrual cycle . Also on Lysteda (tranexamic acid)     PLAN  1. Thrombocytopenia again worsening.  Patient to start CellCept.  Awaiting medication  2. Resume Nplate since patient is not responding well to IVIG and she needs rescue therapy.  3. Fostamatinib discontinued in end of  July 4. Due to mild leukopenia and anemia, I have recommended repeat bone marrow biopsy.  Previous bone marrow was in 2019.  5. Will repeat anemia work-up.  6. Will have  meet patient today to help apply disability.  7. Patient has failed rechallenge with dexamethasone.  No plans to reinitiate steroids.  8. Recommend platelet transfusions only if platelets extremely low ( less than 2K) or if patient has bleeding.  9. Continue Megace as needed for vaginal bleeding.  10. Continue Lysteda, tranexamic acid as needed for platelets less than 10,000.  11. May stop Protonix  12. Continue weekly CBC  13. Follow-up in 1 month      Immune thrombocytopenic purpura (CMS/HCC)  - CBC & Differential  - CBC Auto Differential    Orders Placed This Encounter   Procedures   • CBC Auto Differential   • CBC & Differential          I have reviewed and confirmed the accuracy of the patient's history: Chief complaint, HPI, ROS and Subjective as entered by the MA/LPN/RN. Forrest Llanes MD 09/11/20     Thank you very much for providing the opportunity to participate in this patient’s care. Please do not hesitate to call if there are any other questions.      I have spent greater than 40 minutes in patient encounter and over 50% of the time was spent in counseling and coordination of care, including review of imaging, pathology, indications for treatment, goals of therapy, alternatives and risks, as well as surveillance and potential outcomes.    Electronically signed by Forrest Llanes MD, 09/11/20, 11:08 AM.

## 2020-09-11 NOTE — PROGRESS NOTES
Case Management/ Note    Patient Name: Tiny Akhtar  YOB: 1985  MRN #: [id number]    OSW met with patient at the request of BETSY Carnes. She is alert and oriented to person, place and time. She is pleasant. She states she worked at [employer] and used all available PTO and FMLA and was terminated from her position. She will only have insurance until the end of this month. She has filed for SSD. She is considering part time or PRN jobs. She was given [facility] information for help with food, electricity and rent. She was told to go to [facility] ED to speak with [employer] about [place] Medicaid. She gave v/amanda Lechuga, financial counselor made aware of the above.     She is  with four children ages 16,14,12,and 4. She does not collect child support stating it is 50/50. She was encouraged to consider collecting child support now that she does not have an income. She said she has some savings and basic needs are met at this time. She plans on applying for SNAP and TANF. OSW will remain available.     Electronically signed by:   Mariam Harrison LCSW, OSW-C  09/11/20, 2:27 PM

## 2020-09-14 DIAGNOSIS — D69.3 IMMUNE THROMBOCYTOPENIC PURPURA (HCC): Primary | ICD-10-CM

## 2020-09-21 ENCOUNTER — TELEPHONE (OUTPATIENT)
Dept: ONCOLOGY | Facility: CLINIC | Age: 35
End: 2020-09-21

## 2020-09-21 NOTE — TELEPHONE ENCOUNTER
Attempted to contact patient regarding oncology/hematology referral for second opinion.  LMV asking her to call back.

## 2020-09-24 ENCOUNTER — HOSPITAL ENCOUNTER (OUTPATIENT)
Dept: INTERVENTIONAL RADIOLOGY/VASCULAR | Facility: HOSPITAL | Age: 35
Discharge: HOME | End: 2020-09-24
Admitting: RADIOLOGY

## 2020-09-24 VITALS
WEIGHT: 128 LBS | TEMPERATURE: 98.6 F | RESPIRATION RATE: 13 BRPM | OXYGEN SATURATION: 98 % | SYSTOLIC BLOOD PRESSURE: 95 MMHG | BODY MASS INDEX: 23.55 KG/M2 | DIASTOLIC BLOOD PRESSURE: 51 MMHG | HEIGHT: 62 IN | HEART RATE: 56 BPM

## 2020-09-24 DIAGNOSIS — D69.3 IMMUNE THROMBOCYTOPENIC PURPURA (HCC): Primary | ICD-10-CM

## 2020-09-24 DIAGNOSIS — D69.3 IMMUNE THROMBOCYTOPENIC PURPURA (HCC): ICD-10-CM

## 2020-09-24 LAB
APTT PPP: 25.4 SECONDS (ref 24–31)
B-HCG UR QL: NEGATIVE
BASOPHILS # BLD AUTO: 0.1 10*3/MM3 (ref 0–0.2)
BASOPHILS NFR BLD AUTO: 1.3 % (ref 0–1.5)
DEPRECATED RDW RBC AUTO: 40.3 FL (ref 37–54)
EOSINOPHIL # BLD AUTO: 0.1 10*3/MM3 (ref 0–0.4)
EOSINOPHIL NFR BLD AUTO: 2.1 % (ref 0.3–6.2)
ERYTHROCYTE [DISTWIDTH] IN BLOOD BY AUTOMATED COUNT: 13.8 % (ref 12.3–15.4)
HCT VFR BLD AUTO: 33.8 % (ref 34–46.6)
HGB BLD-MCNC: 10.9 G/DL (ref 12–15.9)
INR PPP: 0.99 (ref 0.93–1.1)
LYMPHOCYTES # BLD AUTO: 2 10*3/MM3 (ref 0.7–3.1)
LYMPHOCYTES NFR BLD AUTO: 32.6 % (ref 19.6–45.3)
MCH RBC QN AUTO: 26.2 PG (ref 26.6–33)
MCHC RBC AUTO-ENTMCNC: 32.3 G/DL (ref 31.5–35.7)
MCV RBC AUTO: 81 FL (ref 79–97)
MONOCYTES # BLD AUTO: 0.4 10*3/MM3 (ref 0.1–0.9)
MONOCYTES NFR BLD AUTO: 7.3 % (ref 5–12)
NEUTROPHILS NFR BLD AUTO: 3.4 10*3/MM3 (ref 1.7–7)
NEUTROPHILS NFR BLD AUTO: 56.7 % (ref 42.7–76)
NRBC BLD AUTO-RTO: 0 /100 WBC (ref 0–0.2)
PATHOLOGY REVIEW: YES
PLATELET # BLD AUTO: 7 10*3/MM3 (ref 140–450)
PMV BLD AUTO: 9.9 FL (ref 6–12)
PROTHROMBIN TIME: 10.8 SECONDS (ref 9.6–11.7)
RBC # BLD AUTO: 4.17 10*6/MM3 (ref 3.77–5.28)
RBC MORPH BLD: NORMAL
SMALL PLATELETS BLD QL SMEAR: NORMAL
WBC # BLD AUTO: 6.1 10*3/MM3 (ref 3.4–10.8)
WBC MORPH BLD: NORMAL

## 2020-09-24 PROCEDURE — 85007 BL SMEAR W/DIFF WBC COUNT: CPT | Performed by: RADIOLOGY

## 2020-09-24 PROCEDURE — 85610 PROTHROMBIN TIME: CPT | Performed by: RADIOLOGY

## 2020-09-24 PROCEDURE — 25010000002 ONDANSETRON PER 1 MG: Performed by: RADIOLOGY

## 2020-09-24 PROCEDURE — 99152 MOD SED SAME PHYS/QHP 5/>YRS: CPT

## 2020-09-24 PROCEDURE — 88313 SPECIAL STAINS GROUP 2: CPT | Performed by: INTERNAL MEDICINE

## 2020-09-24 PROCEDURE — 25010000002 FENTANYL CITRATE (PF) 100 MCG/2ML SOLUTION: Performed by: RADIOLOGY

## 2020-09-24 PROCEDURE — 88333 PATH CONSLTJ SURG CYTO XM 1: CPT | Performed by: INTERNAL MEDICINE

## 2020-09-24 PROCEDURE — 85730 THROMBOPLASTIN TIME PARTIAL: CPT | Performed by: RADIOLOGY

## 2020-09-24 PROCEDURE — 81025 URINE PREGNANCY TEST: CPT | Performed by: RADIOLOGY

## 2020-09-24 PROCEDURE — 85097 BONE MARROW INTERPRETATION: CPT | Performed by: INTERNAL MEDICINE

## 2020-09-24 PROCEDURE — 25010000002 MIDAZOLAM PER 1 MG: Performed by: RADIOLOGY

## 2020-09-24 PROCEDURE — 77002 NEEDLE LOCALIZATION BY XRAY: CPT

## 2020-09-24 PROCEDURE — 88311 DECALCIFY TISSUE: CPT | Performed by: INTERNAL MEDICINE

## 2020-09-24 PROCEDURE — 88312 SPECIAL STAINS GROUP 1: CPT | Performed by: INTERNAL MEDICINE

## 2020-09-24 PROCEDURE — 85025 COMPLETE CBC W/AUTO DIFF WBC: CPT | Performed by: RADIOLOGY

## 2020-09-24 PROCEDURE — 88305 TISSUE EXAM BY PATHOLOGIST: CPT | Performed by: INTERNAL MEDICINE

## 2020-09-24 RX ORDER — SODIUM CHLORIDE 9 MG/ML
75 INJECTION, SOLUTION INTRAVENOUS CONTINUOUS
Status: DISCONTINUED | OUTPATIENT
Start: 2020-09-24 | End: 2020-09-25 | Stop reason: HOSPADM

## 2020-09-24 RX ORDER — FENTANYL CITRATE 50 UG/ML
INJECTION, SOLUTION INTRAMUSCULAR; INTRAVENOUS
Status: COMPLETED | OUTPATIENT
Start: 2020-09-24 | End: 2020-09-24

## 2020-09-24 RX ORDER — LIDOCAINE HYDROCHLORIDE 10 MG/ML
INJECTION, SOLUTION EPIDURAL; INFILTRATION; INTRACAUDAL; PERINEURAL
Status: COMPLETED | OUTPATIENT
Start: 2020-09-24 | End: 2020-09-24

## 2020-09-24 RX ORDER — SODIUM CHLORIDE 0.9 % (FLUSH) 0.9 %
3 SYRINGE (ML) INJECTION EVERY 12 HOURS SCHEDULED
Status: DISCONTINUED | OUTPATIENT
Start: 2020-09-24 | End: 2020-09-24 | Stop reason: HOSPADM

## 2020-09-24 RX ORDER — ONDANSETRON 2 MG/ML
INJECTION INTRAMUSCULAR; INTRAVENOUS
Status: COMPLETED | OUTPATIENT
Start: 2020-09-24 | End: 2020-09-24

## 2020-09-24 RX ORDER — MIDAZOLAM HYDROCHLORIDE 1 MG/ML
INJECTION INTRAMUSCULAR; INTRAVENOUS
Status: COMPLETED | OUTPATIENT
Start: 2020-09-24 | End: 2020-09-24

## 2020-09-24 RX ADMIN — FENTANYL CITRATE 100 MCG: 50 INJECTION, SOLUTION INTRAMUSCULAR; INTRAVENOUS at 12:48

## 2020-09-24 RX ADMIN — ONDANSETRON 4 MG: 2 INJECTION INTRAMUSCULAR; INTRAVENOUS at 12:46

## 2020-09-24 RX ADMIN — MIDAZOLAM 1 MG: 1 INJECTION INTRAMUSCULAR; INTRAVENOUS at 12:50

## 2020-09-24 RX ADMIN — SODIUM CHLORIDE 75 ML/HR: 0.9 INJECTION, SOLUTION INTRAVENOUS at 11:39

## 2020-09-24 RX ADMIN — LIDOCAINE HYDROCHLORIDE 10 ML: 10 INJECTION, SOLUTION EPIDURAL; INFILTRATION; INTRACAUDAL; PERINEURAL at 12:53

## 2020-09-24 RX ADMIN — FENTANYL CITRATE 100 MCG: 50 INJECTION, SOLUTION INTRAMUSCULAR; INTRAVENOUS at 12:52

## 2020-09-24 RX ADMIN — MIDAZOLAM 1 MG: 1 INJECTION INTRAMUSCULAR; INTRAVENOUS at 12:48

## 2020-09-24 NOTE — PROGRESS NOTES
Unable to chart in general assess tab for some reason.   Pt alert, orientated and having conversation with RN upon arrival in IR recovery.  Pt remains connected to cardiac monitor.  Room air.  Dressing dry and secure when pt got off table in cath lab D.  Pt says she has some mild soreness from biopsy site otherwise pt is in no distress.

## 2020-09-24 NOTE — DISCHARGE INSTRUCTIONS
A responsible adult should stay with you and you should rest quietly for the rest of the day. Do not drink alcohol, drive or cook for 24 hours following your procedure.  Progress your diet as tolerated.  Resume your usual medications.  When you remove your dressing in 48 hours, a small amount of blood is to be expected. Do not be alarmed.  If you feel it is bleeding excessively apply pressure and proceed to the Emergency room.  Do not shower, bath, or get your dressing wet at all for 48 hours.  You may shower after the dressing is removed. No lifting more that 10 pounds for 48 hours.  If severe pain, increased shortness of air or racing heartbeat occur, seek immediate medical attention.  Follow up with Dr. Llanes for results.

## 2020-09-25 ENCOUNTER — HOSPITAL ENCOUNTER (OUTPATIENT)
Dept: ONCOLOGY | Facility: HOSPITAL | Age: 35
Setting detail: INFUSION SERIES
Discharge: HOME | End: 2020-09-25

## 2020-09-25 VITALS
HEART RATE: 67 BPM | HEIGHT: 62 IN | RESPIRATION RATE: 18 BRPM | DIASTOLIC BLOOD PRESSURE: 76 MMHG | SYSTOLIC BLOOD PRESSURE: 116 MMHG | WEIGHT: 128 LBS | BODY MASS INDEX: 23.55 KG/M2 | TEMPERATURE: 98.9 F

## 2020-09-25 DIAGNOSIS — D69.6 THROMBOCYTOPENIA (HCC): Primary | ICD-10-CM

## 2020-09-25 LAB
BASOPHILS # BLD AUTO: 0.03 10*3/MM3 (ref 0–0.2)
BASOPHILS NFR BLD AUTO: 0.6 % (ref 0–1.5)
DEPRECATED RDW RBC AUTO: 37.8 FL (ref 37–54)
EOSINOPHIL # BLD AUTO: 0.09 10*3/MM3 (ref 0–0.4)
EOSINOPHIL NFR BLD AUTO: 1.8 % (ref 0.3–6.2)
ERYTHROCYTE [DISTWIDTH] IN BLOOD BY AUTOMATED COUNT: 12.6 % (ref 12.3–15.4)
HCT VFR BLD AUTO: 31.3 % (ref 34–46.6)
HGB BLD-MCNC: 10 G/DL (ref 12–15.9)
LAB AP CASE REPORT: NORMAL
LYMPHOCYTES # BLD AUTO: 1.87 10*3/MM3 (ref 0.7–3.1)
LYMPHOCYTES NFR BLD AUTO: 36.8 % (ref 19.6–45.3)
MCH RBC QN AUTO: 26.7 PG (ref 26.6–33)
MCHC RBC AUTO-ENTMCNC: 31.9 G/DL (ref 31.5–35.7)
MCV RBC AUTO: 83.7 FL (ref 79–97)
MONOCYTES # BLD AUTO: 0.5 10*3/MM3 (ref 0.1–0.9)
MONOCYTES NFR BLD AUTO: 9.8 % (ref 5–12)
NEUTROPHILS NFR BLD AUTO: 2.59 10*3/MM3 (ref 1.7–7)
NEUTROPHILS NFR BLD AUTO: 51 % (ref 42.7–76)
PATH REPORT.FINAL DX SPEC: NORMAL
PLATELET # BLD AUTO: 5 10*3/MM3 (ref 140–450)
RBC # BLD AUTO: 3.74 10*6/MM3 (ref 3.77–5.28)
WBC # BLD AUTO: 5.08 10*3/MM3 (ref 3.4–10.8)

## 2020-09-25 PROCEDURE — 25010000002 ROMIPLOSTIM PER 10 MCG: Performed by: INTERNAL MEDICINE

## 2020-09-25 PROCEDURE — 96372 THER/PROPH/DIAG INJ SC/IM: CPT

## 2020-09-25 PROCEDURE — 36415 COLL VENOUS BLD VENIPUNCTURE: CPT

## 2020-09-25 PROCEDURE — 85025 COMPLETE CBC W/AUTO DIFF WBC: CPT | Performed by: INTERNAL MEDICINE

## 2020-09-25 RX ADMIN — ROMIPLOSTIM 500 MCG: 500 INJECTION, POWDER, LYOPHILIZED, FOR SOLUTION SUBCUTANEOUS at 14:00

## 2020-09-28 LAB — REF LAB TEST METHOD: NORMAL

## 2020-09-30 LAB
LAB AP CASE REPORT: NORMAL
LAB AP DIAGNOSIS COMMENT: NORMAL
LAB AP FLOW CYTOMETRY SUMMARY: NORMAL
PATH REPORT.FINAL DX SPEC: NORMAL
PATH REPORT.GROSS SPEC: NORMAL

## 2020-10-01 RX ORDER — DOXYCYCLINE HYCLATE 50 MG/1
324 CAPSULE, GELATIN COATED ORAL
Qty: 30 TABLET | Refills: 3 | Status: SHIPPED | OUTPATIENT
Start: 2020-10-01

## 2020-10-01 NOTE — TELEPHONE ENCOUNTER
Message left advising pt that a script for ferrous gluconate will be sent to the [facility] in [place]. Asked pt to call back if she has any questions.

## 2020-10-02 ENCOUNTER — TELEPHONE (OUTPATIENT)
Dept: ONCOLOGY | Facility: CLINIC | Age: 35
End: 2020-10-02

## 2020-10-02 NOTE — TELEPHONE ENCOUNTER
Called pt to check on her. She was scheduled for appointment today at 1100 and hasn't shown up yet. Pt states she didn't know that she had an appointment today. Pt transferred to [place] to reschedule.

## 2020-10-05 ENCOUNTER — TELEPHONE (OUTPATIENT)
Dept: ONCOLOGY | Facility: CLINIC | Age: 35
End: 2020-10-05

## 2020-10-05 NOTE — TELEPHONE ENCOUNTER
----- Message from FARZAD Banegas sent at 10/4/2020  2:57 PM EDT -----  Regarding: Needs follow up appt scheduled   Patient seen on 9/11  by Shraddha .     Note says: Follow-up in 1 month    Patient needs f/u appt scheduled please.     FARZAD Banegas  10/04/20  14:57 EDT

## 2020-10-05 NOTE — TELEPHONE ENCOUNTER
S/W PT AND SHE CONFIRMED FRIDAY APPT STATING THAT WAS THE DAY SHE NEEDED TO BE SEEN AFTER SHE DECLINED SOONER APPT ON ANOTHER DAY OF THE WEEK

## 2020-10-06 ENCOUNTER — HOSPITAL ENCOUNTER (OUTPATIENT)
Dept: ONCOLOGY | Facility: HOSPITAL | Age: 35
Setting detail: INFUSION SERIES
Discharge: HOME | End: 2020-10-06

## 2020-10-06 VITALS
WEIGHT: 127 LBS | BODY MASS INDEX: 23.37 KG/M2 | DIASTOLIC BLOOD PRESSURE: 66 MMHG | RESPIRATION RATE: 18 BRPM | HEART RATE: 74 BPM | TEMPERATURE: 98 F | HEIGHT: 62 IN | SYSTOLIC BLOOD PRESSURE: 104 MMHG

## 2020-10-06 DIAGNOSIS — D69.6 THROMBOCYTOPENIA (HCC): Primary | ICD-10-CM

## 2020-10-06 DIAGNOSIS — D69.3 IMMUNE THROMBOCYTOPENIC PURPURA (HCC): ICD-10-CM

## 2020-10-06 LAB
BASOPHILS # BLD AUTO: 0.04 10*3/MM3 (ref 0–0.2)
BASOPHILS NFR BLD AUTO: 0.9 % (ref 0–1.5)
DEPRECATED RDW RBC AUTO: 36.3 FL (ref 37–54)
EOSINOPHIL # BLD AUTO: 0.08 10*3/MM3 (ref 0–0.4)
EOSINOPHIL NFR BLD AUTO: 1.8 % (ref 0.3–6.2)
ERYTHROCYTE [DISTWIDTH] IN BLOOD BY AUTOMATED COUNT: 12.6 % (ref 12.3–15.4)
HCT VFR BLD AUTO: 28.9 % (ref 34–46.6)
HGB BLD-MCNC: 9.2 G/DL (ref 12–15.9)
KARYOTYP MAR: NORMAL
LYMPHOCYTES # BLD AUTO: 1.63 10*3/MM3 (ref 0.7–3.1)
LYMPHOCYTES NFR BLD AUTO: 36.6 % (ref 19.6–45.3)
MCH RBC QN AUTO: 26.2 PG (ref 26.6–33)
MCHC RBC AUTO-ENTMCNC: 31.8 G/DL (ref 31.5–35.7)
MCV RBC AUTO: 82.3 FL (ref 79–97)
MONOCYTES # BLD AUTO: 0.33 10*3/MM3 (ref 0.1–0.9)
MONOCYTES NFR BLD AUTO: 7.4 % (ref 5–12)
NEUTROPHILS NFR BLD AUTO: 2.37 10*3/MM3 (ref 1.7–7)
NEUTROPHILS NFR BLD AUTO: 53.3 % (ref 42.7–76)
PLATELET # BLD AUTO: 10 10*3/MM3 (ref 140–450)
RBC # BLD AUTO: 3.51 10*6/MM3 (ref 3.77–5.28)
WBC # BLD AUTO: 4.45 10*3/MM3 (ref 3.4–10.8)

## 2020-10-06 PROCEDURE — 85025 COMPLETE CBC W/AUTO DIFF WBC: CPT | Performed by: INTERNAL MEDICINE

## 2020-10-06 PROCEDURE — 96372 THER/PROPH/DIAG INJ SC/IM: CPT

## 2020-10-06 PROCEDURE — 25010000002 ROMIPLOSTIM PER 10 MCG: Performed by: INTERNAL MEDICINE

## 2020-10-06 PROCEDURE — 36415 COLL VENOUS BLD VENIPUNCTURE: CPT

## 2020-10-06 RX ADMIN — ROMIPLOSTIM 500 MCG: 500 INJECTION, POWDER, LYOPHILIZED, FOR SOLUTION SUBCUTANEOUS at 11:42

## 2020-10-16 ENCOUNTER — TELEPHONE (OUTPATIENT)
Dept: ONCOLOGY | Facility: HOSPITAL | Age: 35
End: 2020-10-16

## 2020-10-16 NOTE — TELEPHONE ENCOUNTER
Pt. Was a no show for her appointment today. Called pt. To reschedule. Pt. Stated her insurance has lapsed and she was still waiting to here from them possibly later today. Pt. Stated she called yesterday to cancel her appointment for today. Pt. Does not want to reschedule at this time, pt. Stated she would call on Monday after she finds out more from her insurance company.

## 2020-10-20 ENCOUNTER — TELEPHONE (OUTPATIENT)
Dept: ONCOLOGY | Facility: CLINIC | Age: 35
End: 2020-10-20

## 2020-10-23 ENCOUNTER — HOSPITAL ENCOUNTER (OUTPATIENT)
Dept: ONCOLOGY | Facility: HOSPITAL | Age: 35
Setting detail: INFUSION SERIES
Discharge: HOME | End: 2020-10-23

## 2020-10-23 ENCOUNTER — APPOINTMENT (OUTPATIENT)
Dept: LAB | Facility: HOSPITAL | Age: 35
End: 2020-10-23

## 2020-10-23 ENCOUNTER — HOSPITAL ENCOUNTER (OUTPATIENT)
Dept: INFUSION THERAPY | Facility: HOSPITAL | Age: 35
Setting detail: INFUSION SERIES
Discharge: HOME | End: 2020-10-23

## 2020-10-23 ENCOUNTER — OFFICE VISIT (OUTPATIENT)
Dept: ONCOLOGY | Facility: CLINIC | Age: 35
End: 2020-10-23

## 2020-10-23 VITALS
HEIGHT: 62 IN | HEART RATE: 76 BPM | BODY MASS INDEX: 23.65 KG/M2 | OXYGEN SATURATION: 99 % | SYSTOLIC BLOOD PRESSURE: 97 MMHG | DIASTOLIC BLOOD PRESSURE: 57 MMHG | TEMPERATURE: 98.2 F | RESPIRATION RATE: 16 BRPM | WEIGHT: 128.53 LBS

## 2020-10-23 VITALS
WEIGHT: 128.6 LBS | HEIGHT: 62 IN | DIASTOLIC BLOOD PRESSURE: 76 MMHG | RESPIRATION RATE: 16 BRPM | SYSTOLIC BLOOD PRESSURE: 122 MMHG | TEMPERATURE: 98.4 F | BODY MASS INDEX: 23.66 KG/M2 | HEART RATE: 71 BPM

## 2020-10-23 VITALS
DIASTOLIC BLOOD PRESSURE: 66 MMHG | RESPIRATION RATE: 16 BRPM | TEMPERATURE: 98.1 F | HEART RATE: 83 BPM | SYSTOLIC BLOOD PRESSURE: 108 MMHG

## 2020-10-23 DIAGNOSIS — D69.6 THROMBOCYTOPENIA (HCC): Primary | ICD-10-CM

## 2020-10-23 DIAGNOSIS — D69.3 IMMUNE THROMBOCYTOPENIC PURPURA (HCC): ICD-10-CM

## 2020-10-23 DIAGNOSIS — D69.3 IMMUNE THROMBOCYTOPENIC PURPURA (HCC): Primary | ICD-10-CM

## 2020-10-23 LAB
BASOPHILS # BLD AUTO: 0.04 10*3/MM3 (ref 0–0.2)
BASOPHILS NFR BLD AUTO: 0.7 % (ref 0–1.5)
BH BB BLOOD EXPIRATION DATE: NORMAL
BH BB BLOOD TYPE BARCODE: 8400
BH BB DISPENSE STATUS: NORMAL
BH BB PRODUCT CODE: NORMAL
BH BB UNIT NUMBER: NORMAL
DEPRECATED RDW RBC AUTO: 35.3 FL (ref 37–54)
DEPRECATED RDW RBC AUTO: 38.1 FL (ref 37–54)
EOSINOPHIL # BLD AUTO: 0.11 10*3/MM3 (ref 0–0.4)
EOSINOPHIL NFR BLD AUTO: 1.9 % (ref 0.3–6.2)
ERYTHROCYTE [DISTWIDTH] IN BLOOD BY AUTOMATED COUNT: 12.6 % (ref 12.3–15.4)
ERYTHROCYTE [DISTWIDTH] IN BLOOD BY AUTOMATED COUNT: 14.2 % (ref 12.3–15.4)
HCT VFR BLD AUTO: 28.6 % (ref 34–46.6)
HCT VFR BLD AUTO: 28.8 % (ref 34–46.6)
HGB BLD-MCNC: 9 G/DL (ref 12–15.9)
HGB BLD-MCNC: 9.6 G/DL (ref 12–15.9)
LYMPHOCYTES # BLD AUTO: 1.95 10*3/MM3 (ref 0.7–3.1)
LYMPHOCYTES NFR BLD AUTO: 34.1 % (ref 19.6–45.3)
MCH RBC QN AUTO: 25 PG (ref 26.6–33)
MCH RBC QN AUTO: 25.1 PG (ref 26.6–33)
MCHC RBC AUTO-ENTMCNC: 31.5 G/DL (ref 31.5–35.7)
MCHC RBC AUTO-ENTMCNC: 33.2 G/DL (ref 31.5–35.7)
MCV RBC AUTO: 75.6 FL (ref 79–97)
MCV RBC AUTO: 79.4 FL (ref 79–97)
MONOCYTES # BLD AUTO: 0.44 10*3/MM3 (ref 0.1–0.9)
MONOCYTES NFR BLD AUTO: 7.7 % (ref 5–12)
NEUTROPHILS NFR BLD AUTO: 3.18 10*3/MM3 (ref 1.7–7)
NEUTROPHILS NFR BLD AUTO: 55.6 % (ref 42.7–76)
PLATELET # BLD AUTO: 0 10*3/MM3 (ref 140–450)
PLATELET # BLD AUTO: 34 10*3/MM3 (ref 140–450)
PMV BLD AUTO: 9.6 FL (ref 6–12)
RBC # BLD AUTO: 3.6 10*6/MM3 (ref 3.77–5.28)
RBC # BLD AUTO: 3.81 10*6/MM3 (ref 3.77–5.28)
SCAN SLIDE: NORMAL
SMALL PLATELETS BLD QL SMEAR: NORMAL
UNIT  ABO: NORMAL
UNIT  RH: NORMAL
WBC # BLD AUTO: 5.72 10*3/MM3 (ref 3.4–10.8)
WBC # BLD AUTO: 7.9 10*3/MM3 (ref 3.4–10.8)

## 2020-10-23 PROCEDURE — 96365 THER/PROPH/DIAG IV INF INIT: CPT

## 2020-10-23 PROCEDURE — 36415 COLL VENOUS BLD VENIPUNCTURE: CPT | Performed by: INTERNAL MEDICINE

## 2020-10-23 PROCEDURE — 36415 COLL VENOUS BLD VENIPUNCTURE: CPT

## 2020-10-23 PROCEDURE — 85027 COMPLETE CBC AUTOMATED: CPT

## 2020-10-23 PROCEDURE — G0463 HOSPITAL OUTPT CLINIC VISIT: HCPCS

## 2020-10-23 PROCEDURE — 25010000002 ROMIPLOSTIM PER 10 MCG: Performed by: INTERNAL MEDICINE

## 2020-10-23 PROCEDURE — 96372 THER/PROPH/DIAG INJ SC/IM: CPT

## 2020-10-23 PROCEDURE — 85025 COMPLETE CBC W/AUTO DIFF WBC: CPT | Performed by: INTERNAL MEDICINE

## 2020-10-23 PROCEDURE — 63710000001 DIPHENHYDRAMINE PER 50 MG: Performed by: INTERNAL MEDICINE

## 2020-10-23 PROCEDURE — 99215 OFFICE O/P EST HI 40 MIN: CPT | Performed by: INTERNAL MEDICINE

## 2020-10-23 PROCEDURE — 25010000002 IMMUNE GLOBULIN (HUMAN) 10 GM/100ML SOLUTION: Performed by: INTERNAL MEDICINE

## 2020-10-23 PROCEDURE — 85007 BL SMEAR W/DIFF WBC COUNT: CPT

## 2020-10-23 PROCEDURE — 96366 THER/PROPH/DIAG IV INF ADDON: CPT

## 2020-10-23 PROCEDURE — 25010000002 IMMUNE GLOBULIN (HUMAN) 40 GM/400ML SOLUTION: Performed by: INTERNAL MEDICINE

## 2020-10-23 RX ORDER — SODIUM CHLORIDE 9 MG/ML
250 INJECTION, SOLUTION INTRAVENOUS AS NEEDED
Status: DISCONTINUED | OUTPATIENT
Start: 2020-10-23 | End: 2020-10-23

## 2020-10-23 RX ORDER — ACETAMINOPHEN 325 MG/1
650 TABLET ORAL ONCE
Status: COMPLETED | OUTPATIENT
Start: 2020-10-23 | End: 2020-10-23

## 2020-10-23 RX ORDER — DIPHENHYDRAMINE HCL 25 MG
25 CAPSULE ORAL ONCE
Status: COMPLETED | OUTPATIENT
Start: 2020-10-23 | End: 2020-10-23

## 2020-10-23 RX ORDER — SODIUM CHLORIDE 9 MG/ML
250 INJECTION, SOLUTION INTRAVENOUS ONCE
Status: COMPLETED | OUTPATIENT
Start: 2020-10-23 | End: 2020-10-23

## 2020-10-23 RX ORDER — SODIUM CHLORIDE 9 MG/ML
250 INJECTION, SOLUTION INTRAVENOUS AS NEEDED
Status: CANCELLED | OUTPATIENT
Start: 2020-10-23

## 2020-10-23 RX ADMIN — SODIUM CHLORIDE 250 ML: 900 INJECTION, SOLUTION INTRAVENOUS at 12:34

## 2020-10-23 RX ADMIN — IMMUNE GLOBULIN (HUMAN) 10 G: 10 INJECTION INTRAVENOUS; SUBCUTANEOUS at 15:39

## 2020-10-23 RX ADMIN — DIPHENHYDRAMINE HYDROCHLORIDE 25 MG: 25 CAPSULE ORAL at 12:28

## 2020-10-23 RX ADMIN — ACETAMINOPHEN 650 MG: 325 TABLET ORAL at 12:28

## 2020-10-23 RX ADMIN — IMMUNE GLOBULIN (HUMAN) 40 G: 10 INJECTION INTRAVENOUS; SUBCUTANEOUS at 12:34

## 2020-10-23 RX ADMIN — ROMIPLOSTIM 500 MCG: 500 INJECTION, POWDER, LYOPHILIZED, FOR SOLUTION SUBCUTANEOUS at 13:47

## 2020-10-23 NOTE — PROGRESS NOTES
HEMATOLOGY ONCOLOGY FOLLOW UP        Patient name: Tiny Akhtar  : 1985  MRN: [id number]  Primary Care Physician: Jose Ingram, APRN  Referring Physician: Jose Ingram, *  Reason For Consult:     Chief Complaint   Patient presents with   • Follow-up     Immune thrombocytopenic purpura     Refractory ITP  History of Present Illness:  Tiny Akhtar is a 34 y.o. female with a past medical history of thrombocytopenia, anemia, and kidney stones.  She presented to [facility] Emergency Department on 2019 after she had blood work drawn earlier in the day that showed a platelet count of 9,000 at this facility.  She had previously been admitted through the ED on 2019 reporting bruising on her ankles and in her mouth.  She received 2 units of platelets and started on steroids. She was discharged on 19.The patient's last platelet count was 17,000 on 19 after a platelet transfusion.  She had been on steroids for four days.  The patient reported continued  subcutaneous bruising. She denied any headaches, nosebleeds, or gingival bleeding.  She was admitted for IV steroids and further management.     · 10/7/2019 CT abdomen and pelvis: 3 mm stone at the left ureterovesical junction causing mild to moderate obstruction.Spleen reported as normal size.  · 10/7/2019 WBC 10.2, hemoglobin 12.2, platelets 268  · 2019 WBC 5.7, hemoglobin 12.8, Platelets 5000,  Transfused 2 units platelets.  initial presentation for diagnosis acute thrombocytopenia,. Started on Solumedrol 125 mg IV Q6Hr  · 2019 platelets 5000.  Patient transfused with platelets.  Platelets increased to 13,000  · 19  Bone marrow biopsy - Smears of bone marrow aspirate with cell block (clot) preparation and marrow biopsy:  Hypocellular for age bone marrow (50%),  Adequate numbers of unremarkable megakaryocytes,  Absent iron stores,  Negative for involvement by malignant  lymphoma.  Flow cytometry-CD 56 expression on granulocytes and monocytes.  Chromosomal analysis -46XX  · 11/20/2019 platelet count 17,000 . Switched to Prednisone 1 mg/kg ( 50 mg daily)  · 11/21/2019 platelets 17,000  · 11/22/2019  Readmitted . Restarted Solumedrol  mg Q6H . platelets 9000  · 11/23/19 platelets 13,000  · 11/23/2019 hematology/Oncology was consulted on this patient known to our service and previously consulted for acute thrombocytopenia on 11/18/19. She was followed by Uri Sagastume MD for ITP. The patient had been transfused and started on IV steroids Prednisone while inpatient 11/18/19 to 11/20/19.  She was transitioned to oral steroids at discharge.   · 11/23/2019 Hepatitis panel negative, TSH 0.33, H. pylori IgG 1.43 High, IgM negative, JOSE negative, HIV antibodies negative  · 11/23/2019 patient received IV Venofer.  Patient received meningococcal vaccine, Haemophilus vaccine, pneumococcus vaccine  · 11/23/2019 platelets 13,000  · 11/24/2019 platelets 52,000.  Platelets responded to steroids. On IV solumedrol. Started on Megace 40 mg po QID for menorrhagia.  · Pt discharged on Prednisone 50 mg  · 11/27/2019 platelets 8000.  Patient's failed steroids ( on prednisone 50 mg daily).. Started Tranexamic acid 1300 mg TID  · 11/29/2019  READMITTED platelets 12,000  · 11/29/19-Cycle 1 Day one of RITUXIMAB and continues on megestrol  · 11/30/2019 WBC 11.2, hemoglobin 12.1, platelets 6000  · 12/1/2019 platelets 11,000.  Patient received IVIG 30 g  · 12/2/2019 iron 72, iron saturation 17% low, TIBC 416, ferritin 231, platelets 56,000  · 12/3/2019 platelets 129,000  · 12/4/2019 Prednisone decreased to 40 mg daily  · 12/6/2019 patient received week 2 of Rituximab.  Platelets 24,000, hemoglobin 11.8  · 12/8/2019 platelets 5000, hemoglobin 11.6, PT 10.5, INR 1.  Patient received IVIG 50 g  · 12/9/2019 platelets 11,000  · 12/11/2019 platelets 16,000  · 12/13/2019: Platelets 13,000 Patient  received week 3 of her Rituximab. Prednisone decreased to 20 mg daily  · 12/14/2019 patient received IVIG 55 g, platelets 12,000.   · 12/16/2019 platelets 24,000. Prednisone decreased to 10 mg daily  · 12/18/2019 platelets 12,000  · 12/19/2019 platelets 11,000  · 12/20/19-week 4 Rituximab and Megestrol, platelets 12,000. Prednisone DCed.  · 12/26/2019 platelets 7000  · 12/27/19-patient was started on NPLATE   1 mcg/kg, platelets 8000  · 12/30/2019 platelets 6000.  After transfusion 31,000  · 01/03/2020- NPLATE 2 mcg/kg, platelets 10,000  · 01/10/2020-NPLATE 3 mcg/kg.  continues on Megace megestrol, platelets 4000  · 1/15/2020: WBC 5.9, hemoglobin 12.5, platelets 2000k,   · 1/15/2020: Serum copper 114 normal, IgA 81 normal,Anticardiolipin antibodies negative, beta-2 glycoprotein antibodies negative, phosphatidylserine antibodies negative,  · 1/16/2020 patient received IVIG 55 g, platelets 4000  · 1/17/2020 patient received IVIG 55 g, platelets 36,000  · 01/17/2020: IVIG WBC 4.27, hemoglobin, 11.5, platelets  06619  · 1/20/2020 patient received Nplate 6 mcg/kg, WBC 5.1, hemoglobin 11.9, platelets 44,  · 1/27/2020 patient received Nplate 6 mcg/kg platelets 100,000  · 1/29/2020 platelets 74,000  · 1/30/2020 patient seen at [facility] hematology for second opinion.  Recommended to continue course of current treatment.  · 2/3/2020 patient received Nplate 6 mcg/kg.  WBC 5.9, hemoglobin 12, platelets 81k  · 2/10/2020 patient received Nplate 7 mcg/kg platelets 34,000   · 2/12/2020 WBC 5.1, hemoglobin 12.8, platelets 24K  · 2/13/2020: Patient advised to start dexamethasone 40 mg x 4 days course   · 2/17/2020: Platelets 11,000  · 2/21/2020 platelets 0.  Patient received IVIG 1 g/kg.  60 g  · 2/21/2020: Patient transfused platelets   · 2/22/2020 platelets 65K. , hemoglobin 10.4, patient received IVIG 60 g  · 2/24/2020 platelets 231K, hemoglobin 10.6, patient received Nplate 8 mcg/kg  · 3/5/2020: WBC 5.3,  hemoglobin 11.4, platelets 65,000.  Patient received Nplate 8 mcg/kg  · 3/11/2020 hemoglobin 12.1, platelets 97,000  · 3/13/2020 WBC 5.2, hemoglobin 11.6, platelets 106.  Patient received Nplate 500 micrograms  · 3/18/2020 WBC 5.7, hemoglobin 12.3, platelets 25,000, patient received Nplate 500 mcg  · 3/20/2020 WBC 5.7, hemoglobin 12.4, platelets 56,000,  · 3/25/2020 platelets 32,000, received Nplate 500 mcg  · 4/2/2020: WBC 7.3, hemoglobin 11.9, platelets 43,000, MCV 85.8  · 4/3/2020 platelets 27, Nplate 400 mcg  · 4/9/2020 platelets 24k  · 4/10/2020 platelets 35K.  Nplate 590 mcg  · 4/17/2020: Platelets 73168, Nplate 500 mcg  · 4/21/2020: Platelets 2000, patient received IVIG 60 g  · 4/22/2020 platelets 77K, hemoglobin 10.7, WBC 4.1, patient received IVIG 60 g  · 4/24/2020: Platelets 182K, hemoglobin 10.7, received Nplate 5 mcg  · 5/1/2020: Patient received Nplate 400 mcg platelets 179  · 5/8/2020: Patient received Nplate 590 mcg, platelets 33,000  · 5/13/2020: Platelets 46,000  · 5/15/2020 patient received Nplate, platelets 64,000  · 5/22/2020: Platelets 28,000  · 5/28/2020 patient received Nplate 500 mcg.  Platelets 45,000  · 5/29/2020 platelets 26,000.    · 6/1/2020: Platelets 11,000.  Nplate discontinued due to treatment failure.  Patient received IVIG 1 g/kg  · 6/2/2020 platelets 117  · 6/5/2020: Platelets 123K  · 6/16/2020 platelets 8000.  Patient received IVIG 1 g/kg   · 6/17/2020 platelets 47,000.  Patient received IVIG 1 g/kg  · 6/18/2020: Patient started taking fostamatinib  · 6/25/2020: Platelets 33,000.  WBC 5.5, hemoglobin 11.5, platelets 33, creatinine 1.6,  · 7/6/2020: WBC 5.06, hemoglobin 12.1, platelets 14,000,  · 7/9/2020: WBC 4.4, hemoglobin 12.0, platelets 11,000, patient received IVIG 1 g/kg  · 7/10/2020: Patient received IVIG 1 g/kg, platelet count 45,000  · 7/16/2020: WBC 2.27, hemoglobin 10.6, platelets 36,000  · 7/24/2020 patient received IVIG 1 g/kg, WBC 3.3, hemoglobin 11.7, platelet  count 8000, IgA 74, IgG 2180, IgM 111,   · 7/30/2020: WBC 4.17, Hgb 11.6,  21K  · 8/3/2020 patient received IVIG 1 g/kg, WBC 4.1, hemoglobin 11.8, platelet count 7000  · 8/4/2020: WBC 2.98, hemoglobin 10.4, platelet count 39,000, IVIG 1 g/kg  · 8/5/2020 WBC 3.6, hemoglobin 10.1, platelet count 53,000, patient received Nplate 500 mcg  · 8/14/2020 platelet count 206, hemoglobin 10.7, Nplate 500 mcg  · 9/2/2020: WBC 5.5, hemoglobin 10.1, platelet count 3000.  Patient received IVIG 1 g/kg  · 9/3/2020: Platelet count 50,000 patient received IVIG 1 g/kg  · 9/11/2020: WBC 3.77, hemoglobin 9.1 platelet count 29,000  · 9/14/2020: Patient started CellCept.  · 9/24/2020: Bone marrow biopsy: No diagnostic evidence of hemopoietic neoplasia identified.  Normocellular bone marrow for age 60 to 70% with trilineage hematopoiesis.  Increased megakaryocytes.  No increase in blasts.  No significant increase in reticulin fibers.  No storage iron.  Cytogenetics normal karyotype 46XX.. Flow cytometry shows CD56 expression on granulocytes and monocytes..  This is a nonspecific finding and could be reactive/regenerative process as well as neoplastic process.  · 9/25/2020: Patient received Nplate 500 mcg, WBC 5.08, hemoglobin 10, platelets 5000  · 10/6/2020 patient received Nplate 500 mcg.  WBC 4.45, hemoglobin 9.2, platelets 10,000  · 10/23/2020: WBC 5.7, hemoglobin 9, platelets 0        Subjective:  Patient is here for a follow up regarding ITP.  She has been off CellCept for the past 1 week as she is awaiting refill.  She is bruising easily.  Platelets are 0 today.  She has not seen Dr. Heredia in the interim.  In the interim she was seen at [facility], Dr Heredia.  She has not started taking CellCept yet.  She does not report any bleeding.  She has not had any Nplate injection since 8/14/2020.  She had IVIG on 09/02 and 09/03. She denies any dizziness, HTN, or elevated WBC. She does not report any excessive bleeding, but  states she has always bruised easily. She denies any nose bleeds.       Past Medical History:   Diagnosis Date   • Anxiety    • Iron deficiency anemia    • Kidney stone 10/2019     History reviewed. No pertinent surgical history.      Current Outpatient Medications:   •  ferrous gluconate (FERGON) 324 MG tablet, Take 1 tablet by mouth Daily With Breakfast., Disp: 30 tablet, Rfl: 3  •  ferrous sulfate 324 (65 Fe) MG tablet delayed-release EC tablet, Take 1 tablet by mouth Daily With Breakfast., Disp: 30 tablet, Rfl: 0  •  acetaminophen (TYLENOL) 325 MG tablet, Take 650 mg by mouth Every 6 (Six) Hours As Needed for Mild Pain  or Moderate Pain ., Disp: , Rfl:   •  amphetamine-dextroamphetamine (ADDERALL) 10 MG tablet, , Disp: , Rfl:   •  megestrol (MEGACE) 20 MG tablet, Take 20 mg by mouth 3 (Three) Times a Day., Disp: , Rfl:   •  NON FORMULARY, Take 1 tablet by mouth Daily. Balance OTC, Disp: , Rfl:   •  ondansetron (ZOFRAN) 8 MG tablet, Take 1 tablet by mouth 3 (Three) Times a Day As Needed for Nausea or Vomiting., Disp: 30 tablet, Rfl: 5  •  pantoprazole (PROTONIX) 20 MG EC tablet, , Disp: , Rfl:   •  polyethylene glycol (MIRALAX) powder, Take 17 g by mouth Daily As Needed (constipation)., Disp: 225 g, Rfl: 0    No Known Allergies    Family History   Problem Relation Age of Onset   • Thrombocytopenia Nephew         ITP   • Diabetes Mother    • Diabetes Father    • Cancer Paternal Grandmother      Cancer-related family history includes Cancer in her paternal grandmother.    Social History     Tobacco Use   • Smoking status: Never Smoker   • Smokeless tobacco: Never Used   Substance Use Topics   • Alcohol use: No     Frequency: Never   • Drug use: No     I have reviewed the history of present illness, past medical history, family history, social history, lab results, all notes and other records since the patient was last seen on  11/21/2019.    ROS:     Review of Systems   Constitutional: Negative for activity change,  "appetite change, chills and fever.   HENT: Negative for ear pain, mouth sores, nosebleeds and sore throat.    Eyes: Negative for photophobia and visual disturbance.   Respiratory: Negative for wheezing and stridor.    Cardiovascular: Negative for chest pain and palpitations.   Gastrointestinal: Negative for abdominal pain, diarrhea, nausea and vomiting.   Endocrine: Negative for cold intolerance and heat intolerance.   Genitourinary: Negative for dysuria and hematuria.   Musculoskeletal: Negative for joint swelling and neck stiffness.   Skin: Negative for color change and rash.   Neurological: Negative for seizures and syncope.   Hematological: Negative for adenopathy. Bruises/bleeds easily.        No obvious bleeding   Psychiatric/Behavioral: Negative for agitation, confusion and hallucinations.     Objective:    Vitals:    10/23/20 1051   BP: 122/76   Pulse: 71   Resp: 16   Temp: 98.4 °F (36.9 °C)   Weight: 58.3 kg (128 lb 9.6 oz)   Height: 157.5 cm (62\")     ECOG  (0) Fully active, able to carry on all predisease performance without restriction    Physical Exam:   Physical Exam   Constitutional: She is oriented to person, place, and time. No distress.   HENT:   Head: Normocephalic and atraumatic.   Eyes: Conjunctivae are normal. Right eye exhibits no discharge. Left eye exhibits no discharge. No scleral icterus.   Neck: Normal range of motion. Neck supple. No thyromegaly present.   Cardiovascular: Normal rate, regular rhythm and normal heart sounds. Exam reveals no gallop and no friction rub.   Pulmonary/Chest: Effort normal. No stridor. No respiratory distress. She has no wheezes.   Abdominal: Soft. Normal appearance and bowel sounds are normal. She exhibits no mass. There is no abdominal tenderness. There is no rebound and no guarding.   Musculoskeletal: Normal range of motion. No tenderness.   Lymphadenopathy:     She has no cervical adenopathy.   Neurological: She is alert and oriented to person, place, and " time. She exhibits normal muscle tone.   Skin: Skin is warm. Bruising noted. No rash noted. She is not diaphoretic. No erythema.   Psychiatric: Her behavior is normal. Mood normal.   Nursing note and vitals reviewed.    I have reexamined the patient and the results are consistent with the previously documented exam. Forrest Llanes MD     Lab Results - Last 18 Months   Lab Units 10/23/20  1039 10/06/20  1044 09/25/20  1246   WBC 10*3/mm3 5.72 4.45 5.08   HEMOGLOBIN g/dL 9.0* 9.2* 10.0*   HEMATOCRIT % 28.6* 28.9* 31.3*   PLATELETS 10*3/mm3 0* 10* 5*   MCV fL 79.4 82.3 83.7     Lab Results - Last 18 Months   Lab Units 07/16/20  1329 06/25/20  0959 12/08/19 2022 11/29/19  1217   SODIUM mmol/L 137 137 137   < > 136   POTASSIUM mmol/L 3.8 3.8 3.6   < > 4.2   CHLORIDE mmol/L 100 101 100   < > 98   CO2 mmol/L 25.0 27.0 26.0   < > 23.0   BUN  13 13 17   < > 17   CREATININE mg/dL 0.73 0.67 0.55*   < > 0.68   CALCIUM mg/dL 9.2 9.6 9.2   < > 9.5   BILIRUBIN mg/dL 0.6 0.5  --   --  0.3   ALK PHOS U/L 45 42  --   --  36*   ALT (SGPT) U/L 16 11  --   --  31   AST (SGOT) U/L 27 20  --   --  16   GLUCOSE mg/dL 95 69 103*   < > 93    < > = values in this interval not displayed.     Lab Results   Component Value Date    GLUCOSE 95 07/16/2020    BUN  07/16/2020      Comment:      Testing performed by alternate method    BUN 13 07/16/2020    CREATININE 0.73 07/16/2020    EGFRIFNONA 92 07/16/2020    BCR  07/16/2020      Comment:      Testing not performed    K 3.8 07/16/2020    CO2 25.0 07/16/2020    CALCIUM 9.2 07/16/2020    PROTENTOTREF 5.9 (L) 11/23/2019    ALBUMIN 4.60 07/16/2020    LABIL2 1.5 11/23/2019    AST 27 07/16/2020    ALT 16 07/16/2020     Lab Results - Last 18 Months   Lab Units 09/24/20  1118 04/10/20  0841 12/08/19 2022 11/18/19  0918   INR  0.99 1.02 1.00 1.00   APTT seconds 25.4 24.5  --  23.5*     Lab Results   Component Value Date    IRON 72 12/02/2019    TIBC 416 12/02/2019    FERRITIN 231.70 (H) 12/02/2019        Lab Results   Component Value Date    FOLATE 11.20 11/23/2019     No results found for: OCCULTBLD    Lab Results   Component Value Date    RETICCTPCT 3.13 (H) 12/01/2019     Lab Results   Component Value Date    EFPUFLMS56 461 11/23/2019     No results found for: SPEP, UPEP  LDH   Date Value Ref Range Status   11/23/2019 123 (L) 135 - 214 U/L Final     Lab Results   Component Value Date    JOSE Negative 11/23/2019     Lab Results   Component Value Date    HAPTOGLOBIN 136 11/23/2019     Lab Results   Component Value Date    PTT 25.4 09/24/2020    INR 0.99 09/24/2020     No results found for:   No results found for: CEA  No components found for: CA-19-9  No results found for: PSA    Assessment/Plan     Assessment:    1. Critical thrombocytopenia: Platelet count 0 on 10/23/2020  2. Refractory idiopathic thrombocytopenic purpura ITP. : Refractory to steroids: She was diagnosed on 11/18/19  by mouth.  S/p bone marrow biopsy 11/18/19 - hypocellular for age bone marrow (50%),  adequate numbers of unremarkable megakaryocytes,  absent iron stores,  negative for involvement by malignant lymphoma.  Flow cytometry-CD 56 expression on granulocytes and monocytes.  . Normal karyotype 46XX.  HIV testing negative. TSH normal.   She does have a transient responses to IVIG.  Received 4 weekly doses of Rituximab starting 11/29/2019 to 12/20/2019.  She did not respond to rituximab right away and still awaiting response.  Started on Nplate 12/27/2019 at 1 mcg/kg.  Dose was escalated and patient seem to have erratic treatment response.  She continues to require IVIG occasionally while on Nplate.  Nplate was discontinued by end of May 2020 due to treatment failure and she was switched to fostamatinib which was started on 6/18/2020.  However she has not had enough response to therapy yet.  IVIG requirements are increasing.  Patient failed fostamatinib and was DCed at the end of July 2020.  She resumed Nplate on 8/5/2020 and had  response.  Patient started CellCept on 9/14/2020.  Repeat bone marrow biopsy 9/24/2020 unremarkable.  Continued Nplate 9/25/2020, 10/6/2020, but platelets dropped to 0 as of 10/23/2020.  3. Leukopenia/neutropenia: Continues to have slight leukopenia even after discontinuing fostamatinib..  4. Anemia: History of HAILE:  Bone marrow biopsy on 11/18/19 showed absent iron stores.  Patient was given iron replacement.  Repeat bone marrow biopsy shows absent iron stores.  Repeat bone marrow biopsy 9/24/2020 shows absent iron stores.  2. History of iron deficiency anemia - S/p Venofer 400 mg IV x 1 dose on 11/23/19  3. Left renal stone: 3 mm kidney stone visualized on CT A/P on 10/7/19.  Asymptomatic.  4. Anxiety/weight loss:  management per Primary team   5. Hx of Menstrual bleeding: LMPD 11/23/19 -On Megace  to suppress menstrual cycle . Also on Lysteda (tranexamic acid)     PLAN  1. Patient has critical thrombocytopenia today.  Platelets are 0.  We will have to give her IVIG  2. Continue Nplate 10 mcg/kg today.   3. We will arrange platelet transfusion following the IVIG  4. Patient is going on Medicaid.    5. Off CellCept for the past week and awaiting a refill.  6. I will discuss the case with Dr. Heredia, whether patient needs to be sent for splenectomy.  Patient also being evaluated for a clinical trial at the [facility].  7. Patient has failed rechallenge with dexamethasone.  No plans to reinitiate steroids.   8. Iron deficiency: Continue iron sulfate p.o..  May need Injectafer   9. Recommend platelet transfusions only if platelets extremely low ( less than 2K) or if patient has bleeding.  10. Continue Megace as needed  for vaginal bleeding.  11. Continue Lysteda, as needed  for platelets less than 10,000.  12. Patient off Protonix.  13. Continue weekly CBC  14. Follow-up in 2 weeks      Immune thrombocytopenic purpura (CMS/HCC)  - CBC & Differential  - CBC Auto Differential    Orders Placed This  Encounter   Procedures   • CBC Auto Differential   • CBC & Differential          I have reviewed and confirmed the accuracy of the patient's history: Chief complaint, HPI, ROS and Subjective as entered by the MA/LPN/RN. Forrest Llanes MD 10/23/20     Thank you very much for providing the opportunity to participate in this patient’s care. Please do not hesitate to call if there are any other questions.      I have spent greater than 40 minutes in patient encounter and over 50% of the time was spent in counseling and coordination of care, including review of imaging, pathology, indications for treatment, goals of therapy, alternatives and risks, as well as surveillance and potential outcomes.        Electronically signed by Forrest Llanes MD, 10/23/20, 11:31 AM EDT.

## 2020-10-23 NOTE — PROGRESS NOTES
Pt. Here at clinic for MD visit and lab draw  Pt's plts 0 today  MD ordered IVIG and NPlate to be given today, pt. Also will receive platelet transfusion at ambulatory care after IVIG infusion complete.   IV left in place 24g RAC, at discharge for platelet transfusion.   Pt. Discharged from clinic with  No complaints and AVS given

## 2020-10-23 NOTE — PROGRESS NOTES
Pt here for platelet transfusion.  CBC drawn platelets result was 34.  Spoke with Mayra.  No platelets needed today.  Return appt on Monday at [facility]3].  Bleeding risks explained to pt.

## 2020-10-26 ENCOUNTER — LAB (OUTPATIENT)
Dept: LAB | Facility: HOSPITAL | Age: 35
End: 2020-10-26

## 2020-10-26 DIAGNOSIS — D69.3 IMMUNE THROMBOCYTOPENIC PURPURA (HCC): ICD-10-CM

## 2020-10-26 LAB
BASOPHILS # BLD AUTO: 0.06 10*3/MM3 (ref 0–0.2)
BASOPHILS NFR BLD AUTO: 0.8 % (ref 0–1.5)
DEPRECATED RDW RBC AUTO: 35.7 FL (ref 37–54)
EOSINOPHIL # BLD AUTO: 0.19 10*3/MM3 (ref 0–0.4)
EOSINOPHIL NFR BLD AUTO: 2.6 % (ref 0.3–6.2)
ERYTHROCYTE [DISTWIDTH] IN BLOOD BY AUTOMATED COUNT: 12.8 % (ref 12.3–15.4)
HCT VFR BLD AUTO: 31.3 % (ref 34–46.6)
HGB BLD-MCNC: 9.9 G/DL (ref 12–15.9)
LYMPHOCYTES # BLD AUTO: 1.89 10*3/MM3 (ref 0.7–3.1)
LYMPHOCYTES NFR BLD AUTO: 25.8 % (ref 19.6–45.3)
MCH RBC QN AUTO: 24.7 PG (ref 26.6–33)
MCHC RBC AUTO-ENTMCNC: 31.6 G/DL (ref 31.5–35.7)
MCV RBC AUTO: 78.1 FL (ref 79–97)
MONOCYTES # BLD AUTO: 0.79 10*3/MM3 (ref 0.1–0.9)
MONOCYTES NFR BLD AUTO: 10.8 % (ref 5–12)
NEUTROPHILS NFR BLD AUTO: 4.4 10*3/MM3 (ref 1.7–7)
NEUTROPHILS NFR BLD AUTO: 60 % (ref 42.7–76)
PLATELET # BLD AUTO: 31 10*3/MM3 (ref 140–450)
RBC # BLD AUTO: 4.01 10*6/MM3 (ref 3.77–5.28)
WBC # BLD AUTO: 7.33 10*3/MM3 (ref 3.4–10.8)

## 2020-10-26 PROCEDURE — 85025 COMPLETE CBC W/AUTO DIFF WBC: CPT

## 2020-10-30 ENCOUNTER — HOSPITAL ENCOUNTER (OUTPATIENT)
Dept: ONCOLOGY | Facility: HOSPITAL | Age: 35
Setting detail: INFUSION SERIES
End: 2020-10-30

## 2020-10-30 ENCOUNTER — TELEPHONE (OUTPATIENT)
Dept: ONCOLOGY | Facility: CLINIC | Age: 35
End: 2020-10-30

## 2020-10-30 NOTE — TELEPHONE ENCOUNTER
Called patient to discuss appointment.  She stated that her sitter cancelled.  I explained that she could bring her child with her today.  She stated that she would not be able to make it at 1130, explained she could come later.  The patient did not wish to do so and asked for appointment on Monday 11/2/20.  Appointment rescheduled.  Advised to report to ER with signs of bleeding.  Patient v/u.  Dr. Llanes notified.

## 2020-11-02 ENCOUNTER — MEDICATION THERAPY MANAGEMENT (OUTPATIENT)
Dept: PHARMACY | Facility: HOSPITAL | Age: 35
End: 2020-11-02

## 2020-11-02 ENCOUNTER — HOSPITAL ENCOUNTER (OUTPATIENT)
Dept: ONCOLOGY | Facility: HOSPITAL | Age: 35
Setting detail: INFUSION SERIES
Discharge: HOME | End: 2020-11-02

## 2020-11-02 VITALS
BODY MASS INDEX: 23.41 KG/M2 | DIASTOLIC BLOOD PRESSURE: 82 MMHG | TEMPERATURE: 97.7 F | SYSTOLIC BLOOD PRESSURE: 117 MMHG | HEART RATE: 69 BPM | WEIGHT: 128 LBS

## 2020-11-02 DIAGNOSIS — D69.6 THROMBOCYTOPENIA (HCC): Primary | ICD-10-CM

## 2020-11-02 LAB
BASOPHILS # BLD AUTO: 0.05 10*3/MM3 (ref 0–0.2)
BASOPHILS NFR BLD AUTO: 0.7 % (ref 0–1.5)
DEPRECATED RDW RBC AUTO: 38.4 FL (ref 37–54)
EOSINOPHIL # BLD AUTO: 0.26 10*3/MM3 (ref 0–0.4)
EOSINOPHIL NFR BLD AUTO: 3.5 % (ref 0.3–6.2)
ERYTHROCYTE [DISTWIDTH] IN BLOOD BY AUTOMATED COUNT: 14.2 % (ref 12.3–15.4)
HCT VFR BLD AUTO: 30.8 % (ref 34–46.6)
HGB BLD-MCNC: 9.5 G/DL (ref 12–15.9)
LYMPHOCYTES # BLD AUTO: 1.84 10*3/MM3 (ref 0.7–3.1)
LYMPHOCYTES NFR BLD AUTO: 24.7 % (ref 19.6–45.3)
MCH RBC QN AUTO: 25 PG (ref 26.6–33)
MCHC RBC AUTO-ENTMCNC: 30.8 G/DL (ref 31.5–35.7)
MCV RBC AUTO: 81.1 FL (ref 79–97)
MONOCYTES # BLD AUTO: 0.63 10*3/MM3 (ref 0.1–0.9)
MONOCYTES NFR BLD AUTO: 8.4 % (ref 5–12)
NEUTROPHILS NFR BLD AUTO: 4.68 10*3/MM3 (ref 1.7–7)
NEUTROPHILS NFR BLD AUTO: 62.7 % (ref 42.7–76)
PLATELET # BLD AUTO: 256 10*3/MM3 (ref 140–450)
PMV BLD AUTO: 10.2 FL (ref 6–12)
RBC # BLD AUTO: 3.8 10*6/MM3 (ref 3.77–5.28)
WBC # BLD AUTO: 7.46 10*3/MM3 (ref 3.4–10.8)

## 2020-11-02 PROCEDURE — 25010000002 ROMIPLOSTIM PER 10 MCG: Performed by: INTERNAL MEDICINE

## 2020-11-02 PROCEDURE — 85025 COMPLETE CBC W/AUTO DIFF WBC: CPT | Performed by: INTERNAL MEDICINE

## 2020-11-02 PROCEDURE — 36415 COLL VENOUS BLD VENIPUNCTURE: CPT

## 2020-11-02 PROCEDURE — 96372 THER/PROPH/DIAG INJ SC/IM: CPT

## 2020-11-02 RX ORDER — MULTIPLE VITAMINS W/ MINERALS TAB 9MG-400MCG
1 TAB ORAL DAILY
COMMUNITY

## 2020-11-02 RX ORDER — WATER 1000 ML/1000ML
INJECTION, SOLUTION INTRAVENOUS
Status: DISCONTINUED
Start: 2020-11-02 | End: 2020-11-03 | Stop reason: HOSPADM

## 2020-11-02 RX ADMIN — ROMIPLOSTIM 500 MCG: 250 INJECTION, POWDER, LYOPHILIZED, FOR SOLUTION SUBCUTANEOUS at 08:51

## 2020-11-02 NOTE — PROGRESS NOTES
[facility] Initial In-Person    Met with Tiny while at the clinic to get her NPLATE.  Tiny has been taking CellCept which is prescribed by MD at [facility]. Per Tiny, she completed this medication and the doctor at [facility] did not give her refills until her next appointment which is 11/10/20.  She said at that time, MD plans to increase her CellCept dose.  Will follow up with her after her [facility] visit next week.  She reports that while taking CellCept, she had some stomach pains and some slight dizziness but that it was tolerable.  CCA signed.    Labs today:      11/2/2020   WBC 3.40 - 10.80 10*3/mm3 7.46   Neutrophils Absolute 1.70 - 7.00 10*3/mm3 4.68   Hemoglobin 12.0 - 15.9 g/dL 9.5 (A)   Hematocrit 34.0 - 46.6 % 30.8 (A)   Platelets 140 - 450 10*3/mm3 256

## 2020-11-07 ENCOUNTER — TELEPHONE (OUTPATIENT)
Dept: ONCOLOGY | Facility: CLINIC | Age: 35
End: 2020-11-07

## 2020-11-07 NOTE — TELEPHONE ENCOUNTER
11/9/20 - [facility] - [phone] - DR. PATEL RECORDS FROM 9/1 -9/30 ARE AVAILABLE PER PHONE CALL FROM [facility]  ================================================    11/6/20 - I LEFT MESSAGE DR. PATEL  - PLEASE FAX RECORDS

## 2020-11-09 ENCOUNTER — APPOINTMENT (OUTPATIENT)
Dept: LAB | Facility: HOSPITAL | Age: 35
End: 2020-11-09

## 2020-11-09 ENCOUNTER — HOSPITAL ENCOUNTER (OUTPATIENT)
Dept: ONCOLOGY | Facility: HOSPITAL | Age: 35
Setting detail: INFUSION SERIES
Discharge: HOME | End: 2020-11-09

## 2020-11-09 ENCOUNTER — OFFICE VISIT (OUTPATIENT)
Dept: ONCOLOGY | Facility: CLINIC | Age: 35
End: 2020-11-09

## 2020-11-09 VITALS
BODY MASS INDEX: 23.89 KG/M2 | TEMPERATURE: 98.6 F | HEART RATE: 76 BPM | SYSTOLIC BLOOD PRESSURE: 105 MMHG | WEIGHT: 129.8 LBS | RESPIRATION RATE: 16 BRPM | HEIGHT: 62 IN | DIASTOLIC BLOOD PRESSURE: 71 MMHG

## 2020-11-09 DIAGNOSIS — D69.3 IMMUNE THROMBOCYTOPENIC PURPURA (HCC): Primary | ICD-10-CM

## 2020-11-09 LAB
BASOPHILS # BLD AUTO: 0.05 10*3/MM3 (ref 0–0.2)
BASOPHILS NFR BLD AUTO: 0.7 % (ref 0–1.5)
DEPRECATED RDW RBC AUTO: 40.9 FL (ref 37–54)
EOSINOPHIL # BLD AUTO: 0.09 10*3/MM3 (ref 0–0.4)
EOSINOPHIL NFR BLD AUTO: 1.2 % (ref 0.3–6.2)
ERYTHROCYTE [DISTWIDTH] IN BLOOD BY AUTOMATED COUNT: 14.6 % (ref 12.3–15.4)
HCT VFR BLD AUTO: 31.8 % (ref 34–46.6)
HGB BLD-MCNC: 9.9 G/DL (ref 12–15.9)
LYMPHOCYTES # BLD AUTO: 1.54 10*3/MM3 (ref 0.7–3.1)
LYMPHOCYTES NFR BLD AUTO: 20.4 % (ref 19.6–45.3)
MCH RBC QN AUTO: 24.8 PG (ref 26.6–33)
MCHC RBC AUTO-ENTMCNC: 31.1 G/DL (ref 31.5–35.7)
MCV RBC AUTO: 79.5 FL (ref 79–97)
MONOCYTES # BLD AUTO: 0.49 10*3/MM3 (ref 0.1–0.9)
MONOCYTES NFR BLD AUTO: 6.5 % (ref 5–12)
NEUTROPHILS NFR BLD AUTO: 5.38 10*3/MM3 (ref 1.7–7)
NEUTROPHILS NFR BLD AUTO: 71.2 % (ref 42.7–76)
PLATELET # BLD AUTO: 111 10*3/MM3 (ref 140–450)
PMV BLD AUTO: 11.5 FL (ref 6–12)
RBC # BLD AUTO: 4 10*6/MM3 (ref 3.77–5.28)
WBC # BLD AUTO: 7.55 10*3/MM3 (ref 3.4–10.8)

## 2020-11-09 PROCEDURE — 99215 OFFICE O/P EST HI 40 MIN: CPT | Performed by: INTERNAL MEDICINE

## 2020-11-09 PROCEDURE — 36415 COLL VENOUS BLD VENIPUNCTURE: CPT | Performed by: INTERNAL MEDICINE

## 2020-11-09 PROCEDURE — 85025 COMPLETE CBC W/AUTO DIFF WBC: CPT | Performed by: INTERNAL MEDICINE

## 2020-11-09 NOTE — PROGRESS NOTES
HEMATOLOGY ONCOLOGY FOLLOW UP        Patient name: Tiny Akhtar  : 1985  MRN: [id number]  Primary Care Physician: Jose Ingram APRN  Referring Physician: No ref. provider found  Reason For Consult:     Chief Complaint   Patient presents with   • Follow-up     Immune thrombocytopenic purpura      Refractory ITP  History of Present Illness:  Tiny Akhtar is a 34 y.o. female with a past medical history of thrombocytopenia, anemia, and kidney stones.  She presented to [facility] Emergency Department on 2019 after she had blood work drawn earlier in the day that showed a platelet count of 9,000 at this facility.  She had previously been admitted through the ED on 2019 reporting bruising on her ankles and in her mouth.  She received 2 units of platelets and started on steroids. She was discharged on 19.The patient's last platelet count was 17,000 on 19 after a platelet transfusion.  She had been on steroids for four days.  The patient reported continued  subcutaneous bruising. She denied any headaches, nosebleeds, or gingival bleeding.  She was admitted for IV steroids and further management.     · 10/7/2019 CT abdomen and pelvis: 3 mm stone at the left ureterovesical junction causing mild to moderate obstruction.Spleen reported as normal size.  · 10/7/2019 WBC 10.2, hemoglobin 12.2, platelets 268  · 2019 WBC 5.7, hemoglobin 12.8, Platelets 5000,  Transfused 2 units platelets.  initial presentation for diagnosis acute thrombocytopenia,. Started on Solumedrol 125 mg IV Q6Hr  · 2019 platelets 5000.  Patient transfused with platelets.  Platelets increased to 13,000  · 19  Bone marrow biopsy - Smears of bone marrow aspirate with cell block (clot) preparation and marrow biopsy:  Hypocellular for age bone marrow (50%),  Adequate numbers of unremarkable megakaryocytes,  Absent iron stores,  Negative for involvement by malignant  lymphoma.  Flow cytometry-CD 56 expression on granulocytes and monocytes.  Chromosomal analysis -46XX  · 11/20/2019 platelet count 17,000 . Switched to Prednisone 1 mg/kg ( 50 mg daily)  · 11/21/2019 platelets 17,000  · 11/22/2019  Readmitted . Restarted Solumedrol  mg Q6H . platelets 9000  · 11/23/19 platelets 13,000  · 11/23/2019 hematology/Oncology was consulted on this patient known to our service and previously consulted for acute thrombocytopenia on 11/18/19. She was followed by Uri Sagastume MD for ITP. The patient had been transfused and started on IV steroids Prednisone while inpatient 11/18/19 to 11/20/19.  She was transitioned to oral steroids at discharge.   · 11/23/2019 Hepatitis panel negative, TSH 0.33, H. pylori IgG 1.43 High, IgM negative, JOSE negative, HIV antibodies negative  · 11/23/2019 patient received IV Venofer.  Patient received meningococcal vaccine, Haemophilus vaccine, pneumococcus vaccine  · 11/23/2019 platelets 13,000  · 11/24/2019 platelets 52,000.  Platelets responded to steroids. On IV solumedrol. Started on Megace 40 mg po QID for menorrhagia.  · Pt discharged on Prednisone 50 mg  · 11/27/2019 platelets 8000.  Patient's failed steroids ( on prednisone 50 mg daily).. Started Tranexamic acid 1300 mg TID  · 11/29/2019  READMITTED platelets 12,000  · 11/29/19-Cycle 1 Day one of RITUXIMAB and continues on megestrol  · 11/30/2019 WBC 11.2, hemoglobin 12.1, platelets 6000  · 12/1/2019 platelets 11,000.  Patient received IVIG 30 g  · 12/2/2019 iron 72, iron saturation 17% low, TIBC 416, ferritin 231, platelets 56,000  · 12/3/2019 platelets 129,000  · 12/4/2019 Prednisone decreased to 40 mg daily  · 12/6/2019 patient received week 2 of Rituximab.  Platelets 24,000, hemoglobin 11.8  · 12/8/2019 platelets 5000, hemoglobin 11.6, PT 10.5, INR 1.  Patient received IVIG 50 g  · 12/9/2019 platelets 11,000  · 12/11/2019 platelets 16,000  · 12/13/2019: Platelets 13,000 Patient  received week 3 of her Rituximab. Prednisone decreased to 20 mg daily  · 12/14/2019 patient received IVIG 55 g, platelets 12,000.   · 12/16/2019 platelets 24,000. Prednisone decreased to 10 mg daily  · 12/18/2019 platelets 12,000  · 12/19/2019 platelets 11,000  · 12/20/19-week 4 Rituximab and Megestrol, platelets 12,000. Prednisone DCed.  · 12/26/2019 platelets 7000  · 12/27/19-patient was started on NPLATE   1 mcg/kg, platelets 8000  · 12/30/2019 platelets 6000.  After transfusion 31,000  · 01/03/2020- NPLATE 2 mcg/kg, platelets 10,000  · 01/10/2020-NPLATE 3 mcg/kg.  continues on Megace megestrol, platelets 4000  · 1/15/2020: WBC 5.9, hemoglobin 12.5, platelets 2000k,   · 1/15/2020: Serum copper 114 normal, IgA 81 normal,Anticardiolipin antibodies negative, beta-2 glycoprotein antibodies negative, phosphatidylserine antibodies negative,  · 1/16/2020 patient received IVIG 55 g, platelets 4000  · 1/17/2020 patient received IVIG 55 g, platelets 36,000  · 01/17/2020: IVIG WBC 4.27, hemoglobin, 11.5, platelets  01316  · 1/20/2020 patient received Nplate 6 mcg/kg, WBC 5.1, hemoglobin 11.9, platelets 44,  · 1/27/2020 patient received Nplate 6 mcg/kg platelets 100,000  · 1/29/2020 platelets 74,000  · 1/30/2020 patient seen at [facility] hematology for second opinion.  Recommended to continue course of current treatment.  · 2/3/2020 patient received Nplate 6 mcg/kg.  WBC 5.9, hemoglobin 12, platelets 81k  · 2/10/2020 patient received Nplate 7 mcg/kg platelets 34,000   · 2/12/2020 WBC 5.1, hemoglobin 12.8, platelets 24K  · 2/13/2020: Patient advised to start dexamethasone 40 mg x 4 days course   · 2/17/2020: Platelets 11,000  · 2/21/2020 platelets 0.  Patient received IVIG 1 g/kg.  60 g  · 2/21/2020: Patient transfused platelets   · 2/22/2020 platelets 65K. , hemoglobin 10.4, patient received IVIG 60 g  · 2/24/2020 platelets 231K, hemoglobin 10.6, patient received Nplate 8 mcg/kg  · 3/5/2020: WBC 5.3,  hemoglobin 11.4, platelets 65,000.  Patient received Nplate 8 mcg/kg  · 3/11/2020 hemoglobin 12.1, platelets 97,000  · 3/13/2020 WBC 5.2, hemoglobin 11.6, platelets 106.  Patient received Nplate 500 micrograms  · 3/18/2020 WBC 5.7, hemoglobin 12.3, platelets 25,000, patient received Nplate 500 mcg  · 3/20/2020 WBC 5.7, hemoglobin 12.4, platelets 56,000,  · 3/25/2020 platelets 32,000, received Nplate 500 mcg  · 4/2/2020: WBC 7.3, hemoglobin 11.9, platelets 43,000, MCV 85.8  · 4/3/2020 platelets 27, Nplate 400 mcg  · 4/9/2020 platelets 24k  · 4/10/2020 platelets 35K.  Nplate 590 mcg  · 4/17/2020: Platelets 90829, Nplate 500 mcg  · 4/21/2020: Platelets 2000, patient received IVIG 60 g  · 4/22/2020 platelets 77K, hemoglobin 10.7, WBC 4.1, patient received IVIG 60 g  · 4/24/2020: Platelets 182K, hemoglobin 10.7, received Nplate 5 mcg  · 5/1/2020: Patient received Nplate 400 mcg platelets 179  · 5/8/2020: Patient received Nplate 590 mcg, platelets 33,000  · 5/13/2020: Platelets 46,000  · 5/15/2020 patient received Nplate, platelets 64,000  · 5/22/2020: Platelets 28,000  · 5/28/2020 patient received Nplate 500 mcg.  Platelets 45,000  · 5/29/2020 platelets 26,000.    · 6/1/2020: Platelets 11,000.  Nplate discontinued due to treatment failure.  Patient received IVIG 1 g/kg  · 6/2/2020 platelets 117  · 6/5/2020: Platelets 123K  · 6/16/2020 platelets 8000.  Patient received IVIG 1 g/kg   · 6/17/2020 platelets 47,000.  Patient received IVIG 1 g/kg  · 6/18/2020: Patient started taking fostamatinib  · 6/25/2020: Platelets 33,000.  WBC 5.5, hemoglobin 11.5, platelets 33, creatinine 1.6,  · 7/6/2020: WBC 5.06, hemoglobin 12.1, platelets 14,000,  · 7/9/2020: WBC 4.4, hemoglobin 12.0, platelets 11,000, patient received IVIG 1 g/kg  · 7/10/2020: Patient received IVIG 1 g/kg, platelet count 45,000  · 7/16/2020: WBC 2.27, hemoglobin 10.6, platelets 36,000  · 7/24/2020 patient received IVIG 1 g/kg, WBC 3.3, hemoglobin 11.7, platelet  count 8000, IgA 74, IgG 2180, IgM 111,   · 7/30/2020: WBC 4.17, Hgb 11.6,  21K  · 8/3/2020 patient received IVIG 1 g/kg, WBC 4.1, hemoglobin 11.8, platelet count 7000  · 8/4/2020: WBC 2.98, hemoglobin 10.4, platelet count 39,000, IVIG 1 g/kg  · 8/5/2020 WBC 3.6, hemoglobin 10.1, platelet count 53,000, patient received Nplate 500 mcg  · 8/14/2020 platelet count 206, hemoglobin 10.7, Nplate 500 mcg  · 9/2/2020: WBC 5.5, hemoglobin 10.1, platelet count 3000.  Patient received IVIG 1 g/kg  · 9/3/2020: Platelet count 50,000 patient received IVIG 1 g/kg  · 9/11/2020: WBC 3.77, hemoglobin 9.1 platelet count 29,000  · 9/14/2020: Patient started CellCept.  · 9/24/2020: Bone marrow biopsy: No diagnostic evidence of hemopoietic neoplasia identified.  Normocellular bone marrow for age 60 to 70% with trilineage hematopoiesis.  Increased megakaryocytes.  No increase in blasts.  No significant increase in reticulin fibers.  No storage iron.  Cytogenetics normal karyotype 46XX.. Flow cytometry shows CD56 expression on granulocytes and monocytes..  This is a nonspecific finding and could be reactive/regenerative process as well as neoplastic process.  · 9/25/2020: Patient received Nplate 500 mcg, WBC 5.08, hemoglobin 10, platelets 5000  · 10/6/2020 patient received Nplate 500 mcg.  WBC 4.45, hemoglobin 9.2, platelets 10,000  · 10/23/2020: WBC 5.7, hemoglobin 9, platelets 0.  Given IVIG  · 10/23/2020 platelets 34,000, after IVIG and platelet transfusion.  Nplate 500 mcg  · 10/26/2020: WBC 7.3, hemoglobin 9.9, MCV 78.1, platelets 31,000  · 11/2/2020: WBC 7.4, hemoglobin 9.5, MCV 81.1, platelets 256, Nplate 500 mcg  · 11/9/2020: WBC 7.5, hemoglobin 9.9, platelets 111, MCV 79.5         Subjective:  Patient is here for a follow up regarding ITP.  She recently received IVIG on 10/23/2020 and also a dose of Nplate.  After that her platelet count has improved to 256K.  She has been off CellCept for the past 1 month.  She will lost insurance  therefore could not get the refill..  Discussed that she would need splenectomy since her platelets are again dropping.  Patient is agreeable for splenectomy but only through laparoscopy.    Past Medical History:   Diagnosis Date   • Anxiety    • Iron deficiency anemia    • Kidney stone 10/2019     No past surgical history on file.      Current Outpatient Medications:   •  acetaminophen (TYLENOL) 325 MG tablet, Take 650 mg by mouth Every 6 (Six) Hours As Needed for Mild Pain  or Moderate Pain ., Disp: , Rfl:   •  ferrous gluconate (FERGON) 324 MG tablet, Take 1 tablet by mouth Daily With Breakfast., Disp: 30 tablet, Rfl: 3  •  megestrol (MEGACE) 20 MG tablet, Take 20 mg by mouth 3 (Three) Times a Day., Disp: , Rfl:   •  Multiple Vitamins-Minerals (multivitamin with minerals) tablet tablet, Take 1 tablet by mouth Daily., Disp: , Rfl:   •  ondansetron (ZOFRAN) 8 MG tablet, Take 1 tablet by mouth 3 (Three) Times a Day As Needed for Nausea or Vomiting., Disp: 30 tablet, Rfl: 5  •  pantoprazole (PROTONIX) 20 MG EC tablet, , Disp: , Rfl:     No Known Allergies    Family History   Problem Relation Age of Onset   • Thrombocytopenia Nephew         ITP   • Diabetes Mother    • Diabetes Father    • Cancer Paternal Grandmother      Cancer-related family history includes Cancer in her paternal grandmother.    Social History     Tobacco Use   • Smoking status: Never Smoker   • Smokeless tobacco: Never Used   Substance Use Topics   • Alcohol use: No     Frequency: Never   • Drug use: No     I have reviewed the history of present illness, past medical history, family history, social history, lab results, all notes and other records since the patient was last seen on  11/21/2019.    ROS:     Review of Systems   Constitutional: Negative for activity change, appetite change, chills and fever.   HENT: Negative for ear pain, mouth sores, nosebleeds and sore throat.    Eyes: Negative for photophobia and visual disturbance.   Respiratory:  "Negative for wheezing and stridor.    Cardiovascular: Negative for chest pain and palpitations.   Gastrointestinal: Negative for abdominal pain, diarrhea, nausea and vomiting.   Endocrine: Negative for cold intolerance and heat intolerance.   Genitourinary: Negative for dysuria and hematuria.   Musculoskeletal: Negative for joint swelling and neck stiffness.   Skin: Negative for color change and rash.   Allergic/Immunologic: Negative for food allergies.   Neurological: Negative for seizures and syncope.   Hematological: Negative for adenopathy. Does not bruise/bleed easily.        No obvious bleeding   Psychiatric/Behavioral: Negative for agitation, confusion and hallucinations.     Objective:    Vitals:    11/09/20 1445   BP: 105/71   Pulse: 76   Resp: 16   Temp: 98.6 °F (37 °C)   Weight: 58.9 kg (129 lb 12.8 oz)   Height: 157.5 cm (62\")   PainSc: 0-No pain     ECOG  (0) Fully active, able to carry on all predisease performance without restriction    Physical Exam:   Physical Exam   Constitutional: She is oriented to person, place, and time. No distress.   HENT:   Head: Normocephalic and atraumatic.   Eyes: Conjunctivae are normal. Right eye exhibits no discharge. Left eye exhibits no discharge. No scleral icterus.   Neck: Normal range of motion. Neck supple. No thyromegaly present.   Cardiovascular: Normal rate, regular rhythm and normal heart sounds. Exam reveals no gallop and no friction rub.   Pulmonary/Chest: Effort normal. No stridor. No respiratory distress. She has no wheezes.   Abdominal: Soft. Normal appearance and bowel sounds are normal. She exhibits no mass. There is no abdominal tenderness. There is no rebound and no guarding.   Musculoskeletal: Normal range of motion. No tenderness or signs of injury.   Lymphadenopathy:     She has no cervical adenopathy.   Neurological: She is alert and oriented to person, place, and time. She exhibits normal muscle tone.   Skin: Skin is warm. No bruising and no " rash noted. She is not diaphoretic. No erythema.   Psychiatric: Her behavior is normal. Mood and thought content normal.   Nursing note and vitals reviewed.    I have reexamined the patient and the results are consistent with the previously documented exam. Forrest Llanes MD     Lab Results - Last 18 Months   Lab Units 11/09/20  1419 11/02/20  0814 10/26/20  0927   WBC 10*3/mm3 7.55 7.46 7.33   HEMOGLOBIN g/dL 9.9* 9.5* 9.9*   HEMATOCRIT % 31.8* 30.8* 31.3*   PLATELETS 10*3/mm3 111* 256 31*   MCV fL 79.5 81.1 78.1*     Lab Results - Last 18 Months   Lab Units 07/16/20  1329 06/25/20  0959 12/08/19 2022 11/29/19  1217   SODIUM mmol/L 137 137 137   < > 136   POTASSIUM mmol/L 3.8 3.8 3.6   < > 4.2   CHLORIDE mmol/L 100 101 100   < > 98   CO2 mmol/L 25.0 27.0 26.0   < > 23.0   BUN  13 13 17   < > 17   CREATININE mg/dL 0.73 0.67 0.55*   < > 0.68   CALCIUM mg/dL 9.2 9.6 9.2   < > 9.5   BILIRUBIN mg/dL 0.6 0.5  --   --  0.3   ALK PHOS U/L 45 42  --   --  36*   ALT (SGPT) U/L 16 11  --   --  31   AST (SGOT) U/L 27 20  --   --  16   GLUCOSE mg/dL 95 69 103*   < > 93    < > = values in this interval not displayed.     Lab Results   Component Value Date    GLUCOSE 95 07/16/2020    BUN  07/16/2020      Comment:      Testing performed by alternate method    BUN 13 07/16/2020    CREATININE 0.73 07/16/2020    EGFRIFNONA 92 07/16/2020    BCR  07/16/2020      Comment:      Testing not performed    K 3.8 07/16/2020    CO2 25.0 07/16/2020    CALCIUM 9.2 07/16/2020    PROTENTOTREF 5.9 (L) 11/23/2019    ALBUMIN 4.60 07/16/2020    LABIL2 1.5 11/23/2019    AST 27 07/16/2020    ALT 16 07/16/2020     Lab Results - Last 18 Months   Lab Units 09/24/20  1118 04/10/20  0841 12/08/19 2022 11/18/19  0918   INR  0.99 1.02 1.00 1.00   APTT seconds 25.4 24.5  --  23.5*     Lab Results   Component Value Date    IRON 72 12/02/2019    TIBC 416 12/02/2019    FERRITIN 231.70 (H) 12/02/2019       Lab Results   Component Value Date    FOLATE 11.20  11/23/2019     No results found for: OCCULTBLD    Lab Results   Component Value Date    RETICCTPCT 3.13 (H) 12/01/2019     Lab Results   Component Value Date    GIMSZAHZ96 461 11/23/2019     No results found for: SPEP, UPEP  LDH   Date Value Ref Range Status   11/23/2019 123 (L) 135 - 214 U/L Final     Lab Results   Component Value Date    JOSE Negative 11/23/2019     Lab Results   Component Value Date    HAPTOGLOBIN 136 11/23/2019     Lab Results   Component Value Date    PTT 25.4 09/24/2020    INR 0.99 09/24/2020     No results found for:   No results found for: CEA  No components found for: CA-19-9  No results found for: PSA    Assessment/Plan     Assessment:    1. Critical thrombocytopenia: Platelet count 0 on 10/23/2020  2. Refractory idiopathic thrombocytopenic purpura ITP. : Refractory to steroids: She was diagnosed on 11/18/19  by mouth.  S/p bone marrow biopsy 11/18/19 - hypocellular for age bone marrow (50%),  adequate numbers of unremarkable megakaryocytes,  absent iron stores,  negative for involvement by malignant lymphoma.  Flow cytometry-CD 56 expression on granulocytes and monocytes.  . Normal karyotype 46XX.  HIV testing negative. TSH normal.   She does have a transient responses to IVIG.  Received 4 weekly doses of Rituximab starting 11/29/2019 to 12/20/2019.  She did not respond to rituximab right away and still awaiting response.  Started on Nplate 12/27/2019 at 1 mcg/kg.  Dose was escalated and patient seem to have erratic treatment response.  She continues to require IVIG occasionally while on Nplate.  Nplate was discontinued by end of May 2020 due to treatment failure and she was switched to fostamatinib which was started on 6/18/2020.  However she has not had enough response to therapy yet.  IVIG requirements are increasing.  Patient failed fostamatinib and was DCed at the end of July 2020.  She resumed Nplate on 8/5/2020 and had response.  Patient started CellCept on 9/14/2020.  Repeat  bone marrow biopsy 9/24/2020 unremarkable.  Continued Nplate 9/25/2020, 10/6/2020, but platelets dropped to 0 as of 10/23/2020.  3. Leukopenia/neutropenia: Continues to have slight leukopenia even after discontinuing fostamatinib..  4. Anemia: History of HAILE:  Bone marrow biopsy on 11/18/19 showed absent iron stores.  Patient was given iron replacement.  Repeat bone marrow biopsy shows absent iron stores.  Repeat bone marrow biopsy 9/24/2020 shows absent iron stores.  2. History of iron deficiency anemia - S/p Venofer 400 mg IV x 1 dose on 11/23/19  3. Left renal stone: 3 mm kidney stone visualized on CT A/P on 10/7/19.  Asymptomatic.  4. Anxiety/weight loss:  management per Primary team   5. Hx of Menstrual bleeding: LMPD 11/23/19 -On Megace  to suppress menstrual cycle . Also on Lysteda (tranexamic acid)     PLAN  1. Although patient has responded to IVIG/Nplate recently, her platelets have again started trending down.  It is very clear at this time that she is heading to another platelet drop within the next few days to week.  At this point we need to proceed to splenectomy, when platelets are stable at a reasonable range, that way we avoid any surgical complications.  2. Continue Nplate weekly.  3. Patient now has Medicare.  4. CellCept will be on hold, since we are not pursuing splenectomy.  I did discuss the case with Dr. Heredia.  5. Discussed case with surgeon Dr. Akhtar.  Patient wants to only get laparoscopic splenectomy.  6. Patient has failed rechallenge with dexamethasone.  No plans to reinitiate steroids.   7. Iron deficiency: Continue iron sulfate p.o..  May need IV iron.  8. Recommend platelet transfusions only if platelets extremely low ( less than 2K) or if patient has bleeding.  9. Continue prn Megace as needed  for vaginal bleeding.  10. Continue prn Lysteda, as needed  for platelets less than 10,000.  11. Will need to admit pt to the hospital for splenectomy.  Contacted hospitalist.  Discussed the  case with him.        Immune thrombocytopenic purpura (CMS/HCC)  - CBC & Differential  - CBC Auto Differential    Orders Placed This Encounter   Procedures   • CBC Auto Differential   • CBC & Differential          I have reviewed and confirmed the accuracy of the patient's history: Chief complaint, HPI, ROS and Subjective as entered by the MA/LPN/RN. Forrest Llanes MD 11/09/20     Thank you very much for providing the opportunity to participate in this patient’s care. Please do not hesitate to call if there are any other questions.      I have spent greater than 40 minutes in patient encounter and over 50% of the time was spent in counseling and coordination of care, including review of imaging, pathology, indications for treatment, goals of therapy, alternatives and risks, as well as surveillance and potential outcomes.        Electronically signed by Forrest Llanes MD, 11/09/20, 6:06 PM EST.

## 2020-11-10 ENCOUNTER — HOSPITAL ENCOUNTER (INPATIENT)
Facility: HOSPITAL | Age: 35
LOS: 4 days | Discharge: HOME | End: 2020-11-14
Attending: FAMILY MEDICINE | Admitting: SURGERY

## 2020-11-10 ENCOUNTER — ANESTHESIA EVENT (OUTPATIENT)
Dept: PERIOP | Facility: HOSPITAL | Age: 35
End: 2020-11-10

## 2020-11-10 DIAGNOSIS — D69.3 IMMUNE THROMBOCYTOPENIC PURPURA (HCC): Primary | ICD-10-CM

## 2020-11-10 PROBLEM — R04.0 EPISTAXIS NOT DUE TO TRAUMA: Status: RESOLVED | Noted: 2019-11-29 | Resolved: 2020-11-10

## 2020-11-10 PROBLEM — R23.3 PETECHIAL RASH: Status: RESOLVED | Noted: 2019-11-18 | Resolved: 2020-11-10

## 2020-11-10 PROBLEM — R58 ECCHYMOSIS: Status: RESOLVED | Noted: 2019-11-18 | Resolved: 2020-11-10

## 2020-11-10 LAB
ABO GROUP BLD: NORMAL
ALBUMIN SERPL-MCNC: 4.5 G/DL (ref 3.5–5.2)
ALBUMIN/GLOB SERPL: 1.6 G/DL
ALP SERPL-CCNC: 51 U/L (ref 39–117)
ALT SERPL W P-5'-P-CCNC: 9 U/L (ref 1–33)
ANION GAP SERPL CALCULATED.3IONS-SCNC: 10 MMOL/L (ref 5–15)
APTT PPP: 24.6 SECONDS (ref 24–31)
AST SERPL-CCNC: 18 U/L (ref 1–32)
B-HCG UR QL: NEGATIVE
BASOPHILS # BLD AUTO: 0 10*3/MM3 (ref 0–0.2)
BASOPHILS NFR BLD AUTO: 0.9 % (ref 0–1.5)
BILIRUB SERPL-MCNC: 0.3 MG/DL (ref 0–1.2)
BLD GP AB SCN SERPL QL: NEGATIVE
BUN SERPL-MCNC: 17 MG/DL (ref 6–20)
BUN/CREAT SERPL: 25.8 (ref 7–25)
CALCIUM SPEC-SCNC: 9.5 MG/DL (ref 8.6–10.5)
CHLORIDE SERPL-SCNC: 104 MMOL/L (ref 98–107)
CO2 SERPL-SCNC: 26 MMOL/L (ref 22–29)
CREAT SERPL-MCNC: 0.66 MG/DL (ref 0.57–1)
DEPRECATED RDW RBC AUTO: 41.1 FL (ref 37–54)
EOSINOPHIL # BLD AUTO: 0.1 10*3/MM3 (ref 0–0.4)
EOSINOPHIL NFR BLD AUTO: 2.6 % (ref 0.3–6.2)
ERYTHROCYTE [DISTWIDTH] IN BLOOD BY AUTOMATED COUNT: 15.3 % (ref 12.3–15.4)
FERRITIN SERPL-MCNC: 15.58 NG/ML (ref 13–150)
GFR SERPL CREATININE-BSD FRML MDRD: 103 ML/MIN/1.73
GLOBULIN UR ELPH-MCNC: 2.9 GM/DL
GLUCOSE SERPL-MCNC: 94 MG/DL (ref 65–99)
HCT VFR BLD AUTO: 33.9 % (ref 34–46.6)
HGB BLD-MCNC: 10.7 G/DL (ref 12–15.9)
INR PPP: 1.04 (ref 0.93–1.1)
IRON 24H UR-MRATE: 30 MCG/DL (ref 37–145)
IRON SATN MFR SERPL: 6 % (ref 20–50)
LYMPHOCYTES # BLD AUTO: 1.5 10*3/MM3 (ref 0.7–3.1)
LYMPHOCYTES NFR BLD AUTO: 32.3 % (ref 19.6–45.3)
MCH RBC QN AUTO: 24 PG (ref 26.6–33)
MCHC RBC AUTO-ENTMCNC: 31.4 G/DL (ref 31.5–35.7)
MCV RBC AUTO: 76.2 FL (ref 79–97)
MONOCYTES # BLD AUTO: 0.3 10*3/MM3 (ref 0.1–0.9)
MONOCYTES NFR BLD AUTO: 7.3 % (ref 5–12)
NEUTROPHILS NFR BLD AUTO: 2.7 10*3/MM3 (ref 1.7–7)
NEUTROPHILS NFR BLD AUTO: 56.9 % (ref 42.7–76)
NRBC BLD AUTO-RTO: 0 /100 WBC (ref 0–0.2)
PLATELET # BLD AUTO: 85 10*3/MM3 (ref 140–450)
PMV BLD AUTO: 8.8 FL (ref 6–12)
POTASSIUM SERPL-SCNC: 3.6 MMOL/L (ref 3.5–5.2)
PROT SERPL-MCNC: 7.4 G/DL (ref 6–8.5)
PROTHROMBIN TIME: 11.4 SECONDS (ref 9.6–11.7)
RBC # BLD AUTO: 4.45 10*6/MM3 (ref 3.77–5.28)
RH BLD: POSITIVE
SARS-COV-2 RNA PNL SPEC NAA+PROBE: NOT DETECTED
SODIUM SERPL-SCNC: 140 MMOL/L (ref 136–145)
T&S EXPIRATION DATE: NORMAL
TIBC SERPL-MCNC: 489 MCG/DL (ref 298–536)
TRANSFERRIN SERPL-MCNC: 328 MG/DL (ref 200–360)
WBC # BLD AUTO: 4.7 10*3/MM3 (ref 3.4–10.8)

## 2020-11-10 PROCEDURE — 81025 URINE PREGNANCY TEST: CPT | Performed by: NURSE PRACTITIONER

## 2020-11-10 PROCEDURE — 99222 1ST HOSP IP/OBS MODERATE 55: CPT | Performed by: SURGERY

## 2020-11-10 PROCEDURE — 25010000002 IRON SUCROSE PER 1 MG: Performed by: INTERNAL MEDICINE

## 2020-11-10 PROCEDURE — 85730 THROMBOPLASTIN TIME PARTIAL: CPT | Performed by: NURSE PRACTITIONER

## 2020-11-10 PROCEDURE — 85025 COMPLETE CBC W/AUTO DIFF WBC: CPT | Performed by: NURSE PRACTITIONER

## 2020-11-10 PROCEDURE — 82728 ASSAY OF FERRITIN: CPT | Performed by: NURSE PRACTITIONER

## 2020-11-10 PROCEDURE — 84466 ASSAY OF TRANSFERRIN: CPT | Performed by: NURSE PRACTITIONER

## 2020-11-10 PROCEDURE — 85610 PROTHROMBIN TIME: CPT | Performed by: NURSE PRACTITIONER

## 2020-11-10 PROCEDURE — 99222 1ST HOSP IP/OBS MODERATE 55: CPT | Performed by: FAMILY MEDICINE

## 2020-11-10 PROCEDURE — 80053 COMPREHEN METABOLIC PANEL: CPT | Performed by: NURSE PRACTITIONER

## 2020-11-10 PROCEDURE — 86900 BLOOD TYPING SEROLOGIC ABO: CPT | Performed by: NURSE PRACTITIONER

## 2020-11-10 PROCEDURE — 86850 RBC ANTIBODY SCREEN: CPT | Performed by: NURSE PRACTITIONER

## 2020-11-10 PROCEDURE — 87635 SARS-COV-2 COVID-19 AMP PRB: CPT | Performed by: SURGERY

## 2020-11-10 PROCEDURE — 99254 IP/OBS CNSLTJ NEW/EST MOD 60: CPT | Performed by: INTERNAL MEDICINE

## 2020-11-10 PROCEDURE — 83540 ASSAY OF IRON: CPT | Performed by: NURSE PRACTITIONER

## 2020-11-10 PROCEDURE — 86901 BLOOD TYPING SEROLOGIC RH(D): CPT | Performed by: NURSE PRACTITIONER

## 2020-11-10 RX ORDER — MAGNESIUM SULFATE HEPTAHYDRATE 40 MG/ML
2 INJECTION, SOLUTION INTRAVENOUS AS NEEDED
Status: DISCONTINUED | OUTPATIENT
Start: 2020-11-10 | End: 2020-11-14 | Stop reason: HOSPADM

## 2020-11-10 RX ORDER — ONDANSETRON 4 MG/1
4 TABLET, FILM COATED ORAL EVERY 6 HOURS PRN
Status: DISCONTINUED | OUTPATIENT
Start: 2020-11-10 | End: 2020-11-14 | Stop reason: HOSPADM

## 2020-11-10 RX ORDER — POTASSIUM CHLORIDE 20 MEQ/1
40 TABLET, EXTENDED RELEASE ORAL AS NEEDED
Status: DISCONTINUED | OUTPATIENT
Start: 2020-11-10 | End: 2020-11-14 | Stop reason: HOSPADM

## 2020-11-10 RX ORDER — SODIUM CHLORIDE 0.9 % (FLUSH) 0.9 %
10 SYRINGE (ML) INJECTION AS NEEDED
Status: DISCONTINUED | OUTPATIENT
Start: 2020-11-10 | End: 2020-11-14 | Stop reason: HOSPADM

## 2020-11-10 RX ORDER — ACETAMINOPHEN 325 MG/1
650 TABLET ORAL EVERY 4 HOURS PRN
Status: DISCONTINUED | OUTPATIENT
Start: 2020-11-10 | End: 2020-11-11

## 2020-11-10 RX ORDER — LORAZEPAM 0.5 MG/1
0.5 TABLET ORAL ONCE
Status: COMPLETED | OUTPATIENT
Start: 2020-11-10 | End: 2020-11-10

## 2020-11-10 RX ORDER — SODIUM CHLORIDE 0.9 % (FLUSH) 0.9 %
10 SYRINGE (ML) INJECTION EVERY 12 HOURS SCHEDULED
Status: DISCONTINUED | OUTPATIENT
Start: 2020-11-10 | End: 2020-11-14 | Stop reason: HOSPADM

## 2020-11-10 RX ORDER — ALUMINA, MAGNESIA, AND SIMETHICONE 2400; 2400; 240 MG/30ML; MG/30ML; MG/30ML
15 SUSPENSION ORAL EVERY 6 HOURS PRN
Status: DISCONTINUED | OUTPATIENT
Start: 2020-11-10 | End: 2020-11-14 | Stop reason: HOSPADM

## 2020-11-10 RX ORDER — BISACODYL 10 MG
10 SUPPOSITORY, RECTAL RECTAL DAILY PRN
Status: DISCONTINUED | OUTPATIENT
Start: 2020-11-10 | End: 2020-11-11

## 2020-11-10 RX ORDER — ONDANSETRON 2 MG/ML
4 INJECTION INTRAMUSCULAR; INTRAVENOUS EVERY 6 HOURS PRN
Status: DISCONTINUED | OUTPATIENT
Start: 2020-11-10 | End: 2020-11-14 | Stop reason: HOSPADM

## 2020-11-10 RX ORDER — FERROUS SULFATE TAB EC 324 MG (65 MG FE EQUIVALENT) 324 (65 FE) MG
324 TABLET DELAYED RESPONSE ORAL
Status: DISCONTINUED | OUTPATIENT
Start: 2020-11-10 | End: 2020-11-10

## 2020-11-10 RX ORDER — MAGNESIUM SULFATE 1 G/100ML
1 INJECTION INTRAVENOUS AS NEEDED
Status: DISCONTINUED | OUTPATIENT
Start: 2020-11-10 | End: 2020-11-14 | Stop reason: HOSPADM

## 2020-11-10 RX ORDER — CHOLECALCIFEROL (VITAMIN D3) 125 MCG
5 CAPSULE ORAL NIGHTLY PRN
Status: DISCONTINUED | OUTPATIENT
Start: 2020-11-10 | End: 2020-11-14 | Stop reason: HOSPADM

## 2020-11-10 RX ORDER — ACETAMINOPHEN 160 MG/5ML
650 SOLUTION ORAL EVERY 4 HOURS PRN
Status: DISCONTINUED | OUTPATIENT
Start: 2020-11-10 | End: 2020-11-11

## 2020-11-10 RX ORDER — ACETAMINOPHEN 650 MG/1
650 SUPPOSITORY RECTAL EVERY 4 HOURS PRN
Status: DISCONTINUED | OUTPATIENT
Start: 2020-11-10 | End: 2020-11-11

## 2020-11-10 RX ADMIN — FERROUS SULFATE TAB EC 324 MG (65 MG FE EQUIVALENT) 324 MG: 324 (65 FE) TABLET DELAYED RESPONSE at 12:56

## 2020-11-10 RX ADMIN — LORAZEPAM 0.5 MG: 0.5 TABLET ORAL at 12:57

## 2020-11-10 RX ADMIN — Medication 10 ML: at 13:05

## 2020-11-10 RX ADMIN — Medication 10 ML: at 21:09

## 2020-11-10 RX ADMIN — IRON SUCROSE 400 MG: 20 INJECTION, SOLUTION INTRAVENOUS at 18:28

## 2020-11-10 NOTE — PROGRESS NOTES
Discharge Planning Assessment  [facility]     Patient Name: Tiny Akhtar  MRN: [id number]  Today's Date: 11/10/2020    Admit Date: 11/10/2020    Discharge Needs Assessment     Row Name 11/10/20 1320       Living Environment    Lives With  significant other    Current Living Arrangements  home/apartment/condo    Primary Care Provided by  self    Provides Primary Care For  no one    Family Caregiver if Needed  significant other    Quality of Family Relationships  helpful    Able to Return to Prior Arrangements  yes       Resource/Environmental Concerns    Resource/Environmental Concerns  none    Transportation Concerns  car, none       Transition Planning    Patient/Family Anticipates Transition to  home with family    Patient/Family Anticipated Services at Transition  none    Transportation Anticipated  family or friend will provide       Discharge Needs Assessment    Readmission Within the Last 30 Days  no previous admission in last 30 days    Equipment Currently Used at Home  none    Concerns to be Addressed  denies needs/concerns at this time    Anticipated Changes Related to Illness  none    Equipment Needed After Discharge  none        Discharge Plan     Row Name 11/10/20 1321       Plan    Plan  D/C Plan: Home with family.    Patient/Family in Agreement with Plan  yes    Plan Comments   spoke to patient at bedside wearing mask and goggles and keeping distance greater than 6 feet and spent less than 15 minutes in room. Patient lives with sig. other, is IADLs and drives. PCP and pharamcy verified-denies any difficulty affording meds. Denies any d/c needs at this time. Barrier to D/C: splenectomy pending.          Expected Discharge Date and Time     Expected Discharge Date Expected Discharge Time    Nov 13, 2020         Demographic Summary     Row Name 11/10/20 1320       General Information    Admission Type  inpatient    Referral Source  admission list    Reason for Consult  discharge planning     Preferred Language  English     Used During This Interaction  no        Functional Status     Row Name 11/10/20 1320       Functional Status    Usual Activity Tolerance  good    Current Activity Tolerance  good       Functional Status, IADL    Medications  independent    Meal Preparation  independent    Housekeeping  independent    Laundry  independent    Shopping  independent       Mental Status    General Appearance WDL  WDL       Mental Status Summary    Recent Changes in Mental Status/Cognitive Functioning  no changes          Sarina Talbot

## 2020-11-10 NOTE — H&P
[facility] Medicine Services      Patient Name: Tiny Akhtar  : 1985  MRN: [id number]  Primary Care Physician: Jose Ingram APRN  Date of admission: 11/10/2020    Patient Care Team:  Jose Ingram APRN as PCP - General (Family Medicine)          Subjective   History Present Illness     Chief Complaint:  thrombocytopenia      Ms. Akhtar is a 35 y.o. female with a history of immune thrombocytopenic purpura, anemia, and kidney stones who was directly admitted to [facility] on 11/10/2020 due to thrombocytopenia and need for surgery consultation for splenectomy. The patient denies any current complaints.     The patient is a nonsmoker and denies alcohol or illicit drug use. She reports a family history of ITP, diabetes mellitus, cancer, CHF, and stroke.         Review of Systems   All other systems reviewed and are negative.        Personal History     Past Medical History:   Past Medical History:   Diagnosis Date   • Anxiety    • Immune thrombocytopenic purpura (CMS/HCC) 2019   • Iron deficiency anemia    • Kidney stone 10/2019   • Thrombocytopenia (CMS/HCC) 2019       Surgical History:    History reviewed. No pertinent surgical history.    Family History: family history includes Cancer in her paternal grandmother; Diabetes in her father and mother; Heart failure in her maternal grandmother; Stroke in her maternal great-grandmother; Thrombocytopenia in her nephew. Otherwise pertinent FHx was reviewed and unremarkable.     Social History:  reports that she has never smoked. She has never used smokeless tobacco. She reports that she does not drink alcohol or use drugs.      Medications:  Prior to Admission medications    Medication Sig Start Date End Date Taking? Authorizing Provider   acetaminophen (TYLENOL) 325 MG tablet Take 650 mg by mouth Every 6 (Six) Hours As Needed for Mild Pain  or Moderate Pain .    Provider, Historical, MD   ferrous  gluconate (FERGON) 324 MG tablet Take 1 tablet by mouth Daily With Breakfast. 10/1/20   Forrest Llanes MD   megestrol (MEGACE) 20 MG tablet Take 20 mg by mouth 3 (Three) Times a Day.    El Snyder MD   Multiple Vitamins-Minerals (multivitamin with minerals) tablet tablet Take 1 tablet by mouth Daily.    El Snyder MD   ondansetron (ZOFRAN) 8 MG tablet Take 1 tablet by mouth 3 (Three) Times a Day As Needed for Nausea or Vomiting. 5/28/20   Forrest Llanes MD   pantoprazole (PROTONIX) 20 MG EC tablet  1/5/20   El Snyder MD       Allergies:  No Known Allergies      Objective   Objective     Vital Signs        on   ;      There is no height or weight on file to calculate BMI.    Physical Exam  Vitals signs reviewed.   Constitutional:       Appearance: She is normal weight.   HENT:      Head: Normocephalic.      Nose: Nose normal.      Mouth/Throat:      Mouth: Mucous membranes are moist.   Eyes:      Extraocular Movements: Extraocular movements intact.      Conjunctiva/sclera: Conjunctivae normal.      Pupils: Pupils are equal, round, and reactive to light.   Neck:      Musculoskeletal: Normal range of motion and neck supple.   Cardiovascular:      Rate and Rhythm: Normal rate and regular rhythm.      Pulses: Normal pulses.      Heart sounds: Normal heart sounds.   Pulmonary:      Effort: Pulmonary effort is normal.      Breath sounds: Normal breath sounds.   Abdominal:      General: Bowel sounds are normal. There is no distension.      Palpations: Abdomen is soft.      Tenderness: There is no abdominal tenderness.   Musculoskeletal: Normal range of motion.      Right lower leg: No edema.      Left lower leg: No edema.   Skin:     General: Skin is warm and dry.      Capillary Refill: Capillary refill takes less than 2 seconds.   Neurological:      General: No focal deficit present.      Mental Status: She is alert and oriented to person, place, and time.   Psychiatric:         Mood and  Affect: Mood normal.         Behavior: Behavior normal.         Results Review:  I have personally reviewed most recent lab results and agree with findings.    Results from last 7 days   Lab Units 11/09/20  1419   WBC 10*3/mm3 7.55   HEMOGLOBIN g/dL 9.9*   HEMATOCRIT % 31.8*   PLATELETS 10*3/mm3 111*       Brief Urine Lab Results  (Last result in the past 365 days)      Color   Clarity   Blood   Leuk Est   Nitrite   Protein   CREAT   Urine HCG        09/24/20 1119               Negative           Microbiology Results (last 10 days)     ** No results found for the last 240 hours. **          ECG/EMG Results (most recent)     None          No radiology results for the last 7 days    CrCl cannot be calculated (Patient's most recent lab result is older than the maximum 30 days allowed.).      Assessment/Plan   Assessment/Plan       Active Hospital Problems    Diagnosis  POA   • **Thrombocytopenia (CMS/HCC) [D69.6]  Yes   • Iron deficiency anemia [D50.9]  Yes   • Immune thrombocytopenic purpura (CMS/HCC) [D69.3]  Yes      Resolved Hospital Problems   No resolved problems to display.       Thrombocytopenia secondary to Immune thrombocytopenic purpura   -followed as outpatient by hematology, Dr. Llanes---consult  -consult general surgery, Dr. Akhtar, for splenectomy  -check CBC and trend Plts  -patient reports last IVIG approximately 2 weeks ago  -patient reports CellCept on hold    Iron deficiency anemia  -continue iron supplement  -monitor H&H      VTE Prophylaxis -   Mechanical Order History:      Ordered        11/10/20 1010  Place Sequential Compression Device  Once         11/10/20 1010  Maintain Sequential Compression Device  Continuous                 Pharmalogical Order History:     None          CODE STATUS:    Code Status and Medical Interventions:   Ordered at: 11/10/20 1010     Level Of Support Discussed With:    Patient     Code Status:    CPR     Medical Interventions (Level of Support Prior to Arrest):     Full       This patient has been examined wearing appropriate Personal Protective Equipment. 11/10/20      I discussed the patient's findings and my recommendations with patient and family.      Signature: Electronically signed by FARZAD Johnson, 11/10/20, 10:18 AM EST.    [facility]      Attending attestation:    I performed a history and physical examination of the patient. I reviewed the nurse practitioner's note and agree with the documented findings and plan of care.    S:    Patient is a 35-year-old female with history of ITP and chronic kidney stones presenting for splenectomy due to persistent and progressive thrombocytopenia.    O:    Vital signs reviewed    General: Female sitting up in bed breathing comfortably on room air in no acute distress  HEENT: NC/AT, EOMI, mucosa moist  Heart: Regular, rate controlled  Chest: Normal work of breathing moving air well in all lung fields no wheezing or crackles  Abdominal: Soft.  Nontender nondistended unable to appreciate splenomegaly  Musculoskeletal: Normal ROM.  No edema. No calf tenderness.  Neurological: AAOx3, no focal deficits  Skin: Skin is warm and dry. No rash  Psychiatric: Anxious    A/P    ITP-patient direct admitted by hematology service.  Patient with no signs of active bleeding at this time and general surgery consulted for splenectomy  -Trend platelets  -CellCept on hold, previously had IVIG  -General surgery consult  -Platelet transfusion recommended for less than 2000 or active bleeding  -Pharmacy assisting for vaccine timing  -Hematology oncology consult    Anemia-history of iron deficiency  -Monitor with daily CBC  -Can continue iron supplement    Anxiety-patient reports she is very nervous with the prospect of surgery  -Ativan 0.5 mg x 1 can consider bupropion if persistent    Electronically signed by Jassi Will MD, 11/10/20, 1:53 PM EST.

## 2020-11-10 NOTE — PLAN OF CARE
Goal Outcome Evaluation:         Patient admitted this afternoon with plans to go to surgery tomorrow for a splenectomy. Patient has no complaints, VSS, will continue to monitor.

## 2020-11-10 NOTE — PAT
Inpt added to surgery schedule for 11/11/2020 with Dr. Akhtar.  All testing has been ordered, spoke with bedside nurse Hutchinson.

## 2020-11-10 NOTE — CONSULTS
Inpatient General Surgery Consult  Consult performed by: Erick Akhtar MD  Consult ordered by: Annel Null APRN  Reason for consult: ITP refractory to medical therapy          General Surgery Consult Note      Subjective     35-year-old lady who was diagnosed with ITP approximately 1 year ago when she was found to have spontaneous bleeding and petechiae.  She had never had symptoms prior to this.  Other than the bruising she did not have any other issues over the last 1 year with regards to her low platelets.  She has been tried on several medical therapies including oral steroids rituximab CellCept and is currently getting IVIG and nplate monthly.  She frequently has blood draws showing 0 platelets most recently approximately 3 weeks ago.  However her most recent response to IVIG showed a platelet count up to 250 but 1 week later it down to 111.  She also has been working with the [facility] hematologist and is currently enrolled in a trial but the trial started has continued to be pushed back.  She was seen by hematology yesterday and there was concerns that she is going to continue to drop to extremely low levels putting her at risk for ongoing bleeding problems so she was admitted to the hospital for urgent splenectomy.  She had considered splenectomy in the past and in fact is already received her post splenectomy vaccinations.    History  Past Medical History:   Diagnosis Date   • Anxiety    • Immune thrombocytopenic purpura (CMS/HCC) 11/18/2019   • Iron deficiency anemia    • Kidney stone 10/2019   • Thrombocytopenia (CMS/HCC) 11/29/2019     History reviewed. No pertinent surgical history.  Family History   Problem Relation Age of Onset   • Thrombocytopenia Nephew         ITP   • Cancer Paternal Grandmother    • Heart failure Maternal Grandmother    • Diabetes Maternal Great-Grandmother    • Heart failure Maternal Great-Grandmother      Social History     Tobacco Use   • Smoking status:  Never Smoker   • Smokeless tobacco: Never Used   Substance Use Topics   • Alcohol use: No     Frequency: Never   • Drug use: No     Medications Prior to Admission   Medication Sig Dispense Refill Last Dose   • ferrous gluconate (FERGON) 324 MG tablet Take 1 tablet by mouth Daily With Breakfast. 30 tablet 3 11/9/2020 at Unknown time   • ondansetron (ZOFRAN) 8 MG tablet Take 1 tablet by mouth 3 (Three) Times a Day As Needed for Nausea or Vomiting. 30 tablet 5 Past Month at Unknown time   • acetaminophen (TYLENOL) 325 MG tablet Take 650 mg by mouth Every 6 (Six) Hours As Needed for Mild Pain  or Moderate Pain .   Unknown at Unknown time   • megestrol (MEGACE) 20 MG tablet Take 20 mg by mouth 3 (Three) Times a Day.      • Multiple Vitamins-Minerals (multivitamin with minerals) tablet tablet Take 1 tablet by mouth Daily.      • pantoprazole (PROTONIX) 20 MG EC tablet         Allergies:  Patient has no known allergies.    Review of Systems   Constitutional: Negative for chills and fever.   HENT: Negative for sore throat and trouble swallowing.    Eyes: Negative for blurred vision and double vision.   Respiratory: Negative for cough and shortness of breath.    Cardiovascular: Negative for chest pain and leg swelling.   Gastrointestinal: Negative for abdominal distention, abdominal pain, blood in stool, constipation, diarrhea, nausea and vomiting.   Genitourinary: Negative for dysuria and hematuria.   Skin: Negative for rash and bruise.   Neurological: Negative for dizziness and confusion.   Hematological: Bruises/bleeds easily.   Psychiatric/Behavioral: Negative for agitation and depressed mood.        Objective     Vital Signs  Temp:  [98.6 °F (37 °C)] 98.6 °F (37 °C)  Heart Rate:  [76] 76  Resp:  [16] 16  BP: (105)/(71) 105/71    Physical Exam  Constitutional:       Appearance: Normal appearance. She is well-developed.   HENT:      Head: Normocephalic and atraumatic.   Eyes:      General: No scleral icterus.      Conjunctiva/sclera: Conjunctivae normal.   Neck:      Musculoskeletal: No muscular tenderness.      Trachea: No tracheal deviation.   Cardiovascular:      Rate and Rhythm: Normal rate.      Pulses: Normal pulses.   Pulmonary:      Effort: Pulmonary effort is normal. No respiratory distress.   Abdominal:      General: There is no distension.      Palpations: Abdomen is soft.   Musculoskeletal:         General: No swelling or tenderness.   Lymphadenopathy:      Cervical: No cervical adenopathy.   Skin:     General: Skin is warm and dry.   Neurological:      General: No focal deficit present.      Mental Status: She is alert. Mental status is at baseline.   Psychiatric:         Mood and Affect: Mood normal.         Behavior: Behavior normal.         Results Review:  Lab Results (last 24 hours)     Procedure Component Value Units Date/Time    Ferritin [[id number]]  (Normal) Collected: 11/10/20 1017    Specimen: Blood Updated: 11/10/20 1315     Ferritin 15.58 ng/mL     Narrative:      Results may be falsely decreased if patient taking Biotin.      Iron Profile [[id number]]  (Abnormal) Collected: 11/10/20 1017    Specimen: Blood Updated: 11/10/20 1310     Iron 30 mcg/dL      Iron Saturation 6 %      Transferrin 328 mg/dL      TIBC 489 mcg/dL     Comprehensive Metabolic Panel [[id number]]  (Abnormal) Collected: 11/10/20 1017    Specimen: Blood Updated: 11/10/20 1055     Glucose 94 mg/dL      BUN 17 mg/dL      Creatinine 0.66 mg/dL      Sodium 140 mmol/L      Potassium 3.6 mmol/L      Chloride 104 mmol/L      CO2 26.0 mmol/L      Calcium 9.5 mg/dL      Total Protein 7.4 g/dL      Albumin 4.50 g/dL      ALT (SGPT) 9 U/L      AST (SGOT) 18 U/L      Alkaline Phosphatase 51 U/L      Total Bilirubin 0.3 mg/dL      eGFR Non African Amer 103 mL/min/1.73      Globulin 2.9 gm/dL      A/G Ratio 1.6 g/dL      BUN/Creatinine Ratio 25.8     Anion Gap 10.0 mmol/L     Narrative:      GFR Normal >60  Chronic Kidney Disease <60  Kidney Failure  <15      aPTT [[id number]]  (Normal) Collected: 11/10/20 1017    Specimen: Blood Updated: 11/10/20 1044     PTT 24.6 seconds     Protime-INR [[id number]]  (Normal) Collected: 11/10/20 1017    Specimen: Blood Updated: 11/10/20 1044     Protime 11.4 Seconds      INR 1.04    CBC Auto Differential [[id number]]  (Abnormal) Collected: 11/10/20 1017    Specimen: Blood Updated: 11/10/20 1028     WBC 4.70 10*3/mm3      RBC 4.45 10*6/mm3      Hemoglobin 10.7 g/dL      Hematocrit 33.9 %      MCV 76.2 fL      MCH 24.0 pg      MCHC 31.4 g/dL      RDW 15.3 %      RDW-SD 41.1 fl      MPV 8.8 fL      Platelets 85 10*3/mm3      Neutrophil % 56.9 %      Lymphocyte % 32.3 %      Monocyte % 7.3 %      Eosinophil % 2.6 %      Basophil % 0.9 %      Neutrophils, Absolute 2.70 10*3/mm3      Lymphocytes, Absolute 1.50 10*3/mm3      Monocytes, Absolute 0.30 10*3/mm3      Eosinophils, Absolute 0.10 10*3/mm3      Basophils, Absolute 0.00 10*3/mm3      nRBC 0.0 /100 WBC         Imaging Results (Last 24 Hours)     ** No results found for the last 24 hours. **          I reviewed the patient's other test results and agree with the interpretation  I reviewed the patient's new imaging results and agree with the interpretation.    Assessment/Plan     Principal Problem:    Thrombocytopenia (CMS/HCC)  Active Problems:    Immune thrombocytopenic purpura (CMS/HCC)    Iron deficiency anemia    Patient is been admitted for refractory ITP despite aggressive medical therapy.  I have talked to her today about the surgical risks and benefits of splenectomy.  We talked about the steps of the operation including the incisions the intraoperative risks including injury to intra-abdominal contents like the intestines of the stomach we talked about the risk of bleeding and the need for open surgery.  We talked about the anticipated recovery.  We talked about the effectiveness of splenectomy for ITP.  After our discussion she is nervous but she does understand that she  has had highly aggressive medical therapy for the last year and still continues to drift down to platelets of 0 on her lab draws.  Today her platelets are 86,000.  We will go ahead and have her be n.p.o. after midnight and plan for laparoscopic splenectomy tomorrow.    This note was created using Dragon Voice Recognition software.    Erick Akhtar MD  11/10/20  14:30 EST

## 2020-11-10 NOTE — CONSULTS
Hematology/Oncology Inpatient Consultation    Patient name: Tiny Akhtar  : 1985  MRN: [id number]  Referring Provider: Annel Null APRN  Reason for Consultation: Thrombocytopenia    Chief complaint: Surgery consultation for splenectomy    History of present illness:    Tiny Akhtar is a 35 y.o. female who presented to [facility] on 2020 as a direct admit for general surgery consultation for splenectomy.  She has a history of ITP.  Count on admission white blood count 4.7, hemoglobin 10.7, hematocrit 33.9, MCV 76.2, platelets 85,000.    General surgery was consulted for splenectomy.    11/10/20  Hematology/Oncology was consulted for thrombocytopenia.  Patient is known to our service and followed by Dr. Forrest Llanes for history of immune thrombocytopenic purpura.  Patient was seen initially as an inpatient in 2019 when she had a platelet count of 9000.  Patient was initiated on steroid, followed by rituximab, followed by IVIG.  She was then started on Nplate in 2019.  Patient was seen at [facility] hematology for second opinion and was continued on Nplate.  2021 she received IVIG again.  Nplate was discontinued due to treatment failure in 2020 and the patient was initiated on IVIG again.  On 2020 the patient started taking fostamatinib she failed.  She had IVIG in 2020.  Plate was reinitiated in 2020 followed by additional IVIG in 2020.  She was started on CellCept by the [facility] in 2020.  The patient lost insurance and was not able to get a refill.  At office visit on 2020 discussed patient will need splenectomy since her platelets are again dropping.      He  has a past medical history of Anxiety, Immune thrombocytopenic purpura (CMS/HCC) (2019), Iron deficiency anemia, Kidney stone (10/2019), and Thrombocytopenia (CMS/HCC) (2019).    PCP: Jose Ingram,  APRN    History:  Past Medical History:   Diagnosis Date   • Anxiety    • Immune thrombocytopenic purpura (CMS/HCC) 11/18/2019   • Iron deficiency anemia    • Kidney stone 10/2019   • Thrombocytopenia (CMS/HCC) 11/29/2019   , History reviewed. No pertinent surgical history.,   Family History   Problem Relation Age of Onset   • Thrombocytopenia Nephew         ITP   • Cancer Paternal Grandmother    • Heart failure Maternal Grandmother    • Diabetes Maternal Great-Grandmother    • Heart failure Maternal Great-Grandmother    ,   Social History     Tobacco Use   • Smoking status: Never Smoker   • Smokeless tobacco: Never Used   Substance Use Topics   • Alcohol use: No     Frequency: Never   • Drug use: No   ,   Medications Prior to Admission   Medication Sig Dispense Refill Last Dose   • ferrous gluconate (FERGON) 324 MG tablet Take 1 tablet by mouth Daily With Breakfast. 30 tablet 3 11/9/2020 at Unknown time   • ondansetron (ZOFRAN) 8 MG tablet Take 1 tablet by mouth 3 (Three) Times a Day As Needed for Nausea or Vomiting. 30 tablet 5 Past Month at Unknown time   • acetaminophen (TYLENOL) 325 MG tablet Take 650 mg by mouth Every 6 (Six) Hours As Needed for Mild Pain  or Moderate Pain .   Unknown at Unknown time   • megestrol (MEGACE) 20 MG tablet Take 20 mg by mouth 3 (Three) Times a Day.      • Multiple Vitamins-Minerals (multivitamin with minerals) tablet tablet Take 1 tablet by mouth Daily.      • pantoprazole (PROTONIX) 20 MG EC tablet       , Scheduled Meds:  ferrous sulfate, 324 mg, Oral, Daily With Breakfast  LORazepam, 0.5 mg, Oral, Once  sodium chloride, 10 mL, Intravenous, Q12H    , Continuous Infusions:   , PRN Meds:  •  acetaminophen **OR** acetaminophen **OR** acetaminophen  •  aluminum-magnesium hydroxide-simethicone  •  bisacodyl  •  magnesium hydroxide  •  magnesium sulfate **OR** magnesium sulfate in D5W 1g/100mL (PREMIX)  •  melatonin  •  ondansetron **OR** ondansetron  •  potassium chloride  •  sodium  "chloride   Allergies:  Patient has no known allergies.    Subjective     ROS:  Review of Systems   Constitutional: Positive for fatigue. Negative for appetite change, chills, fever and unexpected weight change.   HENT: Negative for congestion, hearing loss, mouth sores, nosebleeds, postnasal drip, rhinorrhea and sore throat.    Eyes: Negative for photophobia, pain and visual disturbance.   Respiratory: Negative for cough, chest tightness, shortness of breath and wheezing.    Cardiovascular: Negative for chest pain, palpitations and leg swelling.   Gastrointestinal: Negative for abdominal distention, abdominal pain, constipation, diarrhea, nausea and vomiting.   Genitourinary: Negative for difficulty urinating, dysuria and hematuria.   Musculoskeletal: Negative for arthralgias, back pain, gait problem and myalgias.   Skin: Negative for pallor and rash.   Neurological: Negative for dizziness, seizures, speech difficulty, weakness, light-headedness, numbness and headaches.   Hematological: Negative for adenopathy. Bruises/bleeds easily.   Psychiatric/Behavioral: Negative for agitation, confusion and dysphoric mood. The patient is not nervous/anxious.    All other systems reviewed and are negative.       Objective   Vital Signs:   Ht 157.5 cm (62\")   Wt 57.1 kg (125 lb 12.8 oz)   LMP 11/04/2020 (Approximate)   BMI 23.01 kg/m²     Physical Exam: (performed by MD)  Physical Exam  Vitals signs reviewed.   Constitutional:       General: She is not in acute distress.     Appearance: Normal appearance. She is well-developed. She is obese. She is not diaphoretic.   HENT:      Head: Normocephalic and atraumatic.      Mouth/Throat:      Pharynx: No oropharyngeal exudate.   Eyes:      Conjunctiva/sclera: Conjunctivae normal.   Neck:      Musculoskeletal: Normal range of motion and neck supple.      Thyroid: No thyromegaly.      Vascular: No JVD.   Cardiovascular:      Rate and Rhythm: Normal rate and regular rhythm.      " Pulses: Normal pulses.      Heart sounds: Normal heart sounds. No murmur.   Pulmonary:      Effort: Pulmonary effort is normal. No respiratory distress.      Breath sounds: Normal breath sounds.   Abdominal:      General: Abdomen is flat. Bowel sounds are normal.      Palpations: Abdomen is soft.      Tenderness: There is no abdominal tenderness. There is no guarding.   Musculoskeletal: Normal range of motion.   Skin:     General: Skin is warm and dry.      Findings: No erythema or rash.   Neurological:      General: No focal deficit present.      Mental Status: She is alert and oriented to person, place, and time.      Sensory: No sensory deficit.   Psychiatric:         Mood and Affect: Mood normal.         Behavior: Behavior normal.         Thought Content: Thought content normal.       Results Review:  Lab Results (last 48 hours)     Procedure Component Value Units Date/Time    Comprehensive Metabolic Panel [[id number]]  (Abnormal) Collected: 11/10/20 1017    Specimen: Blood Updated: 11/10/20 1055     Glucose 94 mg/dL      BUN 17 mg/dL      Creatinine 0.66 mg/dL      Sodium 140 mmol/L      Potassium 3.6 mmol/L      Chloride 104 mmol/L      CO2 26.0 mmol/L      Calcium 9.5 mg/dL      Total Protein 7.4 g/dL      Albumin 4.50 g/dL      ALT (SGPT) 9 U/L      AST (SGOT) 18 U/L      Alkaline Phosphatase 51 U/L      Total Bilirubin 0.3 mg/dL      eGFR Non African Amer 103 mL/min/1.73      Globulin 2.9 gm/dL      A/G Ratio 1.6 g/dL      BUN/Creatinine Ratio 25.8     Anion Gap 10.0 mmol/L     Narrative:      GFR Normal >60  Chronic Kidney Disease <60  Kidney Failure <15      aPTT [[id number]]  (Normal) Collected: 11/10/20 1017    Specimen: Blood Updated: 11/10/20 1044     PTT 24.6 seconds     Protime-INR [[id number]]  (Normal) Collected: 11/10/20 1017    Specimen: Blood Updated: 11/10/20 1044     Protime 11.4 Seconds      INR 1.04    CBC Auto Differential [[id number]]  (Abnormal) Collected: 11/10/20 1017    Specimen: Blood  Updated: 11/10/20 1028     WBC 4.70 10*3/mm3      RBC 4.45 10*6/mm3      Hemoglobin 10.7 g/dL      Hematocrit 33.9 %      MCV 76.2 fL      MCH 24.0 pg      MCHC 31.4 g/dL      RDW 15.3 %      RDW-SD 41.1 fl      MPV 8.8 fL      Platelets 85 10*3/mm3      Neutrophil % 56.9 %      Lymphocyte % 32.3 %      Monocyte % 7.3 %      Eosinophil % 2.6 %      Basophil % 0.9 %      Neutrophils, Absolute 2.70 10*3/mm3      Lymphocytes, Absolute 1.50 10*3/mm3      Monocytes, Absolute 0.30 10*3/mm3      Eosinophils, Absolute 0.10 10*3/mm3      Basophils, Absolute 0.00 10*3/mm3      nRBC 0.0 /100 WBC          Pending Results: Ferritin and iron profile     Imaging Reviewed:   No radiology results for the last 7 days         Assessment/Plan   ASSESSMENT  1. Refractory idiopathic thrombocytopenia purpura: Platelets 85,000 on admission.  Refractory to steroids, Rituxan, N-plate, fostamatinib, and CellCept.  2. Need for splenectomy: 11/23/2019 - Patient received meningococcal vaccine, Haemophilus vaccine, pneumococcus vaccine. Pharmacy consulted for assistance on timing of any further vaccination to complete series.   General surgery, Dr. Erick Akhtar, consulted for splenectomy.  3. History of iron deficiency anemia: Hemoglobin 10.7 on admission.  Patient received IV Venofer in November 2019.  Iron studies pending    PLAN  1. CBC daily   2. Ferritin and iron profile   3. Platelet transfusion recommended only if platelets are extremely low (less than 2000) or if patient has active bleeding General surgery to proceed with splenectomy splenectomy  4. Continue ferrous sulfate  5. Per uptodate - for patients undergoing elective splenectomy, vaccinations should ideally be started approximately 10 to 12 weeks prior to surgery so that the recommended vaccine series can be completed at least 14 days prior to splenectomy. If all recommended vaccine series cannot be completed in this time period, vaccine series can be resumed 14 days after  splenectomy for most patients. Consulted pharmacy for assistance on timing of any further vaccines.   6. General surgery consult for splenectomy    Electronically signed by FARZAD Banegas, 11/10/20, 12:42 PM EST.        I personally reviewed all pertinent labs, related documentation and the  imaging. ROS performed and physical exam done during face to face enounter with patient. I agree with  nurse practitioner's doumentation, assessment and plan.  Patient with a history of severe and refractory ITP has been directly admitted for urgent splenectomy.  She had completed her vaccinations last year.    I discussed the case with the surgeon Dr. Akhtar.  Her platelets are very likely to crash to less than 10,000 before the end of this week.,    Therefore she needs a splenectomy urgently.  Iron and ferritin levels checked and she is on deficient.  Will give Venofer.      Electronically signed by Forrest Llanes MD, 11/11/20, 7:09 PM EST.

## 2020-11-11 ENCOUNTER — ANESTHESIA (OUTPATIENT)
Dept: PERIOP | Facility: HOSPITAL | Age: 35
End: 2020-11-11

## 2020-11-11 LAB
ANION GAP SERPL CALCULATED.3IONS-SCNC: 11 MMOL/L (ref 5–15)
BASOPHILS # BLD AUTO: 0 10*3/MM3 (ref 0–0.2)
BASOPHILS # BLD AUTO: 0.1 10*3/MM3 (ref 0–0.2)
BASOPHILS NFR BLD AUTO: 0.2 % (ref 0–1.5)
BASOPHILS NFR BLD AUTO: 1.2 % (ref 0–1.5)
BUN SERPL-MCNC: 16 MG/DL (ref 6–20)
BUN/CREAT SERPL: 27.6 (ref 7–25)
CALCIUM SPEC-SCNC: 9.1 MG/DL (ref 8.6–10.5)
CHLORIDE SERPL-SCNC: 107 MMOL/L (ref 98–107)
CO2 SERPL-SCNC: 23 MMOL/L (ref 22–29)
CREAT SERPL-MCNC: 0.58 MG/DL (ref 0.57–1)
DEPRECATED RDW RBC AUTO: 42.4 FL (ref 37–54)
DEPRECATED RDW RBC AUTO: 42.4 FL (ref 37–54)
EOSINOPHIL # BLD AUTO: 0 10*3/MM3 (ref 0–0.4)
EOSINOPHIL # BLD AUTO: 0.1 10*3/MM3 (ref 0–0.4)
EOSINOPHIL NFR BLD AUTO: 0 % (ref 0.3–6.2)
EOSINOPHIL NFR BLD AUTO: 2.7 % (ref 0.3–6.2)
ERYTHROCYTE [DISTWIDTH] IN BLOOD BY AUTOMATED COUNT: 15.6 % (ref 12.3–15.4)
ERYTHROCYTE [DISTWIDTH] IN BLOOD BY AUTOMATED COUNT: 15.7 % (ref 12.3–15.4)
GFR SERPL CREATININE-BSD FRML MDRD: 120 ML/MIN/1.73
GLUCOSE SERPL-MCNC: 94 MG/DL (ref 65–99)
HCT VFR BLD AUTO: 30.6 % (ref 34–46.6)
HCT VFR BLD AUTO: 30.7 % (ref 34–46.6)
HGB BLD-MCNC: 9.6 G/DL (ref 12–15.9)
HGB BLD-MCNC: 9.6 G/DL (ref 12–15.9)
LYMPHOCYTES # BLD AUTO: 0.3 10*3/MM3 (ref 0.7–3.1)
LYMPHOCYTES # BLD AUTO: 2.1 10*3/MM3 (ref 0.7–3.1)
LYMPHOCYTES NFR BLD AUTO: 1.9 % (ref 19.6–45.3)
LYMPHOCYTES NFR BLD AUTO: 38 % (ref 19.6–45.3)
MAGNESIUM SERPL-MCNC: 2.3 MG/DL (ref 1.6–2.6)
MCH RBC QN AUTO: 24 PG (ref 26.6–33)
MCH RBC QN AUTO: 24 PG (ref 26.6–33)
MCHC RBC AUTO-ENTMCNC: 31.4 G/DL (ref 31.5–35.7)
MCHC RBC AUTO-ENTMCNC: 31.5 G/DL (ref 31.5–35.7)
MCV RBC AUTO: 76.3 FL (ref 79–97)
MCV RBC AUTO: 76.3 FL (ref 79–97)
MONOCYTES # BLD AUTO: 0.3 10*3/MM3 (ref 0.1–0.9)
MONOCYTES # BLD AUTO: 0.5 10*3/MM3 (ref 0.1–0.9)
MONOCYTES NFR BLD AUTO: 1.4 % (ref 5–12)
MONOCYTES NFR BLD AUTO: 8.5 % (ref 5–12)
NEUTROPHILS NFR BLD AUTO: 17.2 10*3/MM3 (ref 1.7–7)
NEUTROPHILS NFR BLD AUTO: 2.7 10*3/MM3 (ref 1.7–7)
NEUTROPHILS NFR BLD AUTO: 49.6 % (ref 42.7–76)
NEUTROPHILS NFR BLD AUTO: 96.5 % (ref 42.7–76)
NRBC BLD AUTO-RTO: 0 /100 WBC (ref 0–0.2)
NRBC BLD AUTO-RTO: 0.1 /100 WBC (ref 0–0.2)
PLATELET # BLD AUTO: 133 10*3/MM3 (ref 140–450)
PLATELET # BLD AUTO: 60 10*3/MM3 (ref 140–450)
PMV BLD AUTO: 9.4 FL (ref 6–12)
PMV BLD AUTO: 9.9 FL (ref 6–12)
POTASSIUM SERPL-SCNC: 3.8 MMOL/L (ref 3.5–5.2)
RBC # BLD AUTO: 4.01 10*6/MM3 (ref 3.77–5.28)
RBC # BLD AUTO: 4.02 10*6/MM3 (ref 3.77–5.28)
SODIUM SERPL-SCNC: 141 MMOL/L (ref 136–145)
WBC # BLD AUTO: 17.8 10*3/MM3 (ref 3.4–10.8)
WBC # BLD AUTO: 5.5 10*3/MM3 (ref 3.4–10.8)

## 2020-11-11 PROCEDURE — 85025 COMPLETE CBC W/AUTO DIFF WBC: CPT | Performed by: INTERNAL MEDICINE

## 2020-11-11 PROCEDURE — 25010000002 ONDANSETRON PER 1 MG: Performed by: SURGERY

## 2020-11-11 PROCEDURE — 25010000002 HYDROMORPHONE PER 4 MG: Performed by: NURSE ANESTHETIST, CERTIFIED REGISTERED

## 2020-11-11 PROCEDURE — 25010000002 DEXAMETHASONE PER 1 MG: Performed by: NURSE ANESTHETIST, CERTIFIED REGISTERED

## 2020-11-11 PROCEDURE — 25010000002 ONDANSETRON PER 1 MG: Performed by: NURSE PRACTITIONER

## 2020-11-11 PROCEDURE — 80048 BASIC METABOLIC PNL TOTAL CA: CPT | Performed by: NURSE PRACTITIONER

## 2020-11-11 PROCEDURE — 25010000002 FENTANYL CITRATE (PF) 100 MCG/2ML SOLUTION: Performed by: NURSE ANESTHETIST, CERTIFIED REGISTERED

## 2020-11-11 PROCEDURE — 83735 ASSAY OF MAGNESIUM: CPT | Performed by: NURSE PRACTITIONER

## 2020-11-11 PROCEDURE — 38120 LAPAROSCOPY SPLENECTOMY: CPT | Performed by: SURGERY

## 2020-11-11 PROCEDURE — 25010000002 IRON SUCROSE PER 1 MG: Performed by: NURSE PRACTITIONER

## 2020-11-11 PROCEDURE — 85025 COMPLETE CBC W/AUTO DIFF WBC: CPT | Performed by: NURSE PRACTITIONER

## 2020-11-11 PROCEDURE — 25010000002 MORPHINE PER 10 MG: Performed by: SURGERY

## 2020-11-11 PROCEDURE — 07TP4ZZ RESECTION OF SPLEEN, PERCUTANEOUS ENDOSCOPIC APPROACH: ICD-10-PCS | Performed by: SURGERY

## 2020-11-11 PROCEDURE — 88305 TISSUE EXAM BY PATHOLOGIST: CPT | Performed by: SURGERY

## 2020-11-11 PROCEDURE — 99232 SBSQ HOSP IP/OBS MODERATE 35: CPT | Performed by: FAMILY MEDICINE

## 2020-11-11 PROCEDURE — 99232 SBSQ HOSP IP/OBS MODERATE 35: CPT | Performed by: INTERNAL MEDICINE

## 2020-11-11 PROCEDURE — 38120 LAPAROSCOPY SPLENECTOMY: CPT | Performed by: REGISTERED NURSE

## 2020-11-11 PROCEDURE — 25010000002 PROPOFOL 10 MG/ML EMULSION: Performed by: NURSE ANESTHETIST, CERTIFIED REGISTERED

## 2020-11-11 PROCEDURE — 25010000002 MIDAZOLAM PER 1 MG: Performed by: NURSE ANESTHETIST, CERTIFIED REGISTERED

## 2020-11-11 DEVICE — ARTICULATION RELOAD WITH TRI-STAPLE TECHNOLOGY
Type: IMPLANTABLE DEVICE | Site: ABDOMEN | Status: FUNCTIONAL
Brand: ENDO GIA

## 2020-11-11 DEVICE — SEAL HEMO SURG ARISTA/AH ABS/PWDR 3GM: Type: IMPLANTABLE DEVICE | Site: ABDOMEN | Status: FUNCTIONAL

## 2020-11-11 RX ORDER — HYDROMORPHONE HCL 110MG/55ML
1 PATIENT CONTROLLED ANALGESIA SYRINGE INTRAVENOUS
Status: DISCONTINUED | OUTPATIENT
Start: 2020-11-11 | End: 2020-11-11 | Stop reason: HOSPADM

## 2020-11-11 RX ORDER — PROPOFOL 10 MG/ML
VIAL (ML) INTRAVENOUS AS NEEDED
Status: DISCONTINUED | OUTPATIENT
Start: 2020-11-11 | End: 2020-11-11 | Stop reason: SURG

## 2020-11-11 RX ORDER — LORAZEPAM 0.5 MG/1
0.5 TABLET ORAL ONCE
Status: COMPLETED | OUTPATIENT
Start: 2020-11-11 | End: 2020-11-11

## 2020-11-11 RX ORDER — POLYETHYLENE GLYCOL 3350 17 G/17G
17 POWDER, FOR SOLUTION ORAL DAILY
Status: DISCONTINUED | OUTPATIENT
Start: 2020-11-12 | End: 2020-11-14 | Stop reason: HOSPADM

## 2020-11-11 RX ORDER — NEOSTIGMINE METHYLSULFATE 5 MG/5 ML
SYRINGE (ML) INTRAVENOUS AS NEEDED
Status: DISCONTINUED | OUTPATIENT
Start: 2020-11-11 | End: 2020-11-11 | Stop reason: SURG

## 2020-11-11 RX ORDER — FENTANYL CITRATE 50 UG/ML
INJECTION, SOLUTION INTRAMUSCULAR; INTRAVENOUS AS NEEDED
Status: DISCONTINUED | OUTPATIENT
Start: 2020-11-11 | End: 2020-11-11 | Stop reason: SURG

## 2020-11-11 RX ORDER — HYDROMORPHONE HCL 110MG/55ML
0.25 PATIENT CONTROLLED ANALGESIA SYRINGE INTRAVENOUS
Status: DISCONTINUED | OUTPATIENT
Start: 2020-11-11 | End: 2020-11-11 | Stop reason: HOSPADM

## 2020-11-11 RX ORDER — LIDOCAINE HYDROCHLORIDE 20 MG/ML
INJECTION, SOLUTION EPIDURAL; INFILTRATION; INTRACAUDAL; PERINEURAL AS NEEDED
Status: DISCONTINUED | OUTPATIENT
Start: 2020-11-11 | End: 2020-11-11 | Stop reason: SURG

## 2020-11-11 RX ORDER — MORPHINE SULFATE 4 MG/ML
4 INJECTION, SOLUTION INTRAMUSCULAR; INTRAVENOUS
Status: DISCONTINUED | OUTPATIENT
Start: 2020-11-11 | End: 2020-11-14 | Stop reason: HOSPADM

## 2020-11-11 RX ORDER — SODIUM CHLORIDE, SODIUM LACTATE, POTASSIUM CHLORIDE, CALCIUM CHLORIDE 600; 310; 30; 20 MG/100ML; MG/100ML; MG/100ML; MG/100ML
INJECTION, SOLUTION INTRAVENOUS CONTINUOUS PRN
Status: DISCONTINUED | OUTPATIENT
Start: 2020-11-11 | End: 2020-11-11 | Stop reason: SURG

## 2020-11-11 RX ORDER — SODIUM CHLORIDE 9 MG/ML
INJECTION, SOLUTION INTRAVENOUS CONTINUOUS PRN
Status: DISCONTINUED | OUTPATIENT
Start: 2020-11-11 | End: 2020-11-11 | Stop reason: SURG

## 2020-11-11 RX ORDER — LIDOCAINE HYDROCHLORIDE AND EPINEPHRINE 10; 10 MG/ML; UG/ML
INJECTION, SOLUTION INFILTRATION; PERINEURAL AS NEEDED
Status: DISCONTINUED | OUTPATIENT
Start: 2020-11-11 | End: 2020-11-11 | Stop reason: HOSPADM

## 2020-11-11 RX ORDER — DEXAMETHASONE SODIUM PHOSPHATE 4 MG/ML
INJECTION, SOLUTION INTRA-ARTICULAR; INTRALESIONAL; INTRAMUSCULAR; INTRAVENOUS; SOFT TISSUE AS NEEDED
Status: DISCONTINUED | OUTPATIENT
Start: 2020-11-11 | End: 2020-11-11 | Stop reason: SURG

## 2020-11-11 RX ORDER — SODIUM CHLORIDE 9 MG/ML
75 INJECTION, SOLUTION INTRAVENOUS CONTINUOUS
Status: DISCONTINUED | OUTPATIENT
Start: 2020-11-11 | End: 2020-11-13

## 2020-11-11 RX ORDER — MIDAZOLAM HYDROCHLORIDE 1 MG/ML
INJECTION INTRAMUSCULAR; INTRAVENOUS AS NEEDED
Status: DISCONTINUED | OUTPATIENT
Start: 2020-11-11 | End: 2020-11-11 | Stop reason: SURG

## 2020-11-11 RX ORDER — ONDANSETRON 2 MG/ML
4 INJECTION INTRAMUSCULAR; INTRAVENOUS ONCE AS NEEDED
Status: DISCONTINUED | OUTPATIENT
Start: 2020-11-11 | End: 2020-11-11 | Stop reason: HOSPADM

## 2020-11-11 RX ORDER — NALOXONE HCL 0.4 MG/ML
0.4 VIAL (ML) INJECTION AS NEEDED
Status: DISCONTINUED | OUTPATIENT
Start: 2020-11-11 | End: 2020-11-11 | Stop reason: HOSPADM

## 2020-11-11 RX ORDER — PHENYLEPHRINE HCL IN 0.9% NACL 0.5 MG/5ML
SYRINGE (ML) INTRAVENOUS AS NEEDED
Status: DISCONTINUED | OUTPATIENT
Start: 2020-11-11 | End: 2020-11-11 | Stop reason: SURG

## 2020-11-11 RX ORDER — ACETAMINOPHEN 500 MG
1000 TABLET ORAL EVERY 8 HOURS
Status: COMPLETED | OUTPATIENT
Start: 2020-11-11 | End: 2020-11-12

## 2020-11-11 RX ORDER — OXYCODONE HYDROCHLORIDE 5 MG/1
5 TABLET ORAL EVERY 4 HOURS PRN
Status: DISCONTINUED | OUTPATIENT
Start: 2020-11-11 | End: 2020-11-14 | Stop reason: HOSPADM

## 2020-11-11 RX ORDER — MORPHINE SULFATE 4 MG/ML
2 INJECTION, SOLUTION INTRAMUSCULAR; INTRAVENOUS
Status: DISCONTINUED | OUTPATIENT
Start: 2020-11-11 | End: 2020-11-14 | Stop reason: HOSPADM

## 2020-11-11 RX ORDER — GLYCOPYRROLATE 1 MG/5 ML
SYRINGE (ML) INTRAVENOUS AS NEEDED
Status: DISCONTINUED | OUTPATIENT
Start: 2020-11-11 | End: 2020-11-11 | Stop reason: SURG

## 2020-11-11 RX ORDER — ROCURONIUM BROMIDE 10 MG/ML
INJECTION, SOLUTION INTRAVENOUS AS NEEDED
Status: DISCONTINUED | OUTPATIENT
Start: 2020-11-11 | End: 2020-11-11 | Stop reason: SURG

## 2020-11-11 RX ADMIN — FENTANYL CITRATE 25 MCG: 50 INJECTION, SOLUTION INTRAMUSCULAR; INTRAVENOUS at 15:16

## 2020-11-11 RX ADMIN — FENTANYL CITRATE 100 MCG: 50 INJECTION, SOLUTION INTRAMUSCULAR; INTRAVENOUS at 13:01

## 2020-11-11 RX ADMIN — HYDROMORPHONE HYDROCHLORIDE 0.5 MG: 2 INJECTION, SOLUTION INTRAMUSCULAR; INTRAVENOUS; SUBCUTANEOUS at 16:01

## 2020-11-11 RX ADMIN — Medication 10 ML: at 10:16

## 2020-11-11 RX ADMIN — LIDOCAINE HYDROCHLORIDE 40 MG: 20 INJECTION, SOLUTION EPIDURAL; INFILTRATION; INTRACAUDAL; PERINEURAL at 13:01

## 2020-11-11 RX ADMIN — PHENYLEPHRINE HYDROCHLORIDE 100 MCG: 10 INJECTION INTRAVENOUS at 13:36

## 2020-11-11 RX ADMIN — SODIUM CHLORIDE 300 MG: 9 INJECTION, SOLUTION INTRAVENOUS at 10:50

## 2020-11-11 RX ADMIN — MORPHINE SULFATE 4 MG: 4 INJECTION INTRAVENOUS at 23:53

## 2020-11-11 RX ADMIN — MIDAZOLAM 2 MG: 1 INJECTION INTRAMUSCULAR; INTRAVENOUS at 12:57

## 2020-11-11 RX ADMIN — DEXAMETHASONE SODIUM PHOSPHATE 8 MG: 4 INJECTION, SOLUTION INTRAMUSCULAR; INTRAVENOUS at 13:25

## 2020-11-11 RX ADMIN — SODIUM CHLORIDE: 0.9 INJECTION, SOLUTION INTRAVENOUS at 12:55

## 2020-11-11 RX ADMIN — PROPOFOL 150 MG: 10 INJECTION, EMULSION INTRAVENOUS at 13:01

## 2020-11-11 RX ADMIN — SODIUM CHLORIDE 75 ML/HR: 0.9 INJECTION, SOLUTION INTRAVENOUS at 17:42

## 2020-11-11 RX ADMIN — ROCURONIUM BROMIDE 10 MG: 10 INJECTION, SOLUTION INTRAVENOUS at 14:11

## 2020-11-11 RX ADMIN — Medication 2 MG: at 15:02

## 2020-11-11 RX ADMIN — MORPHINE SULFATE 4 MG: 4 INJECTION INTRAVENOUS at 21:19

## 2020-11-11 RX ADMIN — LORAZEPAM 0.5 MG: 0.5 TABLET ORAL at 10:16

## 2020-11-11 RX ADMIN — CEFAZOLIN SODIUM 2 G: 1 INJECTION, POWDER, FOR SOLUTION INTRAMUSCULAR; INTRAVENOUS at 13:11

## 2020-11-11 RX ADMIN — OXYCODONE 5 MG: 5 TABLET ORAL at 18:54

## 2020-11-11 RX ADMIN — SODIUM CHLORIDE, SODIUM LACTATE, POTASSIUM CHLORIDE, AND CALCIUM CHLORIDE: .6; .31; .03; .02 INJECTION, SOLUTION INTRAVENOUS at 13:07

## 2020-11-11 RX ADMIN — ONDANSETRON 4 MG: 2 INJECTION INTRAMUSCULAR; INTRAVENOUS at 13:26

## 2020-11-11 RX ADMIN — ACETAMINOPHEN 1000 MG: 500 TABLET, FILM COATED ORAL at 16:55

## 2020-11-11 RX ADMIN — SODIUM CHLORIDE: 0.9 INJECTION, SOLUTION INTRAVENOUS at 13:39

## 2020-11-11 RX ADMIN — PHENYLEPHRINE HYDROCHLORIDE 50 MCG: 10 INJECTION INTRAVENOUS at 14:18

## 2020-11-11 RX ADMIN — FENTANYL CITRATE 25 MCG: 50 INJECTION, SOLUTION INTRAMUSCULAR; INTRAVENOUS at 13:56

## 2020-11-11 RX ADMIN — Medication 0.2 MG: at 15:02

## 2020-11-11 RX ADMIN — ROCURONIUM BROMIDE 40 MG: 10 INJECTION, SOLUTION INTRAVENOUS at 13:01

## 2020-11-11 RX ADMIN — FENTANYL CITRATE 50 MCG: 50 INJECTION, SOLUTION INTRAMUSCULAR; INTRAVENOUS at 13:29

## 2020-11-11 RX ADMIN — HYDROMORPHONE HYDROCHLORIDE 0.5 MG: 2 INJECTION, SOLUTION INTRAMUSCULAR; INTRAVENOUS; SUBCUTANEOUS at 15:47

## 2020-11-11 RX ADMIN — PHENYLEPHRINE HYDROCHLORIDE 100 MCG: 10 INJECTION INTRAVENOUS at 14:46

## 2020-11-11 RX ADMIN — HYDROMORPHONE HYDROCHLORIDE 1 MG: 2 INJECTION, SOLUTION INTRAMUSCULAR; INTRAVENOUS; SUBCUTANEOUS at 15:31

## 2020-11-11 RX ADMIN — ONDANSETRON 4 MG: 2 INJECTION INTRAMUSCULAR; INTRAVENOUS at 18:54

## 2020-11-11 NOTE — PROGRESS NOTES
Hematology/Oncology Inpatient Progress Note    PATIENT NAME: Tiny Akhtar  : 1985  MRN: [id number]    CHIEF COMPLAINT: Surgery consultation for splenectomy    HISTORY OF PRESENT ILLNESS:   Tiny Akhtar is a 35 y.o. female who presented to [facility] on 2020 as a direct admit for general surgery consultation for splenectomy.  She has a history of ITP.  Count on admission white blood count 4.7, hemoglobin 10.7, hematocrit 33.9, MCV 76.2, platelets 85,000.     General surgery was consulted for splenectomy.     11/10/20  Hematology/Oncology was consulted for thrombocytopenia.  Patient is known to our service and followed by Dr. Forrest Llanes for history of immune thrombocytopenic purpura.  Patient was seen initially as an inpatient in 2019 when she had a platelet count of 9000.  Patient was initiated on steroid, followed by rituximab, followed by IVIG.  She was then started on Nplate in 2019.  Patient was seen at [facility] hematology for second opinion and was continued on Nplate.  2021 she received IVIG again.  Nplate was discontinued due to treatment failure in 2020 and the patient was initiated on IVIG again.  On 2020 the patient started taking fostamatinib she failed.  She had IVIG in 2020.  Plate was reinitiated in 2020 followed by additional IVIG in 2020.  She was started on CellCept by the [facility] in 2020.  The patient lost insurance and was not able to get a refill.  At office visit on 2020 discussed patient will need splenectomy since her platelets are again dropping.       She has a past medical history of Anxiety, Immune thrombocytopenic purpura (CMS/HCC) (2019), Iron deficiency anemia, Kidney stone (10/2019), and Thrombocytopenia (CMS/HCC) (2019).     PCP: Jose Ingram APRN    History of present illness was reviewed and is unchanged from the  "previous visit. 11/11/20    Subjective   Patient seen in the morning.  She is doing okay.  She was a little nervous for the surgery.  ROS:  Review of Systems   Constitutional: Negative for appetite change, chills, fatigue, fever and unexpected weight change.   HENT: Negative for congestion, hearing loss, mouth sores, nosebleeds, postnasal drip, rhinorrhea and sore throat.    Eyes: Negative for photophobia, pain and visual disturbance.   Respiratory: Negative for cough, chest tightness, shortness of breath and wheezing.    Cardiovascular: Negative for chest pain, palpitations and leg swelling.   Gastrointestinal: Negative for abdominal distention, abdominal pain, constipation, diarrhea, nausea and vomiting.   Genitourinary: Negative for difficulty urinating, dysuria and hematuria.   Musculoskeletal: Negative for arthralgias, back pain, gait problem and myalgias.   Skin: Negative for pallor and rash.   Neurological: Negative for dizziness, weakness, light-headedness, numbness and headaches.   Hematological: Negative for adenopathy. Does not bruise/bleed easily.   Psychiatric/Behavioral: Negative for agitation, confusion and dysphoric mood. The patient is not nervous/anxious.    All other systems reviewed and are negative.       MEDICATIONS:    Scheduled Meds:  sodium chloride, 10 mL, Intravenous, Q12H       Continuous Infusions:      PRN Meds:  •  acetaminophen **OR** acetaminophen **OR** acetaminophen  •  aluminum-magnesium hydroxide-simethicone  •  bisacodyl  •  magnesium hydroxide  •  magnesium sulfate **OR** magnesium sulfate in D5W 1g/100mL (PREMIX)  •  melatonin  •  ondansetron **OR** ondansetron  •  potassium chloride  •  sodium chloride     ALLERGIES:  No Known Allergies    Objective    VITALS:   /57 (BP Location: Right arm, Patient Position: Lying)   Pulse 62   Temp 98.4 °F (36.9 °C) (Oral)   Resp 14   Ht 157.5 cm (62\")   Wt 57.1 kg (125 lb 12.8 oz)   LMP 11/04/2020 (Approximate)   SpO2 99%   " BMI 23.01 kg/m²     PHYSICAL EXAM: (performed by MD)  Physical Exam  Vitals signs and nursing note reviewed.   Constitutional:       General: She is not in acute distress.     Appearance: Normal appearance. She is not diaphoretic.   HENT:      Head: Normocephalic and atraumatic.   Eyes:      General: No scleral icterus.        Right eye: No discharge.         Left eye: No discharge.      Conjunctiva/sclera: Conjunctivae normal.   Neck:      Musculoskeletal: Normal range of motion and neck supple.      Thyroid: No thyromegaly.   Cardiovascular:      Rate and Rhythm: Normal rate and regular rhythm.      Pulses: Normal pulses.      Heart sounds: Normal heart sounds. No friction rub. No gallop.    Pulmonary:      Effort: Pulmonary effort is normal. No respiratory distress.      Breath sounds: Normal breath sounds. No stridor. No wheezing.   Abdominal:      General: Bowel sounds are normal.      Palpations: Abdomen is soft. There is no mass.      Tenderness: There is no abdominal tenderness. There is no guarding or rebound.   Musculoskeletal: Normal range of motion.         General: No swelling or tenderness.   Lymphadenopathy:      Cervical: No cervical adenopathy.   Skin:     General: Skin is warm.      Findings: No erythema or rash.   Neurological:      General: No focal deficit present.      Mental Status: She is alert and oriented to person, place, and time.      Motor: No abnormal muscle tone.   Psychiatric:         Mood and Affect: Mood normal.         Behavior: Behavior normal.         Thought Content: Thought content normal.        RECENT LABS:  Lab Results (last 24 hours)     Procedure Component Value Units Date/Time    Basic Metabolic Panel [[id number]]  (Abnormal) Collected: 11/11/20 0303    Specimen: Blood Updated: 11/11/20 0409     Glucose 94 mg/dL      BUN 16 mg/dL      Creatinine 0.58 mg/dL      Sodium 141 mmol/L      Potassium 3.8 mmol/L      Chloride 107 mmol/L      CO2 23.0 mmol/L      Calcium 9.1 mg/dL       eGFR Non African Amer 120 mL/min/1.73      BUN/Creatinine Ratio 27.6     Anion Gap 11.0 mmol/L     Narrative:      GFR Normal >60  Chronic Kidney Disease <60  Kidney Failure <15      Magnesium [[id number]]  (Normal) Collected: 11/11/20 0303    Specimen: Blood Updated: 11/11/20 0409     Magnesium 2.3 mg/dL     CBC & Differential [[id number]]  (Abnormal) Collected: 11/11/20 0303    Specimen: Blood Updated: 11/11/20 0345    Narrative:      The following orders were created for panel order CBC & Differential.  Procedure                               Abnormality         Status                     ---------                               -----------         ------                     CBC Auto Differential[[id number]]        Abnormal            Final result                 Please view results for these tests on the individual orders.    CBC Auto Differential [[id number]]  (Abnormal) Collected: 11/11/20 0303    Specimen: Blood Updated: 11/11/20 0345     WBC 5.50 10*3/mm3      RBC 4.02 10*6/mm3      Hemoglobin 9.6 g/dL      Hematocrit 30.7 %      MCV 76.3 fL      MCH 24.0 pg      MCHC 31.5 g/dL      RDW 15.7 %      RDW-SD 42.4 fl      MPV 9.4 fL      Platelets 60 10*3/mm3      Neutrophil % 49.6 %      Lymphocyte % 38.0 %      Monocyte % 8.5 %      Eosinophil % 2.7 %      Basophil % 1.2 %      Neutrophils, Absolute 2.70 10*3/mm3      Lymphocytes, Absolute 2.10 10*3/mm3      Monocytes, Absolute 0.50 10*3/mm3      Eosinophils, Absolute 0.10 10*3/mm3      Basophils, Absolute 0.10 10*3/mm3      nRBC 0.1 /100 WBC     Pregnancy, Urine - Urine, Clean Catch [[id number]]  (Normal) Collected: 11/10/20 1734    Specimen: Urine, Clean Catch Updated: 11/10/20 1756     HCG, Urine QL Negative    COVID PRE-OP / PRE-PROCEDURE SCREENING ORDER (NO ISOLATION) - Swab, Nasopharynx [[id number]]  (Normal) Collected: 11/10/20 1528    Specimen: Swab from Nasopharynx Updated: 11/10/20 1635    Narrative:      The following orders were created for panel  order COVID PRE-OP / PRE-PROCEDURE SCREENING ORDER (NO ISOLATION) - Swab, Nasopharynx.  Procedure                               Abnormality         Status                     ---------                               -----------         ------                     COVID-19,CEPHEID,COR/ALEJO...[[id number]]  Normal              Final result                 Please view results for these tests on the individual orders.    COVID-19,CEPHEID,COR/ALEJO/PAD IN-HOUSE(OR EMERGENT/ADD-ON),NP SWAB IN TRANSPORT MEDIA 3-4 HR TAT - Swab, Nasopharynx [[id number]]  (Normal) Collected: 11/10/20 1528    Specimen: Swab from Nasopharynx Updated: 11/10/20 1635     COVID19 Not Detected    Narrative:      Fact sheet for providers: https://www.fda.gov/media/689459/download     Fact sheet for patients: https://www.fda.gov/media/780623/download    Ferritin [[id number]]  (Normal) Collected: 11/10/20 1017    Specimen: Blood Updated: 11/10/20 1315     Ferritin 15.58 ng/mL     Narrative:      Results may be falsely decreased if patient taking Biotin.      Iron Profile [[id number]]  (Abnormal) Collected: 11/10/20 1017    Specimen: Blood Updated: 11/10/20 1310     Iron 30 mcg/dL      Iron Saturation 6 %      Transferrin 328 mg/dL      TIBC 489 mcg/dL     Comprehensive Metabolic Panel [[id number]]  (Abnormal) Collected: 11/10/20 1017    Specimen: Blood Updated: 11/10/20 1055     Glucose 94 mg/dL      BUN 17 mg/dL      Creatinine 0.66 mg/dL      Sodium 140 mmol/L      Potassium 3.6 mmol/L      Chloride 104 mmol/L      CO2 26.0 mmol/L      Calcium 9.5 mg/dL      Total Protein 7.4 g/dL      Albumin 4.50 g/dL      ALT (SGPT) 9 U/L      AST (SGOT) 18 U/L      Alkaline Phosphatase 51 U/L      Total Bilirubin 0.3 mg/dL      eGFR Non African Amer 103 mL/min/1.73      Globulin 2.9 gm/dL      A/G Ratio 1.6 g/dL      BUN/Creatinine Ratio 25.8     Anion Gap 10.0 mmol/L     Narrative:      GFR Normal >60  Chronic Kidney Disease <60  Kidney Failure <15      aPTT [[id number]]   (Normal) Collected: 11/10/20 1017    Specimen: Blood Updated: 11/10/20 1044     PTT 24.6 seconds     Protime-INR [[id number]]  (Normal) Collected: 11/10/20 1017    Specimen: Blood Updated: 11/10/20 1044     Protime 11.4 Seconds      INR 1.04    CBC Auto Differential [[id number]]  (Abnormal) Collected: 11/10/20 1017    Specimen: Blood Updated: 11/10/20 1028     WBC 4.70 10*3/mm3      RBC 4.45 10*6/mm3      Hemoglobin 10.7 g/dL      Hematocrit 33.9 %      MCV 76.2 fL      MCH 24.0 pg      MCHC 31.4 g/dL      RDW 15.3 %      RDW-SD 41.1 fl      MPV 8.8 fL      Platelets 85 10*3/mm3      Neutrophil % 56.9 %      Lymphocyte % 32.3 %      Monocyte % 7.3 %      Eosinophil % 2.6 %      Basophil % 0.9 %      Neutrophils, Absolute 2.70 10*3/mm3      Lymphocytes, Absolute 1.50 10*3/mm3      Monocytes, Absolute 0.30 10*3/mm3      Eosinophils, Absolute 0.10 10*3/mm3      Basophils, Absolute 0.00 10*3/mm3      nRBC 0.0 /100 WBC         PENDING RESULTS: none     IMAGING REVIEWED:  No radiology results for the last day    Assessment/Plan   ASSESSMENT  1. Refractory idiopathic thrombocytopenia purpura: Platelets 85,000 on admission.  Refractory to steroids, Rituxan, N-plate, fostamatinib, and CellCept. Platelet count declined to 60,000 today.   2. Need for splenectomy: 11/23/2019 - Patient received meningococcal vaccine, Haemophilus vaccine, pneumococcus vaccine. Pharmacy consulted for assistance on timing of any further vaccination to complete series.   General surgery, Dr. Erick Akhtar, consulted for splenectomy for 11/11/2020.   3. History of iron deficiency anemia: Hemoglobin 10.7 on admission.  Patient received IV Venofer in November 2019.  Iron studies 11/10/2020 - Ferritin 15.58 and iron 30, iron saturation 6, TIBC 489, transferrin 328. Start Venofer 300 mg IV x 3 days on 11/11/2020.      PLAN  1. CBC daily    2. Platelet transfusion recommended only if platelets are extremely low (less than 2000) or if patient has active  bleeding   3. Continue oral ferrous sulfate  4. Continue Venofer 300 mg IV x 3 days, day 2/3  5. Per uptodate - for patients undergoing elective splenectomy, vaccinations should ideally be started approximately 10 to 12 weeks prior to surgery so that the recommended vaccine series can be completed at least 14 days prior to splenectomy. If all recommended vaccine series cannot be completed in this time period, vaccine series can be resumed 14 days after splenectomy for most patients. Consulted pharmacy for assistance on timing of any further vaccines.   6. General surgery to proceed with splenectomy today    Electronically signed by FARZAD Banegas, 11/11/20, 11:48 AM EST.            I personally reviewed all pertinent labs, related documentation and the  imaging. ROS performed and physical exam done during face to face enounter with patient. I agree with  nurse practitioner's doumentation, assessment and plan.    Discussed splenectomy with the patient.  Receiving IV Venofer.  Hopefully she will get this done laparoscopically.  We will repeat CBC after splenectomy.      Electronically signed by Forrest Llanes MD, 11/11/20, 7:14 PM EST.

## 2020-11-11 NOTE — PROGRESS NOTES
General Surgery Progress Note     LOS: 1 day   Patient Care Team:  Jose Ingram APRN as PCP - General (Family Medicine)    Subjective     Interval History:   No significant changes overnight.  No new abdominal complaints.  Afebrile normal vital signs.  Objective     Vital Signs  Temp:  [97.6 °F (36.4 °C)-98.4 °F (36.9 °C)] 98.4 °F (36.9 °C)  Heart Rate:  [62-79] 62  Resp:  [12-18] 14  BP: ()/(52-67) 100/57    Physical Exam  No acute distress  Unlabored respirations on room air  Abdomen soft nontender nondistended     Results Review:    Lab Results (last 24 hours)     Procedure Component Value Units Date/Time    Basic Metabolic Panel [[id number]]  (Abnormal) Collected: 11/11/20 0303    Specimen: Blood Updated: 11/11/20 0409     Glucose 94 mg/dL      BUN 16 mg/dL      Creatinine 0.58 mg/dL      Sodium 141 mmol/L      Potassium 3.8 mmol/L      Chloride 107 mmol/L      CO2 23.0 mmol/L      Calcium 9.1 mg/dL      eGFR Non African Amer 120 mL/min/1.73      BUN/Creatinine Ratio 27.6     Anion Gap 11.0 mmol/L     Narrative:      GFR Normal >60  Chronic Kidney Disease <60  Kidney Failure <15      Magnesium [[id number]]  (Normal) Collected: 11/11/20 0303    Specimen: Blood Updated: 11/11/20 0409     Magnesium 2.3 mg/dL     CBC & Differential [[id number]]  (Abnormal) Collected: 11/11/20 0303    Specimen: Blood Updated: 11/11/20 0345    Narrative:      The following orders were created for panel order CBC & Differential.  Procedure                               Abnormality         Status                     ---------                               -----------         ------                     CBC Auto Differential[[id number]]        Abnormal            Final result                 Please view results for these tests on the individual orders.    CBC Auto Differential [[id number]]  (Abnormal) Collected: 11/11/20 0303    Specimen: Blood Updated: 11/11/20 0345     WBC 5.50 10*3/mm3      RBC 4.02 10*6/mm3      Hemoglobin  9.6 g/dL      Hematocrit 30.7 %      MCV 76.3 fL      MCH 24.0 pg      MCHC 31.5 g/dL      RDW 15.7 %      RDW-SD 42.4 fl      MPV 9.4 fL      Platelets 60 10*3/mm3      Neutrophil % 49.6 %      Lymphocyte % 38.0 %      Monocyte % 8.5 %      Eosinophil % 2.7 %      Basophil % 1.2 %      Neutrophils, Absolute 2.70 10*3/mm3      Lymphocytes, Absolute 2.10 10*3/mm3      Monocytes, Absolute 0.50 10*3/mm3      Eosinophils, Absolute 0.10 10*3/mm3      Basophils, Absolute 0.10 10*3/mm3      nRBC 0.1 /100 WBC     Pregnancy, Urine - Urine, Clean Catch [[id number]]  (Normal) Collected: 11/10/20 1734    Specimen: Urine, Clean Catch Updated: 11/10/20 1756     HCG, Urine QL Negative    COVID PRE-OP / PRE-PROCEDURE SCREENING ORDER (NO ISOLATION) - Swab, Nasopharynx [[id number]]  (Normal) Collected: 11/10/20 1528    Specimen: Swab from Nasopharynx Updated: 11/10/20 1635    Narrative:      The following orders were created for panel order COVID PRE-OP / PRE-PROCEDURE SCREENING ORDER (NO ISOLATION) - Swab, Nasopharynx.  Procedure                               Abnormality         Status                     ---------                               -----------         ------                     COVID-19,CEPHEID,COR/ALEJO...[[id number]]  Normal              Final result                 Please view results for these tests on the individual orders.    COVID-19,CEPHEID,COR/ALEJO/PAD IN-HOUSE(OR EMERGENT/ADD-ON),NP SWAB IN TRANSPORT MEDIA 3-4 HR TAT - Swab, Nasopharynx [[id number]]  (Normal) Collected: 11/10/20 1528    Specimen: Swab from Nasopharynx Updated: 11/10/20 1635     COVID19 Not Detected    Narrative:      Fact sheet for providers: https://www.fda.gov/media/765647/download     Fact sheet for patients: https://www.fda.gov/media/995332/download    Ferritin [[id number]]  (Normal) Collected: 11/10/20 1017    Specimen: Blood Updated: 11/10/20 1315     Ferritin 15.58 ng/mL     Narrative:      Results may be falsely decreased if patient taking  Biotin.      Iron Profile [[id number]]  (Abnormal) Collected: 11/10/20 1017    Specimen: Blood Updated: 11/10/20 1310     Iron 30 mcg/dL      Iron Saturation 6 %      Transferrin 328 mg/dL      TIBC 489 mcg/dL     Comprehensive Metabolic Panel [[id number]]  (Abnormal) Collected: 11/10/20 1017    Specimen: Blood Updated: 11/10/20 1055     Glucose 94 mg/dL      BUN 17 mg/dL      Creatinine 0.66 mg/dL      Sodium 140 mmol/L      Potassium 3.6 mmol/L      Chloride 104 mmol/L      CO2 26.0 mmol/L      Calcium 9.5 mg/dL      Total Protein 7.4 g/dL      Albumin 4.50 g/dL      ALT (SGPT) 9 U/L      AST (SGOT) 18 U/L      Alkaline Phosphatase 51 U/L      Total Bilirubin 0.3 mg/dL      eGFR Non African Amer 103 mL/min/1.73      Globulin 2.9 gm/dL      A/G Ratio 1.6 g/dL      BUN/Creatinine Ratio 25.8     Anion Gap 10.0 mmol/L     Narrative:      GFR Normal >60  Chronic Kidney Disease <60  Kidney Failure <15          Imaging Results (Last 24 Hours)     ** No results found for the last 24 hours. **         I reviewed the patient's new clinical results.    Medication Review:    Current Facility-Administered Medications:   •  acetaminophen (TYLENOL) tablet 650 mg, 650 mg, Oral, Q4H PRN **OR** acetaminophen (TYLENOL) 160 MG/5ML solution 650 mg, 650 mg, Oral, Q4H PRN **OR** acetaminophen (TYLENOL) suppository 650 mg, 650 mg, Rectal, Q4H PRN, Null, Annel, APRN  •  aluminum-magnesium hydroxide-simethicone (MAALOX MAX) 400-400-40 MG/5ML suspension 15 mL, 15 mL, Oral, Q6H PRN, Null, Annel, APRN  •  bisacodyl (DULCOLAX) suppository 10 mg, 10 mg, Rectal, Daily PRN, Null, Annel, APRN  •  iron sucrose 300 mg in 250 mL NS, 300 mg, Intravenous, Daily, Shannon Burgos, APRN, 300 mg at 11/11/20 1050  •  magnesium hydroxide (MILK OF MAGNESIA) suspension 2400 mg/10mL 10 mL, 10 mL, Oral, Daily PRN, Annel Null, APRN  •  Magnesium Sulfate 2 gram infusion - Mg less than or equal to 1.5 mg/dL, 2 g, Intravenous, PRN **OR** Magnesium Sulfate 1  gram infusion - Mg 1.6-1.9 mg/dL, 1 g, Intravenous, PRN, Null, Annel, APRN  •  melatonin tablet 5 mg, 5 mg, Oral, Nightly PRN, Null, Annel, APRN  •  ondansetron (ZOFRAN) tablet 4 mg, 4 mg, Oral, Q6H PRN **OR** ondansetron (ZOFRAN) injection 4 mg, 4 mg, Intravenous, Q6H PRN, Null, Annel, APRN  •  potassium chloride (K-DUR,KLOR-CON) CR tablet 40 mEq, 40 mEq, Oral, PRN, Null, Annel, APRN  •  sodium chloride 0.9 % flush 10 mL, 10 mL, Intravenous, Q12H, Null, Annel, APRN, 10 mL at 11/11/20 1016  •  sodium chloride 0.9 % flush 10 mL, 10 mL, Intravenous, PRN, Null, Annel, APRN    Assessment/Plan     Principal Problem:    Thrombocytopenia (CMS/HCC)  Active Problems:    Immune thrombocytopenic purpura (CMS/HCC)    Iron deficiency anemia      ITP refractory to medical therapy.  Stopped in to see if she had any additional questions for me she did not.  Will move forward with laparoscopic splenectomy this afternoon.    This note was created using Dragon Voice Recognition software.    Erick Akhtar MD  11/11/20  10:53 EST

## 2020-11-11 NOTE — ANESTHESIA PROCEDURE NOTES
Peripheral IV    Patient location during procedure: OR  Start time: 11/11/2020 1:10 PM  Performed By   Anesthesiologist: Hans Goel MD  CRNA: Cristobal Rehman CRNA  Preanesthetic Checklist  Completed: patient identified, pre-op evaluation, timeout performed, IV checked, risks and benefits discussed and monitors and equipment checked  Peripheral IV Prep   Patient position: supine   Prep: ChloraPrep  Patient monitoring: heart rate, cardiac monitor and continuous pulse ox  Peripheral IV Procedure   Laterality:right  Location:  Hand  Catheter size: 16 G         Post Assessment   Dressing Type: transparent and tape.    IV Dressing/Site: clean, dry and intact

## 2020-11-11 NOTE — ANESTHESIA POSTPROCEDURE EVALUATION
Patient: Tiny Akhtar    Procedure Summary     Date: 11/11/20 Room / Location: [facility] OR 10 / [facility] MAIN OR    Anesthesia Start: 1255 Anesthesia Stop: 1517    Procedure: SPLENECTOMY LAPAROSCOPIC (N/A Abdomen) Diagnosis:       Immune thrombocytopenic purpura (CMS/HCC)      (Immune thrombocytopenic purpura (CMS/HCC) [D69.3])    Surgeon: Erick Akhtar MD Provider: Hans Goel MD    Anesthesia Type: general ASA Status: 3          Anesthesia Type: general    Vitals  Vitals Value Taken Time   /54 11/11/20 1531   Temp 97 °F (36.1 °C) 11/11/20 1520   Pulse 62 11/11/20 1536   Resp 14 11/11/20 1530   SpO2 100 % 11/11/20 1536   Vitals shown include unvalidated device data.        Post Anesthesia Care and Evaluation    Patient location during evaluation: PACU  Patient participation: complete - patient participated  Level of consciousness: awake  Pain scale: See nurse's notes for pain score.  Pain management: adequate  Airway patency: patent  Anesthetic complications: No anesthetic complications  PONV Status: none  Cardiovascular status: acceptable  Respiratory status: acceptable  Hydration status: acceptable    Comments: Patient seen and examined postoperatively; vital signs stable; SpO2 greater than or equal to 90%; cardiopulmonary status stable; nausea/vomiting adequately controlled; pain adequately controlled; no apparent anesthesia complications; patient discharged from anesthesia care when discharge criteria were met

## 2020-11-11 NOTE — ANESTHESIA PROCEDURE NOTES
Airway  Urgency: elective    Date/Time: 11/11/2020 1:04 PM  Airway not difficult    General Information and Staff    Patient location during procedure: OR    Indications and Patient Condition  Indications for airway management: airway protection    Preoxygenated: yes  MILS maintained throughout  Mask difficulty assessment: 1 - vent by mask    Final Airway Details  Final airway type: endotracheal airway      Successful airway: ETT  Cuffed: yes   Successful intubation technique: direct laryngoscopy  Facilitating devices/methods: intubating stylet  Endotracheal tube insertion site: oral  Blade: Red  Blade size: 3  ETT size (mm): 7.0  Cormack-Lehane Classification: grade I - full view of glottis  Placement verified by: chest auscultation and capnometry   Measured from: lips  ETT/EBT  to lips (cm): 21  Number of attempts at approach: 1  Assessment: lips, teeth, and gum same as pre-op and atraumatic intubation

## 2020-11-11 NOTE — ANESTHESIA PREPROCEDURE EVALUATION
Anesthesia Evaluation     Patient summary reviewed and Nursing notes reviewed   no history of anesthetic complications:  NPO Solid Status: > 8 hours  NPO Liquid Status: > 8 hours           Airway   Dental      Pulmonary    Cardiovascular     ECG reviewed        Neuro/Psych  (+) psychiatric history Anxiety,     GI/Hepatic/Renal/Endo    (+)   renal disease,     Musculoskeletal     Abdominal    Substance History      OB/GYN          Other   blood dyscrasia anemia thrombocytopenia,     ROS/Med Hx Other: ITP                  Anesthesia Plan    ASA 3     general   (Patient identified; pre-operative vital signs, all relevant labs/studies, complete medical/surgical/anesthetic history, full medication list, full allergy list, and NPO status obtained/reviewed; physical assessment performed; anesthetic options, side effects, potential complications, risks, and benefits discussed; questions answered; written anesthesia consent obtained; patient cleared for procedure; anesthesia machine and equipment checked and functioning)  intravenous induction     Anesthetic plan, all risks, benefits, and alternatives have been provided, discussed and informed consent has been obtained with: patient.    Plan discussed with CRNA and CAA.

## 2020-11-11 NOTE — PROGRESS NOTES
Continued Stay Note  [facility]     Patient Name: Tiny Akhtar  MRN: [id number]  Today's Date: 11/11/2020    Admit Date: 11/10/2020    Discharge Plan     Row Name 11/11/20 1157       Plan    Plan  D/C Plan: Anticipate home with family.    Plan Comments  Barrier to D/C: lap splenectomy today.          Expected Discharge Date and Time     Expected Discharge Date Expected Discharge Time    Nov 13, 2020             Sarina Talbot

## 2020-11-11 NOTE — PLAN OF CARE
Goal Outcome Evaluation:         Pt. A&O x 4, able to make needs known. Splenectomy scheduled for 1130 today, pt. Aware. Denies any pain/discomfort. Platelets cont. To drop 85-60 per labs. Will continue to monitor & report to oncoming nurse. Call light w/I reach.

## 2020-11-11 NOTE — ANESTHESIA PROCEDURE NOTES
Peripheral IV    Patient location during procedure: OR  Start time: 11/11/2020 1:12 PM  Performed By   Anesthesiologist: Hans Goel MD  CRNA: Cristobal Rehman CRNA  Preanesthetic Checklist  Completed: patient identified, pre-op evaluation, timeout performed, IV checked, risks and benefits discussed and monitors and equipment checked  Peripheral IV Prep   Patient position: supine   Prep: ChloraPrep  Patient monitoring: heart rate, cardiac monitor and continuous pulse ox  Peripheral IV Procedure   Laterality:left  Location:  Hand  Catheter size: 18 G         Post Assessment   Dressing Type: transparent and tape.    IV Dressing/Site: clean, dry and intact

## 2020-11-11 NOTE — PROGRESS NOTES
[facility] Medicine Services Daily Progress Note      Hospitalist Team  LOS 1 days      Patient Care Team:  Jose Ingram APRN as PCP - General (Family Medicine)    Patient Location: [facility] MAIN OR/MAIN OR      Subjective   Subjective     Chief Complaint / Subjective  No chief complaint on file.    Patient reports no events overnight.  Patient still anxious about surgery.  Plan for laparoscopic with possible conversion open.  No chest pain or shortness of breath platelets in the 60s.    Brief Synopsis of Hospital Course/HPI  Ms. Akhatr is a 35 y.o. female with a history of immune thrombocytopenic purpura, anemia, and kidney stones who was directly admitted to [facility] on 11/10/2020 due to thrombocytopenia and need for surgery consultation for splenectomy. The patient denies any current complaints.      The patient is a nonsmoker and denies alcohol or illicit drug use. She reports a family history of ITP, diabetes mellitus, cancer, CHF, and stroke.      Date::          Review of Systems   Constitution: Negative for chills and fever.   HENT: Negative for hoarse voice and stridor.    Eyes: Negative for double vision and photophobia.   Cardiovascular: Negative for chest pain and syncope.   Respiratory: Negative for cough and shortness of breath.    Musculoskeletal: Negative for joint pain and stiffness.   Gastrointestinal: Negative for nausea and vomiting.   Genitourinary: Negative for dysuria and flank pain.   Neurological: Negative for focal weakness and light-headedness.   Psychiatric/Behavioral: Negative for altered mental status. The patient is nervous/anxious.          Objective   Objective      Vital Signs  Temp:  [97.6 °F (36.4 °C)-99.1 °F (37.3 °C)] 99.1 °F (37.3 °C)  Heart Rate:  [62-79] 74  Resp:  [12-18] 13  BP: ()/(52-68) 115/68  Oxygen Therapy  SpO2: 97 %  Pulse Oximetry Type: Intermittent  Device (Oxygen Therapy): room air  Flowsheet Rows      First Filed  "Value   Admission Height  157.5 cm (62\") Documented at 11/10/2020 1045   Admission Weight  57.1 kg (125 lb 12.8 oz) Documented at 11/10/2020 1009        Intake & Output (last 3 days)       11/08 0701 - 11/09 0700 11/09 0701 - 11/10 0700 11/10 0701 - 11/11 0700 11/11 0701 - 11/12 0700    P.O.   480     Total Intake(mL/kg)   480 (8.4)     Urine (mL/kg/hr)   300     Total Output   300     Net   +180             Urine Unmeasured Occurrence    2 x        Lines, Drains & Airways    Active LDAs     Name:   Placement date:   Placement time:   Site:   Days:    Peripheral IV 11/11/20 1010 Anterior;Left Forearm   11/11/20    1010    Forearm   less than 1                  Physical Exam:    Physical Exam    General: Female sitting up in bed breathing comfortably on room air in no acute distress  HEENT: NC/AT, EOMI, mucosa moist  Heart: Regular, rate controlled  Chest: Normal work of breathing moving air well in all lung fields no wheezing or crackles  Abdominal: Soft.  Nontender nondistended unable to appreciate splenomegaly  Musculoskeletal: Normal ROM.  No edema. No calf tenderness.  Neurological: AAOx3, no focal deficits  Skin: Skin is warm and dry. No rash  Psychiatric: Anxious         Procedures:    Procedure(s):  SPLENECTOMY LAPAROSCOPIC          Results Review:     I reviewed the patient's new clinical results.      Lab Results (last 24 hours)     Procedure Component Value Units Date/Time    Basic Metabolic Panel [[id number]]  (Abnormal) Collected: 11/11/20 0303    Specimen: Blood Updated: 11/11/20 0409     Glucose 94 mg/dL      BUN 16 mg/dL      Creatinine 0.58 mg/dL      Sodium 141 mmol/L      Potassium 3.8 mmol/L      Chloride 107 mmol/L      CO2 23.0 mmol/L      Calcium 9.1 mg/dL      eGFR Non African Amer 120 mL/min/1.73      BUN/Creatinine Ratio 27.6     Anion Gap 11.0 mmol/L     Narrative:      GFR Normal >60  Chronic Kidney Disease <60  Kidney Failure <15      Magnesium [[id number]]  (Normal) Collected: 11/11/20 " 0303    Specimen: Blood Updated: 11/11/20 0409     Magnesium 2.3 mg/dL     CBC & Differential [[id number]]  (Abnormal) Collected: 11/11/20 0303    Specimen: Blood Updated: 11/11/20 0345    Narrative:      The following orders were created for panel order CBC & Differential.  Procedure                               Abnormality         Status                     ---------                               -----------         ------                     CBC Auto Differential[[id number]]        Abnormal            Final result                 Please view results for these tests on the individual orders.    CBC Auto Differential [[id number]]  (Abnormal) Collected: 11/11/20 0303    Specimen: Blood Updated: 11/11/20 0345     WBC 5.50 10*3/mm3      RBC 4.02 10*6/mm3      Hemoglobin 9.6 g/dL      Hematocrit 30.7 %      MCV 76.3 fL      MCH 24.0 pg      MCHC 31.5 g/dL      RDW 15.7 %      RDW-SD 42.4 fl      MPV 9.4 fL      Platelets 60 10*3/mm3      Neutrophil % 49.6 %      Lymphocyte % 38.0 %      Monocyte % 8.5 %      Eosinophil % 2.7 %      Basophil % 1.2 %      Neutrophils, Absolute 2.70 10*3/mm3      Lymphocytes, Absolute 2.10 10*3/mm3      Monocytes, Absolute 0.50 10*3/mm3      Eosinophils, Absolute 0.10 10*3/mm3      Basophils, Absolute 0.10 10*3/mm3      nRBC 0.1 /100 WBC     Pregnancy, Urine - Urine, Clean Catch [[id number]]  (Normal) Collected: 11/10/20 1734    Specimen: Urine, Clean Catch Updated: 11/10/20 1756     HCG, Urine QL Negative    COVID PRE-OP / PRE-PROCEDURE SCREENING ORDER (NO ISOLATION) - Swab, Nasopharynx [[id number]]  (Normal) Collected: 11/10/20 1528    Specimen: Swab from Nasopharynx Updated: 11/10/20 1635    Narrative:      The following orders were created for panel order COVID PRE-OP / PRE-PROCEDURE SCREENING ORDER (NO ISOLATION) - Swab, Nasopharynx.  Procedure                               Abnormality         Status                     ---------                               -----------         ------                      COVID-19,CEPHEID,COR/ALEJO...[[id number]]  Normal              Final result                 Please view results for these tests on the individual orders.    COVID-19,CEPHEID,COR/ALEJO/PAD IN-HOUSE(OR EMERGENT/ADD-ON),NP SWAB IN TRANSPORT MEDIA 3-4 HR TAT - Swab, Nasopharynx [[id number]]  (Normal) Collected: 11/10/20 1528    Specimen: Swab from Nasopharynx Updated: 11/10/20 1635     COVID19 Not Detected    Narrative:      Fact sheet for providers: https://www.fda.gov/media/832523/download     Fact sheet for patients: https://www.fda.gov/media/107517/download    Ferritin [[id number]]  (Normal) Collected: 11/10/20 1017    Specimen: Blood Updated: 11/10/20 1315     Ferritin 15.58 ng/mL     Narrative:      Results may be falsely decreased if patient taking Biotin.      Iron Profile [[id number]]  (Abnormal) Collected: 11/10/20 1017    Specimen: Blood Updated: 11/10/20 1310     Iron 30 mcg/dL      Iron Saturation 6 %      Transferrin 328 mg/dL      TIBC 489 mcg/dL         No results found for: HGBA1C  Results from last 7 days   Lab Units 11/10/20  1017   INR  1.04           Lab Results   Component Value Date    LIPASE 34 10/07/2019     No results found for: CHOL, CHLPL, TRIG, HDL, LDL, LDLDIRECT    Lab Results   Lab Value Date/Time    FINALDX  09/24/2020 1256     Bone marrow, biopsy and aspiration:    No diagnostic evidence of a hematopoietic neoplasia identified    Normocellular marrow for age (~60-70%) with trilineage hematopoiesis, increased megakaryocytes, and no increase in blasts    No significant increase in reticulin fibers    No storage iron detected    See comment    STEPHANIE/sms       FINALDX  09/24/2020 1118     Anemia  Thrombocytopenia  No blasts identified      COMDX  09/24/2020 1256     This diagnosis is rendered following consultation with [facility]. Please see the attached consultation report for further details.    No diagnostic evidence of a hematopoietic neoplasia identified. A concurrent  flow cytometric immunophenotypic analysis of the bone marrow aspirate reveals no significant immunophenotypic abnormalities with nonspecific monocytic and granulocytic findings. For further details of flow analysis, see a separate report. Given the increased number of megakaryocytes, the thrombocytopenia and mild anemia observed in this patient are secondary and peripheral and can be partly explained by the presence of ITP and possible iron deficiency. Clinical and laboratory correlation is recommended. Requested cytogenetic analysis is pending, and a separate report will follow. The immunostains performed in  this evaluation complement the flow analysis, and were utilized to further study the marrow.             Microbiology Results (last 10 days)     Procedure Component Value - Date/Time    COVID PRE-OP / PRE-PROCEDURE SCREENING ORDER (NO ISOLATION) - Swab, Nasopharynx [[id number]]  (Normal) Collected: 11/10/20 1528    Lab Status: Final result Specimen: Swab from Nasopharynx Updated: 11/10/20 1635    Narrative:      The following orders were created for panel order COVID PRE-OP / PRE-PROCEDURE SCREENING ORDER (NO ISOLATION) - Swab, Nasopharynx.  Procedure                               Abnormality         Status                     ---------                               -----------         ------                     COVID-19,CEPHEID,COR/ALEJO...[[id number]]  Normal              Final result                 Please view results for these tests on the individual orders.    COVID-19,CEPHEID,COR/ALEJO/PAD IN-HOUSE(OR EMERGENT/ADD-ON),NP SWAB IN TRANSPORT MEDIA 3-4 HR TAT - Swab, Nasopharynx [[id number]]  (Normal) Collected: 11/10/20 1528    Lab Status: Final result Specimen: Swab from Nasopharynx Updated: 11/10/20 1635     COVID19 Not Detected    Narrative:      Fact sheet for providers: https://www.fda.gov/media/427815/download     Fact sheet for patients: https://www.fda.gov/media/050151/download          ECG/EMG Results  (most recent)     None                    No radiology results for the last 7 days        Xrays, labs reviewed personally by physician.    Medication Review:   I have reviewed the patient's current medication list      Scheduled Meds  ceFAZolin, 2 g, Intravenous, Once  [MAR Hold] iron sucrose, 300 mg, Intravenous, Daily  [MAR Hold] sodium chloride, 10 mL, Intravenous, Q12H        Meds Infusions       Meds PRN  •  [MAR Hold] acetaminophen **OR** [MAR Hold] acetaminophen **OR** [MAR Hold] acetaminophen  •  [MAR Hold] aluminum-magnesium hydroxide-simethicone  •  [MAR Hold] bisacodyl  •  [MAR Hold] magnesium hydroxide  •  [MAR Hold] magnesium sulfate **OR** [MAR Hold] magnesium sulfate in D5W 1g/100mL (PREMIX)  •  [MAR Hold] melatonin  •  [MAR Hold] ondansetron **OR** [MAR Hold] ondansetron  •  [MAR Hold] potassium chloride  •  [MAR Hold] sodium chloride        Assessment/Plan   Assessment/Plan     Active Hospital Problems    Diagnosis  POA   • **Thrombocytopenia (CMS/HCC) [D69.6]  Yes   • Iron deficiency anemia [D50.9]  Yes   • Immune thrombocytopenic purpura (CMS/HCC) [D69.3]  Yes      Resolved Hospital Problems   No resolved problems to display.       MEDICAL DECISION MAKING COMPLEXITY BY PROBLEM:     ITP-patient direct admitted by hematology service.  Patient with no signs of active bleeding at this time and general surgery consulted for splenectomy  -Trend platelets   -CellCept on hold, previously had IVIG  -General surgery consult  -Platelet transfusion recommended for less than 2000 or active bleeding  -Patient for splenectomy with general surgery 11/11/2020  -Pharmacy assisting for vaccine timing  -Hematology oncology consult     Anemia-history of iron deficiency, decreased overnight  -Monitor with daily CBC  -Can continue iron supplement     Anxiety-patient reports she is very nervous with the prospect of surgery  -Ativan 0.5 mg as needed consider bupropion if persistent    VTE Prophylaxis -   Mechanical Order  History:      Ordered        11/11/20 1137  SCD (Sequential Compression Device) - Place on Patient in Pre-Op  Once         11/10/20 1429  Place Sequential Compression Device on Patient in Pre-Op  Once         11/10/20 1010  Place Sequential Compression Device  Once         11/10/20 1010  Maintain Sequential Compression Device  Continuous                 Pharmalogical Order History:     None            Code Status -   Code Status and Medical Interventions:   Ordered at: 11/10/20 1010     Level Of Support Discussed With:    Patient     Code Status:    CPR     Medical Interventions (Level of Support Prior to Arrest):    Full       This patient has been examined wearing appropriate Personal Protective Equipment and discussed with hospital infection control department. 11/11/20        Discharge Planning    home          Electronically signed by Jassi Will MD, 11/11/20, 13:06 EST.  [facility] Hospitalist Team

## 2020-11-11 NOTE — OP NOTE
Operative Report:    Patient Name:  Tiny Akhtar  YOB: 1985    Date of Surgery:  11/11/2020     Indications: 35-year-old lady with ITP refractory to medical management.  She was admitted by her hematologist for urgent splenectomy due to her ongoing drop in her platelet counts.  I talked to her about the risks and benefits of splenectomy we talked about the anticipated recovery.  After this discussion she understood the risks and wished to proceed.    Pre-op Diagnosis:   Immune thrombocytopenic purpura (CMS/HCC) [D69.3]       Post-Op Diagnosis Codes:     * Immune thrombocytopenic purpura (CMS/HCC) [D69.3]    Procedure/CPT® Codes:      Procedure(s):  SPLENECTOMY LAPAROSCOPIC    Staff:  Surgeon(s):  Erick Akhtar MD    Assistant: Radha Franco RNFA    Anesthesia: General    Estimated Blood Loss: 75 mL    Implants:    Implant Name Type Inv. Item Serial No.  Lot No. LRB No. Used Action   RELOAD STPLR ENDO ROEL TRISTAPLE 45 ART SEB LOGAN - [id number] Implant RELOAD STPLR ENDO ROEL TRISTAPLE 45 ART VASC MD AYALA [id number] N/A 1 Implanted   SEAL HEMO ABS NHI AH PWDR 3GM - [id number] Implant SEAL HEMO ABS NHI AH PWDR 3GM  MEDAFOR HEMOSTATIS [place] [id number] N/A 1 Implanted       Specimen:          Specimens     ID Source Type Tests Collected By Collected At Frozen?      A Spleen Tissue · TISSUE PATHOLOGY EXAM   Erick Akhtar MD 11/11/20 1328 No     Description: Spleen              Findings: No evidence of splenules    Complications: None    Description of Procedure: Patient was taken to the operating room placed on the operating table in the lazy right lateral decubitus position under general anesthesia.  Her abdomen and flank were prepped and draped normal sterile fashion.  Timeout was performed identifying the correct patient procedure site of operation.  I began the operation by entering the abdomen through an infraumbilical skin incision.  The fascia was grasped  elevated was incised the peritoneum was entered bluntly.  The Campbell trocar was placed abdomen insufflated camera introduced there is no evidence of injury to underlying structures.  She was then placed head up and the table was rotated so she was in a more lateral position.  A right upper quadrant 5 mm port was placed a left mid abdominal 10 mm port was also placed.  I inspected the abdomen and did not see any evidence of splenules.  I then used the Enseal to create a window between the stomach and the colon and carried this around the greater curvature taking down the short gastric vessels.  I then inspected the splenic hilum and tail the pancreas and I did not see any evidence of splenules.  I then began to dissect away the attachments to the colon and carried this up towards the splenic hilum.  The Enseal was used to divide minor vessels.  During this process I did partially tear a small splenic vein which the Enseal was used to achieve hemostasis.  A Ray-Rissa was introduced into the abdomen assist with visualization this was then removed after the bleeding subsided.  I then carried my dissection along the splenic hilum superiorly and the splenic artery was dissected free and a ROEL 45 gold staple load was used to divide it.  The remaining attachments were dissected free from the diaphragm and the retroperitoneum this freed the spleen.  It was placed in a 15 cm Endo Catch bag and brought through the umbilical port site where it was morcellated and sent to pathology for review.  We then changed gloves and the left upper quadrant was then inspected.  The tail the pancreas although had to be gently retracted did not seem to be injured during the dissection process.  There is no evidence of bleeding.  Because her platelets at present were right around 50,000 I decided to place some Luciano at the dissection planes and also left a 15 Khmer Diaz drain in the left upper quadrant.  The ports were then serially removed.   The left lower quadrant 10 mm site was closed using a 0 Vicryl suture in a suture passer.  The Campbell trocar site was closed with 0 Vicryl suture the skin was closed with 4-0 Vicryl suture and skin affix was placed on her wounds.      Erick Akhtar MD     Date: 11/11/2020  Time: 15:23 EST    This note was created using Dragon Voice Recognition software.

## 2020-11-12 LAB
ANION GAP SERPL CALCULATED.3IONS-SCNC: 11 MMOL/L (ref 5–15)
BASOPHILS # BLD AUTO: 0 10*3/MM3 (ref 0–0.2)
BASOPHILS NFR BLD AUTO: 0.1 % (ref 0–1.5)
BUN SERPL-MCNC: 11 MG/DL (ref 6–20)
BUN/CREAT SERPL: 19 (ref 7–25)
CALCIUM SPEC-SCNC: 9.2 MG/DL (ref 8.6–10.5)
CHLORIDE SERPL-SCNC: 105 MMOL/L (ref 98–107)
CO2 SERPL-SCNC: 24 MMOL/L (ref 22–29)
CREAT SERPL-MCNC: 0.58 MG/DL (ref 0.57–1)
DEPRECATED RDW RBC AUTO: 42.4 FL (ref 37–54)
EOSINOPHIL # BLD AUTO: 0 10*3/MM3 (ref 0–0.4)
EOSINOPHIL NFR BLD AUTO: 0 % (ref 0.3–6.2)
ERYTHROCYTE [DISTWIDTH] IN BLOOD BY AUTOMATED COUNT: 15.7 % (ref 12.3–15.4)
GFR SERPL CREATININE-BSD FRML MDRD: 120 ML/MIN/1.73
GLUCOSE SERPL-MCNC: 112 MG/DL (ref 65–99)
HCT VFR BLD AUTO: 27.8 % (ref 34–46.6)
HGB BLD-MCNC: 8.7 G/DL (ref 12–15.9)
LYMPHOCYTES # BLD AUTO: 1.2 10*3/MM3 (ref 0.7–3.1)
LYMPHOCYTES NFR BLD AUTO: 7.8 % (ref 19.6–45.3)
MAGNESIUM SERPL-MCNC: 2 MG/DL (ref 1.6–2.6)
MCH RBC QN AUTO: 24 PG (ref 26.6–33)
MCHC RBC AUTO-ENTMCNC: 31.5 G/DL (ref 31.5–35.7)
MCV RBC AUTO: 76.2 FL (ref 79–97)
MONOCYTES # BLD AUTO: 1.1 10*3/MM3 (ref 0.1–0.9)
MONOCYTES NFR BLD AUTO: 7.5 % (ref 5–12)
NEUTROPHILS NFR BLD AUTO: 12.8 10*3/MM3 (ref 1.7–7)
NEUTROPHILS NFR BLD AUTO: 84.6 % (ref 42.7–76)
NRBC BLD AUTO-RTO: 0 /100 WBC (ref 0–0.2)
PLATELET # BLD AUTO: 205 10*3/MM3 (ref 140–450)
PMV BLD AUTO: 9.6 FL (ref 6–12)
POTASSIUM SERPL-SCNC: 4.1 MMOL/L (ref 3.5–5.2)
RBC # BLD AUTO: 3.65 10*6/MM3 (ref 3.77–5.28)
SODIUM SERPL-SCNC: 140 MMOL/L (ref 136–145)
WBC # BLD AUTO: 15.1 10*3/MM3 (ref 3.4–10.8)

## 2020-11-12 PROCEDURE — 99024 POSTOP FOLLOW-UP VISIT: CPT | Performed by: SURGERY

## 2020-11-12 PROCEDURE — 85025 COMPLETE CBC W/AUTO DIFF WBC: CPT | Performed by: SURGERY

## 2020-11-12 PROCEDURE — 25010000002 MORPHINE PER 10 MG: Performed by: SURGERY

## 2020-11-12 PROCEDURE — 99232 SBSQ HOSP IP/OBS MODERATE 35: CPT | Performed by: FAMILY MEDICINE

## 2020-11-12 PROCEDURE — 99232 SBSQ HOSP IP/OBS MODERATE 35: CPT | Performed by: INTERNAL MEDICINE

## 2020-11-12 PROCEDURE — 25010000002 ONDANSETRON PER 1 MG: Performed by: SURGERY

## 2020-11-12 PROCEDURE — 25010000002 IRON SUCROSE PER 1 MG: Performed by: SURGERY

## 2020-11-12 PROCEDURE — 80048 BASIC METABOLIC PNL TOTAL CA: CPT | Performed by: SURGERY

## 2020-11-12 PROCEDURE — 83735 ASSAY OF MAGNESIUM: CPT | Performed by: SURGERY

## 2020-11-12 RX ORDER — ACETAMINOPHEN 325 MG/1
650 TABLET ORAL EVERY 6 HOURS PRN
Status: DISCONTINUED | OUTPATIENT
Start: 2020-11-12 | End: 2020-11-14 | Stop reason: HOSPADM

## 2020-11-12 RX ORDER — SIMETHICONE 80 MG
80 TABLET,CHEWABLE ORAL 4 TIMES DAILY PRN
Status: DISCONTINUED | OUTPATIENT
Start: 2020-11-12 | End: 2020-11-14 | Stop reason: HOSPADM

## 2020-11-12 RX ADMIN — SIMETHICONE 80 MG: 80 TABLET, CHEWABLE ORAL at 10:57

## 2020-11-12 RX ADMIN — Medication 10 ML: at 09:13

## 2020-11-12 RX ADMIN — ONDANSETRON 4 MG: 2 INJECTION INTRAMUSCULAR; INTRAVENOUS at 17:21

## 2020-11-12 RX ADMIN — ACETAMINOPHEN 650 MG: 325 TABLET, FILM COATED ORAL at 17:45

## 2020-11-12 RX ADMIN — SODIUM CHLORIDE 300 MG: 9 INJECTION, SOLUTION INTRAVENOUS at 09:13

## 2020-11-12 RX ADMIN — ACETAMINOPHEN 1000 MG: 500 TABLET, FILM COATED ORAL at 02:12

## 2020-11-12 RX ADMIN — Medication 10 ML: at 20:20

## 2020-11-12 RX ADMIN — ONDANSETRON 4 MG: 2 INJECTION INTRAMUSCULAR; INTRAVENOUS at 08:27

## 2020-11-12 RX ADMIN — MORPHINE SULFATE 4 MG: 4 INJECTION INTRAVENOUS at 03:38

## 2020-11-12 RX ADMIN — ACETAMINOPHEN 1000 MG: 500 TABLET, FILM COATED ORAL at 08:28

## 2020-11-12 RX ADMIN — POLYETHYLENE GLYCOL 3350 17 G: 17 POWDER, FOR SOLUTION ORAL at 08:27

## 2020-11-12 RX ADMIN — OXYCODONE 5 MG: 5 TABLET ORAL at 12:06

## 2020-11-12 RX ADMIN — OXYCODONE 5 MG: 5 TABLET ORAL at 02:18

## 2020-11-12 RX ADMIN — MORPHINE SULFATE 4 MG: 4 INJECTION INTRAVENOUS at 22:47

## 2020-11-12 NOTE — PLAN OF CARE
Goal Outcome Evaluation:  Plan of Care Reviewed With: patient  Patient is alert and able to make needs known to staff. Patient has no complaints noted at this time. Patient is up to BR as needed. Will continue to monitor.   Problem: Adult Inpatient Plan of Care  Goal: Plan of Care Review  Outcome: Ongoing, Progressing

## 2020-11-12 NOTE — PROGRESS NOTES
Hematology/Oncology Inpatient Progress Note    PATIENT NAME: Tiny Akhtar  : 1985  MRN: [id number]    CHIEF COMPLAINT: Surgery consultation for splenectomy    HISTORY OF PRESENT ILLNESS:   Tiny Akhtar is a 35 y.o. female who presented to [facility] on 2020 as a direct admit for general surgery consultation for splenectomy.  She has a history of ITP.  Count on admission white blood count 4.7, hemoglobin 10.7, hematocrit 33.9, MCV 76.2, platelets 85,000.     General surgery was consulted for splenectomy.     11/10/20  Hematology/Oncology was consulted for thrombocytopenia.  Patient is known to our service and followed by Dr. Forrest Llanes for history of immune thrombocytopenic purpura.  Patient was seen initially as an inpatient in 2019 when she had a platelet count of 9000.  Patient was initiated on steroid, followed by rituximab, followed by IVIG.  She was then started on Nplate in 2019.  Patient was seen at [facility] hematology for second opinion and was continued on Nplate.  2021 she received IVIG again.  Nplate was discontinued due to treatment failure in 2020 and the patient was initiated on IVIG again.  On 2020 the patient started taking fostamatinib she failed.  She had IVIG in 2020.  Plate was reinitiated in 2020 followed by additional IVIG in 2020.  She was started on CellCept by the [facility] in 2020.  The patient lost insurance and was not able to get a refill.  At office visit on 2020 discussed patient will need splenectomy since her platelets are again dropping.       She has a past medical history of Anxiety, Immune thrombocytopenic purpura (CMS/HCC) (2019), Iron deficiency anemia, Kidney stone (10/2019), and Thrombocytopenia (CMS/HCC) (2019).     PCP: Jose Ingram APRN    History of present illness was reviewed and is unchanged from the  "previous visit. 11/11/20    Subjective    Status post splenectomy yesterday.  Reports some pain but doing well overall.  No bleeding issues.    ROS:  Review of Systems   Constitutional: Negative for appetite change, chills, fatigue, fever and unexpected weight change.   HENT: Negative for congestion, hearing loss, mouth sores, nosebleeds, postnasal drip, rhinorrhea and sore throat.    Eyes: Negative for photophobia, pain and visual disturbance.   Respiratory: Negative for cough, chest tightness, shortness of breath and wheezing.    Cardiovascular: Negative for chest pain, palpitations and leg swelling.   Gastrointestinal: Negative for abdominal distention, abdominal pain, constipation, diarrhea, nausea and vomiting.   Genitourinary: Negative for difficulty urinating, dysuria and hematuria.   Musculoskeletal: Negative for arthralgias, back pain, gait problem and myalgias.   Skin: Negative for pallor and rash.   Neurological: Negative for dizziness, weakness, light-headedness, numbness and headaches.   Hematological: Negative for adenopathy. Bruises/bleeds easily.   Psychiatric/Behavioral: Negative for agitation, confusion and dysphoric mood. The patient is not nervous/anxious.    All other systems reviewed and are negative.       MEDICATIONS:    Scheduled Meds:  acetaminophen, 1,000 mg, Oral, Q8H  iron sucrose, 300 mg, Intravenous, Daily  polyethylene glycol, 17 g, Oral, Daily  sodium chloride, 10 mL, Intravenous, Q12H       Continuous Infusions:  sodium chloride, 75 mL/hr, Last Rate: 75 mL/hr (11/11/20 4712)       PRN Meds:  aluminum-magnesium hydroxide-simethicone  •  magnesium sulfate **OR** magnesium sulfate in D5W 1g/100mL (PREMIX)  •  melatonin  •  Morphine  •  Morphine  •  ondansetron **OR** ondansetron  •  oxyCODONE  •  potassium chloride  •  sodium chloride     ALLERGIES:  No Known Allergies    Objective    VITALS:   BP 97/60   Pulse 62   Temp 98.1 °F (36.7 °C)   Resp 16   Ht 157.5 cm (62\")   Wt 57.1 kg " (125 lb 12.8 oz)   LMP 11/04/2020 (Approximate)   SpO2 100%   BMI 23.01 kg/m²     PHYSICAL EXAM: (performed by MD)  Physical Exam  Vitals signs and nursing note reviewed.   Constitutional:       General: She is not in acute distress.     Appearance: Normal appearance. She is not diaphoretic.   HENT:      Head: Normocephalic and atraumatic.   Eyes:      General: No scleral icterus.        Right eye: No discharge.         Left eye: No discharge.      Extraocular Movements: Extraocular movements intact.      Conjunctiva/sclera: Conjunctivae normal.   Neck:      Musculoskeletal: Normal range of motion and neck supple.      Thyroid: No thyromegaly.   Cardiovascular:      Rate and Rhythm: Normal rate and regular rhythm.      Pulses: Normal pulses.      Heart sounds: Normal heart sounds. No friction rub. No gallop.    Pulmonary:      Effort: Pulmonary effort is normal. No respiratory distress.      Breath sounds: Normal breath sounds. No stridor. No wheezing.   Abdominal:      General: Bowel sounds are normal.      Palpations: Abdomen is soft. There is no mass.      Tenderness: There is no abdominal tenderness. There is no guarding or rebound.      Comments: Laparoscopic surgical scars noted.  Abdominal drains noted.   Musculoskeletal: Normal range of motion.         General: No swelling or tenderness.   Lymphadenopathy:      Cervical: No cervical adenopathy.   Skin:     General: Skin is warm.      Findings: No erythema or rash.   Neurological:      General: No focal deficit present.      Mental Status: She is alert and oriented to person, place, and time.      Motor: No abnormal muscle tone.   Psychiatric:         Mood and Affect: Mood normal.         Behavior: Behavior normal.         Thought Content: Thought content normal.         Judgment: Judgment normal.        RECENT LABS:  Lab Results (last 24 hours)     Procedure Component Value Units Date/Time    Magnesium [[id number]]  (Normal) Collected: 11/12/20 0242     Specimen: Blood Updated: 11/12/20 0353     Magnesium 2.0 mg/dL     Basic Metabolic Panel [[id number]]  (Abnormal) Collected: 11/12/20 0242    Specimen: Blood Updated: 11/12/20 0353     Glucose 112 mg/dL      BUN 11 mg/dL      Creatinine 0.58 mg/dL      Sodium 140 mmol/L      Potassium 4.1 mmol/L      Chloride 105 mmol/L      CO2 24.0 mmol/L      Calcium 9.2 mg/dL      eGFR Non African Amer 120 mL/min/1.73      BUN/Creatinine Ratio 19.0     Anion Gap 11.0 mmol/L     Narrative:      GFR Normal >60  Chronic Kidney Disease <60  Kidney Failure <15      CBC & Differential [[id number]]  (Abnormal) Collected: 11/12/20 0242    Specimen: Blood Updated: 11/12/20 0319    Narrative:      The following orders were created for panel order CBC & Differential.  Procedure                               Abnormality         Status                     ---------                               -----------         ------                     CBC Auto Differential[[id number]]        Abnormal            Final result                 Please view results for these tests on the individual orders.    CBC Auto Differential [[id number]]  (Abnormal) Collected: 11/12/20 0242    Specimen: Blood Updated: 11/12/20 0319     WBC 15.10 10*3/mm3      RBC 3.65 10*6/mm3      Hemoglobin 8.7 g/dL      Hematocrit 27.8 %      MCV 76.2 fL      MCH 24.0 pg      MCHC 31.5 g/dL      RDW 15.7 %      RDW-SD 42.4 fl      MPV 9.6 fL      Platelets 205 10*3/mm3      Neutrophil % 84.6 %      Lymphocyte % 7.8 %      Monocyte % 7.5 %      Eosinophil % 0.0 %      Basophil % 0.1 %      Neutrophils, Absolute 12.80 10*3/mm3      Lymphocytes, Absolute 1.20 10*3/mm3      Monocytes, Absolute 1.10 10*3/mm3      Eosinophils, Absolute 0.00 10*3/mm3      Basophils, Absolute 0.00 10*3/mm3      nRBC 0.0 /100 WBC     CBC & Differential [[id number]]  (Abnormal) Collected: 11/11/20 1953    Specimen: Blood Updated: 11/11/20 2132    Narrative:      The following orders were created for panel order  CBC & Differential.  Procedure                               Abnormality         Status                     ---------                               -----------         ------                     CBC Auto Differential[[id number]]        Abnormal            Final result                 Please view results for these tests on the individual orders.    CBC Auto Differential [[id number]]  (Abnormal) Collected: 11/11/20 1953    Specimen: Blood Updated: 11/11/20 2132     WBC 17.80 10*3/mm3      RBC 4.01 10*6/mm3      Hemoglobin 9.6 g/dL      Hematocrit 30.6 %      MCV 76.3 fL      MCH 24.0 pg      MCHC 31.4 g/dL      RDW 15.6 %      RDW-SD 42.4 fl      MPV 9.9 fL      Platelets 133 10*3/mm3      Neutrophil % 96.5 %      Lymphocyte % 1.9 %      Monocyte % 1.4 %      Eosinophil % 0.0 %      Basophil % 0.2 %      Neutrophils, Absolute 17.20 10*3/mm3      Lymphocytes, Absolute 0.30 10*3/mm3      Monocytes, Absolute 0.30 10*3/mm3      Eosinophils, Absolute 0.00 10*3/mm3      Basophils, Absolute 0.00 10*3/mm3      nRBC 0.0 /100 WBC         PENDING RESULTS: none     IMAGING REVIEWED:  No radiology results for the last day    Assessment/Plan   ASSESSMENT  1. Refractory idiopathic thrombocytopenia purpura: Platelets 85,000 on admission.  Refractory to steroids, Rituxan, N-plate, fostamatinib, and CellCept. Platelet count declined to 60,000 .  Status post laparoscopic splenectomy  11/13/2020 with platelets improved to 205 post splenectomy day 1..  2. Need for splenectomy: 11/23/2019 - Patient received meningococcal vaccine, Haemophilus vaccine, pneumococcus vaccine. Pharmacy consulted for assistance on timing of any further vaccination to complete series.   General surgery, Dr. Erick Akhtar, consulted for splenectomy for 11/11/2020.   3. History of iron deficiency anemia: Hemoglobin 10.7 on admission.  Patient received IV Venofer in November 2019.  Iron studies 11/10/2020 - Ferritin 15.58 and iron 30, iron saturation 6, TIBC 489,  transferrin 328. Start Venofer 300 mg IV x 3 days on 11/11/2020.      PLAN  1. CBC daily.  Monitor for risk of thrombocytosis postsplenectomy.  Platelets overshoot we will consider giving her aspirin.  2. Continue oral ferrous sulfate as outpatient  3. Continue Venofer 300 mg IV x 3 days, day 2/3  4. Consulted pharmacy to ensure vaccination status is up to date.  5. Discharge planning according to Dr. Akhtar.    Electronically signed by Forrest Llanes MD, 11/12/20, 8:30 AM EST.

## 2020-11-12 NOTE — PROGRESS NOTES
General Surgery Progress Note     LOS: 2 days   Patient Care Team:  Jose Ingram APRN as PCP - General (Family Medicine)    Subjective     Interval History:   Moderate expected pain.  No significant nausea or vomiting.  Objective     Vital Signs  Temp:  [97 °F (36.1 °C)-99.4 °F (37.4 °C)] 98.2 °F (36.8 °C)  Heart Rate:  [53-75] 56  Resp:  [10-17] 17  BP: ()/(39-68) 98/61    Physical Exam  Constitutional:       Appearance: Normal appearance.   HENT:      Head: Normocephalic and atraumatic.   Eyes:      General: No scleral icterus.     Conjunctiva/sclera: Conjunctivae normal.   Cardiovascular:      Rate and Rhythm: Normal rate.      Pulses: Normal pulses.   Pulmonary:      Effort: Pulmonary effort is normal.   Abdominal:      Comments: Soft appropriately tender incisions are healing without any significant erythema or drainage   Musculoskeletal:         General: No swelling or tenderness.   Skin:     General: Skin is warm and dry.   Neurological:      General: No focal deficit present.      Mental Status: She is alert. Mental status is at baseline.   Psychiatric:         Mood and Affect: Mood normal.         Behavior: Behavior normal.            Results Review:    Lab Results (last 24 hours)     Procedure Component Value Units Date/Time    Tissue Pathology Exam [[id number]] Collected: 11/11/20 1328    Specimen: Tissue from Spleen Updated: 11/12/20 0906    Magnesium [[id number]]  (Normal) Collected: 11/12/20 0242    Specimen: Blood Updated: 11/12/20 0353     Magnesium 2.0 mg/dL     Basic Metabolic Panel [[id number]]  (Abnormal) Collected: 11/12/20 0242    Specimen: Blood Updated: 11/12/20 0353     Glucose 112 mg/dL      BUN 11 mg/dL      Creatinine 0.58 mg/dL      Sodium 140 mmol/L      Potassium 4.1 mmol/L      Chloride 105 mmol/L      CO2 24.0 mmol/L      Calcium 9.2 mg/dL      eGFR Non African Amer 120 mL/min/1.73      BUN/Creatinine Ratio 19.0     Anion Gap 11.0 mmol/L     Narrative:      GFR Normal  >60  Chronic Kidney Disease <60  Kidney Failure <15      CBC & Differential [[id number]]  (Abnormal) Collected: 11/12/20 0242    Specimen: Blood Updated: 11/12/20 0319    Narrative:      The following orders were created for panel order CBC & Differential.  Procedure                               Abnormality         Status                     ---------                               -----------         ------                     CBC Auto Differential[[id number]]        Abnormal            Final result                 Please view results for these tests on the individual orders.    CBC Auto Differential [[id number]]  (Abnormal) Collected: 11/12/20 0242    Specimen: Blood Updated: 11/12/20 0319     WBC 15.10 10*3/mm3      RBC 3.65 10*6/mm3      Hemoglobin 8.7 g/dL      Hematocrit 27.8 %      MCV 76.2 fL      MCH 24.0 pg      MCHC 31.5 g/dL      RDW 15.7 %      RDW-SD 42.4 fl      MPV 9.6 fL      Platelets 205 10*3/mm3      Neutrophil % 84.6 %      Lymphocyte % 7.8 %      Monocyte % 7.5 %      Eosinophil % 0.0 %      Basophil % 0.1 %      Neutrophils, Absolute 12.80 10*3/mm3      Lymphocytes, Absolute 1.20 10*3/mm3      Monocytes, Absolute 1.10 10*3/mm3      Eosinophils, Absolute 0.00 10*3/mm3      Basophils, Absolute 0.00 10*3/mm3      nRBC 0.0 /100 WBC     CBC & Differential [[id number]]  (Abnormal) Collected: 11/11/20 1953    Specimen: Blood Updated: 11/11/20 2132    Narrative:      The following orders were created for panel order CBC & Differential.  Procedure                               Abnormality         Status                     ---------                               -----------         ------                     CBC Auto Differential[[id number]]        Abnormal            Final result                 Please view results for these tests on the individual orders.    CBC Auto Differential [[id number]]  (Abnormal) Collected: 11/11/20 1953    Specimen: Blood Updated: 11/11/20 2132     WBC 17.80 10*3/mm3      RBC 4.01  10*6/mm3      Hemoglobin 9.6 g/dL      Hematocrit 30.6 %      MCV 76.3 fL      MCH 24.0 pg      MCHC 31.4 g/dL      RDW 15.6 %      RDW-SD 42.4 fl      MPV 9.9 fL      Platelets 133 10*3/mm3      Neutrophil % 96.5 %      Lymphocyte % 1.9 %      Monocyte % 1.4 %      Eosinophil % 0.0 %      Basophil % 0.2 %      Neutrophils, Absolute 17.20 10*3/mm3      Lymphocytes, Absolute 0.30 10*3/mm3      Monocytes, Absolute 0.30 10*3/mm3      Eosinophils, Absolute 0.00 10*3/mm3      Basophils, Absolute 0.00 10*3/mm3      nRBC 0.0 /100 WBC         Imaging Results (Last 24 Hours)     ** No results found for the last 24 hours. **         I reviewed the patient's new clinical results.    Medication Review:    Current Facility-Administered Medications:   •  aluminum-magnesium hydroxide-simethicone (MAALOX MAX) 400-400-40 MG/5ML suspension 15 mL, 15 mL, Oral, Q6H PRN, Erick Akhtar MD  •  iron sucrose 300 mg in 250 mL NS, 300 mg, Intravenous, Daily, Erick Akhtar MD, 300 mg at 11/12/20 0913  •  Magnesium Sulfate 2 gram infusion - Mg less than or equal to 1.5 mg/dL, 2 g, Intravenous, PRN **OR** Magnesium Sulfate 1 gram infusion - Mg 1.6-1.9 mg/dL, 1 g, Intravenous, PRN, Erick Akhtar MD  •  melatonin tablet 5 mg, 5 mg, Oral, Nightly PRN, Erick Akhtar MD  •  Morphine sulfate (PF) injection 2 mg, 2 mg, Intravenous, Q2H PRN, Kiran Mendoza DO  •  Morphine sulfate (PF) injection 4 mg, 4 mg, Intravenous, Q2H PRN, Kiran Mendoza DO, 4 mg at 11/12/20 0338  •  ondansetron (ZOFRAN) tablet 4 mg, 4 mg, Oral, Q6H PRN **OR** ondansetron (ZOFRAN) injection 4 mg, 4 mg, Intravenous, Q6H PRN, Erick Akhtar MD, 4 mg at 11/12/20 0827  •  oxyCODONE (ROXICODONE) immediate release tablet 5 mg, 5 mg, Oral, Q4H PRN, Erick Akhtar MD, 5 mg at 11/12/20 0218  •  polyethylene glycol (MIRALAX) packet 17 g, 17 g, Oral, Daily, Erick Akhtar MD, 17 g at 11/12/20 0827  •  potassium chloride (K-DUR,KLOR-CON) CR tablet 40 mEq, 40 mEq, Oral,  PRN, Erick Akhtar MD  •  simethicone (MYLICON) chewable tablet 80 mg, 80 mg, Oral, 4x Daily PRN, Jassi Will MD, 80 mg at 11/12/20 1057  •  sodium chloride 0.9 % flush 10 mL, 10 mL, Intravenous, Q12H, Erick Akhtar MD, 10 mL at 11/12/20 0913  •  sodium chloride 0.9 % flush 10 mL, 10 mL, Intravenous, PRN, Erick Akhtar MD  •  sodium chloride 0.9 % infusion, 75 mL/hr, Intravenous, Continuous, Erick Akhtar MD, Last Rate: 75 mL/hr at 11/11/20 1742, 75 mL/hr at 11/11/20 1742    Assessment/Plan     Principal Problem:    Thrombocytopenia (CMS/HCC)  Active Problems:    Immune thrombocytopenic purpura (CMS/HCC)    Iron deficiency anemia      Postop day 1 laparoscopic splenectomy for refractory ITP, overall looks good today.     Clear liquid diet advance as tolerated  Start bowel regimen  I would like to hold DVT chemoprophylaxis 1 additional day due to some venous bleeding during the operation and hemoglobin of 8.6.  Will consider starting tomorrow if necessary.  Will be using SCDs for now  Increase activity  Pulmonary toilet  Anticipate drain removal prior to discharge    This note was created using Dragon Voice Recognition software.    Erick Akhtar MD  11/12/20  11:40 EST

## 2020-11-12 NOTE — PAYOR COMM NOTE
"Requesting IP Auth for Tiny Akhtar  1985  Policy no. [id number]  AUTHORIZATION PENDING 11/10-  PLEASE FORWARD DETERMINATION TO FOLLOWING CONTACT:    RITU HARRY LPN [facility]    [phone]      Tiny Akhtar (35 y.o. Female)     Date of Birth Social Security Number Address Home Phone MRN    1985  [address]  [place] [address] [phone] [id number]    Sabianism Marital Status          None        Admission Date Admission Type Admitting Provider Attending Provider Department, Room/Bed    11/10/20 Elective Jassi Will MD Farley, Timothy Michael, MD [facility] SURGICAL INPATIENT,     Discharge Date Discharge Disposition Discharge Destination                       Attending Provider: Jassi Will MD    Allergies: No Known Allergies    Isolation: None   Infection: None   Code Status: CPR    Ht: 157.5 cm (62\")   Wt: 57.1 kg (125 lb 12.8 oz)    Admission Cmt: None   Principal Problem: Thrombocytopenia (CMS/HCC) [D69.6] More...                 Active Insurance as of 11/10/2020     Primary Coverage     Payor Plan Insurance Group Employer/Plan Group    [place] [id number]     Payor Plan Address Payor Plan Phone Number Payor Plan Fax Number Effective Dates    MAIL STOP:   2020 - None Entered    [address] [place] [address]       Subscriber Name Subscriber Birth Date Member ID       TINY AKHTAR 1985 [id number]                 Emergency Contacts      (Rel.) Home Phone Work Phone Mobile Phone    SHELBI DAVEY (Significant Other) [phone] -- [phone]               History & Physical      Jassi Will MD at 11/10/20 1013                [facility] Medicine Services      Patient Name: Tiny Akhtar  : 1985  MRN: [id number]  Primary Care Physician: Jose Ingram, FARZAD  Date of admission: 11/10/2020    Patient " Care Team:  Jose Ingram APRN as PCP - General (Family Medicine)          Subjective   History Present Illness     Chief Complaint:  thrombocytopenia      Ms. Akhtar is a 35 y.o. female with a history of immune thrombocytopenic purpura, anemia, and kidney stones who was directly admitted to [facility] on 11/10/2020 due to thrombocytopenia and need for surgery consultation for splenectomy. The patient denies any current complaints.     The patient is a nonsmoker and denies alcohol or illicit drug use. She reports a family history of ITP, diabetes mellitus, cancer, CHF, and stroke.         Review of Systems   All other systems reviewed and are negative.        Personal History     Past Medical History:   Past Medical History:   Diagnosis Date   • Anxiety    • Immune thrombocytopenic purpura (CMS/HCC) 11/18/2019   • Iron deficiency anemia    • Kidney stone 10/2019   • Thrombocytopenia (CMS/HCC) 11/29/2019       Surgical History:    History reviewed. No pertinent surgical history.    Family History: family history includes Cancer in her paternal grandmother; Diabetes in her father and mother; Heart failure in her maternal grandmother; Stroke in her maternal great-grandmother; Thrombocytopenia in her nephew. Otherwise pertinent FHx was reviewed and unremarkable.     Social History:  reports that she has never smoked. She has never used smokeless tobacco. She reports that she does not drink alcohol or use drugs.      Medications:  Prior to Admission medications    Medication Sig Start Date End Date Taking? Authorizing Provider   acetaminophen (TYLENOL) 325 MG tablet Take 650 mg by mouth Every 6 (Six) Hours As Needed for Mild Pain  or Moderate Pain .    Provider, MD El   ferrous gluconate (FERGON) 324 MG tablet Take 1 tablet by mouth Daily With Breakfast. 10/1/20   Forrest Llanes MD   megestrol (MEGACE) 20 MG tablet Take 20 mg by mouth 3 (Three) Times a Day.    Provider, MD El   Multiple  Vitamins-Minerals (multivitamin with minerals) tablet tablet Take 1 tablet by mouth Daily.    ProviderEl MD   ondansetron (ZOFRAN) 8 MG tablet Take 1 tablet by mouth 3 (Three) Times a Day As Needed for Nausea or Vomiting. 5/28/20   Forrest Llanes MD   pantoprazole (PROTONIX) 20 MG EC tablet  1/5/20   ProviderEl MD       Allergies:  No Known Allergies      Objective   Objective     Vital Signs        on   ;      There is no height or weight on file to calculate BMI.    Physical Exam  Vitals signs reviewed.   Constitutional:       Appearance: She is normal weight.   HENT:      Head: Normocephalic.      Nose: Nose normal.      Mouth/Throat:      Mouth: Mucous membranes are moist.   Eyes:      Extraocular Movements: Extraocular movements intact.      Conjunctiva/sclera: Conjunctivae normal.      Pupils: Pupils are equal, round, and reactive to light.   Neck:      Musculoskeletal: Normal range of motion and neck supple.   Cardiovascular:      Rate and Rhythm: Normal rate and regular rhythm.      Pulses: Normal pulses.      Heart sounds: Normal heart sounds.   Pulmonary:      Effort: Pulmonary effort is normal.      Breath sounds: Normal breath sounds.   Abdominal:      General: Bowel sounds are normal. There is no distension.      Palpations: Abdomen is soft.      Tenderness: There is no abdominal tenderness.   Musculoskeletal: Normal range of motion.      Right lower leg: No edema.      Left lower leg: No edema.   Skin:     General: Skin is warm and dry.      Capillary Refill: Capillary refill takes less than 2 seconds.   Neurological:      General: No focal deficit present.      Mental Status: She is alert and oriented to person, place, and time.   Psychiatric:         Mood and Affect: Mood normal.         Behavior: Behavior normal.         Results Review:  I have personally reviewed most recent lab results and agree with findings.    Results from last 7 days   Lab Units 11/09/20  1419   WBC  10*3/mm3 7.55   HEMOGLOBIN g/dL 9.9*   HEMATOCRIT % 31.8*   PLATELETS 10*3/mm3 111*       Brief Urine Lab Results  (Last result in the past 365 days)      Color   Clarity   Blood   Leuk Est   Nitrite   Protein   CREAT   Urine HCG        09/24/20 1119               Negative           Microbiology Results (last 10 days)     ** No results found for the last 240 hours. **          ECG/EMG Results (most recent)     None          No radiology results for the last 7 days    CrCl cannot be calculated (Patient's most recent lab result is older than the maximum 30 days allowed.).      Assessment/Plan   Assessment/Plan       Active Hospital Problems    Diagnosis  POA   • **Thrombocytopenia (CMS/HCC) [D69.6]  Yes   • Iron deficiency anemia [D50.9]  Yes   • Immune thrombocytopenic purpura (CMS/HCC) [D69.3]  Yes      Resolved Hospital Problems   No resolved problems to display.       Thrombocytopenia secondary to Immune thrombocytopenic purpura   -followed as outpatient by hematology, Dr. Llanes---consult  -consult general surgery, Dr. Akhtar, for splenectomy  -check CBC and trend Plts  -patient reports last IVIG approximately 2 weeks ago  -patient reports CellCept on hold    Iron deficiency anemia  -continue iron supplement  -monitor H&H      VTE Prophylaxis -   Mechanical Order History:      Ordered        11/10/20 1010  Place Sequential Compression Device  Once         11/10/20 1010  Maintain Sequential Compression Device  Continuous                 Pharmalogical Order History:     None          CODE STATUS:    Code Status and Medical Interventions:   Ordered at: 11/10/20 1010     Level Of Support Discussed With:    Patient     Code Status:    CPR     Medical Interventions (Level of Support Prior to Arrest):    Full       This patient has been examined wearing appropriate Personal Protective Equipment. 11/10/20      I discussed the patient's findings and my recommendations with patient and family.      Signature: Electronically  signed by FARZAD Johnson, 11/10/20, 10:18 AM EST.    [facility]ist [facility]      Attending attestation:    I performed a history and physical examination of the patient. I reviewed the nurse practitioner's note and agree with the documented findings and plan of care.    S:    Patient is a 35-year-old female with history of ITP and chronic kidney stones presenting for splenectomy due to persistent and progressive thrombocytopenia.    O:    Vital signs reviewed    General: Female sitting up in bed breathing comfortably on room air in no acute distress  HEENT: NC/AT, EOMI, mucosa moist  Heart: Regular, rate controlled  Chest: Normal work of breathing moving air well in all lung fields no wheezing or crackles  Abdominal: Soft.  Nontender nondistended unable to appreciate splenomegaly  Musculoskeletal: Normal ROM.  No edema. No calf tenderness.  Neurological: AAOx3, no focal deficits  Skin: Skin is warm and dry. No rash  Psychiatric: Anxious    A/P    ITP-patient direct admitted by hematology service.  Patient with no signs of active bleeding at this time and general surgery consulted for splenectomy  -Trend platelets  -CellCept on hold, previously had IVIG  -General surgery consult  -Platelet transfusion recommended for less than 2000 or active bleeding  -Pharmacy assisting for vaccine timing  -Hematology oncology consult    Anemia-history of iron deficiency  -Monitor with daily CBC  -Can continue iron supplement    Anxiety-patient reports she is very nervous with the prospect of surgery  -Ativan 0.5 mg x 1 can consider bupropion if persistent    Electronically signed by Jassi Will MD, 11/10/20, 1:53 PM EST.      Electronically signed by Jassi Will MD at 11/10/20 1358          Operative/Procedure Notes (last 48 hours) (Notes from 11/10/20 1305 through 11/12/20 1305)      Erick Akhtar MD at 11/11/20 1323          Operative Report:    Patient Name:  Tiny Akhtar  Date of Birth:   1985    Date of Surgery:  11/11/2020     Indications: 35-year-old lady with ITP refractory to medical management.  She was admitted by her hematologist for urgent splenectomy due to her ongoing drop in her platelet counts.  I talked to her about the risks and benefits of splenectomy we talked about the anticipated recovery.  After this discussion she understood the risks and wished to proceed.    Pre-op Diagnosis:   Immune thrombocytopenic purpura (CMS/HCC) [D69.3]       Post-Op Diagnosis Codes:     * Immune thrombocytopenic purpura (CMS/HCC) [D69.3]    Procedure/CPT® Codes:      Procedure(s):  SPLENECTOMY LAPAROSCOPIC    Staff:  Surgeon(s):  Erick Akhtar MD    Assistant: Radha Franco RNFA    Anesthesia: General    Estimated Blood Loss: 75 mL    Implants:    Implant Name Type Inv. Item Serial No.  Lot No. LRB No. Used Action   RELOAD STPLR ENDO ROEL TRISTAPLE 45 ART VASKYRA LOGAN - [id number] Implant RELOAD STPLR ENDO ROEL TRISTAPLE 45 ART VASKYRA LOGAN  [employer]OV[employer] [id number] N/A 1 Implanted   SEAL HEMO ABS NHI AH PWDR 3GM - [id number] Implant SEAL HEMO ABS NHI AH PWDR 3GM  MED[employer] HEMOSTATIS POLYMER [employer] [id number] N/A 1 Implanted       Specimen:          Specimens     ID Source Type Tests Collected By Collected At Frozen?      A Spleen Tissue · TISSUE PATHOLOGY EXAM   Erick Akhtar MD 11/11/20 1328 No     Description: Spleen              Findings: No evidence of splenules    Complications: None    Description of Procedure: Patient was taken to the operating room placed on the operating table in the lazy right lateral decubitus position under general anesthesia.  Her abdomen and flank were prepped and draped normal sterile fashion.  Timeout was performed identifying the correct patient procedure site of operation.  I began the operation by entering the abdomen through an infraumbilical skin incision.  The fascia was grasped elevated was incised the peritoneum was entered bluntly.  The  Campbell trocar was placed abdomen insufflated camera introduced there is no evidence of injury to underlying structures.  She was then placed head up and the table was rotated so she was in a more lateral position.  A right upper quadrant 5 mm port was placed a left mid abdominal 10 mm port was also placed.  I inspected the abdomen and did not see any evidence of splenules.  I then used the Enseal to create a window between the stomach and the colon and carried this around the greater curvature taking down the short gastric vessels.  I then inspected the splenic hilum and tail the pancreas and I did not see any evidence of splenules.  I then began to dissect away the attachments to the colon and carried this up towards the splenic hilum.  The Enseal was used to divide minor vessels.  During this process I did partially tear a small splenic vein which the Enseal was used to achieve hemostasis.  A Ray-Rissa was introduced into the abdomen assist with visualization this was then removed after the bleeding subsided.  I then carried my dissection along the splenic hilum superiorly and the splenic artery was dissected free and a ROEL 45 gold staple load was used to divide it.  The remaining attachments were dissected free from the diaphragm and the retroperitoneum this freed the spleen.  It was placed in a 15 cm Endo Catch bag and brought through the umbilical port site where it was morcellated and sent to pathology for review.  We then changed gloves and the left upper quadrant was then inspected.  The tail the pancreas although had to be gently retracted did not seem to be injured during the dissection process.  There is no evidence of bleeding.  Because her platelets at present were right around 50,000 I decided to place some Luciano at the dissection planes and also left a 15 Citizen of [place] Diaz drain in the left upper quadrant.  The ports were then serially removed.  The left lower quadrant 10 mm site was closed using a 0 Vicryl  suture in a suture passer.  The Campbell trocar site was closed with 0 Vicryl suture the skin was closed with 4-0 Vicryl suture and skin affix was placed on her wounds.      Erick Akhtar MD     Date: 11/11/2020  Time: 15:23 EST    This note was created using Dragon Voice Recognition software.    Electronically signed by Erick Akhtar MD at 11/11/20 1528          Physician Progress Notes (last 24 hours) (Notes from 11/11/20 1305 through 11/12/20 1305)      Jassi Will MD at 11/12/20 1154                [facility] Medicine Services Daily Progress Note      Hospitalist Team  LOS 2 days      Patient Care Team:  Jose Ingram APRN as PCP - General (Family Medicine)    Patient [facility]: [facility]      Subjective   Subjective     Chief Complaint / Subjective  No chief complaint on file.    Patient reports she did well postop underwent laparoscopic splenectomy on 11/11/2020.  Patient reports some nausea this morning and some bloating, but pain controlled.  No chest pain or shortness of breath.  Diet advanced to clears per surgery.    Brief Synopsis of Hospital Course/HPI  Ms. Akhtar is a 35 y.o. female with a history of immune thrombocytopenic purpura, anemia, and kidney stones who was directly admitted to [facility] on 11/10/2020 due to thrombocytopenia and need for surgery consultation for splenectomy. The patient denies any current complaints.      The patient is a nonsmoker and denies alcohol or illicit drug use. She reports a family history of ITP, diabetes mellitus, cancer, CHF, and stroke.    11/11/2020: Patient reports no events overnight.  Patient still anxious about surgery.  Plan for laparoscopic with possible conversion open.  No chest pain or shortness of breath platelets in the 60s.    Date::          Review of Systems   Constitution: Negative for chills and fever.   HENT: Negative for hoarse voice and stridor.    Eyes: Negative for double vision and  "photophobia.   Cardiovascular: Negative for chest pain and syncope.   Respiratory: Negative for cough and shortness of breath.    Musculoskeletal: Negative for joint pain and stiffness.   Gastrointestinal: Positive for bloating and nausea. Negative for vomiting.   Genitourinary: Negative for dysuria and flank pain.   Neurological: Negative for focal weakness and light-headedness.   Psychiatric/Behavioral: Negative for altered mental status. The patient is not nervous/anxious.          Objective   Objective      Vital Signs  Temp:  [97 °F (36.1 °C)-99.4 °F (37.4 °C)] 98.2 °F (36.8 °C)  Heart Rate:  [53-75] 56  Resp:  [10-17] 17  BP: ()/(39-65) 98/61  Oxygen Therapy  SpO2: 100 %  Pulse Oximetry Type: Intermittent  Device (Oxygen Therapy): room air  Flow (L/min): 2  Flowsheet Rows      First Filed Value   Admission Height  157.5 cm (62\") Documented at 11/10/2020 1045   Admission Weight  57.1 kg (125 lb 12.8 oz) Documented at 11/10/2020 1009        Intake & Output (last 3 days)       11/09 0701 - 11/10 0700 11/10 0701 - 11/11 0700 11/11 0701 - 11/12 0700 11/12 0701 - 11/13 0700    P.O.  480 0     I.V. (mL/kg)   2400 (42)     Total Intake(mL/kg)  480 (8.4) 2400 (42)     Urine (mL/kg/hr)  300 450 (0.3)     Drains   10     Blood   75     Total Output  300 535     Net  +180 +1865             Urine Unmeasured Occurrence   4 x         Lines, Drains & Airways    Active LDAs     Name:   Placement date:   Placement time:   Site:   Days:    Peripheral IV 11/11/20 1010 Anterior;Left Forearm   11/11/20    1010    Forearm   less than 1                  Physical Exam:    Physical Exam    General: Female sitting up in bed breathing comfortably on room air in no acute distress  HEENT: NC/AT, EOMI, mucosa moist  Heart: Regular, rate controlled  Chest: Normal work of breathing moving air well in all lung fields no wheezing or crackles  Abdominal: Soft.    Periincisional tenderness, nondistended  Musculoskeletal: Normal ROM.  No " edema. No calf tenderness.  Neurological: AAOx3, no focal deficits  Skin: Skin is warm and dry. No rash  Psychiatric:  Calm        Wounds (last 24 hours)      LDA Wound     Row Name 11/12/20 0800 11/12/20 0338 11/11/20 1900       Wound 11/11/20 1330 abdomen Incision    Wound - Properties Group Placement Date: 11/11/20  -AM Placement Time: 1330  -AM Location: abdomen  -AM Primary Wound Type: Incision  -AM    Dressing Appearance  dry;intact;no drainage  -JH  dry;intact;no drainage  -CP  dry;intact;no drainage  -CP    Closure  Liquid skin adhesive  -JH  Liquid skin adhesive  -CP  Liquid skin adhesive  -CP    Dressing Care  --  open to air  -CP  --    Retired Wound - Properties Group Date first assessed: 11/11/20  -AM Time first assessed: 1330  -AM Location: abdomen  -AM Primary Wound Type: Incision  -AM    Row Name 11/11/20 1655 11/11/20 1622 11/11/20 1520       Wound 11/11/20 1330 abdomen Incision    Wound - Properties Group Placement Date: 11/11/20  -AM Placement Time: 1330  -AM Location: abdomen  -AM Primary Wound Type: Incision  -AM    Dressing Appearance  dry;intact;no drainage  -HN  dry;intact;no drainage  -AH  dry;intact;no drainage  -AH    Closure  Liquid skin adhesive  -HN  Liquid skin adhesive  -AH  Liquid skin adhesive  -AH    Dressing Care  other (see comments) skin affix  -HN  --  --    Retired Wound - Properties Group Date first assessed: 11/11/20  -AM Time first assessed: 1330  -AM Location: abdomen  -AM Primary Wound Type: Incision  -AM    Row Name 11/11/20 1455             Wound 11/11/20 1330 abdomen Incision    Wound - Properties Group Placement Date: 11/11/20  -AM Placement Time: 1330  -AM Location: abdomen  -AM Primary Wound Type: Incision  -AM    Dressing Care  dressing applied skin affix  -LH      Retired Wound - Properties Group Date first assessed: 11/11/20  -AM Time first assessed: 1330  -AM Location: abdomen  -AM Primary Wound Type: Incision  -AM      User Key  (r) = Recorded By, (t) = Taken  By, (c) = Cosigned By    Initials Name Provider Type    Marcie Queen, RN Registered Nurse    Sarina Henson, RN Registered Nurse    Lala Oh RN Registered Nurse    Rin Ogden, RN Registered Nurse    Pearl Laureano, RN Registered Nurse    Tasha Rubio LPN Licensed Nurse          Procedures:    Procedure(s):  SPLENECTOMY LAPAROSCOPIC          Results Review:     I reviewed the patient's new clinical results.      Lab Results (last 24 hours)     Procedure Component Value Units Date/Time    Tissue Pathology Exam [[id number]] Collected: 11/11/20 1328    Specimen: Tissue from Spleen Updated: 11/12/20 0906    Magnesium [[id number]]  (Normal) Collected: 11/12/20 0242    Specimen: Blood Updated: 11/12/20 0353     Magnesium 2.0 mg/dL     Basic Metabolic Panel [[id number]]  (Abnormal) Collected: 11/12/20 0242    Specimen: Blood Updated: 11/12/20 0353     Glucose 112 mg/dL      BUN 11 mg/dL      Creatinine 0.58 mg/dL      Sodium 140 mmol/L      Potassium 4.1 mmol/L      Chloride 105 mmol/L      CO2 24.0 mmol/L      Calcium 9.2 mg/dL      eGFR Non African Amer 120 mL/min/1.73      BUN/Creatinine Ratio 19.0     Anion Gap 11.0 mmol/L     Narrative:      GFR Normal >60  Chronic Kidney Disease <60  Kidney Failure <15      CBC & Differential [[id number]]  (Abnormal) Collected: 11/12/20 0242    Specimen: Blood Updated: 11/12/20 0319    Narrative:      The following orders were created for panel order CBC & Differential.  Procedure                               Abnormality         Status                     ---------                               -----------         ------                     CBC Auto Differential[[id number]]        Abnormal            Final result                 Please view results for these tests on the individual orders.    CBC Auto Differential [[id number]]  (Abnormal) Collected: 11/12/20 0242    Specimen: Blood Updated: 11/12/20 0319     WBC 15.10 10*3/mm3      RBC 3.65 10*6/mm3       Hemoglobin 8.7 g/dL      Hematocrit 27.8 %      MCV 76.2 fL      MCH 24.0 pg      MCHC 31.5 g/dL      RDW 15.7 %      RDW-SD 42.4 fl      MPV 9.6 fL      Platelets 205 10*3/mm3      Neutrophil % 84.6 %      Lymphocyte % 7.8 %      Monocyte % 7.5 %      Eosinophil % 0.0 %      Basophil % 0.1 %      Neutrophils, Absolute 12.80 10*3/mm3      Lymphocytes, Absolute 1.20 10*3/mm3      Monocytes, Absolute 1.10 10*3/mm3      Eosinophils, Absolute 0.00 10*3/mm3      Basophils, Absolute 0.00 10*3/mm3      nRBC 0.0 /100 WBC     CBC & Differential [[id number]]  (Abnormal) Collected: 11/11/20 1953    Specimen: Blood Updated: 11/11/20 2132    Narrative:      The following orders were created for panel order CBC & Differential.  Procedure                               Abnormality         Status                     ---------                               -----------         ------                     CBC Auto Differential[[id number]]        Abnormal            Final result                 Please view results for these tests on the individual orders.    CBC Auto Differential [[id number]]  (Abnormal) Collected: 11/11/20 1953    Specimen: Blood Updated: 11/11/20 2132     WBC 17.80 10*3/mm3      RBC 4.01 10*6/mm3      Hemoglobin 9.6 g/dL      Hematocrit 30.6 %      MCV 76.3 fL      MCH 24.0 pg      MCHC 31.4 g/dL      RDW 15.6 %      RDW-SD 42.4 fl      MPV 9.9 fL      Platelets 133 10*3/mm3      Neutrophil % 96.5 %      Lymphocyte % 1.9 %      Monocyte % 1.4 %      Eosinophil % 0.0 %      Basophil % 0.2 %      Neutrophils, Absolute 17.20 10*3/mm3      Lymphocytes, Absolute 0.30 10*3/mm3      Monocytes, Absolute 0.30 10*3/mm3      Eosinophils, Absolute 0.00 10*3/mm3      Basophils, Absolute 0.00 10*3/mm3      nRBC 0.0 /100 WBC         No results found for: HGBA1C  Results from last 7 days   Lab Units 11/10/20  1017   INR  1.04           Lab Results   Component Value Date    LIPASE 34 10/07/2019     No results found for: CHOL, CHLPL,  TRIG, HDL, LDL, LDLDIRECT    Lab Results   Lab Value Date/Time    FINALDX  09/24/2020 1256     Bone marrow, biopsy and aspiration:    No diagnostic evidence of a hematopoietic neoplasia identified    Normocellular marrow for age (~60-70%) with trilineage hematopoiesis, increased megakaryocytes, and no increase in blasts    No significant increase in reticulin fibers    No storage iron detected    See comment    STEPHANIE/sms       FINALDX  09/24/2020 1118     Anemia  Thrombocytopenia  No blasts identified      COMDX  09/24/2020 1256     This diagnosis is rendered following consultation with Integrated [facility]. Please see the attached consultation report for further details.    No diagnostic evidence of a hematopoietic neoplasia identified. A concurrent flow cytometric immunophenotypic analysis of the bone marrow aspirate reveals no significant immunophenotypic abnormalities with nonspecific monocytic and granulocytic findings. For further details of flow analysis, see a separate report. Given the increased number of megakaryocytes, the thrombocytopenia and mild anemia observed in this patient are secondary and peripheral and can be partly explained by the presence of ITP and possible iron deficiency. Clinical and laboratory correlation is recommended. Requested cytogenetic analysis is pending, and a separate report will follow. The immunostains performed in  this evaluation complement the flow analysis, and were utilized to further study the marrow.             Microbiology Results (last 10 days)     Procedure Component Value - Date/Time    COVID PRE-OP / PRE-PROCEDURE SCREENING ORDER (NO ISOLATION) - Swab, Nasopharynx [[id number]]  (Normal) Collected: 11/10/20 1528    Lab Status: Final result Specimen: Swab from Nasopharynx Updated: 11/10/20 1635    Narrative:      The following orders were created for panel order COVID PRE-OP / PRE-PROCEDURE SCREENING ORDER (NO ISOLATION) - Swab, Nasopharynx.  Procedure                                Abnormality         Status                     ---------                               -----------         ------                     COVID-19,CEPHEID,COR/ALEJO...[[id number]]  Normal              Final result                 Please view results for these tests on the individual orders.    COVID-19,CEPHEID,COR/ALEJO/PAD IN-HOUSE(OR EMERGENT/ADD-ON),NP SWAB IN TRANSPORT MEDIA 3-4 HR TAT - Swab, Nasopharynx [[id number]]  (Normal) Collected: 11/10/20 1528    Lab Status: Final result Specimen: Swab from Nasopharynx Updated: 11/10/20 1635     COVID19 Not Detected    Narrative:      Fact sheet for providers: https://www.fda.gov/media/833862/download     Fact sheet for patients: https://www.fda.gov/media/778085/download          ECG/EMG Results (most recent)     None                    No radiology results for the last 7 days        Xrays, labs reviewed personally by physician.    Medication Review:   I have reviewed the patient's current medication list      Scheduled Meds  iron sucrose, 300 mg, Intravenous, Daily  polyethylene glycol, 17 g, Oral, Daily  sodium chloride, 10 mL, Intravenous, Q12H        Meds Infusions  sodium chloride, 75 mL/hr, Last Rate: 75 mL/hr (11/11/20 1742)        Meds PRN  •  aluminum-magnesium hydroxide-simethicone  •  magnesium sulfate **OR** magnesium sulfate in D5W 1g/100mL (PREMIX)  •  melatonin  •  Morphine  •  Morphine  •  ondansetron **OR** ondansetron  •  oxyCODONE  •  potassium chloride  •  simethicone  •  sodium chloride        Assessment/Plan   Assessment/Plan     Active Hospital Problems    Diagnosis  POA   • **Thrombocytopenia (CMS/HCC) [D69.6]  Yes   • Iron deficiency anemia [D50.9]  Yes   • Immune thrombocytopenic purpura (CMS/HCC) [D69.3]  Yes      Resolved Hospital Problems   No resolved problems to display.       MEDICAL DECISION MAKING COMPLEXITY BY PROBLEM:     ITP-patient direct admitted by hematology service.  Patient with no signs of active bleeding at this time and  general surgery consulted for splenectomy  -Status post laparoscopic splenectomy on 11/11/2020  -Trend platelets   -CellCept on hold, previously had IVIG  -General surgery managing diet  -Platelet transfusion recommended for less than 2000 or active bleeding  -Pharmacy assisting for vaccine timing  -Hematology oncology consult  -Recommending starting chemical DVT prophylaxis 11/13/2020 per general surgery     Anemia-history of iron deficiency, decreased overnight  -Monitor with daily CBC  -Can continue iron supplement  -IV Venofer per hematology     Anxiety-patient reports she is very nervous with the prospect of surgery  -Ativan 0.5 mg as needed consider bupropion if persistent    VTE Prophylaxis -   Mechanical Order History:      Ordered        11/11/20 1137  SCD (Sequential Compression Device) - Place on Patient in Pre-Op  Once         11/10/20 1429  Place Sequential Compression Device on Patient in Pre-Op  Once         11/10/20 1010  Place Sequential Compression Device  Once         11/10/20 1010  Maintain Sequential Compression Device  Continuous                 Pharmalogical Order History:     None            Code Status -   Code Status and Medical Interventions:   Ordered at: 11/10/20 1010     Level Of Support Discussed With:    Patient     Code Status:    CPR     Medical Interventions (Level of Support Prior to Arrest):    Full       This patient has been examined wearing appropriate Personal Protective Equipment and discussed with hospital infection control department. 11/12/20        Discharge Planning    home          Electronically signed by Jassi Will MD, 11/12/20, 11:54 EST.  [facility]ist [facility]        Electronically signed by Jassi Will MD at 11/12/20 1157     Erick Akhtar MD at 11/12/20 1140          General Surgery Progress Note     LOS: 2 days   Patient Care Team:  Jose Ingram APRN as PCP - General (Family Medicine)    Subjective     Interval  History:   Moderate expected pain.  No significant nausea or vomiting.  Objective     Vital Signs  Temp:  [97 °F (36.1 °C)-99.4 °F (37.4 °C)] 98.2 °F (36.8 °C)  Heart Rate:  [53-75] 56  Resp:  [10-17] 17  BP: ()/(39-68) 98/61    Physical Exam  Constitutional:       Appearance: Normal appearance.   HENT:      Head: Normocephalic and atraumatic.   Eyes:      General: No scleral icterus.     Conjunctiva/sclera: Conjunctivae normal.   Cardiovascular:      Rate and Rhythm: Normal rate.      Pulses: Normal pulses.   Pulmonary:      Effort: Pulmonary effort is normal.   Abdominal:      Comments: Soft appropriately tender incisions are healing without any significant erythema or drainage   Musculoskeletal:         General: No swelling or tenderness.   Skin:     General: Skin is warm and dry.   Neurological:      General: No focal deficit present.      Mental Status: She is alert. Mental status is at baseline.   Psychiatric:         Mood and Affect: Mood normal.         Behavior: Behavior normal.            Results Review:    Lab Results (last 24 hours)     Procedure Component Value Units Date/Time    Tissue Pathology Exam [[id number]] Collected: 11/11/20 1328    Specimen: Tissue from Spleen Updated: 11/12/20 0906    Magnesium [[id number]]  (Normal) Collected: 11/12/20 0242    Specimen: Blood Updated: 11/12/20 0353     Magnesium 2.0 mg/dL     Basic Metabolic Panel [[id number]]  (Abnormal) Collected: 11/12/20 0242    Specimen: Blood Updated: 11/12/20 0353     Glucose 112 mg/dL      BUN 11 mg/dL      Creatinine 0.58 mg/dL      Sodium 140 mmol/L      Potassium 4.1 mmol/L      Chloride 105 mmol/L      CO2 24.0 mmol/L      Calcium 9.2 mg/dL      eGFR Non African Amer 120 mL/min/1.73      BUN/Creatinine Ratio 19.0     Anion Gap 11.0 mmol/L     Narrative:      GFR Normal >60  Chronic Kidney Disease <60  Kidney Failure <15      CBC & Differential [[id number]]  (Abnormal) Collected: 11/12/20 0242    Specimen: Blood Updated:  11/12/20 0319    Narrative:      The following orders were created for panel order CBC & Differential.  Procedure                               Abnormality         Status                     ---------                               -----------         ------                     CBC Auto Differential[[id number]]        Abnormal            Final result                 Please view results for these tests on the individual orders.    CBC Auto Differential [[id number]]  (Abnormal) Collected: 11/12/20 0242    Specimen: Blood Updated: 11/12/20 0319     WBC 15.10 10*3/mm3      RBC 3.65 10*6/mm3      Hemoglobin 8.7 g/dL      Hematocrit 27.8 %      MCV 76.2 fL      MCH 24.0 pg      MCHC 31.5 g/dL      RDW 15.7 %      RDW-SD 42.4 fl      MPV 9.6 fL      Platelets 205 10*3/mm3      Neutrophil % 84.6 %      Lymphocyte % 7.8 %      Monocyte % 7.5 %      Eosinophil % 0.0 %      Basophil % 0.1 %      Neutrophils, Absolute 12.80 10*3/mm3      Lymphocytes, Absolute 1.20 10*3/mm3      Monocytes, Absolute 1.10 10*3/mm3      Eosinophils, Absolute 0.00 10*3/mm3      Basophils, Absolute 0.00 10*3/mm3      nRBC 0.0 /100 WBC     CBC & Differential [[id number]]  (Abnormal) Collected: 11/11/20 1953    Specimen: Blood Updated: 11/11/20 2132    Narrative:      The following orders were created for panel order CBC & Differential.  Procedure                               Abnormality         Status                     ---------                               -----------         ------                     CBC Auto Differential[[id number]]        Abnormal            Final result                 Please view results for these tests on the individual orders.    CBC Auto Differential [[id number]]  (Abnormal) Collected: 11/11/20 1953    Specimen: Blood Updated: 11/11/20 2132     WBC 17.80 10*3/mm3      RBC 4.01 10*6/mm3      Hemoglobin 9.6 g/dL      Hematocrit 30.6 %      MCV 76.3 fL      MCH 24.0 pg      MCHC 31.4 g/dL      RDW 15.6 %      RDW-SD 42.4 fl       MPV 9.9 fL      Platelets 133 10*3/mm3      Neutrophil % 96.5 %      Lymphocyte % 1.9 %      Monocyte % 1.4 %      Eosinophil % 0.0 %      Basophil % 0.2 %      Neutrophils, Absolute 17.20 10*3/mm3      Lymphocytes, Absolute 0.30 10*3/mm3      Monocytes, Absolute 0.30 10*3/mm3      Eosinophils, Absolute 0.00 10*3/mm3      Basophils, Absolute 0.00 10*3/mm3      nRBC 0.0 /100 WBC         Imaging Results (Last 24 Hours)     ** No results found for the last 24 hours. **         I reviewed the patient's new clinical results.    Medication Review:    Current Facility-Administered Medications:   •  aluminum-magnesium hydroxide-simethicone (MAALOX MAX) 400-400-40 MG/5ML suspension 15 mL, 15 mL, Oral, Q6H PRN, Erick Akhtar MD  •  iron sucrose 300 mg in 250 mL NS, 300 mg, Intravenous, Daily, Erick Akhtar MD, 300 mg at 11/12/20 0913  •  Magnesium Sulfate 2 gram infusion - Mg less than or equal to 1.5 mg/dL, 2 g, Intravenous, PRN **OR** Magnesium Sulfate 1 gram infusion - Mg 1.6-1.9 mg/dL, 1 g, Intravenous, PRN, Erick Akhtar MD  •  melatonin tablet 5 mg, 5 mg, Oral, Nightly PRN, Erick Akhtar MD  •  Morphine sulfate (PF) injection 2 mg, 2 mg, Intravenous, Q2H PRN, Kiran Mendoza,   •  Morphine sulfate (PF) injection 4 mg, 4 mg, Intravenous, Q2H PRN, Kiran Mendoza DO, 4 mg at 11/12/20 0338  •  ondansetron (ZOFRAN) tablet 4 mg, 4 mg, Oral, Q6H PRN **OR** ondansetron (ZOFRAN) injection 4 mg, 4 mg, Intravenous, Q6H PRN, Erick Akhtar MD, 4 mg at 11/12/20 0827  •  oxyCODONE (ROXICODONE) immediate release tablet 5 mg, 5 mg, Oral, Q4H PRN, Erick Akhtar MD, 5 mg at 11/12/20 0218  •  polyethylene glycol (MIRALAX) packet 17 g, 17 g, Oral, Daily, Erick Akhtar MD, 17 g at 11/12/20 0827  •  potassium chloride (K-DUR,KLOR-CON) CR tablet 40 mEq, 40 mEq, Oral, PRN, Erick Akhtar MD  •  simethicone (MYLICON) chewable tablet 80 mg, 80 mg, Oral, 4x Daily PRN, Jassi Will MD, 80 mg at 11/12/20  1057  •  sodium chloride 0.9 % flush 10 mL, 10 mL, Intravenous, Q12H, Erick Akhtar MD, 10 mL at 20 0913  •  sodium chloride 0.9 % flush 10 mL, 10 mL, Intravenous, PRN, Erick Akhtar MD  •  sodium chloride 0.9 % infusion, 75 mL/hr, Intravenous, Continuous, Erick Akhtar MD, Last Rate: 75 mL/hr at 20 1742, 75 mL/hr at 20 174    Assessment/Plan     Principal Problem:    Thrombocytopenia (CMS/HCC)  Active Problems:    Immune thrombocytopenic purpura (CMS/HCC)    Iron deficiency anemia      Postop day 1 laparoscopic splenectomy for refractory ITP, overall looks good today.     Clear liquid diet advance as tolerated  Start bowel regimen  I would like to hold DVT chemoprophylaxis 1 additional day due to some venous bleeding during the operation and hemoglobin of 8.6.  Will consider starting tomorrow if necessary.  Will be using SCDs for now  Increase activity  Pulmonary toilet  Anticipate drain removal prior to discharge    This note was created using Dragon Voice Recognition software.    Erick Akhtar MD  20  11:40 EST    Electronically signed by Erick Akhtar MD at 20 1141     Forrest Llanes MD at 20 [phone]                Hematology/Oncology Inpatient Progress Note    PATIENT NAME: Tiny Akhtar  : 1985  MRN: [id number]    CHIEF COMPLAINT: Surgery consultation for splenectomy    HISTORY OF PRESENT ILLNESS:   Tiny Akhtar is a 35 y.o. female who presented to [facility] on 2020 as a direct admit for general surgery consultation for splenectomy.  She has a history of ITP.  Count on admission white blood count 4.7, hemoglobin 10.7, hematocrit 33.9, MCV 76.2, platelets 85,000.     General surgery was consulted for splenectomy.     11/10/20  Hematology/Oncology was consulted for thrombocytopenia.  Patient is known to our service and followed by Dr. Forrest Llanes for history of immune thrombocytopenic purpura.  Patient was seen initially as an inpatient in  November 2019 when she had a platelet count of 9000.  Patient was initiated on steroid, followed by rituximab, followed by IVIG.  She was then started on Nplate in December 2019.  Patient was seen at [facility] hematology for second opinion and was continued on Nplate.  April 2021 she received IVIG again.  Nplate was discontinued due to treatment failure in June 2020 and the patient was initiated on IVIG again.  On 6/18/2020 the patient started taking fostamatinib she failed.  She had IVIG in September 2020.  Plate was reinitiated in September of 2020 followed by additional IVIG in October 2020.  She was started on CellCept by the [facility] in September 2020.  The patient lost insurance and was not able to get a refill.  At office visit on 11/9/2020 discussed patient will need splenectomy since her platelets are again dropping.       She has a past medical history of Anxiety, Immune thrombocytopenic purpura (CMS/HCC) (11/18/2019), Iron deficiency anemia, Kidney stone (10/2019), and Thrombocytopenia (CMS/[facility]) (11/29/2019).     PCP: Jose Ingram APRN    History of present illness was reviewed and is unchanged from the previous visit. 11/11/20    Subjective    Status post splenectomy yesterday.  Reports some pain but doing well overall.  No bleeding issues.    ROS:  Review of Systems   Constitutional: Negative for appetite change, chills, fatigue, fever and unexpected weight change.   HENT: Negative for congestion, hearing loss, mouth sores, nosebleeds, postnasal drip, rhinorrhea and sore throat.    Eyes: Negative for photophobia, pain and visual disturbance.   Respiratory: Negative for cough, chest tightness, shortness of breath and wheezing.    Cardiovascular: Negative for chest pain, palpitations and leg swelling.   Gastrointestinal: Negative for abdominal distention, abdominal pain, constipation, diarrhea, nausea and vomiting.   Genitourinary: Negative for difficulty  "urinating, dysuria and hematuria.   Musculoskeletal: Negative for arthralgias, back pain, gait problem and myalgias.   Skin: Negative for pallor and rash.   Neurological: Negative for dizziness, weakness, light-headedness, numbness and headaches.   Hematological: Negative for adenopathy. Bruises/bleeds easily.   Psychiatric/Behavioral: Negative for agitation, confusion and dysphoric mood. The patient is not nervous/anxious.    All other systems reviewed and are negative.       MEDICATIONS:    Scheduled Meds:  acetaminophen, 1,000 mg, Oral, Q8H  iron sucrose, 300 mg, Intravenous, Daily  polyethylene glycol, 17 g, Oral, Daily  sodium chloride, 10 mL, Intravenous, Q12H       Continuous Infusions:  sodium chloride, 75 mL/hr, Last Rate: 75 mL/hr (11/11/20 1742)       PRN Meds:  aluminum-magnesium hydroxide-simethicone  •  magnesium sulfate **OR** magnesium sulfate in D5W 1g/100mL (PREMIX)  •  melatonin  •  Morphine  •  Morphine  •  ondansetron **OR** ondansetron  •  oxyCODONE  •  potassium chloride  •  sodium chloride     ALLERGIES:  No Known Allergies    Objective    VITALS:   BP 97/60   Pulse 62   Temp 98.1 °F (36.7 °C)   Resp 16   Ht 157.5 cm (62\")   Wt 57.1 kg (125 lb 12.8 oz)   LMP 11/04/2020 (Approximate)   SpO2 100%   BMI 23.01 kg/m²     PHYSICAL EXAM: (performed by MD)  Physical Exam  Vitals signs and nursing note reviewed.   Constitutional:       General: She is not in acute distress.     Appearance: Normal appearance. She is not diaphoretic.   HENT:      Head: Normocephalic and atraumatic.   Eyes:      General: No scleral icterus.        Right eye: No discharge.         Left eye: No discharge.      Extraocular Movements: Extraocular movements intact.      Conjunctiva/sclera: Conjunctivae normal.   Neck:      Musculoskeletal: Normal range of motion and neck supple.      Thyroid: No thyromegaly.   Cardiovascular:      Rate and Rhythm: Normal rate and regular rhythm.      Pulses: Normal pulses.      Heart " sounds: Normal heart sounds. No friction rub. No gallop.    Pulmonary:      Effort: Pulmonary effort is normal. No respiratory distress.      Breath sounds: Normal breath sounds. No stridor. No wheezing.   Abdominal:      General: Bowel sounds are normal.      Palpations: Abdomen is soft. There is no mass.      Tenderness: There is no abdominal tenderness. There is no guarding or rebound.      Comments: Laparoscopic surgical scars noted.  Abdominal drains noted.   Musculoskeletal: Normal range of motion.         General: No swelling or tenderness.   Lymphadenopathy:      Cervical: No cervical adenopathy.   Skin:     General: Skin is warm.      Findings: No erythema or rash.   Neurological:      General: No focal deficit present.      Mental Status: She is alert and oriented to person, place, and time.      Motor: No abnormal muscle tone.   Psychiatric:         Mood and Affect: Mood normal.         Behavior: Behavior normal.         Thought Content: Thought content normal.         Judgment: Judgment normal.        RECENT LABS:  Lab Results (last 24 hours)     Procedure Component Value Units Date/Time    Magnesium [[id number]]  (Normal) Collected: 11/12/20 0242    Specimen: Blood Updated: 11/12/20 0353     Magnesium 2.0 mg/dL     Basic Metabolic Panel [[id number]]  (Abnormal) Collected: 11/12/20 0242    Specimen: Blood Updated: 11/12/20 0353     Glucose 112 mg/dL      BUN 11 mg/dL      Creatinine 0.58 mg/dL      Sodium 140 mmol/L      Potassium 4.1 mmol/L      Chloride 105 mmol/L      CO2 24.0 mmol/L      Calcium 9.2 mg/dL      eGFR Non African Amer 120 mL/min/1.73      BUN/Creatinine Ratio 19.0     Anion Gap 11.0 mmol/L     Narrative:      GFR Normal >60  Chronic Kidney Disease <60  Kidney Failure <15      CBC & Differential [[id number]]  (Abnormal) Collected: 11/12/20 0242    Specimen: Blood Updated: 11/12/20 0319    Narrative:      The following orders were created for panel order CBC & Differential.  Procedure                                Abnormality         Status                     ---------                               -----------         ------                     CBC Auto Differential[[id number]]        Abnormal            Final result                 Please view results for these tests on the individual orders.    CBC Auto Differential [[id number]]  (Abnormal) Collected: 11/12/20 0242    Specimen: Blood Updated: 11/12/20 0319     WBC 15.10 10*3/mm3      RBC 3.65 10*6/mm3      Hemoglobin 8.7 g/dL      Hematocrit 27.8 %      MCV 76.2 fL      MCH 24.0 pg      MCHC 31.5 g/dL      RDW 15.7 %      RDW-SD 42.4 fl      MPV 9.6 fL      Platelets 205 10*3/mm3      Neutrophil % 84.6 %      Lymphocyte % 7.8 %      Monocyte % 7.5 %      Eosinophil % 0.0 %      Basophil % 0.1 %      Neutrophils, Absolute 12.80 10*3/mm3      Lymphocytes, Absolute 1.20 10*3/mm3      Monocytes, Absolute 1.10 10*3/mm3      Eosinophils, Absolute 0.00 10*3/mm3      Basophils, Absolute 0.00 10*3/mm3      nRBC 0.0 /100 WBC     CBC & Differential [[id number]]  (Abnormal) Collected: 11/11/20 1953    Specimen: Blood Updated: 11/11/20 2132    Narrative:      The following orders were created for panel order CBC & Differential.  Procedure                               Abnormality         Status                     ---------                               -----------         ------                     CBC Auto Differential[[id number]]        Abnormal            Final result                 Please view results for these tests on the individual orders.    CBC Auto Differential [[id number]]  (Abnormal) Collected: 11/11/20 1953    Specimen: Blood Updated: 11/11/20 2132     WBC 17.80 10*3/mm3      RBC 4.01 10*6/mm3      Hemoglobin 9.6 g/dL      Hematocrit 30.6 %      MCV 76.3 fL      MCH 24.0 pg      MCHC 31.4 g/dL      RDW 15.6 %      RDW-SD 42.4 fl      MPV 9.9 fL      Platelets 133 10*3/mm3      Neutrophil % 96.5 %      Lymphocyte % 1.9 %      Monocyte % 1.4 %       Eosinophil % 0.0 %      Basophil % 0.2 %      Neutrophils, Absolute 17.20 10*3/mm3      Lymphocytes, Absolute 0.30 10*3/mm3      Monocytes, Absolute 0.30 10*3/mm3      Eosinophils, Absolute 0.00 10*3/mm3      Basophils, Absolute 0.00 10*3/mm3      nRBC 0.0 /100 WBC         PENDING RESULTS: none     IMAGING REVIEWED:  No radiology results for the last day    Assessment/Plan   ASSESSMENT  1. Refractory idiopathic thrombocytopenia purpura: Platelets 85,000 on admission.  Refractory to steroids, Rituxan, N-plate, fostamatinib, and CellCept. Platelet count declined to 60,000 .  Status post laparoscopic splenectomy  11/13/2020 with platelets improved to 205 post splenectomy day 1..  2. Need for splenectomy: 11/23/2019 - Patient received meningococcal vaccine, Haemophilus vaccine, pneumococcus vaccine. Pharmacy consulted for assistance on timing of any further vaccination to complete series.   General surgery, Dr. Erick Akhtar, consulted for splenectomy for 11/11/2020.   3. History of iron deficiency anemia: Hemoglobin 10.7 on admission.  Patient received IV Venofer in November 2019.  Iron studies 11/10/2020 - Ferritin 15.58 and iron 30, iron saturation 6, TIBC 489, transferrin 328. Start Venofer 300 mg IV x 3 days on 11/11/2020.      PLAN  1. CBC daily.  Monitor for risk of thrombocytosis postsplenectomy.  Platelets overshoot we will consider giving her aspirin.  2. Continue oral ferrous sulfate as outpatient  3. Continue Venofer 300 mg IV x 3 days, day 2/3  4. Consulted pharmacy to ensure vaccination status is up to date.  5. Discharge planning according to Dr. Akhtar.    Electronically signed by Forrest Llanes MD, 11/12/20, 8:30 AM EST.                 Electronically signed by Forrest Llanes MD at 11/12/20 0803     Jassi Will MD at 11/11/20 1306                [facility] Medicine Services Daily Progress Note      Hospitalist Team  LOS 1 days      Patient Care Team:  Jose Ingram,  "APRN as PCP - General (Family Medicine)    Patient Location: [facility] MAIN OR/MAIN OR      Subjective   Subjective     Chief Complaint / Subjective  No chief complaint on file.    Patient reports no events overnight.  Patient still anxious about surgery.  Plan for laparoscopic with possible conversion open.  No chest pain or shortness of breath platelets in the 60s.    Brief Synopsis of Hospital Course/HPI  Ms. Akhtar is a 35 y.o. female with a history of immune thrombocytopenic purpura, anemia, and kidney stones who was directly admitted to [facility] on 11/10/2020 due to thrombocytopenia and need for surgery consultation for splenectomy. The patient denies any current complaints.      The patient is a nonsmoker and denies alcohol or illicit drug use. She reports a family history of ITP, diabetes mellitus, cancer, CHF, and stroke.      Date::          Review of Systems   Constitution: Negative for chills and fever.   HENT: Negative for hoarse voice and stridor.    Eyes: Negative for double vision and photophobia.   Cardiovascular: Negative for chest pain and syncope.   Respiratory: Negative for cough and shortness of breath.    Musculoskeletal: Negative for joint pain and stiffness.   Gastrointestinal: Negative for nausea and vomiting.   Genitourinary: Negative for dysuria and flank pain.   Neurological: Negative for focal weakness and light-headedness.   Psychiatric/Behavioral: Negative for altered mental status. The patient is nervous/anxious.          Objective   Objective      Vital Signs  Temp:  [97.6 °F (36.4 °C)-99.1 °F (37.3 °C)] 99.1 °F (37.3 °C)  Heart Rate:  [62-79] 74  Resp:  [12-18] 13  BP: ()/(52-68) 115/68  Oxygen Therapy  SpO2: 97 %  Pulse Oximetry Type: Intermittent  Device (Oxygen Therapy): room air  Flowsheet Rows      First Filed Value   Admission Height  157.5 cm (62\") Documented at 11/10/2020 1045   Admission Weight  57.1 kg (125 lb 12.8 oz) Documented at 11/10/2020 1009    "     Intake & Output (last 3 days)       11/08 0701 - 11/09 0700 11/09 0701 - 11/10 0700 11/10 0701 - 11/11 0700 11/11 0701 - 11/12 0700    P.O.   480     Total Intake(mL/kg)   480 (8.4)     Urine (mL/kg/hr)   300     Total Output   300     Net   +180             Urine Unmeasured Occurrence    2 x        Lines, Drains & Airways    Active LDAs     Name:   Placement date:   Placement time:   Site:   Days:    Peripheral IV 11/11/20 1010 Anterior;Left Forearm   11/11/20    1010    Forearm   less than 1                  Physical Exam:    Physical Exam    General: Female sitting up in bed breathing comfortably on room air in no acute distress  HEENT: NC/AT, EOMI, mucosa moist  Heart: Regular, rate controlled  Chest: Normal work of breathing moving air well in all lung fields no wheezing or crackles  Abdominal: Soft.  Nontender nondistended unable to appreciate splenomegaly  Musculoskeletal: Normal ROM.  No edema. No calf tenderness.  Neurological: AAOx3, no focal deficits  Skin: Skin is warm and dry. No rash  Psychiatric: Anxious         Procedures:    Procedure(s):  SPLENECTOMY LAPAROSCOPIC          Results Review:     I reviewed the patient's new clinical results.      Lab Results (last 24 hours)     Procedure Component Value Units Date/Time    Basic Metabolic Panel [[id number]]  (Abnormal) Collected: 11/11/20 0303    Specimen: Blood Updated: 11/11/20 0409     Glucose 94 mg/dL      BUN 16 mg/dL      Creatinine 0.58 mg/dL      Sodium 141 mmol/L      Potassium 3.8 mmol/L      Chloride 107 mmol/L      CO2 23.0 mmol/L      Calcium 9.1 mg/dL      eGFR Non African Amer 120 mL/min/1.73      BUN/Creatinine Ratio 27.6     Anion Gap 11.0 mmol/L     Narrative:      GFR Normal >60  Chronic Kidney Disease <60  Kidney Failure <15      Magnesium [[id number]]  (Normal) Collected: 11/11/20 0303    Specimen: Blood Updated: 11/11/20 0409     Magnesium 2.3 mg/dL     CBC & Differential [[id number]]  (Abnormal) Collected: 11/11/20 0303     Specimen: Blood Updated: 11/11/20 0345    Narrative:      The following orders were created for panel order CBC & Differential.  Procedure                               Abnormality         Status                     ---------                               -----------         ------                     CBC Auto Differential[[id number]]        Abnormal            Final result                 Please view results for these tests on the individual orders.    CBC Auto Differential [[id number]]  (Abnormal) Collected: 11/11/20 0303    Specimen: Blood Updated: 11/11/20 0345     WBC 5.50 10*3/mm3      RBC 4.02 10*6/mm3      Hemoglobin 9.6 g/dL      Hematocrit 30.7 %      MCV 76.3 fL      MCH 24.0 pg      MCHC 31.5 g/dL      RDW 15.7 %      RDW-SD 42.4 fl      MPV 9.4 fL      Platelets 60 10*3/mm3      Neutrophil % 49.6 %      Lymphocyte % 38.0 %      Monocyte % 8.5 %      Eosinophil % 2.7 %      Basophil % 1.2 %      Neutrophils, Absolute 2.70 10*3/mm3      Lymphocytes, Absolute 2.10 10*3/mm3      Monocytes, Absolute 0.50 10*3/mm3      Eosinophils, Absolute 0.10 10*3/mm3      Basophils, Absolute 0.10 10*3/mm3      nRBC 0.1 /100 WBC     Pregnancy, Urine - Urine, Clean Catch [[id number]]  (Normal) Collected: 11/10/20 1734    Specimen: Urine, Clean Catch Updated: 11/10/20 1756     HCG, Urine QL Negative    COVID PRE-OP / PRE-PROCEDURE SCREENING ORDER (NO ISOLATION) - Swab, Nasopharynx [[id number]]  (Normal) Collected: 11/10/20 1528    Specimen: Swab from Nasopharynx Updated: 11/10/20 1635    Narrative:      The following orders were created for panel order COVID PRE-OP / PRE-PROCEDURE SCREENING ORDER (NO ISOLATION) - Swab, Nasopharynx.  Procedure                               Abnormality         Status                     ---------                               -----------         ------                     COVID-19,CEPHEID,COR/ALEJO...[[id number]]  Normal              Final result                 Please view results for these tests on  the individual orders.    COVID-19,CEPHEID,COR/ALEJO/PAD IN-HOUSE(OR EMERGENT/ADD-ON),NP SWAB IN TRANSPORT MEDIA 3-4 HR TAT - Swab, Nasopharynx [[id number]]  (Normal) Collected: 11/10/20 1528    Specimen: Swab from Nasopharynx Updated: 11/10/20 1635     COVID19 Not Detected    Narrative:      Fact sheet for providers: https://www.fda.gov/media/227643/download     Fact sheet for patients: https://www.fda.gov/media/653657/download    Ferritin [[id number]]  (Normal) Collected: 11/10/20 1017    Specimen: Blood Updated: 11/10/20 1315     Ferritin 15.58 ng/mL     Narrative:      Results may be falsely decreased if patient taking Biotin.      Iron Profile [[id number]]  (Abnormal) Collected: 11/10/20 1017    Specimen: Blood Updated: 11/10/20 1310     Iron 30 mcg/dL      Iron Saturation 6 %      Transferrin 328 mg/dL      TIBC 489 mcg/dL         No results found for: HGBA1C  Results from last 7 days   Lab Units 11/10/20  1017   INR  1.04           Lab Results   Component Value Date    LIPASE 34 10/07/2019     No results found for: CHOL, CHLPL, TRIG, HDL, LDL, LDLDIRECT    Lab Results   Lab Value Date/Time    FINALDX  09/24/2020 1256     Bone marrow, biopsy and aspiration:    No diagnostic evidence of a hematopoietic neoplasia identified    Normocellular marrow for age (~60-70%) with trilineage hematopoiesis, increased megakaryocytes, and no increase in blasts    No significant increase in reticulin fibers    No storage iron detected    See comment    STEPHANIE/sms       FINALDX  09/24/2020 1118     Anemia  Thrombocytopenia  No blasts identified      COM[facility]  09/24/2020 1256     This diagnosis is rendered following consultation with [facility]. Please see the attached consultation report for further details.    No diagnostic evidence of a hematopoietic neoplasia identified. A concurrent flow cytometric immunophenotypic analysis of the bone marrow aspirate reveals no significant immunophenotypic abnormalities with nonspecific  monocytic and granulocytic findings. For further details of flow analysis, see a separate report. Given the increased number of megakaryocytes, the thrombocytopenia and mild anemia observed in this patient are secondary and peripheral and can be partly explained by the presence of ITP and possible iron deficiency. Clinical and laboratory correlation is recommended. Requested cytogenetic analysis is pending, and a separate report will follow. The immunostains performed in  this evaluation complement the flow analysis, and were utilized to further study the marrow.             Microbiology Results (last 10 days)     Procedure Component Value - Date/Time    COVID PRE-OP / PRE-PROCEDURE SCREENING ORDER (NO ISOLATION) - Swab, Nasopharynx [[id number]]  (Normal) Collected: 11/10/20 1528    Lab Status: Final result Specimen: Swab from Nasopharynx Updated: 11/10/20 1635    Narrative:      The following orders were created for panel order COVID PRE-OP / PRE-PROCEDURE SCREENING ORDER (NO ISOLATION) - Swab, Nasopharynx.  Procedure                               Abnormality         Status                     ---------                               -----------         ------                     COVID-19,CEPHEID,COR/ALEJO...[[id number]]  Normal              Final result                 Please view results for these tests on the individual orders.    COVID-19,CEPHEID,COR/ALEJO/PAD IN-HOUSE(OR EMERGENT/ADD-ON),NP SWAB IN TRANSPORT MEDIA 3-4 HR TAT - Swab, Nasopharynx [[id number]]  (Normal) Collected: 11/10/20 1528    Lab Status: Final result Specimen: Swab from Nasopharynx Updated: 11/10/20 1635     COVID19 Not Detected    Narrative:      Fact sheet for providers: https://www.fda.gov/media/971735/download     Fact sheet for patients: https://www.fda.gov/media/735084/download          ECG/EMG Results (most recent)     None                    No radiology results for the last 7 days        Xrays, labs reviewed personally by  physician.    Medication Review:   I have reviewed the patient's current medication list      Scheduled Meds  ceFAZolin, 2 g, Intravenous, Once  [MAR Hold] iron sucrose, 300 mg, Intravenous, Daily  [MAR Hold] sodium chloride, 10 mL, Intravenous, Q12H        Meds Infusions       Meds PRN  •  [MAR Hold] acetaminophen **OR** [MAR Hold] acetaminophen **OR** [MAR Hold] acetaminophen  •  [MAR Hold] aluminum-magnesium hydroxide-simethicone  •  [MAR Hold] bisacodyl  •  [MAR Hold] magnesium hydroxide  •  [MAR Hold] magnesium sulfate **OR** [MAR Hold] magnesium sulfate in D5W 1g/100mL (PREMIX)  •  [MAR Hold] melatonin  •  [MAR Hold] ondansetron **OR** [MAR Hold] ondansetron  •  [MAR Hold] potassium chloride  •  [MAR Hold] sodium chloride        Assessment/Plan   Assessment/Plan     Active Hospital Problems    Diagnosis  POA   • **Thrombocytopenia (CMS/HCC) [D69.6]  Yes   • Iron deficiency anemia [D50.9]  Yes   • Immune thrombocytopenic purpura (CMS/HCC) [D69.3]  Yes      Resolved Hospital Problems   No resolved problems to display.       MEDICAL DECISION MAKING COMPLEXITY BY PROBLEM:     ITP-patient direct admitted by hematology service.  Patient with no signs of active bleeding at this time and general surgery consulted for splenectomy  -Trend platelets   -CellCept on hold, previously had IVIG  -General surgery consult  -Platelet transfusion recommended for less than 2000 or active bleeding  -Patient for splenectomy with general surgery 11/11/2020  -Pharmacy assisting for vaccine timing  -Hematology oncology consult     Anemia-history of iron deficiency, decreased overnight  -Monitor with daily CBC  -Can continue iron supplement     Anxiety-patient reports she is very nervous with the prospect of surgery  -Ativan 0.5 mg as needed consider bupropion if persistent    VTE Prophylaxis -   Mechanical Order History:      Ordered        11/11/20 1137  SCD (Sequential Compression Device) - Place on Patient in Pre-Op  Once          11/10/20 1429  Place Sequential Compression Device on Patient in Pre-Op  Once         11/10/20 1010  Place Sequential Compression Device  Once         11/10/20 1010  Maintain Sequential Compression Device  Continuous                 Pharmalogical Order History:     None            Code Status -   Code Status and Medical Interventions:   Ordered at: 11/10/20 1010     Level Of Support Discussed With:    Patient     Code Status:    CPR     Medical Interventions (Level of Support Prior to Arrest):    Full       This patient has been examined wearing appropriate Personal Protective Equipment and discussed with hospital infection control department. 11/11/20        Discharge Planning    home          Electronically signed by Jassi Will MD, 11/11/20, 13:06 EST.  [facility] Hospitalist Team        Electronically signed by Jassi Will MD at 11/11/20 1309       Consult Notes (last 24 hours) (Notes from 11/11/20 1305 through 11/12/20 1305)    No notes of this type exist for this encounter.

## 2020-11-12 NOTE — PROGRESS NOTES
Immunization plan after splenectomy per pharmacy    Below is the documentation from patient pharmacy, pharmacist Delano Hernandez, regarding need for patient to have immunization after splenectomy.  This information was communicated with Lindsey Davis and Emre Thompson in pharmacy to build into treatment plan.      So it looks she had the Bexsero and Menectra in November and December. So she just needs one mor dose of the Bexsero and that will be complete. She will need one more dose of Menectra and then a booster every 5 years. She has had the Prevnar so she just needs a Pneunovax and then a booster at 67. Then she'll need the flu vaccine annually, I saw where she got LAIV in the past but now she'll have to get the inactivated vaccine. So it looks like this: 1. Bexsero x 1 2. Menectra x 1 and then booster q5 years 3. Bexsero x 1 4. Pneumovax x 1 and then booster at 67 5. annual inactivated flu vaccine      Electronically signed by FARZAD Banegas, 11/12/20, 9:51 AM EST.

## 2020-11-12 NOTE — PROGRESS NOTES
[facility] Medicine Services Daily Progress Note      Hospitalist Team  LOS 2 days      Patient Care Team:  Jose Ingram APRN as PCP - General (Family Medicine)    Patient Location: 4120/1      Subjective   Subjective     Chief Complaint / Subjective  No chief complaint on file.    Patient reports she did well postop underwent laparoscopic splenectomy on 11/11/2020.  Patient reports some nausea this morning and some bloating, but pain controlled.  No chest pain or shortness of breath.  Diet advanced to clears per surgery.    Brief Synopsis of Hospital Course/HPI  Ms. Akhtar is a 35 y.o. female with a history of immune thrombocytopenic purpura, anemia, and kidney stones who was directly admitted to [facility] on 11/10/2020 due to thrombocytopenia and need for surgery consultation for splenectomy. The patient denies any current complaints.      The patient is a nonsmoker and denies alcohol or illicit drug use. She reports a family history of ITP, diabetes mellitus, cancer, CHF, and stroke.    11/11/2020: Patient reports no events overnight.  Patient still anxious about surgery.  Plan for laparoscopic with possible conversion open.  No chest pain or shortness of breath platelets in the 60s.    Date::          Review of Systems   Constitution: Negative for chills and fever.   HENT: Negative for hoarse voice and stridor.    Eyes: Negative for double vision and photophobia.   Cardiovascular: Negative for chest pain and syncope.   Respiratory: Negative for cough and shortness of breath.    Musculoskeletal: Negative for joint pain and stiffness.   Gastrointestinal: Positive for bloating and nausea. Negative for vomiting.   Genitourinary: Negative for dysuria and flank pain.   Neurological: Negative for focal weakness and light-headedness.   Psychiatric/Behavioral: Negative for altered mental status. The patient is not nervous/anxious.          Objective   Objective      Vital  "Signs  Temp:  [97 °F (36.1 °C)-99.4 °F (37.4 °C)] 98.2 °F (36.8 °C)  Heart Rate:  [53-75] 56  Resp:  [10-17] 17  BP: ()/(39-65) 98/61  Oxygen Therapy  SpO2: 100 %  Pulse Oximetry Type: Intermittent  Device (Oxygen Therapy): room air  Flow (L/min): 2  Flowsheet Rows      First Filed Value   Admission Height  157.5 cm (62\") Documented at 11/10/2020 1045   Admission Weight  57.1 kg (125 lb 12.8 oz) Documented at 11/10/2020 1009        Intake & Output (last 3 days)       11/09 0701 - 11/10 0700 11/10 0701 - 11/11 0700 11/11 0701 - 11/12 0700 11/12 0701 - 11/13 0700    P.O.  480 0     I.V. (mL/kg)   2400 (42)     Total Intake(mL/kg)  480 (8.4) 2400 (42)     Urine (mL/kg/hr)  300 450 (0.3)     Drains   10     Blood   75     Total Output  300 535     Net  +180 +1865             Urine Unmeasured Occurrence   4 x         Lines, Drains & Airways    Active LDAs     Name:   Placement date:   Placement time:   Site:   Days:    Peripheral IV 11/11/20 1010 Anterior;Left Forearm   11/11/20    1010    Forearm   less than 1                  Physical Exam:    Physical Exam    General: Female sitting up in bed breathing comfortably on room air in no acute distress  HEENT: NC/AT, EOMI, mucosa moist  Heart: Regular, rate controlled  Chest: Normal work of breathing moving air well in all lung fields no wheezing or crackles  Abdominal: Soft.    Periincisional tenderness, nondistended  Musculoskeletal: Normal ROM.  No edema. No calf tenderness.  Neurological: AAOx3, no focal deficits  Skin: Skin is warm and dry. No rash  Psychiatric:  Calm        Wounds (last 24 hours)      LDA Wound     Row Name 11/12/20 0800 11/12/20 0338 11/11/20 1900       Wound 11/11/20 1330 abdomen Incision    Wound - Properties Group Placement Date: 11/11/20  -AM Placement Time: 1330  -AM Location: abdomen  -AM Primary Wound Type: Incision  -AM    Dressing Appearance  dry;intact;no drainage  -JH  dry;intact;no drainage  -CP  dry;intact;no drainage  -CP    " Closure  Liquid skin adhesive  -JH  Liquid skin adhesive  -CP  Liquid skin adhesive  -CP    Dressing Care  --  open to air  -CP  --    Retired Wound - Properties Group Date first assessed: 11/11/20  -AM Time first assessed: 1330  -AM Location: abdomen  -AM Primary Wound Type: Incision  -AM    Row Name 11/11/20 1655 11/11/20 1622 11/11/20 1520       Wound 11/11/20 1330 abdomen Incision    Wound - Properties Group Placement Date: 11/11/20  -AM Placement Time: 1330  -AM Location: abdomen  -AM Primary Wound Type: Incision  -AM    Dressing Appearance  dry;intact;no drainage  -HN  dry;intact;no drainage  -AH  dry;intact;no drainage  -AH    Closure  Liquid skin adhesive  -HN  Liquid skin adhesive  -AH  Liquid skin adhesive  -AH    Dressing Care  other (see comments) skin affix  -HN  --  --    Retired Wound - Properties Group Date first assessed: 11/11/20  -AM Time first assessed: 1330  -AM Location: abdomen  -AM Primary Wound Type: Incision  -AM    Row Name 11/11/20 1455             Wound 11/11/20 1330 abdomen Incision    Wound - Properties Group Placement Date: 11/11/20  -AM Placement Time: 1330  -AM Location: abdomen  -AM Primary Wound Type: Incision  -AM    Dressing Care  dressing applied skin affix  -LH      Retired Wound - Properties Group Date first assessed: 11/11/20  -AM Time first assessed: 1330  -AM Location: abdomen  -AM Primary Wound Type: Incision  -AM      User Key  (r) = Recorded By, (t) = Taken By, (c) = Cosigned By    Initials Name Provider Type    Marcie Queen, RN Registered Nurse    Sarina Henson, RN Registered Nurse    Lala Oh RN Registered Nurse    Rin Ogden RN Registered Nurse    Pearl Laureano, RN Registered Nurse    Tasha Rubio LPN Licensed Nurse          Procedures:    Procedure(s):  SPLENECTOMY LAPAROSCOPIC          Results Review:     I reviewed the patient's new clinical results.      Lab Results (last 24 hours)     Procedure Component Value Units  Date/Time    Tissue Pathology Exam [[id number]] Collected: 11/11/20 1328    Specimen: Tissue from Spleen Updated: 11/12/20 0906    Magnesium [[id number]]  (Normal) Collected: 11/12/20 0242    Specimen: Blood Updated: 11/12/20 0353     Magnesium 2.0 mg/dL     Basic Metabolic Panel [[id number]]  (Abnormal) Collected: 11/12/20 0242    Specimen: Blood Updated: 11/12/20 0353     Glucose 112 mg/dL      BUN 11 mg/dL      Creatinine 0.58 mg/dL      Sodium 140 mmol/L      Potassium 4.1 mmol/L      Chloride 105 mmol/L      CO2 24.0 mmol/L      Calcium 9.2 mg/dL      eGFR Non African Amer 120 mL/min/1.73      BUN/Creatinine Ratio 19.0     Anion Gap 11.0 mmol/L     Narrative:      GFR Normal >60  Chronic Kidney Disease <60  Kidney Failure <15      CBC & Differential [[id number]]  (Abnormal) Collected: 11/12/20 0242    Specimen: Blood Updated: 11/12/20 0319    Narrative:      The following orders were created for panel order CBC & Differential.  Procedure                               Abnormality         Status                     ---------                               -----------         ------                     CBC Auto Differential[[id number]]        Abnormal            Final result                 Please view results for these tests on the individual orders.    CBC Auto Differential [[id number]]  (Abnormal) Collected: 11/12/20 0242    Specimen: Blood Updated: 11/12/20 0319     WBC 15.10 10*3/mm3      RBC 3.65 10*6/mm3      Hemoglobin 8.7 g/dL      Hematocrit 27.8 %      MCV 76.2 fL      MCH 24.0 pg      MCHC 31.5 g/dL      RDW 15.7 %      RDW-SD 42.4 fl      MPV 9.6 fL      Platelets 205 10*3/mm3      Neutrophil % 84.6 %      Lymphocyte % 7.8 %      Monocyte % 7.5 %      Eosinophil % 0.0 %      Basophil % 0.1 %      Neutrophils, Absolute 12.80 10*3/mm3      Lymphocytes, Absolute 1.20 10*3/mm3      Monocytes, Absolute 1.10 10*3/mm3      Eosinophils, Absolute 0.00 10*3/mm3      Basophils, Absolute 0.00 10*3/mm3      nRBC 0.0 /100  WBC     CBC & Differential [[id number]]  (Abnormal) Collected: 11/11/20 1953    Specimen: Blood Updated: 11/11/20 2132    Narrative:      The following orders were created for panel order CBC & Differential.  Procedure                               Abnormality         Status                     ---------                               -----------         ------                     CBC Auto Differential[[id number]]        Abnormal            Final result                 Please view results for these tests on the individual orders.    CBC Auto Differential [[id number]]  (Abnormal) Collected: 11/11/20 1953    Specimen: Blood Updated: 11/11/20 2132     WBC 17.80 10*3/mm3      RBC 4.01 10*6/mm3      Hemoglobin 9.6 g/dL      Hematocrit 30.6 %      MCV 76.3 fL      MCH 24.0 pg      MCHC 31.4 g/dL      RDW 15.6 %      RDW-SD 42.4 fl      MPV 9.9 fL      Platelets 133 10*3/mm3      Neutrophil % 96.5 %      Lymphocyte % 1.9 %      Monocyte % 1.4 %      Eosinophil % 0.0 %      Basophil % 0.2 %      Neutrophils, Absolute 17.20 10*3/mm3      Lymphocytes, Absolute 0.30 10*3/mm3      Monocytes, Absolute 0.30 10*3/mm3      Eosinophils, Absolute 0.00 10*3/mm3      Basophils, Absolute 0.00 10*3/mm3      nRBC 0.0 /100 WBC         No results found for: HGBA1C  Results from last 7 days   Lab Units 11/10/20  1017   INR  1.04           Lab Results   Component Value Date    LIPASE 34 10/07/2019     No results found for: CHOL, CHLPL, TRIG, HDL, LDL, LDLDIRECT    Lab Results   Lab Value Date/Time    FINALDX  09/24/2020 1256     Bone marrow, biopsy and aspiration:    No diagnostic evidence of a hematopoietic neoplasia identified    Normocellular marrow for age (~60-70%) with trilineage hematopoiesis, increased megakaryocytes, and no increase in blasts    No significant increase in reticulin fibers    No storage iron detected    See comment    STEPHANIE/sms       FINALDX  09/24/2020 1118     Anemia  Thrombocytopenia  No blasts identified      COMDX   09/24/2020 1256     This diagnosis is rendered following consultation with [facility]. Please see the attached consultation report for further details.    No diagnostic evidence of a hematopoietic neoplasia identified. A concurrent flow cytometric immunophenotypic analysis of the bone marrow aspirate reveals no significant immunophenotypic abnormalities with nonspecific monocytic and granulocytic findings. For further details of flow analysis, see a separate report. Given the increased number of megakaryocytes, the thrombocytopenia and mild anemia observed in this patient are secondary and peripheral and can be partly explained by the presence of ITP and possible iron deficiency. Clinical and laboratory correlation is recommended. Requested cytogenetic analysis is pending, and a separate report will follow. The immunostains performed in  this evaluation complement the flow analysis, and were utilized to further study the marrow.             Microbiology Results (last 10 days)     Procedure Component Value - Date/Time    COVID PRE-OP / PRE-PROCEDURE SCREENING ORDER (NO ISOLATION) - Swab, Nasopharynx [[id number]]  (Normal) Collected: 11/10/20 1528    Lab Status: Final result Specimen: Swab from Nasopharynx Updated: 11/10/20 1989    Narrative:      The following orders were created for panel order COVID PRE-OP / PRE-PROCEDURE SCREENING ORDER (NO ISOLATION) - Swab, Nasopharynx.  Procedure                               Abnormality         Status                     ---------                               -----------         ------                     COVID-19,CEPHEID,COR/ALEJO...[[id number]]  Normal              Final result                 Please view results for these tests on the individual orders.    COVID-19,CEPHEID,COR/ALEJO/PAD IN-HOUSE(OR EMERGENT/ADD-ON),NP SWAB IN TRANSPORT MEDIA 3-4 HR TAT - Swab, Nasopharynx [[id number]]  (Normal) Collected: 11/10/20 1528    Lab Status: Final result Specimen: Swab from  Nasopharynx Updated: 11/10/20 1635     COVID19 Not Detected    Narrative:      Fact sheet for providers: https://www.fda.gov/media/226170/download     Fact sheet for patients: https://www.fda.gov/media/436274/download          ECG/EMG Results (most recent)     None                    No radiology results for the last 7 days        Xrays, labs reviewed personally by physician.    Medication Review:   I have reviewed the patient's current medication list      Scheduled Meds  iron sucrose, 300 mg, Intravenous, Daily  polyethylene glycol, 17 g, Oral, Daily  sodium chloride, 10 mL, Intravenous, Q12H        Meds Infusions  sodium chloride, 75 mL/hr, Last Rate: 75 mL/hr (11/11/20 4952)        Meds PRN  •  aluminum-magnesium hydroxide-simethicone  •  magnesium sulfate **OR** magnesium sulfate in D5W 1g/100mL (PREMIX)  •  melatonin  •  Morphine  •  Morphine  •  ondansetron **OR** ondansetron  •  oxyCODONE  •  potassium chloride  •  simethicone  •  sodium chloride        Assessment/Plan   Assessment/Plan     Active Hospital Problems    Diagnosis  POA   • **Thrombocytopenia (CMS/HCC) [D69.6]  Yes   • Iron deficiency anemia [D50.9]  Yes   • Immune thrombocytopenic purpura (CMS/HCC) [D69.3]  Yes      Resolved Hospital Problems   No resolved problems to display.       MEDICAL DECISION MAKING COMPLEXITY BY PROBLEM:     ITP-patient direct admitted by hematology service.  Patient with no signs of active bleeding at this time and general surgery consulted for splenectomy  -Status post laparoscopic splenectomy on 11/11/2020  -Trend platelets   -CellCept on hold, previously had IVIG  -General surgery managing diet  -Platelet transfusion recommended for less than 2000 or active bleeding  -Pharmacy assisting for vaccine timing  -Hematology oncology consult  -Recommending starting chemical DVT prophylaxis 11/13/2020 per general surgery     Anemia-history of iron deficiency, decreased overnight  -Monitor with daily CBC  -Can continue iron  supplement  -IV Venofer per hematology     Anxiety-patient reports she is very nervous with the prospect of surgery  -Ativan 0.5 mg as needed consider bupropion if persistent    VTE Prophylaxis -   Mechanical Order History:      Ordered        11/11/20 1137  SCD (Sequential Compression Device) - Place on Patient in Pre-Op  Once         11/10/20 1429  Place Sequential Compression Device on Patient in Pre-Op  Once         11/10/20 1010  Place Sequential Compression Device  Once         11/10/20 1010  Maintain Sequential Compression Device  Continuous                 Pharmalogical Order History:     None            Code Status -   Code Status and Medical Interventions:   Ordered at: 11/10/20 1010     Level Of Support Discussed With:    Patient     Code Status:    CPR     Medical Interventions (Level of Support Prior to Arrest):    Full       This patient has been examined wearing appropriate Personal Protective Equipment and discussed with hospital infection control department. 11/12/20        Discharge Planning    home          Electronically signed by Jassi Will MD, 11/12/20, 11:54 EST.  [facility] Hospitalist Team

## 2020-11-12 NOTE — PLAN OF CARE
Goal Outcome Evaluation:  Plan of Care Reviewed With: patient     Patient resting after surgery. Patient having pain and treated with scheduled and PRN pain medications.

## 2020-11-13 LAB
ALBUMIN SERPL-MCNC: 3.8 G/DL (ref 3.5–5.2)
ALBUMIN/GLOB SERPL: 1.7 G/DL
ALP SERPL-CCNC: 41 U/L (ref 39–117)
ALT SERPL W P-5'-P-CCNC: 8 U/L (ref 1–33)
ANION GAP SERPL CALCULATED.3IONS-SCNC: 7 MMOL/L (ref 5–15)
AST SERPL-CCNC: 15 U/L (ref 1–32)
BASOPHILS # BLD AUTO: 0.1 10*3/MM3 (ref 0–0.2)
BASOPHILS NFR BLD AUTO: 0.3 % (ref 0–1.5)
BILIRUB SERPL-MCNC: <0.2 MG/DL (ref 0–1.2)
BUN SERPL-MCNC: 8 MG/DL (ref 6–20)
BUN/CREAT SERPL: 15.1 (ref 7–25)
CALCIUM SPEC-SCNC: 9.2 MG/DL (ref 8.6–10.5)
CHLORIDE SERPL-SCNC: 104 MMOL/L (ref 98–107)
CO2 SERPL-SCNC: 27 MMOL/L (ref 22–29)
CREAT SERPL-MCNC: 0.53 MG/DL (ref 0.57–1)
DEPRECATED RDW RBC AUTO: 42 FL (ref 37–54)
EOSINOPHIL # BLD AUTO: 0 10*3/MM3 (ref 0–0.4)
EOSINOPHIL NFR BLD AUTO: 0.2 % (ref 0.3–6.2)
ERYTHROCYTE [DISTWIDTH] IN BLOOD BY AUTOMATED COUNT: 15.6 % (ref 12.3–15.4)
GFR SERPL CREATININE-BSD FRML MDRD: 133 ML/MIN/1.73
GLOBULIN UR ELPH-MCNC: 2.2 GM/DL
GLUCOSE SERPL-MCNC: 101 MG/DL (ref 65–99)
HCT VFR BLD AUTO: 27.1 % (ref 34–46.6)
HGB BLD-MCNC: 8.5 G/DL (ref 12–15.9)
LAB AP CASE REPORT: NORMAL
LIPASE SERPL-CCNC: 34 U/L (ref 13–60)
LYMPHOCYTES # BLD AUTO: 2.2 10*3/MM3 (ref 0.7–3.1)
LYMPHOCYTES NFR BLD AUTO: 13.3 % (ref 19.6–45.3)
MAGNESIUM SERPL-MCNC: 1.9 MG/DL (ref 1.6–2.6)
MCH RBC QN AUTO: 24 PG (ref 26.6–33)
MCHC RBC AUTO-ENTMCNC: 31.2 G/DL (ref 31.5–35.7)
MCV RBC AUTO: 76.7 FL (ref 79–97)
MONOCYTES # BLD AUTO: 1.9 10*3/MM3 (ref 0.1–0.9)
MONOCYTES NFR BLD AUTO: 11.6 % (ref 5–12)
NEUTROPHILS NFR BLD AUTO: 12.5 10*3/MM3 (ref 1.7–7)
NEUTROPHILS NFR BLD AUTO: 74.6 % (ref 42.7–76)
NRBC BLD AUTO-RTO: 0.2 /100 WBC (ref 0–0.2)
PATH REPORT.FINAL DX SPEC: NORMAL
PATH REPORT.GROSS SPEC: NORMAL
PLATELET # BLD AUTO: 391 10*3/MM3 (ref 140–450)
PMV BLD AUTO: 8.5 FL (ref 6–12)
POTASSIUM SERPL-SCNC: 3.7 MMOL/L (ref 3.5–5.2)
PROT SERPL-MCNC: 6 G/DL (ref 6–8.5)
RBC # BLD AUTO: 3.53 10*6/MM3 (ref 3.77–5.28)
SODIUM SERPL-SCNC: 138 MMOL/L (ref 136–145)
WBC # BLD AUTO: 16.8 10*3/MM3 (ref 3.4–10.8)

## 2020-11-13 PROCEDURE — 25010000002 IRON SUCROSE PER 1 MG: Performed by: SURGERY

## 2020-11-13 PROCEDURE — 94799 UNLISTED PULMONARY SVC/PX: CPT

## 2020-11-13 PROCEDURE — 99024 POSTOP FOLLOW-UP VISIT: CPT | Performed by: SURGERY

## 2020-11-13 PROCEDURE — 80053 COMPREHEN METABOLIC PANEL: CPT | Performed by: SURGERY

## 2020-11-13 PROCEDURE — 83690 ASSAY OF LIPASE: CPT | Performed by: SURGERY

## 2020-11-13 PROCEDURE — 99232 SBSQ HOSP IP/OBS MODERATE 35: CPT | Performed by: FAMILY MEDICINE

## 2020-11-13 PROCEDURE — 83735 ASSAY OF MAGNESIUM: CPT | Performed by: SURGERY

## 2020-11-13 PROCEDURE — 85025 COMPLETE CBC W/AUTO DIFF WBC: CPT | Performed by: SURGERY

## 2020-11-13 PROCEDURE — 99232 SBSQ HOSP IP/OBS MODERATE 35: CPT | Performed by: INTERNAL MEDICINE

## 2020-11-13 PROCEDURE — 25010000002 MORPHINE PER 10 MG: Performed by: SURGERY

## 2020-11-13 RX ORDER — OXYCODONE HYDROCHLORIDE 5 MG/1
5 TABLET ORAL EVERY 4 HOURS PRN
Qty: 10 TABLET | Refills: 0 | Status: SHIPPED | OUTPATIENT
Start: 2020-11-13 | End: 2020-11-18

## 2020-11-13 RX ORDER — POLYETHYLENE GLYCOL 3350 17 G/17G
17 POWDER, FOR SOLUTION ORAL DAILY PRN
Start: 2020-11-13 | End: 2020-12-22

## 2020-11-13 RX ADMIN — OXYCODONE 5 MG: 5 TABLET ORAL at 08:16

## 2020-11-13 RX ADMIN — OXYCODONE 5 MG: 5 TABLET ORAL at 20:40

## 2020-11-13 RX ADMIN — MAGNESIUM HYDROXIDE 10 ML: 2400 SUSPENSION ORAL at 11:51

## 2020-11-13 RX ADMIN — MORPHINE SULFATE 2 MG: 4 INJECTION INTRAVENOUS at 06:21

## 2020-11-13 RX ADMIN — MORPHINE SULFATE 2 MG: 4 INJECTION INTRAVENOUS at 01:25

## 2020-11-13 RX ADMIN — SODIUM CHLORIDE 300 MG: 9 INJECTION, SOLUTION INTRAVENOUS at 08:17

## 2020-11-13 RX ADMIN — Medication 10 ML: at 08:17

## 2020-11-13 RX ADMIN — SIMETHICONE 80 MG: 80 TABLET, CHEWABLE ORAL at 08:39

## 2020-11-13 RX ADMIN — ALUMINUM HYDROXIDE, MAGNESIUM HYDROXIDE, AND DIMETHICONE 15 ML: 400; 400; 40 SUSPENSION ORAL at 20:40

## 2020-11-13 RX ADMIN — OXYCODONE 5 MG: 5 TABLET ORAL at 15:09

## 2020-11-13 RX ADMIN — SIMETHICONE 80 MG: 80 TABLET, CHEWABLE ORAL at 18:17

## 2020-11-13 RX ADMIN — POLYETHYLENE GLYCOL 3350 17 G: 17 POWDER, FOR SOLUTION ORAL at 08:17

## 2020-11-13 RX ADMIN — Medication 10 ML: at 22:22

## 2020-11-13 NOTE — PROGRESS NOTES
Hematology/Oncology Inpatient Progress Note    PATIENT NAME: Tiny Akhtar  : 1985  MRN: [id number]    CHIEF COMPLAINT: Surgery consultation for splenectomy    HISTORY OF PRESENT ILLNESS:   Tiny Akhtar is a 35 y.o. female who presented to [facility] on 2020 as a direct admit for general surgery consultation for splenectomy.  She has a history of ITP.  Count on admission white blood count 4.7, hemoglobin 10.7, hematocrit 33.9, MCV 76.2, platelets 85,000.     General surgery was consulted for splenectomy.     11/10/20  Hematology/Oncology was consulted for thrombocytopenia.  Patient is known to our service and followed by Dr. Forrest Llanes for history of immune thrombocytopenic purpura.  Patient was seen initially as an inpatient in 2019 when she had a platelet count of 9000.  Patient was initiated on steroid, followed by rituximab, followed by IVIG.  She was then started on Nplate in 2019.  Patient was seen at [facility] hematology for second opinion and was continued on Nplate.  2021 she received IVIG again.  Nplate was discontinued due to treatment failure in 2020 and the patient was initiated on IVIG again.  On 2020 the patient started taking fostamatinib she failed.  She had IVIG in 2020.  Plate was reinitiated in 2020 followed by additional IVIG in 2020.  She was started on CellCept by the [facility] in 2020.  The patient lost insurance and was not able to get a refill.  At office visit on 2020 discussed patient will need splenectomy since her platelets are again dropping.       She has a past medical history of Anxiety, Immune thrombocytopenic purpura (CMS/HCC) (2019), Iron deficiency anemia, Kidney stone (10/2019), and Thrombocytopenia (CMS/HCC) (2019).     PCP: Jose Ingram APRN    History of present illness was reviewed and is unchanged from the  previous visit. 11/11/20    Subjective    Status post splenectomy the day before..  Reports some pain.  Awaiting involvement as of this morning.    ROS:  Review of Systems   Constitutional: Negative for appetite change, chills, fatigue, fever and unexpected weight change.   HENT: Negative for congestion, hearing loss, mouth sores, nosebleeds, postnasal drip, rhinorrhea and sore throat.    Eyes: Negative for photophobia, pain and visual disturbance.   Respiratory: Negative for cough, chest tightness, shortness of breath and wheezing.    Cardiovascular: Negative for chest pain, palpitations and leg swelling.   Gastrointestinal: Positive for abdominal pain. Negative for abdominal distention, constipation, diarrhea, nausea and vomiting.   Genitourinary: Negative for difficulty urinating, dysuria and hematuria.   Musculoskeletal: Negative for arthralgias, back pain, gait problem and myalgias.   Skin: Negative for pallor and rash.   Neurological: Negative for dizziness, weakness, light-headedness, numbness and headaches.   Hematological: Negative for adenopathy. Bruises/bleeds easily.   Psychiatric/Behavioral: Negative for agitation, confusion and dysphoric mood. The patient is not nervous/anxious.    All other systems reviewed and are negative.       MEDICATIONS:    Scheduled Meds:  polyethylene glycol, 17 g, Oral, Daily  sodium chloride, 10 mL, Intravenous, Q12H       Continuous Infusions:      PRN Meds:  •  acetaminophen  •  aluminum-magnesium hydroxide-simethicone  •  magnesium hydroxide  •  magnesium sulfate **OR** magnesium sulfate in D5W 1g/100mL (PREMIX)  •  melatonin  •  Morphine  •  Morphine  •  ondansetron **OR** ondansetron  •  oxyCODONE  •  potassium chloride  •  simethicone  •  sodium chloride     ALLERGIES:  No Known Allergies    Objective    VITALS:   BP 97/60 (BP Location: Right arm, Patient Position: Lying) Comment: nurse was notified  Pulse 73   Temp 98 °F (36.7 °C) (Oral)   Resp 14   Ht 157.5 cm  "(62\")   Wt 57.1 kg (125 lb 12.8 oz)   LMP 11/04/2020 (Approximate)   SpO2 100%   BMI 23.01 kg/m²     PHYSICAL EXAM: (performed by MD)  Physical Exam  Vitals signs and nursing note reviewed.   Constitutional:       General: She is not in acute distress.     Appearance: Normal appearance. She is not diaphoretic.   HENT:      Head: Normocephalic and atraumatic.   Eyes:      General: No scleral icterus.        Right eye: No discharge.         Left eye: No discharge.      Extraocular Movements: Extraocular movements intact.      Conjunctiva/sclera: Conjunctivae normal.   Neck:      Musculoskeletal: Normal range of motion and neck supple.      Thyroid: No thyromegaly.   Cardiovascular:      Rate and Rhythm: Normal rate and regular rhythm.      Pulses: Normal pulses.      Heart sounds: Normal heart sounds. No friction rub. No gallop.    Pulmonary:      Effort: Pulmonary effort is normal. No respiratory distress.      Breath sounds: Normal breath sounds. No stridor. No wheezing.   Abdominal:      General: Bowel sounds are normal.      Palpations: Abdomen is soft. There is no mass.      Tenderness: There is no abdominal tenderness. There is no guarding or rebound.      Comments: Laparoscopic surgical scars noted.     Musculoskeletal: Normal range of motion.         General: No swelling or tenderness.   Lymphadenopathy:      Cervical: No cervical adenopathy.   Skin:     General: Skin is warm.      Coloration: Skin is not jaundiced or pale.      Findings: No erythema or rash.   Neurological:      General: No focal deficit present.      Mental Status: She is alert and oriented to person, place, and time.      Motor: No abnormal muscle tone.   Psychiatric:         Mood and Affect: Mood normal.         Behavior: Behavior normal.         Thought Content: Thought content normal.         Judgment: Judgment normal.        RECENT LABS:  Lab Results (last 24 hours)     Procedure Component Value Units Date/Time    Tissue Pathology " "Exam [[id number]] Collected: 11/11/20 1328    Specimen: Tissue from Spleen Updated: 11/13/20 1059     Case Report --     Surgical Pathology Report                         Case: [id number]                                  Authorizing Provider:  Erick Akhtar MD        Collected:           11/11/2020 01:28 PM          Ordering Location:     [facility] MAIN  Received:            11/12/2020 09:06 AM                                 OR                                                                           Pathologist:           Reji Gamez MD                                                            Specimen:    Spleen                                                                                      Final Diagnosis --     Spleen, splenectomy (weight 156 grams):    Splenic tissue with no significant pathologic changes    No malignancy identified     STEPHANIE/sms        Gross Description --     Received designated \"Spleen\" are multiple fragments of irregular grayish red soft tissue weighing 156 grams and measuring 9 x 8.5 x 5.5 cm in aggregate. The capsular surfaces are gray and glistening. Sectioning reveals reddish cut surfaces. No nodularity or mass lesions are identified. Representative sections of splenic tissue are submitted in cassettes A through D.     STEPHANIE/tkd        Magnesium [[id number]]  (Normal) Collected: 11/13/20 0215    Specimen: Blood Updated: 11/13/20 0307     Magnesium 1.9 mg/dL     Comprehensive Metabolic Panel [[id number]]  (Abnormal) Collected: 11/13/20 0215    Specimen: Blood Updated: 11/13/20 0307     Glucose 101 mg/dL      BUN 8 mg/dL      Creatinine 0.53 mg/dL      Sodium 138 mmol/L      Potassium 3.7 mmol/L      Chloride 104 mmol/L      CO2 27.0 mmol/L      Calcium 9.2 mg/dL      Total Protein 6.0 g/dL      Albumin 3.80 g/dL      ALT (SGPT) 8 U/L      AST (SGOT) 15 U/L      Alkaline Phosphatase 41 U/L      Total Bilirubin <0.2 mg/dL      eGFR Non African Amer 133 mL/min/1.73      " Globulin 2.2 gm/dL      A/G Ratio 1.7 g/dL      BUN/Creatinine Ratio 15.1     Anion Gap 7.0 mmol/L     Narrative:      GFR Normal >60  Chronic Kidney Disease <60  Kidney Failure <15      Lipase [[id number]]  (Normal) Collected: 11/13/20 0215    Specimen: Blood Updated: 11/13/20 0307     Lipase 34 U/L     CBC & Differential [[id number]]  (Abnormal) Collected: 11/13/20 0215    Specimen: Blood Updated: 11/13/20 0256    Narrative:      The following orders were created for panel order CBC & Differential.  Procedure                               Abnormality         Status                     ---------                               -----------         ------                     CBC Auto Differential[[id number]]        Abnormal            Final result                 Please view results for these tests on the individual orders.    CBC Auto Differential [[id number]]  (Abnormal) Collected: 11/13/20 0215    Specimen: Blood Updated: 11/13/20 0256     WBC 16.80 10*3/mm3      RBC 3.53 10*6/mm3      Hemoglobin 8.5 g/dL      Hematocrit 27.1 %      MCV 76.7 fL      MCH 24.0 pg      MCHC 31.2 g/dL      RDW 15.6 %      RDW-SD 42.0 fl      MPV 8.5 fL      Platelets 391 10*3/mm3      Comment: Result checked         Neutrophil % 74.6 %      Lymphocyte % 13.3 %      Monocyte % 11.6 %      Eosinophil % 0.2 %      Basophil % 0.3 %      Neutrophils, Absolute 12.50 10*3/mm3      Lymphocytes, Absolute 2.20 10*3/mm3      Monocytes, Absolute 1.90 10*3/mm3      Eosinophils, Absolute 0.00 10*3/mm3      Basophils, Absolute 0.10 10*3/mm3      nRBC 0.2 /100 WBC         PENDING RESULTS: none     IMAGING REVIEWED:  No radiology results for the last day    Assessment/Plan   ASSESSMENT  1. Refractory idiopathic thrombocytopenia purpura: Platelets 85,000 on admission.  Refractory to steroids, Rituxan, N-plate, fostamatinib, and CellCept. Platelet count declined to 60,000 .  Status post laparoscopic splenectomy 11/13/2020 with platelets improved to 391,000 on  post-splenectomy day 2.   2. Need for splenectomy: 11/23/2019 - Patient received meningococcal vaccine, Haemophilus vaccine, pneumococcus vaccine. Pharmacy consulted for assistance on timing of any further vaccination to complete series.   General surgery, Dr. Erick Akhtar, consulted for splenectomy for 11/11/2020.   3. History of iron deficiency anemia: Hemoglobin 10.7 on admission.  Patient received IV Venofer in November 2019.  Iron studies 11/10/2020 - Ferritin 15.58 and iron 30, iron saturation 6, TIBC 489, transferrin 328. Start Venofer 300 mg IV x 3 days on 11/11/2020.      PLAN  1. CBC daily.    2. Monitor for risk of thrombocytosis postsplenectomy.  If platelets overshoot we will consider giving her aspirin.  3. Continue oral ferrous sulfate as outpatient  4. Continue Venofer 300 mg IV x 3 days, day 3/3  5. Patient to proceed with additional vaccination as outpatient   6. Discharge planning according to Dr. Akhtar - plan is to discharge home with family once bowel function recovers    Electronically signed by FARZAD Banegas, 11/13/20, 11:41 AM EST.            I personally reviewed all pertinent labs, related documentation and the  imaging. ROS performed and physical exam done during face to face enounter with patient. I agree with  nurse practitioner's doumentation, assessment and plan.     Advised patient to start taking a baby aspirin due to risk of thrombosis  Repeat CBC on Monday.  Awaiting bowel movement and improvement in pain symptoms.  She may be discharged if these are achieved.    Electronically signed by Forrest Llanes MD, 11/13/20, 4:14 PM EST.

## 2020-11-13 NOTE — PROGRESS NOTES
Continued Stay Note  [facility]     Patient Name: Tiny Akhtar  MRN: [id number]  Today's Date: 11/13/2020    Admit Date: 11/10/2020    Discharge Plan     Row Name 11/13/20 0828       Plan    Plan  DC plan: home with family    Plan Comments  Barriers to DC: pain controll, waiting on bowel function          Expected Discharge Date and Time     Expected Discharge Date Expected Discharge Time    Nov 13, 2020           Phone communication only - no physical contact with patient or family.  Lakshmi Manzo RN

## 2020-11-13 NOTE — PROGRESS NOTES
[facility] Medicine Services Daily Progress Note      Hospitalist Team  LOS 3 days      Patient Care Team:  Jose Ingram APRN as PCP - General (Family Medicine)    Patient Location: 4120/1      Subjective   Subjective     Chief Complaint / Subjective  No chief complaint on file.    Patient overall doing well.  Patient has had no bowel function as of yet no flatus or bowel movements.  Adding MiraLAX and milk of magnesia and patient ambulating frequently.  If patient can have bowel function then can discharge but otherwise may have to stay overnight to ensure no signs of prolonged ileus versus obstruction.    Brief Synopsis of Hospital Course/HPI  Ms. Akhtar is a 35 y.o. female with a history of immune thrombocytopenic purpura, anemia, and kidney stones who was directly admitted to [facility] on 11/10/2020 due to thrombocytopenia and need for surgery consultation for splenectomy. The patient denies any current complaints.      The patient is a nonsmoker and denies alcohol or illicit drug use. She reports a family history of ITP, diabetes mellitus, cancer, CHF, and stroke.    11/11/2020: Patient reports no events overnight.  Patient still anxious about surgery.  Plan for laparoscopic with possible conversion open.  No chest pain or shortness of breath platelets in the 60s.    11/12/2020: Patient reports she did well postop underwent laparoscopic splenectomy on 11/11/2020.  Patient reports some nausea this morning and some bloating, but pain controlled.  No chest pain or shortness of breath.  Diet advanced to clears per surgery.    Date::          Review of Systems   Constitution: Negative for chills and fever.   HENT: Negative for hoarse voice and stridor.    Eyes: Negative for double vision and photophobia.   Cardiovascular: Negative for chest pain and syncope.   Respiratory: Negative for cough and shortness of breath.    Musculoskeletal: Negative for joint pain and  "stiffness.   Gastrointestinal: Positive for bloating. Negative for nausea and vomiting.   Genitourinary: Negative for dysuria and flank pain.   Neurological: Negative for focal weakness and light-headedness.   Psychiatric/Behavioral: Negative for altered mental status. The patient is not nervous/anxious.          Objective   Objective      Vital Signs  Temp:  [98 °F (36.7 °C)-98.5 °F (36.9 °C)] 98 °F (36.7 °C)  Heart Rate:  [58-75] 73  Resp:  [14-18] 14  BP: ()/(55-60) 97/60  Oxygen Therapy  SpO2: 100 %  Pulse Oximetry Type: Intermittent  Device (Oxygen Therapy): room air  Flow (L/min): 2  Flowsheet Rows      First Filed Value   Admission Height  157.5 cm (62\") Documented at 11/10/2020 1045   Admission Weight  57.1 kg (125 lb 12.8 oz) Documented at 11/10/2020 1009        Intake & Output (last 3 days)       11/10 0701 - 11/11 0700 11/11 0701 - 11/12 0700 11/12 0701 - 11/13 0700 11/13 0701 - 11/14 0700    P.O. 480 0 1020 240    I.V. (mL/kg)  2400 (42)      Total Intake(mL/kg) 480 (8.4) 2400 (42) 1020 (17.9) 240 (4.2)    Urine (mL/kg/hr) 300 450 (0.3) 1000 (0.7) 200 (0.4)    Drains  10  2    Blood  75      Total Output  202    Net +180 +1865 +20 +38            Urine Unmeasured Occurrence  4 x 1 x         Lines, Drains & Airways    Active LDAs     Name:   Placement date:   Placement time:   Site:   Days:    Peripheral IV 11/11/20 1010 Anterior;Left Forearm   11/11/20    1010    Forearm   less than 1                  Physical Exam:    Physical Exam    General: Female sitting up in bed breathing comfortably on room air in no acute distress  HEENT: NC/AT, EOMI, mucosa moist  Heart: Regular, rate controlled  Chest: Normal work of breathing moving air well in all lung fields no wheezing or crackles  Abdominal: Soft.    Periincisional tenderness, nondistended  Musculoskeletal: Normal ROM.  No edema. No calf tenderness.  Neurological: AAOx3, no focal deficits  Skin: Skin is warm and dry. No rash  Psychiatric: "  Calm        Wounds (last 24 hours)      LDA Wound     Row Name 11/13/20 1201 11/13/20 0801 11/12/20 1929       Wound 11/11/20 1330 abdomen Incision    Wound - Properties Group Placement Date: 11/11/20  -AM Placement Time: 1330  -AM Location: abdomen  -AM Primary Wound Type: Incision  -AM    Dressing Appearance  --  --  dry;intact;no drainage  -AC    Closure  Approximated;Liquid skin adhesive  -LS  Approximated;Liquid skin adhesive  -LS  Approximated;Liquid skin adhesive  -AC    Dressing Care  --  open to air  -LS  --    Retired Wound - Properties Group Date first assessed: 11/11/20  -AM Time first assessed: 1330  -AM Location: abdomen  -AM Primary Wound Type: Incision  -AM      User Key  (r) = Recorded By, (t) = Taken By, (c) = Cosigned By    Initials Name Provider Type    AM Sarina Harris, RN Registered Nurse    Alvina Persaud RN Registered Nurse    Venessa Albarran LPN Licensed Nurse          Procedures:    Procedure(s):  SPLENECTOMY LAPAROSCOPIC          Results Review:     I reviewed the patient's new clinical results.      Lab Results (last 24 hours)     Procedure Component Value Units Date/Time    Tissue Pathology Exam [[id number]] Collected: 11/11/20 1328    Specimen: Tissue from Spleen Updated: 11/13/20 1059     Case Report --     Surgical Pathology Report                         Case: [id number]                                  Authorizing Provider:  Erick Akhtar MD        Collected:           11/11/2020 01:28 PM          Ordering Location:     [facility] MAIN  Received:            11/12/2020 09:06 AM                                 OR                                                                           Pathologist:           Reji Gamez MD                                                            Specimen:    Spleen                                                                                      Final Diagnosis --     Spleen, splenectomy (weight 156 grams):    Splenic  "tissue with no significant pathologic changes    No malignancy identified     STEPHANIE/[facility]        Gross Description --     Received designated \"Spleen\" are multiple fragments of irregular grayish red soft tissue weighing 156 grams and measuring 9 x 8.5 x 5.5 cm in aggregate. The capsular surfaces are gray and glistening. Sectioning reveals reddish cut surfaces. No nodularity or mass lesions are identified. Representative sections of splenic tissue are submitted in cassettes A through D.     STEPHANIE/[facility]        Magnesium [[id number]]  (Normal) Collected: 11/13/20 0215    Specimen: Blood Updated: 11/13/20 0307     Magnesium 1.9 mg/dL     Comprehensive Metabolic Panel [[id number]]  (Abnormal) Collected: 11/13/20 0215    Specimen: Blood Updated: 11/13/20 0307     Glucose 101 mg/dL      BUN 8 mg/dL      Creatinine 0.53 mg/dL      Sodium 138 mmol/L      Potassium 3.7 mmol/L      Chloride 104 mmol/L      CO2 27.0 mmol/L      Calcium 9.2 mg/dL      Total Protein 6.0 g/dL      Albumin 3.80 g/dL      ALT (SGPT) 8 U/L      AST (SGOT) 15 U/L      Alkaline Phosphatase 41 U/L      Total Bilirubin <0.2 mg/dL      eGFR Non African Amer 133 mL/min/1.73      Globulin 2.2 gm/dL      A/G Ratio 1.7 g/dL      BUN/Creatinine Ratio 15.1     Anion Gap 7.0 mmol/L     Narrative:      GFR Normal >60  Chronic Kidney Disease <60  Kidney Failure <15      Lipase [[id number]]  (Normal) Collected: 11/13/20 0215    Specimen: Blood Updated: 11/13/20 0307     Lipase 34 U/L     CBC & Differential [[id number]]  (Abnormal) Collected: 11/13/20 0215    Specimen: Blood Updated: 11/13/20 0256    Narrative:      The following orders were created for panel order CBC & Differential.  Procedure                               Abnormality         Status                     ---------                               -----------         ------                     CBC Auto Differential[[id number]]        Abnormal            Final result                 Please view results for these " tests on the individual orders.    CBC Auto Differential [[id number]]  (Abnormal) Collected: 11/13/20 0215    Specimen: Blood Updated: 11/13/20 0256     WBC 16.80 10*3/mm3      RBC 3.53 10*6/mm3      Hemoglobin 8.5 g/dL      Hematocrit 27.1 %      MCV 76.7 fL      MCH 24.0 pg      MCHC 31.2 g/dL      RDW 15.6 %      RDW-SD 42.0 fl      MPV 8.5 fL      Platelets 391 10*3/mm3      Comment: Result checked         Neutrophil % 74.6 %      Lymphocyte % 13.3 %      Monocyte % 11.6 %      Eosinophil % 0.2 %      Basophil % 0.3 %      Neutrophils, Absolute 12.50 10*3/mm3      Lymphocytes, Absolute 2.20 10*3/mm3      Monocytes, Absolute 1.90 10*3/mm3      Eosinophils, Absolute 0.00 10*3/mm3      Basophils, Absolute 0.10 10*3/mm3      nRBC 0.2 /100 WBC         No results found for: HGBA1C  Results from last 7 days   Lab Units 11/10/20  1017   INR  1.04           Lab Results   Component Value Date    LIPASE 34 11/13/2020     No results found for: CHOL, CHLPL, TRIG, HDL, LDL, LDLDIRECT    Lab Results   Lab Value Date/Time    FINALDX  11/11/2020 1328     Spleen, splenectomy (weight 156 grams):    Splenic tissue with no significant pathologic changes    No malignancy identified     STEPHANIE/[employer]       FINALDX  09/24/2020 1256     Bone marrow, biopsy and aspiration:    No diagnostic evidence of a hematopoietic neoplasia identified    Normocellular marrow for age (~60-70%) with trilineage hematopoiesis, increased megakaryocytes, and no increase in blasts    No significant increase in reticulin fibers    No storage iron detected    See comment    STEPHANIE/sms       COMDX  09/24/2020 1256     This diagnosis is rendered following consultation with [facility]. Please see the attached consultation report for further details.    No diagnostic evidence of a hematopoietic neoplasia identified. A concurrent flow cytometric immunophenotypic analysis of the bone marrow aspirate reveals no significant immunophenotypic abnormalities with  nonspecific monocytic and granulocytic findings. For further details of flow analysis, see a separate report. Given the increased number of megakaryocytes, the thrombocytopenia and mild anemia observed in this patient are secondary and peripheral and can be partly explained by the presence of ITP and possible iron deficiency. Clinical and laboratory correlation is recommended. Requested cytogenetic analysis is pending, and a separate report will follow. The immunostains performed in  this evaluation complement the flow analysis, and were utilized to further study the marrow.             Microbiology Results (last 10 days)     Procedure Component Value - Date/Time    COVID PRE-OP / PRE-PROCEDURE SCREENING ORDER (NO ISOLATION) - Swab, Nasopharynx [[id number]]  (Normal) Collected: 11/10/20 1528    Lab Status: Final result Specimen: Swab from Nasopharynx Updated: 11/10/20 1635    Narrative:      The following orders were created for panel order COVID PRE-OP / PRE-PROCEDURE SCREENING ORDER (NO ISOLATION) - Swab, Nasopharynx.  Procedure                               Abnormality         Status                     ---------                               -----------         ------                     COVID-19,CEPHEID,COR/ALEJO...[[id number]]  Normal              Final result                 Please view results for these tests on the individual orders.    COVID-19,CEPHEID,COR/ALEJO/PAD IN-HOUSE(OR EMERGENT/ADD-ON),NP SWAB IN TRANSPORT MEDIA 3-4 HR TAT - Swab, Nasopharynx [[id number]]  (Normal) Collected: 11/10/20 1528    Lab Status: Final result Specimen: Swab from Nasopharynx Updated: 11/10/20 1635     COVID19 Not Detected    Narrative:      Fact sheet for providers: https://www.fda.gov/media/812498/download     Fact sheet for patients: https://www.fda.gov/media/834022/download          ECG/EMG Results (most recent)     None                    No radiology results for the last 7 days        Xrays, labs reviewed personally by  physician.    Medication Review:   I have reviewed the patient's current medication list      Scheduled Meds  polyethylene glycol, 17 g, Oral, Daily  sodium chloride, 10 mL, Intravenous, Q12H        Meds Infusions       Meds PRN  •  acetaminophen  •  aluminum-magnesium hydroxide-simethicone  •  magnesium hydroxide  •  magnesium sulfate **OR** magnesium sulfate in D5W 1g/100mL (PREMIX)  •  melatonin  •  Morphine  •  Morphine  •  ondansetron **OR** ondansetron  •  oxyCODONE  •  potassium chloride  •  simethicone  •  sodium chloride        Assessment/Plan   Assessment/Plan     Active Hospital Problems    Diagnosis  POA   • **Thrombocytopenia (CMS/HCC) [D69.6]  Yes   • Iron deficiency anemia [D50.9]  Yes   • Immune thrombocytopenic purpura (CMS/HCC) [D69.3]  Yes      Resolved Hospital Problems   No resolved problems to display.       MEDICAL DECISION MAKING COMPLEXITY BY PROBLEM:     ITP-patient direct admitted by hematology service.  Patient with no signs of active bleeding at this time and general surgery consulted for splenectomy.  Platelets rebounded after splenectomy  -Status post laparoscopic splenectomy on 11/11/2020  -Trend platelets   -CellCept on hold, previously had IVIG  -General surgery managing diet  -Platelet transfusion recommended for less than 2000 or active bleeding  -Pharmacy assisting for vaccine timing  -Hematology oncology consult  -Recommending starting chemical DVT prophylaxis 11/13/2020 per general surgery  -MiraLAX added, patient ambulating and awaiting bowel function for discharge    Anemia-history of iron deficiency, decreased overnight  -Monitor with daily CBC  -Can continue iron supplement  -IV Venofer per hematology     Anxiety-patient reports she is very nervous with the prospect of surgery  -Ativan 0.5 mg as needed consider bupropion if persistent    VTE Prophylaxis -   Mechanical Order History:      Ordered        11/11/20 1137  SCD (Sequential Compression Device) - Place on Patient in  Pre-Op  Once         11/10/20 1429  Place Sequential Compression Device on Patient in Pre-Op  Once         11/10/20 1010  Place Sequential Compression Device  Once         11/10/20 1010  Maintain Sequential Compression Device  Continuous                 Pharmalogical Order History:     None            Code Status -   Code Status and Medical Interventions:   Ordered at: 11/10/20 1010     Level Of Support Discussed With:    Patient     Code Status:    CPR     Medical Interventions (Level of Support Prior to Arrest):    Full       This patient has been examined wearing appropriate Personal Protective Equipment and discussed with hospital infection control department. 11/13/20        Discharge Planning    home          Electronically signed by Jassi Will MD, 11/13/20, 15:41 EST.  [facility] Hospitalist Team

## 2020-11-13 NOTE — PROGRESS NOTES
General Surgery Progress Note     LOS: 3 days   Patient Care Team:  Jose Ingram APRN as PCP - General (Family Medicine)    Subjective     Interval History:   In general seems to be improving but did have quite a bit of pain last night mostly in the left upper quadrant.  No significant nausea or vomiting.  Starting to pass flatus.  No bowel movement yet.  Objective     Vital Signs  Temp:  [98.2 °F (36.8 °C)-98.5 °F (36.9 °C)] 98.5 °F (36.9 °C)  Heart Rate:  [56-75] 75  Resp:  [14-18] 18  BP: ()/(55-61) 95/55    Physical Exam  Constitutional:       Appearance: Normal appearance.   HENT:      Head: Normocephalic and atraumatic.   Eyes:      General: No scleral icterus.     Conjunctiva/sclera: Conjunctivae normal.   Cardiovascular:      Rate and Rhythm: Normal rate.      Pulses: Normal pulses.   Pulmonary:      Effort: Pulmonary effort is normal.   Abdominal:      Comments: Abdomen is soft and flat incisions are healing well without any significant erythema or drainage the TAB drain is serosanguineous and output and minimal   Musculoskeletal:         General: No swelling or tenderness.   Skin:     General: Skin is warm and dry.   Neurological:      General: No focal deficit present.      Mental Status: She is alert. Mental status is at baseline.   Psychiatric:         Mood and Affect: Mood normal.         Behavior: Behavior normal.            Results Review:    Lab Results (last 24 hours)     Procedure Component Value Units Date/Time    Magnesium [[id number]]  (Normal) Collected: 11/13/20 0215    Specimen: Blood Updated: 11/13/20 0307     Magnesium 1.9 mg/dL     Comprehensive Metabolic Panel [[id number]]  (Abnormal) Collected: 11/13/20 0215    Specimen: Blood Updated: 11/13/20 0307     Glucose 101 mg/dL      BUN 8 mg/dL      Creatinine 0.53 mg/dL      Sodium 138 mmol/L      Potassium 3.7 mmol/L      Chloride 104 mmol/L      CO2 27.0 mmol/L      Calcium 9.2 mg/dL      Total Protein 6.0 g/dL      Albumin  3.80 g/dL      ALT (SGPT) 8 U/L      AST (SGOT) 15 U/L      Alkaline Phosphatase 41 U/L      Total Bilirubin <0.2 mg/dL      eGFR Non African Amer 133 mL/min/1.73      Globulin 2.2 gm/dL      A/G Ratio 1.7 g/dL      BUN/Creatinine Ratio 15.1     Anion Gap 7.0 mmol/L     Narrative:      GFR Normal >60  Chronic Kidney Disease <60  Kidney Failure <15      Lipase [[id number]]  (Normal) Collected: 11/13/20 0215    Specimen: Blood Updated: 11/13/20 0307     Lipase 34 U/L     CBC & Differential [[id number]]  (Abnormal) Collected: 11/13/20 0215    Specimen: Blood Updated: 11/13/20 0256    Narrative:      The following orders were created for panel order CBC & Differential.  Procedure                               Abnormality         Status                     ---------                               -----------         ------                     CBC Auto Differential[[id number]]        Abnormal            Final result                 Please view results for these tests on the individual orders.    CBC Auto Differential [[id number]]  (Abnormal) Collected: 11/13/20 0215    Specimen: Blood Updated: 11/13/20 0256     WBC 16.80 10*3/mm3      RBC 3.53 10*6/mm3      Hemoglobin 8.5 g/dL      Hematocrit 27.1 %      MCV 76.7 fL      MCH 24.0 pg      MCHC 31.2 g/dL      RDW 15.6 %      RDW-SD 42.0 fl      MPV 8.5 fL      Platelets 391 10*3/mm3      Comment: Result checked         Neutrophil % 74.6 %      Lymphocyte % 13.3 %      Monocyte % 11.6 %      Eosinophil % 0.2 %      Basophil % 0.3 %      Neutrophils, Absolute 12.50 10*3/mm3      Lymphocytes, Absolute 2.20 10*3/mm3      Monocytes, Absolute 1.90 10*3/mm3      Eosinophils, Absolute 0.00 10*3/mm3      Basophils, Absolute 0.10 10*3/mm3      nRBC 0.2 /100 WBC     Tissue Pathology Exam [[id number]] Collected: 11/11/20 1328    Specimen: Tissue from Spleen Updated: 11/12/20 0906        Imaging Results (Last 24 Hours)     ** No results found for the last 24 hours. **         I reviewed the  patient's new clinical results.    Medication Review:    Current Facility-Administered Medications:   •  acetaminophen (TYLENOL) tablet 650 mg, 650 mg, Oral, Q6H PRN, Jassi Will MD, 650 mg at 11/12/20 1745  •  aluminum-magnesium hydroxide-simethicone (MAALOX MAX) 400-400-40 MG/5ML suspension 15 mL, 15 mL, Oral, Q6H PRN, Erick Akhtar MD  •  iron sucrose 300 mg in 250 mL NS, 300 mg, Intravenous, Daily, Erick Akhtar MD, 300 mg at 11/12/20 0913  •  Magnesium Sulfate 2 gram infusion - Mg less than or equal to 1.5 mg/dL, 2 g, Intravenous, PRN **OR** Magnesium Sulfate 1 gram infusion - Mg 1.6-1.9 mg/dL, 1 g, Intravenous, PRN, Erick Akhtar MD  •  melatonin tablet 5 mg, 5 mg, Oral, Nightly PRN, Erick Akhtar MD  •  Morphine sulfate (PF) injection 2 mg, 2 mg, Intravenous, Q2H PRN, Kiran Mendoza DO, 2 mg at 11/13/20 0621  •  Morphine sulfate (PF) injection 4 mg, 4 mg, Intravenous, Q2H PRN, Kiran Mendoza DO, 4 mg at 11/12/20 2247  •  ondansetron (ZOFRAN) tablet 4 mg, 4 mg, Oral, Q6H PRN **OR** ondansetron (ZOFRAN) injection 4 mg, 4 mg, Intravenous, Q6H PRN, Erikc Akhtar MD, 4 mg at 11/12/20 1721  •  oxyCODONE (ROXICODONE) immediate release tablet 5 mg, 5 mg, Oral, Q4H PRN, Erick Akhtar MD, 5 mg at 11/12/20 1206  •  polyethylene glycol (MIRALAX) packet 17 g, 17 g, Oral, Daily, Erick Akhtar MD, 17 g at 11/12/20 0827  •  potassium chloride (K-DUR,KLOR-CON) CR tablet 40 mEq, 40 mEq, Oral, PRN, Erick Akhtar MD  •  simethicone (MYLICON) chewable tablet 80 mg, 80 mg, Oral, 4x Daily PRN, Jassi Will MD, 80 mg at 11/12/20 1057  •  sodium chloride 0.9 % flush 10 mL, 10 mL, Intravenous, Q12H, Erick Akhtar MD, 10 mL at 11/12/20 2020  •  sodium chloride 0.9 % flush 10 mL, 10 mL, Intravenous, PRN, Erick Akhtar MD  •  sodium chloride 0.9 % infusion, 75 mL/hr, Intravenous, Continuous, Erick Akhtar MD, Last Rate: 75 mL/hr at 11/11/20 1742, 75 mL/hr at 11/11/20  8852    Assessment/Plan     Principal Problem:    Thrombocytopenia (CMS/HCC)  Active Problems:    Immune thrombocytopenic purpura (CMS/HCC)    Iron deficiency anemia      Postop day 2 laparoscopic splenectomy for refractory ITP, overall looks good today.     Diet as tolerated  MiraLAX for bowel regimen  Will defer to oncology with regards to anticoagulation.  If she is ambulatory she likely should not need DVT chemoprophylaxis also using SCDs.  Increase activity  Pulmonary toilet  DC drain    If her pain is reasonably well controlled and she has bowel function it would be okay with me for discharge home today.  However if she is not able due to either of the above issues will hold till tomorrow.  Her discharge instructions have been placed in the chart as well as her narcotic prescription for home.     This note was created using Dragon Voice Recognition software.    Erick Akhtar MD  11/13/20  08:01 EST

## 2020-11-13 NOTE — PLAN OF CARE
Goal Outcome Evaluation:  Plan of Care Reviewed With: patient  Progress: improving  Outcome Summary: prn morphine/tylenol for pain. ambulates in room/murray independently. anticipates d/c home soon. platelets increasing slowly.

## 2020-11-14 VITALS
HEIGHT: 62 IN | OXYGEN SATURATION: 100 % | DIASTOLIC BLOOD PRESSURE: 55 MMHG | HEART RATE: 70 BPM | BODY MASS INDEX: 23.15 KG/M2 | TEMPERATURE: 98.4 F | WEIGHT: 125.8 LBS | RESPIRATION RATE: 14 BRPM | SYSTOLIC BLOOD PRESSURE: 90 MMHG

## 2020-11-14 LAB
BASOPHILS # BLD AUTO: 0.1 10*3/MM3 (ref 0–0.2)
BASOPHILS NFR BLD AUTO: 0.8 % (ref 0–1.5)
DEPRECATED RDW RBC AUTO: 42.4 FL (ref 37–54)
EOSINOPHIL # BLD AUTO: 0.3 10*3/MM3 (ref 0–0.4)
EOSINOPHIL NFR BLD AUTO: 1.9 % (ref 0.3–6.2)
ERYTHROCYTE [DISTWIDTH] IN BLOOD BY AUTOMATED COUNT: 15.8 % (ref 12.3–15.4)
HCT VFR BLD AUTO: 27.4 % (ref 34–46.6)
HGB BLD-MCNC: 8.7 G/DL (ref 12–15.9)
LYMPHOCYTES # BLD AUTO: 2.3 10*3/MM3 (ref 0.7–3.1)
LYMPHOCYTES NFR BLD AUTO: 15.4 % (ref 19.6–45.3)
MAGNESIUM SERPL-MCNC: 2.3 MG/DL (ref 1.6–2.6)
MCH RBC QN AUTO: 24.2 PG (ref 26.6–33)
MCHC RBC AUTO-ENTMCNC: 31.7 G/DL (ref 31.5–35.7)
MCV RBC AUTO: 76.4 FL (ref 79–97)
MONOCYTES # BLD AUTO: 2.3 10*3/MM3 (ref 0.1–0.9)
MONOCYTES NFR BLD AUTO: 15.3 % (ref 5–12)
NEUTROPHILS NFR BLD AUTO: 10 10*3/MM3 (ref 1.7–7)
NEUTROPHILS NFR BLD AUTO: 66.6 % (ref 42.7–76)
NRBC BLD AUTO-RTO: 0.2 /100 WBC (ref 0–0.2)
PLATELET # BLD AUTO: 550 10*3/MM3 (ref 140–450)
PMV BLD AUTO: 7.9 FL (ref 6–12)
POTASSIUM SERPL-SCNC: 3.7 MMOL/L (ref 3.5–5.2)
RBC # BLD AUTO: 3.59 10*6/MM3 (ref 3.77–5.28)
WBC # BLD AUTO: 15 10*3/MM3 (ref 3.4–10.8)

## 2020-11-14 PROCEDURE — 85025 COMPLETE CBC W/AUTO DIFF WBC: CPT | Performed by: SURGERY

## 2020-11-14 PROCEDURE — 99232 SBSQ HOSP IP/OBS MODERATE 35: CPT | Performed by: INTERNAL MEDICINE

## 2020-11-14 PROCEDURE — 83735 ASSAY OF MAGNESIUM: CPT | Performed by: SURGERY

## 2020-11-14 PROCEDURE — 84132 ASSAY OF SERUM POTASSIUM: CPT | Performed by: SURGERY

## 2020-11-14 PROCEDURE — 99238 HOSP IP/OBS DSCHRG MGMT 30/<: CPT | Performed by: FAMILY MEDICINE

## 2020-11-14 PROCEDURE — 99024 POSTOP FOLLOW-UP VISIT: CPT | Performed by: SURGERY

## 2020-11-14 RX ORDER — ASPIRIN 81 MG/1
81 TABLET ORAL DAILY
Qty: 30 TABLET | Refills: 0 | Status: SHIPPED | OUTPATIENT
Start: 2020-11-14 | End: 2020-12-14

## 2020-11-14 RX ORDER — ASPIRIN 81 MG/1
81 TABLET ORAL DAILY
Status: DISCONTINUED | OUTPATIENT
Start: 2020-11-14 | End: 2020-11-14 | Stop reason: HOSPADM

## 2020-11-14 RX ADMIN — OXYCODONE 5 MG: 5 TABLET ORAL at 10:03

## 2020-11-14 RX ADMIN — POLYETHYLENE GLYCOL 3350 17 G: 17 POWDER, FOR SOLUTION ORAL at 08:33

## 2020-11-14 NOTE — PROGRESS NOTES
General Surgery Progress Note     LOS: 4 days   Patient Care Team:  Jose Ingram APRN as PCP - General (Family Medicine)    Subjective     Interval History:   Pain controlled. Tolerating diet without nausea/vomiting. Decreased appetite as expected. Afebrile    Objective     Vital Signs  Temp:  [98 °F (36.7 °C)-98.4 °F (36.9 °C)] 98.4 °F (36.9 °C)  Heart Rate:  [70-79] 70  Resp:  [14-16] 14  BP: ()/(55-66) 90/55    Physical Exam  Constitutional:       Appearance: Normal appearance.   HENT:      Head: Normocephalic and atraumatic.   Eyes:      General: No scleral icterus.     Conjunctiva/sclera: Conjunctivae normal.   Cardiovascular:      Rate and Rhythm: Normal rate.      Pulses: Normal pulses.   Pulmonary:      Effort: Pulmonary effort is normal.   Abdominal:      Comments: Abdomen is soft and flat incisions are healing well without any significant erythema or drainage. The TAB drain has been removed   Musculoskeletal:         General: No swelling or tenderness.   Skin:     General: Skin is warm and dry.   Neurological:      General: No focal deficit present.      Mental Status: She is alert. Mental status is at baseline.   Psychiatric:         Mood and Affect: Mood normal.         Behavior: Behavior normal.            Results Review:    Lab Results (last 24 hours)     Procedure Component Value Units Date/Time    Potassium [[id number]]  (Normal) Collected: 11/14/20 0222    Specimen: Blood Updated: 11/14/20 0322     Potassium 3.7 mmol/L     Magnesium [[id number]]  (Normal) Collected: 11/14/20 0222    Specimen: Blood Updated: 11/14/20 0322     Magnesium 2.3 mg/dL     CBC & Differential [[id number]]  (Abnormal) Collected: 11/14/20 0222    Specimen: Blood Updated: 11/14/20 0255    Narrative:      The following orders were created for panel order CBC & Differential.  Procedure                               Abnormality         Status                     ---------                               -----------          ------                     CBC Auto Differential[[id number]]        Abnormal            Final result                 Please view results for these tests on the individual orders.    CBC Auto Differential [[id number]]  (Abnormal) Collected: 11/14/20 0222    Specimen: Blood Updated: 11/14/20 0255     WBC 15.00 10*3/mm3      RBC 3.59 10*6/mm3      Hemoglobin 8.7 g/dL      Hematocrit 27.4 %      MCV 76.4 fL      MCH 24.2 pg      MCHC 31.7 g/dL      RDW 15.8 %      RDW-SD 42.4 fl      MPV 7.9 fL      Platelets 550 10*3/mm3      Neutrophil % 66.6 %      Lymphocyte % 15.4 %      Monocyte % 15.3 %      Eosinophil % 1.9 %      Basophil % 0.8 %      Neutrophils, Absolute 10.00 10*3/mm3      Lymphocytes, Absolute 2.30 10*3/mm3      Monocytes, Absolute 2.30 10*3/mm3      Eosinophils, Absolute 0.30 10*3/mm3      Basophils, Absolute 0.10 10*3/mm3      nRBC 0.2 /100 WBC         Imaging Results (Last 24 Hours)     ** No results found for the last 24 hours. **         I reviewed the patient's new clinical results.    Medication Review:    Current Facility-Administered Medications:   •  acetaminophen (TYLENOL) tablet 650 mg, 650 mg, Oral, Q6H PRN, Jassi Will MD, 650 mg at 11/12/20 1745  •  aluminum-magnesium hydroxide-simethicone (MAALOX MAX) 400-400-40 MG/5ML suspension 15 mL, 15 mL, Oral, Q6H PRN, Erick Akhtar MD, 15 mL at 11/13/20 2040  •  aspirin EC tablet 81 mg, 81 mg, Oral, Daily, Shannon Burgos, APRN  •  magnesium hydroxide (MILK OF MAGNESIA) suspension 2400 mg/10mL 10 mL, 10 mL, Oral, Daily PRN, Jassi Will MD, 10 mL at 11/13/20 1151  •  Magnesium Sulfate 2 gram infusion - Mg less than or equal to 1.5 mg/dL, 2 g, Intravenous, PRN **OR** Magnesium Sulfate 1 gram infusion - Mg 1.6-1.9 mg/dL, 1 g, Intravenous, PRN, Erick Akhtar MD  •  melatonin tablet 5 mg, 5 mg, Oral, Nightly PRN, Erick Akhtar MD  •  Morphine sulfate (PF) injection 2 mg, 2 mg, Intravenous, Q2H PRN, Kiran Mendoza DO,  2 mg at 11/13/20 0621  •  Morphine sulfate (PF) injection 4 mg, 4 mg, Intravenous, Q2H PRN, Kiran Mendoza DO, 4 mg at 11/12/20 2247  •  ondansetron (ZOFRAN) tablet 4 mg, 4 mg, Oral, Q6H PRN **OR** ondansetron (ZOFRAN) injection 4 mg, 4 mg, Intravenous, Q6H PRN, Erick Akhtar MD, 4 mg at 11/12/20 1721  •  oxyCODONE (ROXICODONE) immediate release tablet 5 mg, 5 mg, Oral, Q4H PRN, Erick Akhtar MD, 5 mg at 11/14/20 1003  •  polyethylene glycol (MIRALAX) packet 17 g, 17 g, Oral, Daily, Erick Akhtar MD, 17 g at 11/14/20 0833  •  potassium chloride (K-DUR,KLOR-CON) CR tablet 40 mEq, 40 mEq, Oral, PRN, Erick Akhtar MD  •  simethicone (MYLICON) chewable tablet 80 mg, 80 mg, Oral, 4x Daily PRN, Jassi Will MD, 80 mg at 11/13/20 1817  •  sodium chloride 0.9 % flush 10 mL, 10 mL, Intravenous, Q12H, Erick Akhtar MD, 10 mL at 11/13/20 2222  •  sodium chloride 0.9 % flush 10 mL, 10 mL, Intravenous, PRN, Erick Akhtar MD    Assessment/Plan     Principal Problem:    Thrombocytopenia (CMS/HCC)  Active Problems:    Immune thrombocytopenic purpura (CMS/HCC)    Iron deficiency anemia      Postop day 3 laparoscopic splenectomy for refractory ITP, ready for d/c.     OK for d/c home.  Follow-up with Dr. Akhtar as directed  Questions answered.    Kiran Sweet MD  11/14/20  14:27 EST

## 2020-11-14 NOTE — DISCHARGE SUMMARY
Date of Admission: 11/10/2020    Date of Discharge:  11/14/2020    Length of stay:  LOS: 4 days     Discharge Diagnosis:     ITP status post splenectomy    Presenting Problem List:    ITP-patient direct admitted by hematology service.  Patient with no signs of active bleeding at this time and general surgery consulted for splenectomy.  Platelets rebounded after splenectomy  -Status post laparoscopic splenectomy on 11/11/2020  -Trend platelets, 550 upon discharge  -CellCept on hold, previously had IVIG  -General surgery managing diet  -Platelet transfusion recommended for less than 2000 or active bleeding  -Pharmacy assisting for vaccine timing  -Hematology oncology consult  -Recommending starting chemical DVT prophylaxis 11/13/2020 per general surgery  -MiraLAX added, patient passing flatus and abdominal distention resolved cleared for discharge  -Continue daily baby aspirin until seen by hematology     Anemia-history of iron deficiency, decreased overnight  -Can continue iron supplement     Anxiety-patient reports she is very nervous with the prospect of surgery  -Ativan 0.5 mg as needed consider bupropion if persistent on an outpatient basis    HPI/Hospital Course:  Ms. Akhtar is a 35 y.o. female with a history of immune thrombocytopenic purpura, anemia, and kidney stones who was directly admitted to [facility] on 11/10/2020 due to thrombocytopenia and need for surgery consultation for splenectomy. The patient denies any current complaints.      The patient is a nonsmoker and denies alcohol or illicit drug use. She reports a family history of ITP, diabetes mellitus, cancer, CHF, and stroke.     11/11/2020: Patient reports no events overnight.  Patient still anxious about surgery.  Plan for laparoscopic with possible conversion open.  No chest pain or shortness of breath platelets in the 60s.     11/12/2020: Patient reports she did well postop underwent laparoscopic splenectomy on 11/11/2020.  Patient  reports some nausea this morning and some bloating, but pain controlled.  No chest pain or shortness of breath.  Diet advanced to clears per surgery.    11/13/2020:Patient overall doing well.  Patient has had no bowel function as of yet no flatus or bowel movements.  Adding MiraLAX and milk of magnesia and patient ambulating frequently.  If patient can have bowel function then can discharge but otherwise may have to stay overnight to ensure no signs of prolonged ileus versus obstruction.    11/14/2020: Patient passing flatus abdominal distention resolved.  Patient cleared for discharge receiving updated vaccines for postsplenectomy status.  Patient to continue daily baby aspirin until seen by hematology in 2 days.  Follow-up organized with primary care and general surgery additionally.  Strict return precautions given patient able to be discharged home in good condition.    Procedures Performed    Procedure(s):  SPLENECTOMY LAPAROSCOPIC  -------------------       Consults:   Consults     Date and Time Order Name Status Description    11/10/2020 1010 Inpatient General Surgery Consult Completed     11/10/2020 1010 Hematology & Oncology Inpatient Consult Completed           Pertinent Test Results:     Lab Results (last 24 hours)     Procedure Component Value Units Date/Time    Potassium [[id number]]  (Normal) Collected: 11/14/20 0222    Specimen: Blood Updated: 11/14/20 0322     Potassium 3.7 mmol/L     Magnesium [[id number]]  (Normal) Collected: 11/14/20 0222    Specimen: Blood Updated: 11/14/20 0322     Magnesium 2.3 mg/dL     CBC & Differential [[id number]]  (Abnormal) Collected: 11/14/20 0222    Specimen: Blood Updated: 11/14/20 0255    Narrative:      The following orders were created for panel order CBC & Differential.  Procedure                               Abnormality         Status                     ---------                               -----------         ------                     CBC Auto  Differential[[id number]]        Abnormal            Final result                 Please view results for these tests on the individual orders.    CBC Auto Differential [[id number]]  (Abnormal) Collected: 11/14/20 0222    Specimen: Blood Updated: 11/14/20 0255     WBC 15.00 10*3/mm3      RBC 3.59 10*6/mm3      Hemoglobin 8.7 g/dL      Hematocrit 27.4 %      MCV 76.4 fL      MCH 24.2 pg      MCHC 31.7 g/dL      RDW 15.8 %      RDW-SD 42.4 fl      MPV 7.9 fL      Platelets 550 10*3/mm3      Neutrophil % 66.6 %      Lymphocyte % 15.4 %      Monocyte % 15.3 %      Eosinophil % 1.9 %      Basophil % 0.8 %      Neutrophils, Absolute 10.00 10*3/mm3      Lymphocytes, Absolute 2.30 10*3/mm3      Monocytes, Absolute 2.30 10*3/mm3      Eosinophils, Absolute 0.30 10*3/mm3      Basophils, Absolute 0.10 10*3/mm3      nRBC 0.2 /100 WBC           Microbiology Results Abnormal     Procedure Component Value - Date/Time    COVID PRE-OP / PRE-PROCEDURE SCREENING ORDER (NO ISOLATION) - Swab, Nasopharynx [[id number]]  (Normal) Collected: 11/10/20 1528    Lab Status: Final result Specimen: Swab from Nasopharynx Updated: 11/10/20 1635    Narrative:      The following orders were created for panel order COVID PRE-OP / PRE-PROCEDURE SCREENING ORDER (NO ISOLATION) - Swab, Nasopharynx.  Procedure                               Abnormality         Status                     ---------                               -----------         ------                     COVID-19,CEPHEID,COR/ALEJO...[[id number]]  Normal              Final result                 Please view results for these tests on the individual orders.    COVID-19,CEPHEID,COR/ALEJO/PAD IN-HOUSE(OR EMERGENT/ADD-ON),NP SWAB IN TRANSPORT MEDIA 3-4 HR TAT - Swab, Nasopharynx [[id number]]  (Normal) Collected: 11/10/20 1528    Lab Status: Final result Specimen: Swab from Nasopharynx Updated: 11/10/20 1635     COVID19 Not Detected    Narrative:      Fact sheet for providers:  https://www.fda.gov/media/167893/download     Fact sheet for patients: https://www.fda.gov/media/707416/download        No radiology results for the last day           Condition on Discharge:  Good    Vital Signs  Temp:  [98 °F (36.7 °C)-98.4 °F (36.9 °C)] 98.4 °F (36.9 °C)  Heart Rate:  [70-79] 70  Resp:  [14-16] 14  BP: ()/(55-66) 90/55    Physical Exam:  General: Female sitting up in bed breathing comfortably on room air in no acute distress  HEENT: NC/AT, EOMI, mucosa moist  Heart: Regular, rate controlled  Chest: Normal work of breathing moving air well in all lung fields no wheezing or crackles  Abdominal: Soft.      Nondistended, periincisional tenderness, hyperactive bowel sounds  Musculoskeletal: Normal ROM.  No edema. No calf tenderness.  Neurological: AAOx3, no focal deficits  Skin: Skin is warm and dry.  Incisions healing well no drainage or surrounding erythema  Psychiatric:  Calm      Discharge Disposition  Home or Self Care [1]    Discharge Medications     Discharge Medications      New Medications      Instructions Start Date   aspirin 81 MG EC tablet   81 mg, Oral, Daily      oxyCODONE 5 MG immediate release tablet  Commonly known as: ROXICODONE   5 mg, Oral, Every 4 Hours PRN      polyethylene glycol 17 g packet  Commonly known as: MIRALAX   17 g, Oral, Daily PRN         Continue These Medications      Instructions Start Date   acetaminophen 325 MG tablet  Commonly known as: TYLENOL   650 mg, Oral, Every 6 Hours PRN      ferrous gluconate 324 MG tablet  Commonly known as: FERGON   324 mg, Oral, Daily With Breakfast      megestrol 20 MG tablet  Commonly known as: MEGACE   20 mg, Oral, 3 Times Daily      multivitamin with minerals tablet tablet   1 tablet, Oral, Daily      ondansetron 8 MG tablet  Commonly known as: ZOFRAN   8 mg, Oral, 3 Times Daily PRN      pantoprazole 20 MG EC tablet  Commonly known as: PROTONIX   No dose, route, or frequency recorded.             Discharge Diet:   Diet  Instructions     Advance Diet As Tolerated      Diet: Regular      Discharge Diet: Regular          Activity at Discharge:   Activity Instructions     Activity as Tolerated      Driving Restrictions      Type of Restriction: Driving    Driving Restrictions: No Driving While Taking Narcotics    Lifting Restrictions      Type of Restriction: Lifting    Lifting Restrictions: Other    Explain Lifing Restrictions: No lifting more than 15 lbs for 2 weeks. Or otherwise specified the day of surgery.    Other Activity Instructions      Activity Instructions: May shower in 24 hours. Do not tub soak incision or swim for at least 2 weeks.          Follow-up Appointments  Future Appointments   Date Time Provider Department Center   11/24/2020  9:40 AM Erick Akhtar MD [facility] None     Additional Instructions for the Follow-ups that You Need to Schedule     Call MD With Problems / Concerns   As directed      Call the office, [phone] with any questions or concerns following your surgery.    Order Comments: Call the office, [phone] with any questions or concerns following your surgery.          Discharge Follow-up with PCP   As directed       Currently Documented PCP:    Jose Ingram APRN    PCP Phone Number:    [phone]     Follow Up Details: Follow-up with your primary care doctor within a week to recheck any symptoms         Discharge Follow-up with Specified Provider: Dr. Akhtar, General Surgery. Call [phone] to schedule; 2 Weeks   As directed      To: Dr. Akhtar, General Surgery. Call [phone] to schedule    Follow Up: 2 Weeks         Discharge Follow-up with Specified Provider: Follow-up with your general surgeon as indicated to evaluate surgical site   As directed      To: Follow-up with your general surgeon as indicated to evaluate surgical site         Discharge Follow-up with Specified Provider: Follow-up with your hematologist on Monday to recheck your platelet count   As directed      To:  Follow-up with your hematologist on Monday to recheck your platelet count               Test Results Pending at Discharge       Risk for Readmission (LACE) Score: 7 (11/14/2020  6:00 AM)        Jassi Will MD  11/14/20  13:00 EST      Time: Discharge 25 min total time spent with face-to-face history/exam, writing all prescriptions, preparing medication reconciliation, and documenting discharge data including chart review of the full admission, care co-ordination with patient, family, , and , etc., and follow-up appointments with PCP and specialists as recommended.

## 2020-11-14 NOTE — PROGRESS NOTES
Hematology/Oncology Inpatient Progress Note    PATIENT NAME: Tiny Akhtar  : 1985  MRN: [id number]    CHIEF COMPLAINT: Surgery consultation for splenectomy    HISTORY OF PRESENT ILLNESS:   Tiny Akhtar is a 35 y.o. female who presented to [facility] on 2020 as a direct admit for general surgery consultation for splenectomy.  She has a history of ITP.  Count on admission white blood count 4.7, hemoglobin 10.7, hematocrit 33.9, MCV 76.2, platelets 85,000.     General surgery was consulted for splenectomy.     11/10/20  Hematology/Oncology was consulted for thrombocytopenia.  Patient is known to our service and followed by Dr. Forrest Llanes for history of immune thrombocytopenic purpura.  Patient was seen initially as an inpatient in 2019 when she had a platelet count of 9000.  Patient was initiated on steroid, followed by rituximab, followed by IVIG.  She was then started on Nplate in 2019.  Patient was seen at [facility] hematology for second opinion and was continued on Nplate.  2021 she received IVIG again.  Nplate was discontinued due to treatment failure in 2020 and the patient was initiated on IVIG again.  On 2020 the patient started taking fostamatinib she failed.  She had IVIG in 2020.  Plate was reinitiated in 2020 followed by additional IVIG in 2020.  She was started on CellCept by the [facility] in 2020.  The patient lost insurance and was not able to get a refill.  At office visit on 2020 discussed patient will need splenectomy since her platelets are again dropping.  · 2020   Status post laparoscopic splenectomy    ·   She has a past medical history of Anxiety, Immune thrombocytopenic purpura (CMS/HCC) (2019), Iron deficiency anemia, Kidney stone (10/2019), and Thrombocytopenia (CMS/HCC) (2019).     PCP: Jose Ingram APRN    History of  present illness was reviewed and is unchanged from the previous visit. 11/14/20    Subjective    Status post splenectomy .  Had a bowel movement.  Reports some pain.      ROS:  Review of Systems   Constitutional: Negative for appetite change, chills, fatigue, fever and unexpected weight change.   HENT: Negative for congestion, hearing loss, mouth sores, nosebleeds, postnasal drip, rhinorrhea and sore throat.    Eyes: Negative for photophobia, pain and visual disturbance.   Respiratory: Negative for cough, chest tightness, shortness of breath and wheezing.    Cardiovascular: Negative for chest pain, palpitations and leg swelling.   Gastrointestinal: Positive for abdominal pain ( post-surgical ). Negative for abdominal distention, constipation, diarrhea, nausea and vomiting.   Genitourinary: Negative for difficulty urinating, dyspareunia, dysuria, flank pain and hematuria.   Musculoskeletal: Negative for arthralgias, back pain, gait problem and myalgias.   Skin: Negative for pallor and rash.   Neurological: Negative for dizziness, weakness, light-headedness, numbness and headaches.   Hematological: Negative for adenopathy. Bruises/bleeds easily.   Psychiatric/Behavioral: Negative for agitation, confusion and dysphoric mood. The patient is not nervous/anxious.    All other systems reviewed and are negative.     MEDICATIONS:    Scheduled Meds:  aspirin, 81 mg, Oral, Daily  polyethylene glycol, 17 g, Oral, Daily  sodium chloride, 10 mL, Intravenous, Q12H       Continuous Infusions:      PRN Meds:  •  acetaminophen  •  aluminum-magnesium hydroxide-simethicone  •  magnesium hydroxide  •  magnesium sulfate **OR** magnesium sulfate in D5W 1g/100mL (PREMIX)  •  melatonin  •  Morphine  •  Morphine  •  ondansetron **OR** ondansetron  •  oxyCODONE  •  potassium chloride  •  simethicone  •  sodium chloride     ALLERGIES:  No Known Allergies    Objective    VITALS:   BP 90/55 (BP Location: Left arm, Patient Position: Sitting)    "Pulse 70   Temp 98.4 °F (36.9 °C) (Oral)   Resp 14   Ht 157.5 cm (62\")   Wt 57.1 kg (125 lb 12.8 oz)   LMP 11/04/2020 (Approximate)   SpO2 100%   BMI 23.01 kg/m²     PHYSICAL EXAM: (performed by MD)  Physical Exam  Vitals signs and nursing note reviewed.   Constitutional:       General: She is not in acute distress.     Appearance: Normal appearance. She is normal weight. She is not diaphoretic.   HENT:      Head: Normocephalic and atraumatic.   Eyes:      General: No scleral icterus.        Right eye: No discharge.         Left eye: No discharge.      Extraocular Movements: Extraocular movements intact.      Conjunctiva/sclera: Conjunctivae normal.   Neck:      Musculoskeletal: Normal range of motion and neck supple.      Thyroid: No thyromegaly.   Cardiovascular:      Rate and Rhythm: Normal rate and regular rhythm.      Pulses: Normal pulses.      Heart sounds: Normal heart sounds. No friction rub. No gallop.    Pulmonary:      Effort: Pulmonary effort is normal. No respiratory distress.      Breath sounds: Normal breath sounds. No stridor. No wheezing.   Abdominal:      General: Bowel sounds are normal.      Palpations: Abdomen is soft. There is no mass.      Tenderness: There is no abdominal tenderness. There is no guarding or rebound.      Comments: Laparoscopic surgical scars noted.     Musculoskeletal: Normal range of motion.         General: No swelling or tenderness.   Lymphadenopathy:      Cervical: No cervical adenopathy.   Skin:     General: Skin is warm.      Coloration: Skin is not jaundiced or pale.      Findings: No erythema or rash.   Neurological:      General: No focal deficit present.      Mental Status: She is alert and oriented to person, place, and time.      Motor: No abnormal muscle tone.   Psychiatric:         Mood and Affect: Mood normal.         Behavior: Behavior normal.         Thought Content: Thought content normal.         Judgment: Judgment normal.        RECENT LABS:  Lab " Results (last 24 hours)     Procedure Component Value Units Date/Time    Potassium [[id number]]  (Normal) Collected: 11/14/20 0222    Specimen: Blood Updated: 11/14/20 0322     Potassium 3.7 mmol/L     Magnesium [[id number]]  (Normal) Collected: 11/14/20 0222    Specimen: Blood Updated: 11/14/20 0322     Magnesium 2.3 mg/dL     CBC & Differential [[id number]]  (Abnormal) Collected: 11/14/20 0222    Specimen: Blood Updated: 11/14/20 0255    Narrative:      The following orders were created for panel order CBC & Differential.  Procedure                               Abnormality         Status                     ---------                               -----------         ------                     CBC Auto Differential[[id number]]        Abnormal            Final result                 Please view results for these tests on the individual orders.    CBC Auto Differential [[id number]]  (Abnormal) Collected: 11/14/20 0222    Specimen: Blood Updated: 11/14/20 0255     WBC 15.00 10*3/mm3      RBC 3.59 10*6/mm3      Hemoglobin 8.7 g/dL      Hematocrit 27.4 %      MCV 76.4 fL      MCH 24.2 pg      MCHC 31.7 g/dL      RDW 15.8 %      RDW-SD 42.4 fl      MPV 7.9 fL      Platelets 550 10*3/mm3      Neutrophil % 66.6 %      Lymphocyte % 15.4 %      Monocyte % 15.3 %      Eosinophil % 1.9 %      Basophil % 0.8 %      Neutrophils, Absolute 10.00 10*3/mm3      Lymphocytes, Absolute 2.30 10*3/mm3      Monocytes, Absolute 2.30 10*3/mm3      Eosinophils, Absolute 0.30 10*3/mm3      Basophils, Absolute 0.10 10*3/mm3      nRBC 0.2 /100 WBC         PENDING RESULTS: none     IMAGING REVIEWED:  No radiology results for the last day    Assessment/Plan   ASSESSMENT  1. Refractory idiopathic thrombocytopenia purpura: Platelets 85,000 on admission.  Refractory to steroids, Rituxan, N-plate, fostamatinib, and CellCept. Platelet count declined to 60,000 .  Status post laparoscopic splenectomy 11/13/2020 with platelets improved to 391,000 on  post-splenectomy day 2. Thrombocytosis with platelets today 550,00.   2. Status post splenectomy: 11/23/2019 - Patient received meningococcal vaccine, Haemophilus vaccine, pneumococcus vaccine. Pharmacy consulted for assistance on timing of any further vaccination to complete series.   General surgery, Dr. Erick Akhtar performed splenectomy for 11/11/2020. Awaiting recovery of bowel function.   3. History of iron deficiency anemia: Hemoglobin 10.7 on admission.  Patient received IV Venofer in November 2019.  Iron studies 11/10/2020 - Ferritin 15.58 and iron 30, iron saturation 6, TIBC 489, transferrin 328. S/p Venofer 300 mg IV x 3 days started on 11/11/2020.  4.  thrombocytosis: Due to splenectomy     PLAN  1. CBC daily.    2. Start baby ASA 81 mg po daily   3. Continue oral ferrous sulfate as outpatient  4. Patient to proceed with additional vaccination as outpatient   5. Discharge planning according to Dr. Akhtar - plan is to discharge home with family once bowel function recovers     Electronically signed by FARZAD Banegas, 11/14/20, 12:28 PM EST.            I personally reviewed all pertinent labs, related documentation and the  imaging. ROS performed and physical exam done during face to face enounter with patient. I agree with  nurse practitioner's doumentation, assessment and plan.     Started baby aspirin, since patient has worsening thrombocytosis and his risk of thrombosis for splenectomy..  Continue oral iron as outpatient    Will do CBC on Monday        Electronically signed by Forrest Llanes MD, 11/15/20, 2:16 PM EST.

## 2020-11-14 NOTE — PLAN OF CARE
Goal Outcome Evaluation:  Plan of Care Reviewed With: patient  Progress: improving   Patient up ambulating in room. Pain has been controlled with po pain medication. Patient has not had BM but has active bowel sounds.

## 2020-11-15 ENCOUNTER — READMISSION MANAGEMENT (OUTPATIENT)
Dept: CALL CENTER | Facility: HOSPITAL | Age: 35
End: 2020-11-15

## 2020-11-15 NOTE — OUTREACH NOTE
Prep Survey      Responses   [facility]?  [facility]   Is LACE score < 7 ?  No   Eligibility  Readm Mgmt   Discharge diagnosis  ITP status post splenectomy   Does the patient have one of the following disease processes/diagnoses(primary or secondary)?  General Surgery   Does the patient have Home health ordered?  No   Is there a DME ordered?  No   Medication alerts for this patient  ASA    Prep survey completed?  Yes          Jhoana Hernandez RN

## 2020-11-16 ENCOUNTER — LAB (OUTPATIENT)
Dept: LAB | Facility: HOSPITAL | Age: 35
End: 2020-11-16

## 2020-11-16 DIAGNOSIS — D69.3 IMMUNE THROMBOCYTOPENIC PURPURA (HCC): ICD-10-CM

## 2020-11-16 LAB
BASOPHILS # BLD AUTO: 0.09 10*3/MM3 (ref 0–0.2)
BASOPHILS NFR BLD AUTO: 0.6 % (ref 0–1.5)
DEPRECATED RDW RBC AUTO: 43.4 FL (ref 37–54)
EOSINOPHIL # BLD AUTO: 0.74 10*3/MM3 (ref 0–0.4)
EOSINOPHIL NFR BLD AUTO: 5.1 % (ref 0.3–6.2)
ERYTHROCYTE [DISTWIDTH] IN BLOOD BY AUTOMATED COUNT: 17.1 % (ref 12.3–15.4)
HCT VFR BLD AUTO: 34 % (ref 34–46.6)
HGB BLD-MCNC: 10.4 G/DL (ref 12–15.9)
LYMPHOCYTES # BLD AUTO: 2.38 10*3/MM3 (ref 0.7–3.1)
LYMPHOCYTES NFR BLD AUTO: 16.4 % (ref 19.6–45.3)
MCH RBC QN AUTO: 24.9 PG (ref 26.6–33)
MCHC RBC AUTO-ENTMCNC: 30.6 G/DL (ref 31.5–35.7)
MCV RBC AUTO: 81.3 FL (ref 79–97)
MONOCYTES # BLD AUTO: 1.72 10*3/MM3 (ref 0.1–0.9)
MONOCYTES NFR BLD AUTO: 11.9 % (ref 5–12)
NEUTROPHILS NFR BLD AUTO: 66 % (ref 42.7–76)
NEUTROPHILS NFR BLD AUTO: 9.56 10*3/MM3 (ref 1.7–7)
PLATELET # BLD AUTO: 346 10*3/MM3 (ref 140–450)
PMV BLD AUTO: 11.2 FL (ref 6–12)
RBC # BLD AUTO: 4.18 10*6/MM3 (ref 3.77–5.28)
WBC # BLD AUTO: 14.49 10*3/MM3 (ref 3.4–10.8)

## 2020-11-16 PROCEDURE — 85025 COMPLETE CBC W/AUTO DIFF WBC: CPT

## 2020-11-16 NOTE — PROGRESS NOTES
Case Management Discharge Note      Final Note: Home         Selected Continued Care - Discharged on 11/14/2020 Admission date: 11/10/2020 - Discharge disposition: Home or Self Care              Final Discharge Disposition Code: 01 - home or self-care

## 2020-11-16 NOTE — PAYOR COMM NOTE
"DC notice for Tiny Akhtar  1985  Ref #[id number]  DC  routine to home    RITU HARRY [facility]PRON[facility]    [phone]    Tiny Akhtar (35 y.o. Female)     Date of Birth Social Security Number Address Home Phone MRN    1985  [address]  [place] [address] [phone] [id number]    Sikh Marital Status          None        Admission Date Admission Type Admitting Provider Attending Provider Department, Room/Bed    11/10/20 Elective Jassi Will MD  [facility]IENT,     Discharge Date Discharge Disposition Discharge Destination        2020 Home or Self Care              Attending Provider: (none)   Allergies: No Known Allergies    Isolation: None   Infection: None   Code Status: Prior    Ht: 157.5 cm (62\")   Wt: 57.1 kg (125 lb 12.8 oz)    Admission Cmt: None   Principal Problem: Thrombocytopenia (CMS/HCC) [D69.6] More...                 Active Insurance as of 11/10/2020     Primary Coverage     Payor Plan Insurance Group Employer/Plan Group    [facility] MEDICAID [facility] [id number]     Payor Plan Address Payor Plan Phone Number Payor Plan Fax Number Effective Dates    MAIL STOP:   2020 - None Entered    [address] [place] [id number]       Subscriber Name Subscriber Birth Date Member ID       TNIY AKHTAR 1985 [id number]                 Emergency Contacts      (Rel.) Home Phone Work Phone Mobile Phone    SHELBI DAVEY (Significant Other) [phone] -- [phone]               Discharge Summary      Jassi Will MD at 20 1300            Date of Admission: 11/10/2020    Date of Discharge:  2020    Length of stay:  LOS: 4 days     Discharge Diagnosis:     ITP status post splenectomy    Presenting Problem List:    ITP-patient direct admitted by hematology service.  Patient with no signs of active bleeding at this time and general surgery " consulted for splenectomy.  Platelets rebounded after splenectomy  -Status post laparoscopic splenectomy on 11/11/2020  -Trend platelets, 550 upon discharge  -CellCept on hold, previously had IVIG  -General surgery managing diet  -Platelet transfusion recommended for less than 2000 or active bleeding  -Pharmacy assisting for vaccine timing  -Hematology oncology consult  -Recommending starting chemical DVT prophylaxis 11/13/2020 per general surgery  -MiraLAX added, patient passing flatus and abdominal distention resolved cleared for discharge  -Continue daily baby aspirin until seen by hematology     Anemia-history of iron deficiency, decreased overnight  -Can continue iron supplement     Anxiety-patient reports she is very nervous with the prospect of surgery  -Ativan 0.5 mg as needed consider bupropion if persistent on an outpatient basis    HPI/Hospital Course:  Ms. Akhtar is a 35 y.o. female with a history of immune thrombocytopenic purpura, anemia, and kidney stones who was directly admitted to [facility] on 11/10/2020 due to thrombocytopenia and need for surgery consultation for splenectomy. The patient denies any current complaints.      The patient is a nonsmoker and denies alcohol or illicit drug use. She reports a family history of ITP, diabetes mellitus, cancer, CHF, and stroke.     11/11/2020: Patient reports no events overnight.  Patient still anxious about surgery.  Plan for laparoscopic with possible conversion open.  No chest pain or shortness of breath platelets in the 60s.     11/12/2020: Patient reports she did well postop underwent laparoscopic splenectomy on 11/11/2020.  Patient reports some nausea this morning and some bloating, but pain controlled.  No chest pain or shortness of breath.  Diet advanced to clears per surgery.    11/13/2020:Patient overall doing well.  Patient has had no bowel function as of yet no flatus or bowel movements.  Adding MiraLAX and milk of magnesia and  patient ambulating frequently.  If patient can have bowel function then can discharge but otherwise may have to stay overnight to ensure no signs of prolonged ileus versus obstruction.    11/14/2020: Patient passing flatus abdominal distention resolved.  Patient cleared for discharge receiving updated vaccines for postsplenectomy status.  Patient to continue daily baby aspirin until seen by hematology in 2 days.  Follow-up organized with primary care and general surgery additionally.  Strict return precautions given patient able to be discharged home in good condition.    Procedures Performed    Procedure(s):  SPLENECTOMY LAPAROSCOPIC  -------------------       Consults:   Consults     Date and Time Order Name Status Description    11/10/2020 1010 Inpatient General Surgery Consult Completed     11/10/2020 1010 Hematology & Oncology Inpatient Consult Completed           Pertinent Test Results:     Lab Results (last 24 hours)     Procedure Component Value Units Date/Time    Potassium [[id number]]  (Normal) Collected: 11/14/20 0222    Specimen: Blood Updated: 11/14/20 0322     Potassium 3.7 mmol/L     Magnesium [[id number]]  (Normal) Collected: 11/14/20 0222    Specimen: Blood Updated: 11/14/20 0322     Magnesium 2.3 mg/dL     CBC & Differential [[id number]]  (Abnormal) Collected: 11/14/20 0222    Specimen: Blood Updated: 11/14/20 0255    Narrative:      The following orders were created for panel order CBC & Differential.  Procedure                               Abnormality         Status                     ---------                               -----------         ------                     CBC Auto Differential[[id number]]        Abnormal            Final result                 Please view results for these tests on the individual orders.    CBC Auto Differential [[id number]]  (Abnormal) Collected: 11/14/20 0222    Specimen: Blood Updated: 11/14/20 0255     WBC 15.00 10*3/mm3      RBC 3.59 10*6/mm3      Hemoglobin 8.7  g/dL      Hematocrit 27.4 %      MCV 76.4 fL      MCH 24.2 pg      MCHC 31.7 g/dL      RDW 15.8 %      RDW-SD 42.4 fl      MPV 7.9 fL      Platelets 550 10*3/mm3      Neutrophil % 66.6 %      Lymphocyte % 15.4 %      Monocyte % 15.3 %      Eosinophil % 1.9 %      Basophil % 0.8 %      Neutrophils, Absolute 10.00 10*3/mm3      Lymphocytes, Absolute 2.30 10*3/mm3      Monocytes, Absolute 2.30 10*3/mm3      Eosinophils, Absolute 0.30 10*3/mm3      Basophils, Absolute 0.10 10*3/mm3      nRBC 0.2 /100 WBC           Microbiology Results Abnormal     Procedure Component Value - Date/Time    COVID PRE-OP / PRE-PROCEDURE SCREENING ORDER (NO ISOLATION) - Swab, Nasopharynx [[id number]]  (Normal) Collected: 11/10/20 1528    Lab Status: Final result Specimen: Swab from Nasopharynx Updated: 11/10/20 1635    Narrative:      The following orders were created for panel order COVID PRE-OP / PRE-PROCEDURE SCREENING ORDER (NO ISOLATION) - Swab, Nasopharynx.  Procedure                               Abnormality         Status                     ---------                               -----------         ------                     COVID-19,CEPHEID,COR/ALEJO...[[id number]]  Normal              Final result                 Please view results for these tests on the individual orders.    COVID-19,CEPHEID,COR/ALEJO/PAD IN-HOUSE(OR EMERGENT/ADD-ON),NP SWAB IN TRANSPORT MEDIA 3-4 HR TAT - Swab, Nasopharynx [[id number]]  (Normal) Collected: 11/10/20 1528    Lab Status: Final result Specimen: Swab from Nasopharynx Updated: 11/10/20 1635     COVID19 Not Detected    Narrative:      Fact sheet for providers: https://www.fda.gov/media/099011/download     Fact sheet for patients: https://www.fda.gov/media/067885/download        No radiology results for the last day           Condition on Discharge:  Good    Vital Signs  Temp:  [98 °F (36.7 °C)-98.4 °F (36.9 °C)] 98.4 °F (36.9 °C)  Heart Rate:  [70-79] 70  Resp:  [14-16] 14  BP: ()/(55-66)  90/55    Physical Exam:  General: Female sitting up in bed breathing comfortably on room air in no acute distress  HEENT: NC/AT, EOMI, mucosa moist  Heart: Regular, rate controlled  Chest: Normal work of breathing moving air well in all lung fields no wheezing or crackles  Abdominal: Soft.      Nondistended, periincisional tenderness, hyperactive bowel sounds  Musculoskeletal: Normal ROM.  No edema. No calf tenderness.  Neurological: AAOx3, no focal deficits  Skin: Skin is warm and dry.  Incisions healing well no drainage or surrounding erythema  Psychiatric:  Calm      Discharge Disposition  Home or Self Care [1]    Discharge Medications     Discharge Medications      New Medications      Instructions Start Date   aspirin 81 MG EC tablet   81 mg, Oral, Daily      oxyCODONE 5 MG immediate release tablet  Commonly known as: ROXICODONE   5 mg, Oral, Every 4 Hours PRN      polyethylene glycol 17 g packet  Commonly known as: MIRALAX   17 g, Oral, Daily PRN         Continue These Medications      Instructions Start Date   acetaminophen 325 MG tablet  Commonly known as: TYLENOL   650 mg, Oral, Every 6 Hours PRN      ferrous gluconate 324 MG tablet  Commonly known as: FERGON   324 mg, Oral, Daily With Breakfast      megestrol 20 MG tablet  Commonly known as: MEGACE   20 mg, Oral, 3 Times Daily      multivitamin with minerals tablet tablet   1 tablet, Oral, Daily      ondansetron 8 MG tablet  Commonly known as: ZOFRAN   8 mg, Oral, 3 Times Daily PRN      pantoprazole 20 MG EC tablet  Commonly known as: PROTONIX   No dose, route, or frequency recorded.             Discharge Diet:   Diet Instructions     Advance Diet As Tolerated      Diet: Regular      Discharge Diet: Regular          Activity at Discharge:   Activity Instructions     Activity as Tolerated      Driving Restrictions      Type of Restriction: Driving    Driving Restrictions: No Driving While Taking Narcotics    Lifting Restrictions      Type of Restriction:  Lifting    Lifting Restrictions: Other    Explain Lifing Restrictions: No lifting more than 15 lbs for 2 weeks. Or otherwise specified the day of surgery.    Other Activity Instructions      Activity Instructions: May shower in 24 hours. Do not tub soak incision or swim for at least 2 weeks.          Follow-up Appointments  Future Appointments   Date Time Provider Department Center   11/24/2020  9:40 AM Erick Akhtar MD [facility] None     Additional Instructions for the Follow-ups that You Need to Schedule     Call MD With Problems / Concerns   As directed      Call the office, [phone] with any questions or concerns following your surgery.    Order Comments: Call the office, [phone] with any questions or concerns following your surgery.          Discharge Follow-up with PCP   As directed       Currently Documented PCP:    Jose Ingram APRN    PCP Phone Number:    [phone]     Follow Up Details: Follow-up with your primary care doctor within a week to recheck any symptoms         Discharge Follow-up with Specified Provider: Dr. Akhtar, General Surgery. Call [phone] to schedule; 2 Weeks   As directed      To: Dr. Akhtar, General Surgery. Call [phone] to schedule    Follow Up: 2 Weeks         Discharge Follow-up with Specified Provider: Follow-up with your general surgeon as indicated to evaluate surgical site   As directed      To: Follow-up with your general surgeon as indicated to evaluate surgical site         Discharge Follow-up with Specified Provider: Follow-up with your hematologist on Monday to recheck your platelet count   As directed      To: Follow-up with your hematologist on Monday to recheck your platelet count               Test Results Pending at Discharge       Risk for Readmission (LACE) Score: 7 (11/14/2020  6:00 AM)        Jassi Will MD  11/14/20  13:00 EST      Time: Discharge 25 min total time spent with face-to-face history/exam, writing all prescriptions,  preparing medication reconciliation, and documenting discharge data including chart review of the full admission, care co-ordination with patient, family, , and , etc., and follow-up appointments with PCP and specialists as recommended.       Electronically signed by Jassi Will MD at 11/14/20 [id number]

## 2020-11-17 ENCOUNTER — READMISSION MANAGEMENT (OUTPATIENT)
Dept: CALL CENTER | Facility: HOSPITAL | Age: 35
End: 2020-11-17

## 2020-11-17 NOTE — OUTREACH NOTE
General Surgery Week 1 Survey      Responses   [facility] patient discharged from?  [facility]   Does the patient have one of the following disease processes/diagnoses(primary or secondary)?  General Surgery   Week 1 attempt successful?  No   Call start time  1058   Rescheduled  Revoked   Revoke  Decline to participate [Followups scheduled. Instructed on [facility] Nurse Call Center]   Discharge diagnosis  ITP status post splenectomy          Alina Madera RN

## 2020-11-19 ENCOUNTER — TELEPHONE (OUTPATIENT)
Dept: ONCOLOGY | Facility: CLINIC | Age: 35
End: 2020-11-19

## 2020-11-19 NOTE — TELEPHONE ENCOUNTER
----- Message from FARZAD Banegas sent at 11/15/2020 10:05 AM EST -----  Regarding: [facility] follow up appt scheduled  Patient seen inpatient by Forrest Llanes MD    Patient [facility] f/u appt scheduled please in 1 week.     Electronically signed by FARZAD Banegas, 11/15/20, 10:05 AM EST.

## 2020-11-23 ENCOUNTER — OFFICE VISIT (OUTPATIENT)
Dept: ONCOLOGY | Facility: CLINIC | Age: 35
End: 2020-11-23

## 2020-11-23 ENCOUNTER — LAB (OUTPATIENT)
Dept: LAB | Facility: HOSPITAL | Age: 35
End: 2020-11-23

## 2020-11-23 VITALS
HEIGHT: 62 IN | SYSTOLIC BLOOD PRESSURE: 101 MMHG | HEART RATE: 76 BPM | RESPIRATION RATE: 18 BRPM | DIASTOLIC BLOOD PRESSURE: 63 MMHG | BODY MASS INDEX: 23.34 KG/M2 | TEMPERATURE: 98 F | WEIGHT: 126.8 LBS

## 2020-11-23 DIAGNOSIS — D69.3 IMMUNE THROMBOCYTOPENIC PURPURA (HCC): ICD-10-CM

## 2020-11-23 DIAGNOSIS — D69.3 IMMUNE THROMBOCYTOPENIC PURPURA (HCC): Primary | ICD-10-CM

## 2020-11-23 LAB
BASOPHILS # BLD AUTO: 0.13 10*3/MM3 (ref 0–0.2)
BASOPHILS NFR BLD AUTO: 1.2 % (ref 0–1.5)
DEPRECATED RDW RBC AUTO: 55.4 FL (ref 37–54)
EOSINOPHIL # BLD AUTO: 0.7 10*3/MM3 (ref 0–0.4)
EOSINOPHIL NFR BLD AUTO: 6.3 % (ref 0.3–6.2)
ERYTHROCYTE [DISTWIDTH] IN BLOOD BY AUTOMATED COUNT: 19.2 % (ref 12.3–15.4)
HCT VFR BLD AUTO: 34.1 % (ref 34–46.6)
HGB BLD-MCNC: 10.6 G/DL (ref 12–15.9)
LYMPHOCYTES # BLD AUTO: 2.33 10*3/MM3 (ref 0.7–3.1)
LYMPHOCYTES NFR BLD AUTO: 20.8 % (ref 19.6–45.3)
MCH RBC QN AUTO: 26.1 PG (ref 26.6–33)
MCHC RBC AUTO-ENTMCNC: 31.1 G/DL (ref 31.5–35.7)
MCV RBC AUTO: 84 FL (ref 79–97)
MONOCYTES # BLD AUTO: 1.34 10*3/MM3 (ref 0.1–0.9)
MONOCYTES NFR BLD AUTO: 12 % (ref 5–12)
NEUTROPHILS NFR BLD AUTO: 59.7 % (ref 42.7–76)
NEUTROPHILS NFR BLD AUTO: 6.68 10*3/MM3 (ref 1.7–7)
PLATELET # BLD AUTO: 753 10*3/MM3 (ref 140–450)
PMV BLD AUTO: 9.2 FL (ref 6–12)
RBC # BLD AUTO: 4.06 10*6/MM3 (ref 3.77–5.28)
WBC # BLD AUTO: 11.18 10*3/MM3 (ref 3.4–10.8)

## 2020-11-23 PROCEDURE — 36415 COLL VENOUS BLD VENIPUNCTURE: CPT

## 2020-11-23 PROCEDURE — 85025 COMPLETE CBC W/AUTO DIFF WBC: CPT

## 2020-11-23 PROCEDURE — 99214 OFFICE O/P EST MOD 30 MIN: CPT | Performed by: INTERNAL MEDICINE

## 2020-11-23 NOTE — PROGRESS NOTES
HEMATOLOGY ONCOLOGY FOLLOW UP        Patient name: Tiny Akhtar  : 1985  MRN: [id number]  Primary Care Physician: Jose Ingram, APRN  Referring Physician: Jose Ingram, *  Reason For Consult:     Chief Complaint   Patient presents with   • Follow-up     Immune thrombocytopenic purpura      Refractory ITP  History of Present Illness:  Tiny Akhtar is a 34 y.o. female with a past medical history of thrombocytopenia, anemia, and kidney stones.  She presented to [facility] Emergency Department on 2019 after she had blood work drawn earlier in the day that showed a platelet count of 9,000 at this facility.  She had previously been admitted through the ED on 2019 reporting bruising on her ankles and in her mouth.  She received 2 units of platelets and started on steroids. She was discharged on 19.The patient's last platelet count was 17,000 on 19 after a platelet transfusion.  She had been on steroids for four days.  The patient reported continued  subcutaneous bruising. She denied any headaches, nosebleeds, or gingival bleeding.  She was admitted for IV steroids and further management.     · 10/7/2019 CT abdomen and pelvis: 3 mm stone at the left ureterovesical junction causing mild to moderate obstruction.Spleen reported as normal size.  · 10/7/2019 WBC 10.2, hemoglobin 12.2, platelets 268  · 2019 WBC 5.7, hemoglobin 12.8, Platelets 5000,  Transfused 2 units platelets.  initial presentation for diagnosis acute thrombocytopenia,. Started on Solumedrol 125 mg IV Q6Hr  · 2019 platelets 5000.  Patient transfused with platelets.  Platelets increased to 13,000  · 19  Bone marrow biopsy - Smears of bone marrow aspirate with cell block (clot) preparation and marrow biopsy:  Hypocellular for age bone marrow (50%),  Adequate numbers of unremarkable megakaryocytes,  Absent iron stores,  Negative for involvement by malignant  lymphoma.  Flow cytometry-CD 56 expression on granulocytes and monocytes.  Chromosomal analysis -46XX  · 11/20/2019 platelet count 17,000 . Switched to Prednisone 1 mg/kg ( 50 mg daily)  · 11/21/2019 platelets 17,000  · 11/22/2019  Readmitted . Restarted Solumedrol  mg Q6H . platelets 9000  · 11/23/19 platelets 13,000  · 11/23/2019 hematology/Oncology was consulted on this patient known to our service and previously consulted for acute thrombocytopenia on 11/18/19. She was followed by Uri Sagastume MD for ITP. The patient had been transfused and started on IV steroids Prednisone while inpatient 11/18/19 to 11/20/19.  She was transitioned to oral steroids at discharge.   · 11/23/2019 Hepatitis panel negative, TSH 0.33, H. pylori IgG 1.43 High, IgM negative, JOSE negative, HIV antibodies negative  · 11/23/2019 patient received IV Venofer.  Patient received meningococcal vaccine, Haemophilus vaccine, pneumococcus vaccine  · 11/23/2019 platelets 13,000  · 11/24/2019 platelets 52,000.  Platelets responded to steroids. On IV solumedrol. Started on Megace 40 mg po QID for menorrhagia.  · Pt discharged on Prednisone 50 mg  · 11/27/2019 platelets 8000.  Patient's failed steroids ( on prednisone 50 mg daily).. Started Tranexamic acid 1300 mg TID  · 11/29/2019  READMITTED platelets 12,000  · 11/29/19-Cycle 1 Day one of RITUXIMAB and continues on megestrol  · 11/30/2019 WBC 11.2, hemoglobin 12.1, platelets 6000  · 12/1/2019 platelets 11,000.  Patient received IVIG 30 g  · 12/2/2019 iron 72, iron saturation 17% low, TIBC 416, ferritin 231, platelets 56,000  · 12/3/2019 platelets 129,000  · 12/4/2019 Prednisone decreased to 40 mg daily  · 12/6/2019 patient received week 2 of Rituximab.  Platelets 24,000, hemoglobin 11.8  · 12/8/2019 platelets 5000, hemoglobin 11.6, PT 10.5, INR 1.  Patient received IVIG 50 g  · 12/9/2019 platelets 11,000  · 12/11/2019 platelets 16,000  · 12/13/2019: Platelets 13,000 Patient  received week 3 of her Rituximab. Prednisone decreased to 20 mg daily  · 12/14/2019 patient received IVIG 55 g, platelets 12,000.   · 12/16/2019 platelets 24,000. Prednisone decreased to 10 mg daily  · 12/18/2019 platelets 12,000  · 12/19/2019 platelets 11,000  · 12/20/19-week 4 Rituximab and Megestrol, platelets 12,000. Prednisone DCed.  · 12/26/2019 platelets 7000  · 12/27/19-patient was started on NPLATE   1 mcg/kg, platelets 8000  · 12/30/2019 platelets 6000.  After transfusion 31,000  · 01/03/2020- NPLATE 2 mcg/kg, platelets 10,000  · 01/10/2020-NPLATE 3 mcg/kg.  continues on Megace megestrol, platelets 4000  · 1/15/2020: WBC 5.9, hemoglobin 12.5, platelets 2000k,   · 1/15/2020: Serum copper 114 normal, IgA 81 normal,Anticardiolipin antibodies negative, beta-2 glycoprotein antibodies negative, phosphatidylserine antibodies negative,  · 1/16/2020 patient received IVIG 55 g, platelets 4000  · 1/17/2020 patient received IVIG 55 g, platelets 36,000  · 01/17/2020: IVIG WBC 4.27, hemoglobin, 11.5, platelets  09065  · 1/20/2020 patient received Nplate 6 mcg/kg, WBC 5.1, hemoglobin 11.9, platelets 44,  · 1/27/2020 patient received Nplate 6 mcg/kg platelets 100,000  · 1/29/2020 platelets 74,000  · 1/30/2020 patient seen at [facility] hematology for second opinion.  Recommended to continue course of current treatment.  · 2/3/2020 patient received Nplate 6 mcg/kg.  WBC 5.9, hemoglobin 12, platelets 81k  · 2/10/2020 patient received Nplate 7 mcg/kg platelets 34,000   · 2/12/2020 WBC 5.1, hemoglobin 12.8, platelets 24K  · 2/13/2020: Patient advised to start dexamethasone 40 mg x 4 days course   · 2/17/2020: Platelets 11,000  · 2/21/2020 platelets 0.  Patient received IVIG 1 g/kg.  60 g  · 2/21/2020: Patient transfused platelets   · 2/22/2020 platelets 65K. , hemoglobin 10.4, patient received IVIG 60 g  · 2/24/2020 platelets 231K, hemoglobin 10.6, patient received Nplate 8 mcg/kg  · 3/5/2020: WBC 5.3,  hemoglobin 11.4, platelets 65,000.  Patient received Nplate 8 mcg/kg  · 3/11/2020 hemoglobin 12.1, platelets 97,000  · 3/13/2020 WBC 5.2, hemoglobin 11.6, platelets 106.  Patient received Nplate 500 micrograms  · 3/18/2020 WBC 5.7, hemoglobin 12.3, platelets 25,000, patient received Nplate 500 mcg  · 3/20/2020 WBC 5.7, hemoglobin 12.4, platelets 56,000,  · 3/25/2020 platelets 32,000, received Nplate 500 mcg  · 4/2/2020: WBC 7.3, hemoglobin 11.9, platelets 43,000, MCV 85.8  · 4/3/2020 platelets 27, Nplate 400 mcg  · 4/9/2020 platelets 24k  · 4/10/2020 platelets 35K.  Nplate 590 mcg  · 4/17/2020: Platelets 49317, Nplate 500 mcg  · 4/21/2020: Platelets 2000, patient received IVIG 60 g  · 4/22/2020 platelets 77K, hemoglobin 10.7, WBC 4.1, patient received IVIG 60 g  · 4/24/2020: Platelets 182K, hemoglobin 10.7, received Nplate 5 mcg  · 5/1/2020: Patient received Nplate 400 mcg platelets 179  · 5/8/2020: Patient received Nplate 590 mcg, platelets 33,000  · 5/13/2020: Platelets 46,000  · 5/15/2020 patient received Nplate, platelets 64,000  · 5/22/2020: Platelets 28,000  · 5/28/2020 patient received Nplate 500 mcg.  Platelets 45,000  · 5/29/2020 platelets 26,000.    · 6/1/2020: Platelets 11,000.  Nplate discontinued due to treatment failure.  Patient received IVIG 1 g/kg  · 6/2/2020 platelets 117  · 6/5/2020: Platelets 123K  · 6/16/2020 platelets 8000.  Patient received IVIG 1 g/kg   · 6/17/2020 platelets 47,000.  Patient received IVIG 1 g/kg  · 6/18/2020: Patient started taking fostamatinib  · 6/25/2020: Platelets 33,000.  WBC 5.5, hemoglobin 11.5, platelets 33, creatinine 1.6,  · 7/6/2020: WBC 5.06, hemoglobin 12.1, platelets 14,000,  · 7/9/2020: WBC 4.4, hemoglobin 12.0, platelets 11,000, patient received IVIG 1 g/kg  · 7/10/2020: Patient received IVIG 1 g/kg, platelet count 45,000  · 7/16/2020: WBC 2.27, hemoglobin 10.6, platelets 36,000  · 7/24/2020 patient received IVIG 1 g/kg, WBC 3.3, hemoglobin 11.7, platelet  count 8000, IgA 74, IgG 2180, IgM 111,   · 7/30/2020: WBC 4.17, Hgb 11.6,  21K  · 8/3/2020 patient received IVIG 1 g/kg, WBC 4.1, hemoglobin 11.8, platelet count 7000  · 8/4/2020: WBC 2.98, hemoglobin 10.4, platelet count 39,000, IVIG 1 g/kg  · 8/5/2020 WBC 3.6, hemoglobin 10.1, platelet count 53,000, patient received Nplate 500 mcg  · 8/14/2020 platelet count 206, hemoglobin 10.7, Nplate 500 mcg  · 9/2/2020: WBC 5.5, hemoglobin 10.1, platelet count 3000.  Patient received IVIG 1 g/kg  · 9/3/2020: Platelet count 50,000 patient received IVIG 1 g/kg  · 9/11/2020: WBC 3.77, hemoglobin 9.1 platelet count 29,000  · 9/14/2020: Patient started CellCept.  · 9/24/2020: Bone marrow biopsy: No diagnostic evidence of hemopoietic neoplasia identified.  Normocellular bone marrow for age 60 to 70% with trilineage hematopoiesis.  Increased megakaryocytes.  No increase in blasts.  No significant increase in reticulin fibers.  No storage iron.  Cytogenetics normal karyotype 46XX.. Flow cytometry shows CD56 expression on granulocytes and monocytes..  This is a nonspecific finding and could be reactive/regenerative process as well as neoplastic process.  · 9/25/2020: Patient received Nplate 500 mcg, WBC 5.08, hemoglobin 10, platelets 5000  · 10/6/2020 patient received Nplate 500 mcg.  WBC 4.45, hemoglobin 9.2, platelets 10,000  · 10/23/2020: WBC 5.7, hemoglobin 9, platelets 0.  Given IVIG  · 10/23/2020 platelets 34,000, after IVIG and platelet transfusion.  Nplate 500 mcg  · 10/26/2020: WBC 7.3, hemoglobin 9.9, MCV 78.1, platelets 31,000  · 11/2/2020: WBC 7.4, hemoglobin 9.5, MCV 81.1, platelets 256, Nplate 500 mcg  · 11/9/2020: WBC 7.5, hemoglobin 9.9, platelets 111, MCV 79.5   · 11/11/2020: WBC 5.5, hemoglobin 9.6, MCV 76.3, platelets 60,000  · 11/10/2020-11/14/2020: Patient admitted at [facility].  Patient underwent laparoscopic splenectomy on 11/11/2020.  Patient was given IV iron  · 11/11/2020: Platelets  133k  · 11/12/2020 platelets 205K-post day 1 splenectomy  · 11/14/2020: Platelets 550 K  · 11/23/2020: WBC 11.18, hemoglobin 10.6, platelets 753, MCV 84        Subjective:  Patient is here for a follow up regarding ITP.  She recently had splenectomy on 11/11/2020.  Since then platelets have improved.  She has been started on aspirin for splenectomy..  She reports slight abdominal pain but that is improving    Past Medical History:   Diagnosis Date   • Anxiety    • Immune thrombocytopenic purpura (CMS/HCC) 11/18/2019   • Iron deficiency anemia    • Kidney stone 10/2019   • Thrombocytopenia (CMS/HCC) 11/29/2019     Past Surgical History:   Procedure Laterality Date   • SPLENECTOMY N/A 11/11/2020    Procedure: SPLENECTOMY LAPAROSCOPIC;  Surgeon: Erick Akhtar MD;  Location: [facility] MAIN OR;  Service: General;  Laterality: N/A;         Current Outpatient Medications:   •  acetaminophen (TYLENOL) 325 MG tablet, Take 650 mg by mouth Every 6 (Six) Hours As Needed for Mild Pain  or Moderate Pain ., Disp: , Rfl:   •  aspirin 81 MG EC tablet, Take 1 tablet by mouth Daily for 30 days., Disp: 30 tablet, Rfl: 0  •  ferrous gluconate (FERGON) 324 MG tablet, Take 1 tablet by mouth Daily With Breakfast., Disp: 30 tablet, Rfl: 3  •  megestrol (MEGACE) 20 MG tablet, Take 20 mg by mouth 3 (Three) Times a Day., Disp: , Rfl:   •  Multiple Vitamins-Minerals (multivitamin with minerals) tablet tablet, Take 1 tablet by mouth Daily., Disp: , Rfl:   •  ondansetron (ZOFRAN) 8 MG tablet, Take 1 tablet by mouth 3 (Three) Times a Day As Needed for Nausea or Vomiting., Disp: 30 tablet, Rfl: 5  •  pantoprazole (PROTONIX) 20 MG EC tablet, , Disp: , Rfl:   •  polyethylene glycol (MIRALAX) 17 g packet, Take 17 g by mouth Daily As Needed (constipation)., Disp: , Rfl:     No Known Allergies    Family History   Problem Relation Age of Onset   • Thrombocytopenia Nephew         ITP   • Cancer Paternal Grandmother    • Heart failure Maternal Grandmother   "  • Diabetes Maternal Great-Grandmother    • Heart failure Maternal Great-Grandmother      Cancer-related family history includes Cancer in her paternal grandmother.    Social History     Tobacco Use   • Smoking status: Never Smoker   • Smokeless tobacco: Never Used   Substance Use Topics   • Alcohol use: No     Frequency: Never   • Drug use: No     I have reviewed the history of present illness, past medical history, family history, social history, lab results, all notes and other records since the patient was last seen on  11/21/2019.    ROS:     Review of Systems   Constitutional: Negative for activity change, appetite change, chills and fever.   HENT: Negative for ear pain, mouth sores, nosebleeds and sore throat.    Eyes: Negative for photophobia and visual disturbance.   Respiratory: Negative for wheezing and stridor.    Cardiovascular: Negative for chest pain and palpitations.   Gastrointestinal: Negative for abdominal pain, diarrhea, nausea and vomiting.        Slight abdominal pain   Endocrine: Negative for cold intolerance and heat intolerance.   Genitourinary: Negative for dysuria and hematuria.   Musculoskeletal: Negative for joint swelling and neck stiffness.   Skin: Negative for color change and rash.   Allergic/Immunologic: Negative for food allergies.   Neurological: Negative for seizures and syncope.   Hematological: Negative for adenopathy. Does not bruise/bleed easily.        No obvious bleeding   Psychiatric/Behavioral: Negative for agitation, confusion and hallucinations.     Objective:    Vitals:    11/23/20 0856   BP: 101/63   Pulse: 76   Resp: 18   Temp: 98 °F (36.7 °C)   Weight: 57.5 kg (126 lb 12.8 oz)   Height: 157.5 cm (62\")   PainSc:   2     ECOG  (0) Fully active, able to carry on all predisease performance without restriction    Physical Exam:   Physical Exam   Constitutional: She is oriented to person, place, and time. No distress.   HENT:   Head: Normocephalic and atraumatic.   Eyes: " Conjunctivae are normal. Right eye exhibits no discharge. Left eye exhibits no discharge. No scleral icterus.   Neck: Normal range of motion. Neck supple. No thyromegaly present.   Cardiovascular: Normal rate, regular rhythm and normal heart sounds. Exam reveals no gallop and no friction rub.   Pulmonary/Chest: Effort normal. No stridor. No respiratory distress. She has no wheezes.   Abdominal: Soft. Normal appearance and bowel sounds are normal. She exhibits no mass. There is no abdominal tenderness. There is no rebound and no guarding.   Healing laparoscopic scars   Musculoskeletal: Normal range of motion. No tenderness or signs of injury.   Lymphadenopathy:     She has no cervical adenopathy.   Neurological: She is alert and oriented to person, place, and time. She exhibits normal muscle tone.   Skin: Skin is warm. No bruising and no rash noted. She is not diaphoretic. No erythema.   Psychiatric: Her behavior is normal. Mood and thought content normal.   Nursing note and vitals reviewed.    I have reexamined the patient and the results are consistent with the previously documented exam. Forrest Llanes MD     Lab Results - Last 18 Months   Lab Units 11/23/20  0823 11/16/20  1055 11/14/20  0222   WBC 10*3/mm3 11.18* 14.49* 15.00*   HEMOGLOBIN g/dL 10.6* 10.4* 8.7*   HEMATOCRIT % 34.1 34.0 27.4*   PLATELETS 10*3/mm3 753* 346 550*   MCV fL 84.0 81.3 76.4*     Lab Results - Last 18 Months   Lab Units 11/14/20  0222 11/13/20  0215 11/12/20  0242 11/11/20  0303 11/10/20  1017 07/16/20  1329   SODIUM mmol/L  --  138 140 141 140 137   POTASSIUM mmol/L 3.7 3.7 4.1 3.8 3.6 3.8   CHLORIDE mmol/L  --  104 105 107 104 100   CO2 mmol/L  --  27.0 24.0 23.0 26.0 25.0   BUN mg/dL  --  8 11 16 17 13   CREATININE mg/dL  --  0.53* 0.58 0.58 0.66 0.73   CALCIUM mg/dL  --  9.2 9.2 9.1 9.5 9.2   BILIRUBIN mg/dL  --  <0.2  --   --  0.3 0.6   ALK PHOS U/L  --  41  --   --  51 45   ALT (SGPT) U/L  --  8  --   --  9 16   AST (SGOT) U/L  --   15  --   --  18 27   GLUCOSE mg/dL  --  101* 112* 94 94 95     Lab Results   Component Value Date    GLUCOSE 101 (H) 11/13/2020    BUN 8 11/13/2020    CREATININE 0.53 (L) 11/13/2020    EGFRIFNONA 133 11/13/2020    BCR 15.1 11/13/2020    K 3.7 11/14/2020    CO2 27.0 11/13/2020    CALCIUM 9.2 11/13/2020    PROTENTOTREF 5.9 (L) 11/23/2019    ALBUMIN 3.80 11/13/2020    LABIL2 1.5 11/23/2019    AST 15 11/13/2020    ALT 8 11/13/2020     Lab Results - Last 18 Months   Lab Units 11/10/20  1017 09/24/20  1118 04/10/20  0841   INR  1.04 0.99 1.02   APTT seconds 24.6 25.4 24.5     Lab Results   Component Value Date    IRON 30 (L) 11/10/2020    TIBC 489 11/10/2020    FERRITIN 15.58 11/10/2020       Lab Results   Component Value Date    FOLATE 11.20 11/23/2019     No results found for: OCCULTBLD    Lab Results   Component Value Date    RETICCTPCT 3.13 (H) 12/01/2019     Lab Results   Component Value Date    SBNQPACP53 461 11/23/2019     No results found for: SPEP, UPEP  LDH   Date Value Ref Range Status   11/23/2019 123 (L) 135 - 214 U/L Final     Lab Results   Component Value Date    JOSE Negative 11/23/2019     Lab Results   Component Value Date    HAPTOGLOBIN 136 11/23/2019     Lab Results   Component Value Date    PTT 24.6 11/10/2020    INR 1.04 11/10/2020     No results found for:   No results found for: CEA  No components found for: CA-19-9  No results found for: PSA    Assessment/Plan     Assessment:    1. Critical thrombocytopenia: Platelet count 0 on 10/23/2020  2. Refractory idiopathic thrombocytopenic purpura ITP. : Refractory to steroids: She was diagnosed on 11/18/19  by mouth.  S/p bone marrow biopsy 11/18/19 - hypocellular for age bone marrow (50%),  adequate numbers of unremarkable megakaryocytes,  absent iron stores,  negative for involvement by malignant lymphoma.  Flow cytometry-CD 56 expression on granulocytes and monocytes.  . Normal karyotype 46XX.  HIV testing negative. TSH normal.   She does have a  transient responses to IVIG.  Received 4 weekly doses of Rituximab starting 11/29/2019 to 12/20/2019.  She did not respond to rituximab right away and still awaiting response.  Started on Nplate 12/27/2019 at 1 mcg/kg.  Dose was escalated and patient seem to have erratic treatment response.  She continues to require IVIG occasionally while on Nplate.  Nplate was discontinued by end of May 2020 due to treatment failure and she was switched to fostamatinib which was started on 6/18/2020.  However she has not had enough response to therapy yet.  IVIG requirements are increasing.  Patient failed fostamatinib and was DCed at the end of July 2020.  She resumed Nplate on 8/5/2020 and had response.  Patient started CellCept on 9/14/2020.  Repeat bone marrow biopsy 9/24/2020 unremarkable.  Continued Nplate 9/25/2020, 10/6/2020, but platelets dropped to 0 as of 10/23/2020. On 11/11/2020 patient underwent laparoscopic splenectomy with the good response.  Post splenectomy day 1 platelet counts went up to 205.  On the day 3 they were up to 550 K  3. Status post splenectomy 11/11/2020  4. Leukopenia/neutropenia: Continues to have slight leukopenia even after discontinuing fostamatinib..  5. Anemia: History of HAILE:  Bone marrow biopsy on 11/18/19 showed absent iron stores.  Patient was given iron replacement.  Repeat bone marrow biopsy shows absent iron stores.  Repeat bone marrow biopsy 9/24/2020 shows absent iron stores.  6. History of iron deficiency anemia - status post IV iron.  Currently on oral iron.  7. Left renal stone: 3 mm kidney stone visualized on CT A/P on 10/7/19.  Asymptomatic.  8. Anxiety/weight loss:  management per Primary team   9. Hx of Menstrual bleeding: LMPD 11/23/19 -On Megace  to suppress menstrual cycle . Also on Lysteda (tranexamic acid)     PLAN  1. Status post recent splenectomy and since then platelets are going up.  2. Platelets over 700 K today.  Advised to double up aspirin to 81 mg 2 pills  daily  3. Advised to get flu shot.  4. We will have pharmacy follow-up on her other vaccinations related to splenectomy.  She did have presplenectomy vaccinations.  5. We will discontinue all her other treatments for ITP.   6.  Continue iron sulfate p.o.  7. Recommend platelet transfusions only if platelets extremely low ( less than 2K) or if patient has bleeding.  8. Follow-up with me 1 week.  Continue weekly CBC next 2 weeks.          There are no diagnoses linked to this encounter.  No orders of the defined types were placed in this encounter.         I have reviewed and confirmed the accuracy of the patient's history: Chief complaint, HPI, ROS and Subjective as entered by the MA/ELENAN/RN. Forrest Llanes MD 11/23/20                 Electronically signed by Forrest Llanes MD, 11/23/20, 10:38 AM EST.

## 2020-11-23 NOTE — PROGRESS NOTES
HEMATOLOGY ONCOLOGY FOLLOW UP        Patient name: Tiny Akhtar  : 1985  MRN: [id number]  Primary Care Physician: Jose Ingram, APRN  Referring Physician: Jose Ingram, *  Reason For Consult:           History of Present Illness:  Tiny Akhtar is a 35 y.o. female who presented to [facility] on 2020 as a direct admit for general surgery consultation for splenectomy.  She has a history of ITP.  Count on admission white blood count 4.7, hemoglobin 10.7, hematocrit 33.9, MCV 76.2, platelets 85,000.     General surgery was consulted for splenectomy.     11/10/20  Hematology/Oncology was consulted for thrombocytopenia.  Patient is known to our service and followed by Dr. Forrest Llanes for history of immune thrombocytopenic purpura.  Patient was seen initially as an inpatient in 2019 when she had a platelet count of 9000.  Patient was initiated on steroid, followed by rituximab, followed by IVIG.  She was then started on Nplate in 2019.  Patient was seen at [facility] hematology for second opinion and was continued on Nplate.  2021 she received IVIG again.  Nplate was discontinued due to treatment failure in 2020 and the patient was initiated on IVIG again.  On 2020 the patient started taking fostamatinib she failed.  She had IVIG in 2020.  Plate was reinitiated in 2020 followed by additional IVIG in 2020.  She was started on CellCept by the [facility] in 2020.  The patient lost insurance and was not able to get a refill.  At office visit on 2020 discussed patient will need splenectomy since her platelets are again dropping.  · 2020   Status post laparoscopic splenectomy    ·    She has a past medical history of Anxiety, Immune thrombocytopenic purpura (CMS/HCC) (2019), Iron deficiency anemia, Kidney stone (10/2019), and Thrombocytopenia  "(CMS/[facility]) (11/29/2019).     PCP: Jose Ingram APRN     History of present illness was reviewed and is unchanged from the previous visit. 11/14/20    Subjective:            The following portions of the patient's history were reviewed and updated as appropriate: {history reviewed:20406::\"allergies\",\"current medications\",\"past family history\",\"past medical history\",\"past social history\",\"past surgical history\",\"problem list\"}.      Past Medical History:   Diagnosis Date   • Anxiety    • Immune thrombocytopenic purpura (CMS/HCC) 11/18/2019   • Iron deficiency anemia    • Kidney stone 10/2019   • Thrombocytopenia (CMS/HCC) 11/29/2019       Past Surgical History:   Procedure Laterality Date   • SPLENECTOMY N/A 11/11/2020    Procedure: SPLENECTOMY LAPAROSCOPIC;  Surgeon: Erick Akhtar MD;  Location: [facility] MAIN OR;  Service: General;  Laterality: N/A;         Current Outpatient Medications:   •  acetaminophen (TYLENOL) 325 MG tablet, Take 650 mg by mouth Every 6 (Six) Hours As Needed for Mild Pain  or Moderate Pain ., Disp: , Rfl:   •  aspirin 81 MG EC tablet, Take 1 tablet by mouth Daily for 30 days., Disp: 30 tablet, Rfl: 0  •  ferrous gluconate (FERGON) 324 MG tablet, Take 1 tablet by mouth Daily With Breakfast., Disp: 30 tablet, Rfl: 3  •  megestrol (MEGACE) 20 MG tablet, Take 20 mg by mouth 3 (Three) Times a Day., Disp: , Rfl:   •  Multiple Vitamins-Minerals (multivitamin with minerals) tablet tablet, Take 1 tablet by mouth Daily., Disp: , Rfl:   •  ondansetron (ZOFRAN) 8 MG tablet, Take 1 tablet by mouth 3 (Three) Times a Day As Needed for Nausea or Vomiting., Disp: 30 tablet, Rfl: 5  •  pantoprazole (PROTONIX) 20 MG EC tablet, , Disp: , Rfl:   •  polyethylene glycol (MIRALAX) 17 g packet, Take 17 g by mouth Daily As Needed (constipation)., Disp: , Rfl:     No Known Allergies    Family History   Problem Relation Age of Onset   • Thrombocytopenia Nephew         ITP   • Cancer Paternal Grandmother    • " Heart failure Maternal Grandmother    • Diabetes Maternal Great-Grandmother    • Heart failure Maternal Great-Grandmother        Cancer-related family history includes Cancer in her paternal grandmother.    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         I have reviewed the history of present illness, past medical history, family history, social history, lab results, all notes and other records since the patient was last seen on  11/19/2020.    ROS:   Review of Systems    I have reviewed and confirmed the accuracy of the ROS as documented by the MA/LPN/RN Trice Torres MA    Objective:  Vital signs:  There were no vitals filed for this visit.  There is no height or weight on file to calculate BMI.  ECOG  {[facility] ECOG Status:03328}    Physical Exam:   Physical Exam    I have reexamined the patient and the results are consistent with the previously documented exam. Trice Torres MA     Lab Results - Last 18 Months   Lab Units 11/16/20  1055 11/14/20  0222 11/13/20  0215   WBC 10*3/mm3 14.49* 15.00* 16.80*   HEMOGLOBIN g/dL 10.4* 8.7* 8.5*   HEMATOCRIT % 34.0 27.4* 27.1*   PLATELETS 10*3/mm3 346 550* 391   MCV fL 81.3 76.4* 76.7*     Lab Results - Last 18 Months   Lab Units 11/14/20  0222 11/13/20  0215 11/12/20  0242 11/11/20  0303 11/10/20  1017 07/16/20  1329   SODIUM mmol/L  --  138 140 141 140 137   POTASSIUM mmol/L 3.7 3.7 4.1 3.8 3.6 3.8   CHLORIDE mmol/L  --  104 105 107 104 100   CO2 mmol/L  --  27.0 24.0 23.0 26.0 25.0   BUN mg/dL  --  8 11 16 17 13   CREATININE mg/dL  --  0.53* 0.58 0.58 0.66 0.73   CALCIUM mg/dL  --  9.2 9.2 9.1 9.5 9.2   BILIRUBIN mg/dL  --  <0.2  --   --  0.3 0.6   ALK PHOS U/L  --  41  --   --  51 45   ALT (SGPT) U/L  --  8  --   --  9 16   AST  (SGOT) U/L  --  15  --   --  18 27   GLUCOSE mg/dL  --  101* 112* 94 94 95       Lab Results   Component Value Date    GLUCOSE 101 (H) 11/13/2020    BUN 8 11/13/2020    CREATININE 0.53 (L) 11/13/2020    EGFRIFNONA 133 11/13/2020    BCR 15.1 11/13/2020    K 3.7 11/14/2020    CO2 27.0 11/13/2020    CALCIUM 9.2 11/13/2020    PROTENTOTREF 5.9 (L) 11/23/2019    ALBUMIN 3.80 11/13/2020    LABIL2 1.5 11/23/2019    AST 15 11/13/2020    ALT 8 11/13/2020       Lab Results - Last 18 Months   Lab Units 11/10/20  1017 09/24/20  1118 04/10/20  0841   INR  1.04 0.99 1.02   APTT seconds 24.6 25.4 24.5       Lab Results   Component Value Date    IRON 30 (L) 11/10/2020    TIBC 489 11/10/2020    FERRITIN 15.58 11/10/2020       Lab Results   Component Value Date    FOLATE 11.20 11/23/2019       No results found for: OCCULTBLD    Lab Results   Component Value Date    RETICCTPCT 3.13 (H) 12/01/2019     Lab Results   Component Value Date    XWQKKZBS54 461 11/23/2019     No results found for: SPEP, UPEP  LDH   Date Value Ref Range Status   11/23/2019 123 (L) 135 - 214 U/L Final     Lab Results   Component Value Date    JOSE Negative 11/23/2019     Lab Results   Component Value Date    HAPTOGLOBIN 136 11/23/2019     Lab Results   Component Value Date    PTT 24.6 11/10/2020    INR 1.04 11/10/2020     No results found for:   No results found for: CEA  No components found for: CA-19-9  No results found for: PSA  No results found for: SEDRATE     Assessment/Plan       Assessment:  1. Refractory idiopathic thrombocytopenia purpura: Platelets 85,000 on admission.  Refractory to steroids, Rituxan, N-plate, fostamatinib, and CellCept. Platelet count declined to 60,000 .  Status post laparoscopic splenectomy 11/13/2020 with platelets improved to 391,000 on post-splenectomy day 2. Thrombocytosis with platelets today 550,00.   2. Status post splenectomy: 11/23/2019 - Patient received meningococcal vaccine, Haemophilus vaccine, pneumococcus  vaccine. Pharmacy consulted for assistance on timing of any further vaccination to complete series.   General surgery, Dr. Erick Akhtar performed splenectomy for 11/11/2020. Awaiting recovery of bowel function.   3. History of iron deficiency anemia: Hemoglobin 10.7 on admission.  Patient received IV Venofer in November 2019.  Iron studies 11/10/2020 - Ferritin 15.58 and iron 30, iron saturation 6, TIBC 489, transferrin 328. S/p Venofer 300 mg IV x 3 days started on 11/11/2020.  4.  thrombocytosis: Due to splenectomy     PLAN  1. CBC daily.    2. Start baby ASA 81 mg po daily   3. Continue oral ferrous sulfate as outpatient  4. Patient to proceed with additional vaccination as outpatient   5. Discharge planning according to Dr. Akhtar - plan is to discharge home with family once bowel function recovers      Electronically signed by FARZAD Banegas, 11/14/20, 12:28 PM EST.                I personally reviewed all pertinent labs, related documentation and the  imaging. ROS performed and physical exam done during face to face enounter with patient. I agree with  nurse practitioner's doumentation, assessment and plan.      Started baby aspirin, since patient has worsening thrombocytosis and his risk of thrombosis for splenectomy..  Continue oral iron as outpatient     Will do CBC on Monday         I have reviewed and confirmed the accuracy of the patient's history: {hiscomponents:85592} as entered by the MA/NELLY/RN. Trice Torres MA 11/23/20     Electronically signed by Trice Torres MA, 11/23/20, 8:33 AM EST.

## 2020-11-24 ENCOUNTER — OFFICE VISIT (OUTPATIENT)
Dept: SURGERY | Facility: CLINIC | Age: 35
End: 2020-11-24

## 2020-11-24 VITALS
BODY MASS INDEX: 23.48 KG/M2 | TEMPERATURE: 97.3 F | HEART RATE: 61 BPM | DIASTOLIC BLOOD PRESSURE: 63 MMHG | OXYGEN SATURATION: 100 % | WEIGHT: 127.6 LBS | SYSTOLIC BLOOD PRESSURE: 106 MMHG | RESPIRATION RATE: 16 BRPM | HEIGHT: 62 IN

## 2020-11-24 DIAGNOSIS — D69.3 IMMUNE THROMBOCYTOPENIC PURPURA (HCC): Primary | ICD-10-CM

## 2020-11-24 PROCEDURE — 99024 POSTOP FOLLOW-UP VISIT: CPT | Performed by: SURGERY

## 2020-11-24 NOTE — PROGRESS NOTES
"Subjective   Tiny Akhtar is a 35 y.o. female.   35-year-old lady who is about 2 weeks out from a laparoscopic splenectomy for severe medical therapy refractory ITP.    The first week or so she had a fair bit of pain especially in the left upper quadrant and left flank.  After she left the hospital there were a few days that was quite severe but has gradually resolved.  At this point her pain is tolerable is worse with deep inspiration like coughing or laughing.  Otherwise she is tolerating a diet having regular bowel function.  She has regular follow-up with her hematologist and most recent platelet count is over 700.  She has been placed on aspirin.    Objective   Temp 97.3 °F (36.3 °C) (Temporal)   Resp 16   Ht 157.5 cm (62\")   Wt 57.9 kg (127 lb 9.6 oz)   LMP 11/04/2020 (Approximate)   BMI 23.34 kg/m²   Physical Exam  Constitutional:       Appearance: Normal appearance.   HENT:      Head: Normocephalic and atraumatic.   Cardiovascular:      Rate and Rhythm: Normal rate.   Pulmonary:      Effort: Pulmonary effort is normal.   Abdominal:      General: Abdomen is flat.      Palpations: Abdomen is soft.      Comments: Incisions are healing well there is no erythema or drainage there is appropriate induration and tenderness there is some dried blood underneath the skin glue which is starting to flake and peel.   Skin:     General: Skin is warm and dry.   Neurological:      Mental Status: She is alert.         Assessment/Plan   Diagnoses and all orders for this visit:    1. Immune thrombocytopenic purpura (CMS/HCC) (Primary)    About 2 weeks out from laparoscopic splenectomy for medically refractory ITP.  Good response to surgery.  We will have close follow-up with hematology.  Follow-up in 1 month with me.  Okay to advance activity as tolerated.    Erick Akhtar MD  11/24/2020  9:45 AM EST    This note was created using Dragon Voice Recognition software.  "

## 2020-12-11 ENCOUNTER — LAB (OUTPATIENT)
Dept: LAB | Facility: HOSPITAL | Age: 35
End: 2020-12-11

## 2020-12-11 DIAGNOSIS — D69.3 IMMUNE THROMBOCYTOPENIC PURPURA (HCC): ICD-10-CM

## 2020-12-11 LAB
BASOPHILS # BLD AUTO: 0.07 10*3/MM3 (ref 0–0.2)
BASOPHILS NFR BLD AUTO: 1 % (ref 0–1.5)
DEPRECATED RDW RBC AUTO: 60.6 FL (ref 37–54)
EOSINOPHIL # BLD AUTO: 0.38 10*3/MM3 (ref 0–0.4)
EOSINOPHIL NFR BLD AUTO: 5.2 % (ref 0.3–6.2)
ERYTHROCYTE [DISTWIDTH] IN BLOOD BY AUTOMATED COUNT: 21.2 % (ref 12.3–15.4)
HCT VFR BLD AUTO: 40.9 % (ref 34–46.6)
HGB BLD-MCNC: 13 G/DL (ref 12–15.9)
LYMPHOCYTES # BLD AUTO: 2.69 10*3/MM3 (ref 0.7–3.1)
LYMPHOCYTES NFR BLD AUTO: 36.6 % (ref 19.6–45.3)
MCH RBC QN AUTO: 26.6 PG (ref 26.6–33)
MCHC RBC AUTO-ENTMCNC: 31.8 G/DL (ref 31.5–35.7)
MCV RBC AUTO: 83.8 FL (ref 79–97)
MONOCYTES # BLD AUTO: 0.73 10*3/MM3 (ref 0.1–0.9)
MONOCYTES NFR BLD AUTO: 9.9 % (ref 5–12)
NEUTROPHILS NFR BLD AUTO: 3.47 10*3/MM3 (ref 1.7–7)
NEUTROPHILS NFR BLD AUTO: 47.3 % (ref 42.7–76)
PLATELET # BLD AUTO: 135 10*3/MM3 (ref 140–450)
PMV BLD AUTO: 10.2 FL (ref 6–12)
RBC # BLD AUTO: 4.88 10*6/MM3 (ref 3.77–5.28)
WBC # BLD AUTO: 7.34 10*3/MM3 (ref 3.4–10.8)

## 2020-12-11 PROCEDURE — 85025 COMPLETE CBC W/AUTO DIFF WBC: CPT

## 2020-12-17 ENCOUNTER — TELEPHONE (OUTPATIENT)
Dept: ONCOLOGY | Facility: CLINIC | Age: 35
End: 2020-12-17

## 2020-12-18 ENCOUNTER — TELEPHONE (OUTPATIENT)
Dept: ONCOLOGY | Facility: CLINIC | Age: 35
End: 2020-12-18

## 2020-12-22 ENCOUNTER — OFFICE VISIT (OUTPATIENT)
Dept: SURGERY | Facility: CLINIC | Age: 35
End: 2020-12-22

## 2020-12-22 VITALS
OXYGEN SATURATION: 99 % | SYSTOLIC BLOOD PRESSURE: 101 MMHG | TEMPERATURE: 98.4 F | RESPIRATION RATE: 16 BRPM | WEIGHT: 128.6 LBS | DIASTOLIC BLOOD PRESSURE: 66 MMHG | BODY MASS INDEX: 23.66 KG/M2 | HEIGHT: 62 IN | HEART RATE: 69 BPM

## 2020-12-22 DIAGNOSIS — D69.3 IMMUNE THROMBOCYTOPENIC PURPURA (HCC): Primary | ICD-10-CM

## 2020-12-22 PROCEDURE — 99024 POSTOP FOLLOW-UP VISIT: CPT | Performed by: SURGERY

## 2020-12-22 NOTE — PROGRESS NOTES
"Subjective   Tiny Akhtar is a 35 y.o. female   35-year-old lady who is now 6 weeks out from laparoscopic splenectomy for medically refractory ITP.  She has been doing pretty well since last time I saw her.  She has been going to the gym and exercising without lifting any significant amounts of weight.  She is having regular bowel function minimal abdominal pain tolerating a diet.  Her right upper quadrant incision hurts intermittently but that pain has gone away this morning.  No overlying skin changes.  Her last CBC showed a platelet count of 135.  She has since stopped her aspirin.  She has a plan to see oncology next week.    Objective   /66 (BP Location: Right arm, Patient Position: Sitting, Cuff Size: Adult)   Pulse 69   Temp 98.4 °F (36.9 °C) (Temporal)   Resp 16   Ht 157.5 cm (62\")   Wt 58.3 kg (128 lb 9.6 oz)   SpO2 99%   BMI 23.52 kg/m²   Physical Exam  On exam she is in no distress her abdomen is soft and flat is nondistended the incisions have all healed well there is some appropriate induration   in the just below the incisions without any evidence of infection or hernia.    Assessment/Plan   Diagnoses and all orders for this visit:    1. Immune thrombocytopenic purpura (CMS/HCC) (Primary)    Status post laparoscopic splenectomy about 6 weeks ago for medically refractory ITP.  Seems to have recovered well.  Will have ongoing follow-up with oncology.  Follow-up as needed.  No restrictions.    Erick Akhtar MD  12/22/2020  9:42 AM EST    This note was created using Dragon Voice Recognition software.  "

## 2020-12-23 ENCOUNTER — LAB (OUTPATIENT)
Dept: LAB | Facility: HOSPITAL | Age: 35
End: 2020-12-23

## 2020-12-23 ENCOUNTER — TELEPHONE (OUTPATIENT)
Dept: ONCOLOGY | Facility: HOSPITAL | Age: 35
End: 2020-12-23

## 2020-12-23 DIAGNOSIS — D69.3 IMMUNE THROMBOCYTOPENIC PURPURA (HCC): ICD-10-CM

## 2020-12-23 LAB
BASOPHILS # BLD AUTO: 0.05 10*3/MM3 (ref 0–0.2)
BASOPHILS NFR BLD AUTO: 0.5 % (ref 0–1.5)
DEPRECATED RDW RBC AUTO: 62.5 FL (ref 37–54)
EOSINOPHIL # BLD AUTO: 0.28 10*3/MM3 (ref 0–0.4)
EOSINOPHIL NFR BLD AUTO: 2.6 % (ref 0.3–6.2)
ERYTHROCYTE [DISTWIDTH] IN BLOOD BY AUTOMATED COUNT: 21.7 % (ref 12.3–15.4)
HCT VFR BLD AUTO: 40.5 % (ref 34–46.6)
HGB BLD-MCNC: 13.2 G/DL (ref 12–15.9)
LYMPHOCYTES # BLD AUTO: 4.03 10*3/MM3 (ref 0.7–3.1)
LYMPHOCYTES NFR BLD AUTO: 37.7 % (ref 19.6–45.3)
MCH RBC QN AUTO: 27.2 PG (ref 26.6–33)
MCHC RBC AUTO-ENTMCNC: 32.6 G/DL (ref 31.5–35.7)
MCV RBC AUTO: 83.5 FL (ref 79–97)
MONOCYTES # BLD AUTO: 1.21 10*3/MM3 (ref 0.1–0.9)
MONOCYTES NFR BLD AUTO: 11.3 % (ref 5–12)
NEUTROPHILS NFR BLD AUTO: 47.9 % (ref 42.7–76)
NEUTROPHILS NFR BLD AUTO: 5.13 10*3/MM3 (ref 1.7–7)
PLATELET # BLD AUTO: 135 10*3/MM3 (ref 140–450)
PMV BLD AUTO: 11.4 FL (ref 6–12)
RBC # BLD AUTO: 4.85 10*6/MM3 (ref 3.77–5.28)
WBC # BLD AUTO: 10.7 10*3/MM3 (ref 3.4–10.8)

## 2020-12-23 PROCEDURE — 85025 COMPLETE CBC W/AUTO DIFF WBC: CPT

## 2020-12-23 NOTE — TELEPHONE ENCOUNTER
----- Message from Hansa Conley RN sent at 12/21/2020  3:41 PM EST -----  Regarding: lab appt  Pt called and stated Dr. Llanes wanted her to come in for a cbc this week she can come in tomorrow  If if is after 0930 am. Please call her at [phone]

## 2020-12-23 NOTE — TELEPHONE ENCOUNTER
Called patient to schedule CBC today.  No answer on either home or cell number.  Message left for return call.

## 2020-12-26 ENCOUNTER — APPOINTMENT (OUTPATIENT)
Dept: CT IMAGING | Facility: HOSPITAL | Age: 35
End: 2020-12-26

## 2020-12-26 ENCOUNTER — APPOINTMENT (OUTPATIENT)
Dept: GENERAL RADIOLOGY | Facility: HOSPITAL | Age: 35
End: 2020-12-26

## 2020-12-26 ENCOUNTER — HOSPITAL ENCOUNTER (EMERGENCY)
Facility: HOSPITAL | Age: 35
Discharge: HOME | End: 2020-12-26
Admitting: EMERGENCY MEDICINE

## 2020-12-26 VITALS
HEIGHT: 62 IN | TEMPERATURE: 98 F | OXYGEN SATURATION: 99 % | RESPIRATION RATE: 15 BRPM | SYSTOLIC BLOOD PRESSURE: 101 MMHG | DIASTOLIC BLOOD PRESSURE: 53 MMHG | HEART RATE: 63 BPM | WEIGHT: 131.39 LBS | BODY MASS INDEX: 24.18 KG/M2

## 2020-12-26 DIAGNOSIS — R42 DIZZY: Primary | ICD-10-CM

## 2020-12-26 DIAGNOSIS — G44.209 ACUTE NON INTRACTABLE TENSION-TYPE HEADACHE: ICD-10-CM

## 2020-12-26 LAB
ALBUMIN SERPL-MCNC: 5.2 G/DL (ref 3.5–5.2)
ALBUMIN/GLOB SERPL: 2 G/DL
ALP SERPL-CCNC: 60 U/L (ref 39–117)
ALT SERPL W P-5'-P-CCNC: 11 U/L (ref 1–33)
ANION GAP SERPL CALCULATED.3IONS-SCNC: 12 MMOL/L (ref 5–15)
AST SERPL-CCNC: 20 U/L (ref 1–32)
BASOPHILS # BLD AUTO: 0.1 10*3/MM3 (ref 0–0.2)
BASOPHILS NFR BLD AUTO: 0.7 % (ref 0–1.5)
BILIRUB SERPL-MCNC: <0.2 MG/DL (ref 0–1.2)
BILIRUB UR QL STRIP: NEGATIVE
BUN SERPL-MCNC: 15 MG/DL (ref 6–20)
BUN/CREAT SERPL: 20.8 (ref 7–25)
CALCIUM SPEC-SCNC: 10.2 MG/DL (ref 8.6–10.5)
CHLORIDE SERPL-SCNC: 99 MMOL/L (ref 98–107)
CLARITY UR: ABNORMAL
CO2 SERPL-SCNC: 27 MMOL/L (ref 22–29)
COHGB MFR BLD: 1.4 % (ref 0–3)
COLOR UR: YELLOW
CREAT SERPL-MCNC: 0.72 MG/DL (ref 0.57–1)
DEPRECATED RDW RBC AUTO: 64.8 FL (ref 37–54)
EOSINOPHIL # BLD AUTO: 0.3 10*3/MM3 (ref 0–0.4)
EOSINOPHIL NFR BLD AUTO: 1.8 % (ref 0.3–6.2)
ERYTHROCYTE [DISTWIDTH] IN BLOOD BY AUTOMATED COUNT: 23.7 % (ref 12.3–15.4)
GFR SERPL CREATININE-BSD FRML MDRD: 93 ML/MIN/1.73
GLOBULIN UR ELPH-MCNC: 2.6 GM/DL
GLUCOSE SERPL-MCNC: 131 MG/DL (ref 65–99)
GLUCOSE UR STRIP-MCNC: NEGATIVE MG/DL
HCT VFR BLD AUTO: 39.6 % (ref 34–46.6)
HGB BLD-MCNC: 13 G/DL (ref 12–15.9)
HGB UR QL STRIP.AUTO: NEGATIVE
KETONES UR QL STRIP: NEGATIVE
LEUKOCYTE ESTERASE UR QL STRIP.AUTO: NEGATIVE
LYMPHOCYTES # BLD AUTO: 5.1 10*3/MM3 (ref 0.7–3.1)
LYMPHOCYTES NFR BLD AUTO: 31.6 % (ref 19.6–45.3)
MCH RBC QN AUTO: 27.4 PG (ref 26.6–33)
MCHC RBC AUTO-ENTMCNC: 32.9 G/DL (ref 31.5–35.7)
MCV RBC AUTO: 83.3 FL (ref 79–97)
MONOCYTES # BLD AUTO: 1.4 10*3/MM3 (ref 0.1–0.9)
MONOCYTES NFR BLD AUTO: 8.9 % (ref 5–12)
NEUTROPHILS NFR BLD AUTO: 57 % (ref 42.7–76)
NEUTROPHILS NFR BLD AUTO: 9.3 10*3/MM3 (ref 1.7–7)
NITRITE UR QL STRIP: NEGATIVE
NRBC BLD AUTO-RTO: 0.1 /100 WBC (ref 0–0.2)
PH UR STRIP.AUTO: 7.5 [PH] (ref 5–8)
PLATELET # BLD AUTO: 472 10*3/MM3 (ref 140–450)
PMV BLD AUTO: 7.6 FL (ref 6–12)
POTASSIUM SERPL-SCNC: 3.5 MMOL/L (ref 3.5–5.2)
PROT SERPL-MCNC: 7.8 G/DL (ref 6–8.5)
PROT UR QL STRIP: NEGATIVE
RBC # BLD AUTO: 4.76 10*6/MM3 (ref 3.77–5.28)
SODIUM SERPL-SCNC: 138 MMOL/L (ref 136–145)
SP GR UR STRIP: 1.02 (ref 1–1.03)
UROBILINOGEN UR QL STRIP: ABNORMAL
WBC # BLD AUTO: 16.3 10*3/MM3 (ref 3.4–10.8)

## 2020-12-26 PROCEDURE — 99284 EMERGENCY DEPT VISIT MOD MDM: CPT

## 2020-12-26 PROCEDURE — 85025 COMPLETE CBC W/AUTO DIFF WBC: CPT | Performed by: NURSE PRACTITIONER

## 2020-12-26 PROCEDURE — 71045 X-RAY EXAM CHEST 1 VIEW: CPT

## 2020-12-26 PROCEDURE — 81003 URINALYSIS AUTO W/O SCOPE: CPT | Performed by: NURSE PRACTITIONER

## 2020-12-26 PROCEDURE — 93005 ELECTROCARDIOGRAM TRACING: CPT | Performed by: NURSE PRACTITIONER

## 2020-12-26 PROCEDURE — 70450 CT HEAD/BRAIN W/O DYE: CPT

## 2020-12-26 PROCEDURE — 82375 ASSAY CARBOXYHB QUANT: CPT | Performed by: NURSE PRACTITIONER

## 2020-12-26 PROCEDURE — 80053 COMPREHEN METABOLIC PANEL: CPT | Performed by: NURSE PRACTITIONER

## 2020-12-26 RX ORDER — SODIUM CHLORIDE 0.9 % (FLUSH) 0.9 %
10 SYRINGE (ML) INJECTION AS NEEDED
Status: DISCONTINUED | OUTPATIENT
Start: 2020-12-26 | End: 2020-12-26 | Stop reason: HOSPADM

## 2020-12-26 RX ORDER — MECLIZINE HYDROCHLORIDE 25 MG/1
50 TABLET ORAL ONCE
Status: COMPLETED | OUTPATIENT
Start: 2020-12-26 | End: 2020-12-26

## 2020-12-26 RX ORDER — MECLIZINE HYDROCHLORIDE 25 MG/1
50 TABLET ORAL 3 TIMES DAILY PRN
Qty: 15 TABLET | Refills: 0 | Status: SHIPPED | OUTPATIENT
Start: 2020-12-26 | End: 2020-12-31

## 2020-12-26 RX ADMIN — MECLIZINE HYDROCHLORIDE 50 MG: 25 TABLET ORAL at 03:02

## 2020-12-26 NOTE — DISCHARGE INSTRUCTIONS
Push clear liquids    Use meclizine as needed for dizziness.    Follow-up with primary care for any further problems or return to the ER if worse

## 2020-12-26 NOTE — ED PROVIDER NOTES
Subjective   Patient is a 35-year-old female who reports that she was sleeping on the couch at her parents house where they were heating with a propane heater she states she woke up with headache and severe dizziness she states she feels like the room is spinning it is worse when she stands up or lies completely flat.  She also reports a dull aching headache that is generalized she states is not the worst headache of her life she had no thunderclap episode.  She rates her discomfort a 6/10 she had no nausea no vomiting fever chills or exposure to COVID-19 that she is aware of.          Review of Systems   Constitutional: Negative for chills, fatigue and fever.   HENT: Negative for congestion, tinnitus and trouble swallowing.    Eyes: Negative for photophobia, discharge and redness.   Respiratory: Negative for cough and shortness of breath.    Cardiovascular: Negative for chest pain and palpitations.   Gastrointestinal: Negative for abdominal pain, diarrhea, nausea and vomiting.   Genitourinary: Negative for dysuria, frequency and urgency.   Musculoskeletal: Negative for back pain, joint swelling and myalgias.   Skin: Negative for rash.   Neurological: Positive for dizziness and headaches.   Psychiatric/Behavioral: Negative for confusion.   All other systems reviewed and are negative.      Past Medical History:   Diagnosis Date   • Anxiety    • Immune thrombocytopenic purpura (CMS/HCC) 11/18/2019   • Iron deficiency anemia    • Kidney stone 10/2019   • Thrombocytopenia (CMS/HCC) 11/29/2019       No Known Allergies    Past Surgical History:   Procedure Laterality Date   • SPLENECTOMY N/A 11/11/2020    Procedure: SPLENECTOMY LAPAROSCOPIC;  Surgeon: Erick Akhtar MD;  Location: [facility] MAIN OR;  Service: General;  Laterality: N/A;       Family History   Problem Relation Age of Onset   • Thrombocytopenia Nephew         ITP   • Cancer Paternal Grandmother    • Heart failure Maternal Grandmother    • Diabetes Maternal  Great-Grandmother    • Heart failure Maternal Great-Grandmother        Social History     Socioeconomic History   • Marital status:      Spouse name: Not on file   • Number of children: Not on file   • Years of education: Not on file   • Highest education level: Not on file   Tobacco Use   • Smoking status: Never Smoker   • Smokeless tobacco: Never Used   Substance and Sexual Activity   • Alcohol use: No     Frequency: Never   • Drug use: No   • Sexual activity: Defer   Social History Narrative    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            Objective   Physical Exam  Vitals signs reviewed.   Constitutional:       Appearance: She is well-developed.   HENT:      Head: Normocephalic and atraumatic.      Right Ear: Tympanic membrane, ear canal and external ear normal.      Left Ear: Tympanic membrane, ear canal and external ear normal.      Nose: Nose normal.      Mouth/Throat:      Mouth: Mucous membranes are moist.      Pharynx: Oropharynx is clear.   Eyes:      Extraocular Movements: Extraocular movements intact.      Conjunctiva/sclera: Conjunctivae normal.      Pupils: Pupils are equal, round, and reactive to light.   Neck:      Musculoskeletal: Normal range of motion and neck supple. No neck rigidity or muscular tenderness.      Vascular: No carotid bruit.   Cardiovascular:      Rate and Rhythm: Normal rate and regular rhythm.      Pulses: Normal pulses.      Heart sounds: Normal heart sounds.   Pulmonary:      Effort: Pulmonary effort is normal. No respiratory distress.      Breath sounds: Normal breath sounds. No wheezing.   Abdominal:      General: Abdomen is flat. Bowel sounds are normal. There is no distension.      Palpations: Abdomen is soft. There is no mass.      Tenderness: There is no abdominal tenderness. There is no guarding or rebound.   Musculoskeletal: Normal range of motion.         General: No deformity.   Lymphadenopathy:      Cervical: No cervical adenopathy.   Skin:     General:  "Skin is warm and dry.      Capillary Refill: Capillary refill takes less than 2 seconds.   Neurological:      General: No focal deficit present.      Mental Status: She is alert and oriented to person, place, and time. Mental status is at baseline.      GCS: GCS eye subscore is 4. GCS verbal subscore is 5. GCS motor subscore is 6.      Cranial Nerves: No cranial nerve deficit.      Sensory: No sensory deficit.      Deep Tendon Reflexes: Reflexes normal.   Psychiatric:         Mood and Affect: Mood normal.         Behavior: Behavior normal.         Thought Content: Thought content normal.         Judgment: Judgment normal.         Procedures       EKG has sinus rhythm with a rate of 67 no ectopy no ST elevation reviewed by myself read by Dr. Frod there is no previous    ED Course      /70   Pulse 65   Temp 97.9 °F (36.6 °C) (Oral)   Resp 15   Ht 157.5 cm (62\")   Wt 59.6 kg (131 lb 6.3 oz)   SpO2 100%   BMI 24.03 kg/m²   Labs Reviewed   COMPREHENSIVE METABOLIC PANEL - Abnormal; Notable for the following components:       Result Value    Glucose 131 (*)     All other components within normal limits    Narrative:     GFR Normal >60  Chronic Kidney Disease <60  Kidney Failure <15     URINALYSIS W/ CULTURE IF INDICATED - Abnormal; Notable for the following components:    Appearance, UA Cloudy (*)     All other components within normal limits    Narrative:     Urine microscopic not indicated.   CBC WITH AUTO DIFFERENTIAL - Abnormal; Notable for the following components:    WBC 16.30 (*)     RDW 23.7 (*)     RDW-SD 64.8 (*)     Platelets 472 (*)     Neutrophils, Absolute 9.30 (*)     Lymphocytes, Absolute 5.10 (*)     Monocytes, Absolute 1.40 (*)     All other components within normal limits   CARBON MONOXIDE, BLOOD - Normal   CBC AND DIFFERENTIAL    Narrative:     The following orders were created for panel order CBC & Differential.  Procedure                               Abnormality         Status          "            ---------                               -----------         ------                     CBC Auto Differential[[id number]]        Abnormal            Final result                 Please view results for these tests on the individual orders.     Medications   sodium chloride 0.9 % flush 10 mL (has no administration in time range)   meclizine (ANTIVERT) tablet 50 mg (has no administration in time range)     Ct Head Without Contrast    Result Date: 12/26/2020  1. No acute intracranial process. MRI is more sensitive for the detection of acute nonhemorrhagic infarct.  Electronically signed by:  Fabian Ross M.D.  12/26/2020 12:03 AM                                         MDM  Number of Diagnoses or Management Options  Acute non intractable tension-type headache:   Dizzy:   Diagnosis management comments: Patient had above exam and was found to not be orthostatic.  She had a head CT which was within normal limits and normal EKG.  Patient was given meclizine had improvement of symptoms and was discharged home to follow-up with primary care she was given a prescription for meclizine as well.  She was advised to return for any worsening symptoms and follow-up with primary care if persistent       Amount and/or Complexity of Data Reviewed  Clinical lab tests: reviewed  Tests in the radiology section of CPT®: reviewed  Tests in the medicine section of CPT®: reviewed    Patient Progress  Patient progress: improved      Final diagnoses:   Dizzy   Acute non intractable tension-type headache            Sammi Orellana, APRN  12/26/20 0251

## 2020-12-28 ENCOUNTER — TELEPHONE (OUTPATIENT)
Dept: ONCOLOGY | Facility: CLINIC | Age: 35
End: 2020-12-28

## 2020-12-28 LAB — QT INTERVAL: 398 MS

## 2020-12-28 NOTE — TELEPHONE ENCOUNTER
----- Message from Forrest Llanes MD sent at 12/23/2020  7:00 PM EST -----  Need to arrange follow-up in 2 weeks.  Also repeat CBC in 1 week.

## 2020-12-28 NOTE — TELEPHONE ENCOUNTER
I called and spoke to the patient and scheduled appt for CBC on 12/30/20 and follow up with Dr Llanes 1/15/21. The patient verbalized understanding.

## 2020-12-30 ENCOUNTER — LAB (OUTPATIENT)
Dept: LAB | Facility: HOSPITAL | Age: 35
End: 2020-12-30

## 2020-12-30 DIAGNOSIS — D69.3 IMMUNE THROMBOCYTOPENIC PURPURA (HCC): ICD-10-CM

## 2020-12-30 LAB
BASOPHILS # BLD AUTO: 0.06 10*3/MM3 (ref 0–0.2)
BASOPHILS NFR BLD AUTO: 0.7 % (ref 0–1.5)
DEPRECATED RDW RBC AUTO: 63.2 FL (ref 37–54)
EOSINOPHIL # BLD AUTO: 0.26 10*3/MM3 (ref 0–0.4)
EOSINOPHIL NFR BLD AUTO: 3.1 % (ref 0.3–6.2)
ERYTHROCYTE [DISTWIDTH] IN BLOOD BY AUTOMATED COUNT: 21.7 % (ref 12.3–15.4)
HCT VFR BLD AUTO: 39.7 % (ref 34–46.6)
HGB BLD-MCNC: 12.8 G/DL (ref 12–15.9)
LYMPHOCYTES # BLD AUTO: 2.99 10*3/MM3 (ref 0.7–3.1)
LYMPHOCYTES NFR BLD AUTO: 35.4 % (ref 19.6–45.3)
MCH RBC QN AUTO: 27.3 PG (ref 26.6–33)
MCHC RBC AUTO-ENTMCNC: 32.2 G/DL (ref 31.5–35.7)
MCV RBC AUTO: 84.6 FL (ref 79–97)
MONOCYTES # BLD AUTO: 1 10*3/MM3 (ref 0.1–0.9)
MONOCYTES NFR BLD AUTO: 11.8 % (ref 5–12)
NEUTROPHILS NFR BLD AUTO: 4.14 10*3/MM3 (ref 1.7–7)
NEUTROPHILS NFR BLD AUTO: 49 % (ref 42.7–76)
PLATELET # BLD AUTO: 234 10*3/MM3 (ref 140–450)
PMV BLD AUTO: 10.2 FL (ref 6–12)
RBC # BLD AUTO: 4.69 10*6/MM3 (ref 3.77–5.28)
WBC # BLD AUTO: 8.45 10*3/MM3 (ref 3.4–10.8)

## 2020-12-30 PROCEDURE — 85025 COMPLETE CBC W/AUTO DIFF WBC: CPT

## 2021-01-11 NOTE — PROGRESS NOTES
HEMATOLOGY ONCOLOGY FOLLOW UP        Patient name: Tiny Akhtar  : 1985  MRN: [id number]  Primary Care Physician: Jose Ingram, APRN  Referring Physician: Jose Ingram, *  Reason For Consult:     Chief Complaint   Patient presents with   • Follow-up     Immune thrombocytopenic purpura     Refractory ITP  History of Present Illness:  Tiny Akhtar is a 34 y.o. female with a past medical history of thrombocytopenia, anemia, and kidney stones.  She presented to [facility] on 2019 after she had blood work drawn earlier in the day that showed a platelet count of 9,000 at this facility.  She had previously been admitted through the ED on 2019 reporting bruising on her ankles and in her mouth.  She received 2 units of platelets and started on steroids. She was discharged on 19.The patient's last platelet count was 17,000 on 19 after a platelet transfusion.  She had been on steroids for four days.  The patient reported continued  subcutaneous bruising. She denied any headaches, nosebleeds, or gingival bleeding.  She was admitted for IV steroids and further management.     · 10/7/2019 CT abdomen and pelvis: 3 mm stone at the left ureterovesical junction causing mild to moderate obstruction.Spleen reported as normal size.  · 10/7/2019 WBC 10.2, hemoglobin 12.2, platelets 268  · 2019 WBC 5.7, hemoglobin 12.8, Platelets 5000,  Transfused 2 units platelets.  initial presentation for diagnosis acute thrombocytopenia,. Started on Solumedrol 125 mg IV Q6Hr  · 2019 platelets 5000.  Patient transfused with platelets.  Platelets increased to 13,000  · 19  Bone marrow biopsy - Smears of bone marrow aspirate with cell block (clot) preparation and marrow biopsy:  Hypocellular for age bone marrow (50%),  Adequate numbers of unremarkable megakaryocytes,  Absent iron stores,  Negative for involvement by malignant  lymphoma.  Flow cytometry-CD 56 expression on granulocytes and monocytes.  Chromosomal analysis -46XX  · 11/20/2019 platelet count 17,000 . Switched to Prednisone 1 mg/kg ( 50 mg daily)  · 11/21/2019 platelets 17,000  · 11/22/2019  Readmitted . Restarted Solumedrol  mg Q6H . platelets 9000  · 11/23/19 platelets 13,000  · 11/23/2019 hematology/Oncology was consulted on this patient known to our service and previously consulted for acute thrombocytopenia on 11/18/19. She was followed by Uri Sagastume MD for ITP. The patient had been transfused and started on IV steroids Prednisone while inpatient 11/18/19 to 11/20/19.  She was transitioned to oral steroids at discharge.   · 11/23/2019 Hepatitis panel negative, TSH 0.33, H. pylori IgG 1.43 High, IgM negative, JOSE negative, HIV antibodies negative  · 11/23/2019 patient received IV Venofer.  Patient received meningococcal vaccine, Haemophilus vaccine, pneumococcus vaccine  · 11/23/2019 platelets 13,000  · 11/24/2019 platelets 52,000.  Platelets responded to steroids. On IV solumedrol. Started on Megace 40 mg po QID for menorrhagia.  · Pt discharged on Prednisone 50 mg  · 11/27/2019 platelets 8000.  Patient's failed steroids ( on prednisone 50 mg daily).. Started Tranexamic acid 1300 mg TID  · 11/29/2019  READMITTED platelets 12,000  · 11/29/19-Cycle 1 Day one of RITUXIMAB and continues on megestrol  · 11/30/2019 WBC 11.2, hemoglobin 12.1, platelets 6000  · 12/1/2019 platelets 11,000.  Patient received IVIG 30 g  · 12/2/2019 iron 72, iron saturation 17% low, TIBC 416, ferritin 231, platelets 56,000  · 12/3/2019 platelets 129,000  · 12/4/2019 Prednisone decreased to 40 mg daily  · 12/6/2019 patient received week 2 of Rituximab.  Platelets 24,000, hemoglobin 11.8  · 12/8/2019 platelets 5000, hemoglobin 11.6, PT 10.5, INR 1.  Patient received IVIG 50 g  · 12/9/2019 platelets 11,000  · 12/11/2019 platelets 16,000  · 12/13/2019: Platelets 13,000 Patient  received week 3 of her Rituximab. Prednisone decreased to 20 mg daily  · 12/14/2019 patient received IVIG 55 g, platelets 12,000.   · 12/16/2019 platelets 24,000. Prednisone decreased to 10 mg daily  · 12/18/2019 platelets 12,000  · 12/19/2019 platelets 11,000  · 12/20/19-week 4 Rituximab and Megestrol, platelets 12,000. Prednisone DCed.  · 12/26/2019 platelets 7000  · 12/27/19-patient was started on NPLATE   1 mcg/kg, platelets 8000  · 12/30/2019 platelets 6000.  After transfusion 31,000  · 01/03/2020- NPLATE 2 mcg/kg, platelets 10,000  · 01/10/2020-NPLATE 3 mcg/kg.  continues on Megace megestrol, platelets 4000  · 1/15/2020: WBC 5.9, hemoglobin 12.5, platelets 2000k,   · 1/15/2020: Serum copper 114 normal, IgA 81 normal,Anticardiolipin antibodies negative, beta-2 glycoprotein antibodies negative, phosphatidylserine antibodies negative,  · 1/16/2020 patient received IVIG 55 g, platelets 4000  · 1/17/2020 patient received IVIG 55 g, platelets 36,000  · 01/17/2020: IVIG WBC 4.27, hemoglobin, 11.5, platelets  43741  · 1/20/2020 patient received Nplate 6 mcg/kg, WBC 5.1, hemoglobin 11.9, platelets 44,  · 1/27/2020 patient received Nplate 6 mcg/kg platelets 100,000  · 1/29/2020 platelets 74,000  · 1/30/2020 patient seen at [facility] hematology for second opinion.  Recommended to continue course of current treatment.  · 2/3/2020 patient received Nplate 6 mcg/kg.  WBC 5.9, hemoglobin 12, platelets 81k  · 2/10/2020 patient received Nplate 7 mcg/kg platelets 34,000   · 2/12/2020 WBC 5.1, hemoglobin 12.8, platelets 24K  · 2/13/2020: Patient advised to start dexamethasone 40 mg x 4 days course   · 2/17/2020: Platelets 11,000  · 2/21/2020 platelets 0.  Patient received IVIG 1 g/kg.  60 g  · 2/21/2020: Patient transfused platelets   · 2/22/2020 platelets 65K. , hemoglobin 10.4, patient received IVIG 60 g  · 2/24/2020 platelets 231K, hemoglobin 10.6, patient received Nplate 8 mcg/kg  · 3/5/2020: WBC 5.3,  hemoglobin 11.4, platelets 65,000.  Patient received Nplate 8 mcg/kg  · 3/11/2020 hemoglobin 12.1, platelets 97,000  · 3/13/2020 WBC 5.2, hemoglobin 11.6, platelets 106.  Patient received Nplate 500 micrograms  · 3/18/2020 WBC 5.7, hemoglobin 12.3, platelets 25,000, patient received Nplate 500 mcg  · 3/20/2020 WBC 5.7, hemoglobin 12.4, platelets 56,000,  · 3/25/2020 platelets 32,000, received Nplate 500 mcg  · 4/2/2020: WBC 7.3, hemoglobin 11.9, platelets 43,000, MCV 85.8  · 4/3/2020 platelets 27, Nplate 400 mcg  · 4/9/2020 platelets 24k  · 4/10/2020 platelets 35K.  Nplate 590 mcg  · 4/17/2020: Platelets 68444, Nplate 500 mcg  · 4/21/2020: Platelets 2000, patient received IVIG 60 g  · 4/22/2020 platelets 77K, hemoglobin 10.7, WBC 4.1, patient received IVIG 60 g  · 4/24/2020: Platelets 182K, hemoglobin 10.7, received Nplate 5 mcg  · 5/1/2020: Patient received Nplate 400 mcg platelets 179  · 5/8/2020: Patient received Nplate 590 mcg, platelets 33,000  · 5/13/2020: Platelets 46,000  · 5/15/2020 patient received Nplate, platelets 64,000  · 5/22/2020: Platelets 28,000  · 5/28/2020 patient received Nplate 500 mcg.  Platelets 45,000  · 5/29/2020 platelets 26,000.    · 6/1/2020: Platelets 11,000.  Nplate discontinued due to treatment failure.  Patient received IVIG 1 g/kg  · 6/2/2020 platelets 117  · 6/5/2020: Platelets 123K  · 6/16/2020 platelets 8000.  Patient received IVIG 1 g/kg   · 6/17/2020 platelets 47,000.  Patient received IVIG 1 g/kg  · 6/18/2020: Patient started taking fostamatinib  · 6/25/2020: Platelets 33,000.  WBC 5.5, hemoglobin 11.5, platelets 33, creatinine 1.6,  · 7/6/2020: WBC 5.06, hemoglobin 12.1, platelets 14,000,  · 7/9/2020: WBC 4.4, hemoglobin 12.0, platelets 11,000, patient received IVIG 1 g/kg  · 7/10/2020: Patient received IVIG 1 g/kg, platelet count 45,000  · 7/16/2020: WBC 2.27, hemoglobin 10.6, platelets 36,000  · 7/24/2020 patient received IVIG 1 g/kg, WBC 3.3, hemoglobin 11.7, platelet  count 8000, IgA 74, IgG 2180, IgM 111,   · 7/30/2020: WBC 4.17, Hgb 11.6,  21K  · 8/3/2020 patient received IVIG 1 g/kg, WBC 4.1, hemoglobin 11.8, platelet count 7000  · 8/4/2020: WBC 2.98, hemoglobin 10.4, platelet count 39,000, IVIG 1 g/kg  · 8/5/2020 WBC 3.6, hemoglobin 10.1, platelet count 53,000, patient received Nplate 500 mcg  · 8/14/2020 platelet count 206, hemoglobin 10.7, Nplate 500 mcg  · 9/2/2020: WBC 5.5, hemoglobin 10.1, platelet count 3000.  Patient received IVIG 1 g/kg  · 9/3/2020: Platelet count 50,000 patient received IVIG 1 g/kg  · 9/11/2020: WBC 3.77, hemoglobin 9.1 platelet count 29,000  · 9/14/2020: Patient started CellCept.  · 9/24/2020: Bone marrow biopsy: No diagnostic evidence of hemopoietic neoplasia identified.  Normocellular bone marrow for age 60 to 70% with trilineage hematopoiesis.  Increased megakaryocytes.  No increase in blasts.  No significant increase in reticulin fibers.  No storage iron.  Cytogenetics normal karyotype 46XX.. Flow cytometry shows CD56 expression on granulocytes and monocytes..  This is a nonspecific finding and could be reactive/regenerative process as well as neoplastic process.  · 9/25/2020: Patient received Nplate 500 mcg, WBC 5.08, hemoglobin 10, platelets 5000  · 10/6/2020 patient received Nplate 500 mcg.  WBC 4.45, hemoglobin 9.2, platelets 10,000  · 10/23/2020: WBC 5.7, hemoglobin 9, platelets 0.  Given IVIG  · 10/23/2020 platelets 34,000, after IVIG and platelet transfusion.  Nplate 500 mcg  · 10/26/2020: WBC 7.3, hemoglobin 9.9, MCV 78.1, platelets 31,000  · 11/2/2020: WBC 7.4, hemoglobin 9.5, MCV 81.1, platelets 256, Nplate 500 mcg  · 11/9/2020: WBC 7.5, hemoglobin 9.9, platelets 111, MCV 79.5   · 11/11/2020: WBC 5.5, hemoglobin 9.6, MCV 76.3, platelets 60,000  · 11/10/2020-11/14/2020: Patient admitted at [facility].  Patient underwent laparoscopic splenectomy on 11/11/2020.  Patient was given IV iron  · 11/11/2020: Platelets  133k  · 11/12/2020 platelets 205K-post day 1 splenectomy  · 11/14/2020: Platelets 550 K  · 11/23/2020: WBC 11.18, hemoglobin 10.6, platelets 753, MCV 84  · 12/11/2020: Platelet count 135  · 12/23/2020 platelet count 135, hemoglobin 13.2  · 12/26/2020: Platelet count 472  · 12/30/2020: WBC 8.4, hemoglobin 12.8, platelets 234, MCV 84.6  · 1/15/2021 WBC 7.6, hemoglobin 12.6, platelet count of 329        Subjective:  Patient is here for a follow up regarding ITP.  Platelets dropped to 135 in December but then they recovered to 472.  Doing well no complaints.  She takes iron regularly..    Past Medical History:   Diagnosis Date   • Anxiety    • Immune thrombocytopenic purpura (CMS/HCC) 11/18/2019   • Iron deficiency anemia    • Kidney stone 10/2019   • Thrombocytopenia (CMS/HCC) 11/29/2019     Past Surgical History:   Procedure Laterality Date   • SPLENECTOMY N/A 11/11/2020    Procedure: SPLENECTOMY LAPAROSCOPIC;  Surgeon: Erick Akhtar MD;  Location: [facility] OR;  Service: General;  Laterality: N/A;         Current Outpatient Medications:   •  Multiple Vitamins-Minerals (multivitamin with minerals) tablet tablet, Take 1 tablet by mouth Daily., Disp: , Rfl:   •  acetaminophen (TYLENOL) 325 MG tablet, Take 650 mg by mouth Every 6 (Six) Hours As Needed for Mild Pain  or Moderate Pain ., Disp: , Rfl:   •  ferrous gluconate (FERGON) 324 MG tablet, Take 1 tablet by mouth Daily With Breakfast., Disp: 30 tablet, Rfl: 3  •  ondansetron (ZOFRAN) 8 MG tablet, Take 1 tablet by mouth 3 (Three) Times a Day As Needed for Nausea or Vomiting., Disp: 30 tablet, Rfl: 5    No Known Allergies    Family History   Problem Relation Age of Onset   • Thrombocytopenia Nephew         ITP   • Cancer Paternal Grandmother    • Heart failure Maternal Grandmother    • Diabetes Maternal Great-Grandmother    • Heart failure Maternal Great-Grandmother      Cancer-related family history includes Cancer in her paternal grandmother.    Social History  "    Tobacco Use   • Smoking status: Never Smoker   • Smokeless tobacco: Never Used   Substance Use Topics   • Alcohol use: No     Frequency: Never   • Drug use: No     I have reviewed the history of present illness, past medical history, family history, social history, lab results, all notes and other records since the patient was last seen on  11/21/2019.    ROS:     Review of Systems   Constitutional: Negative for activity change, appetite change, chills and fever.   HENT: Negative for ear pain, mouth sores, nosebleeds and sore throat.    Eyes: Negative for photophobia and visual disturbance.   Respiratory: Negative for wheezing and stridor.    Cardiovascular: Negative for chest pain and palpitations.   Gastrointestinal: Negative for abdominal pain, diarrhea, nausea and vomiting.        Slight abdominal pain   Endocrine: Negative for cold intolerance and heat intolerance.   Genitourinary: Negative for dysuria and hematuria.   Musculoskeletal: Negative for joint swelling and neck stiffness.   Skin: Negative for color change and rash.   Allergic/Immunologic: Negative for food allergies.   Neurological: Negative for seizures and syncope.   Hematological: Negative for adenopathy. Does not bruise/bleed easily.        No obvious bleeding   Psychiatric/Behavioral: Negative for agitation, confusion and hallucinations.     Objective:    Vitals:    01/15/21 0915   Resp: 18   Temp: 97.1 °F (36.2 °C)   Weight: 58.5 kg (129 lb)   Height: 157.5 cm (62\")   PainSc: 0-No pain     ECOG  (0) Fully active, able to carry on all predisease performance without restriction    Physical Exam:   Physical Exam   Constitutional: She is oriented to person, place, and time. No distress.   HENT:   Head: Normocephalic and atraumatic.   Eyes: Conjunctivae are normal. Right eye exhibits no discharge. Left eye exhibits no discharge. No scleral icterus.   Neck: Normal range of motion. Neck supple. No thyromegaly present.   Cardiovascular: Normal " rate, regular rhythm and normal heart sounds. Exam reveals no gallop and no friction rub.   Pulmonary/Chest: Effort normal. No stridor. No respiratory distress. She has no wheezes.   Abdominal: Soft. Normal appearance and bowel sounds are normal. She exhibits no mass. There is no abdominal tenderness. There is no rebound and no guarding.   Healing laparoscopic scars   Musculoskeletal: Normal range of motion. No swelling, tenderness or signs of injury.   Lymphadenopathy:     She has no cervical adenopathy.   Neurological: She is alert and oriented to person, place, and time. She exhibits normal muscle tone.   Skin: Skin is warm. No bruising and no rash noted. She is not diaphoretic. No erythema. No pallor.   Psychiatric: Her behavior is normal. Mood and thought content normal.   Nursing note and vitals reviewed.    I have reexamined the patient and the results are consistent with the previously documented exam. Forrest Llanes MD     Lab Results - Last 18 Months   Lab Units 01/15/21  0912 12/30/20  0911 12/26/20  0135   WBC 10*3/mm3 7.69 8.45 16.30*   HEMOGLOBIN g/dL 12.6 12.8 13.0   HEMATOCRIT % 39.6 39.7 39.6   PLATELETS 10*3/mm3 329 234 472*   MCV fL 87.4 84.6 83.3     Lab Results - Last 18 Months   Lab Units 12/26/20  0135 11/14/20  0222 11/13/20  0215 11/12/20  0242  11/10/20  1017   SODIUM mmol/L 138  --  138 140   < > 140   POTASSIUM mmol/L 3.5 3.7 3.7 4.1   < > 3.6   CHLORIDE mmol/L 99  --  104 105   < > 104   CO2 mmol/L 27.0  --  27.0 24.0   < > 26.0   BUN mg/dL 15  --  8 11   < > 17   CREATININE mg/dL 0.72  --  0.53* 0.58   < > 0.66   CALCIUM mg/dL 10.2  --  9.2 9.2   < > 9.5   BILIRUBIN mg/dL <0.2  --  <0.2  --   --  0.3   ALK PHOS U/L 60  --  41  --   --  51   ALT (SGPT) U/L 11  --  8  --   --  9   AST (SGOT) U/L 20  --  15  --   --  18   GLUCOSE mg/dL 131*  --  101* 112*   < > 94    < > = values in this interval not displayed.     Lab Results   Component Value Date    GLUCOSE 131 (H) 12/26/2020    BUN 15  12/26/2020    CREATININE 0.72 12/26/2020    EGFRIFNONA 93 12/26/2020    BCR 20.8 12/26/2020    K 3.5 12/26/2020    CO2 27.0 12/26/2020    CALCIUM 10.2 12/26/2020    PROTENTOTREF 5.9 (L) 11/23/2019    ALBUMIN 5.20 12/26/2020    LABIL2 1.5 11/23/2019    AST 20 12/26/2020    ALT 11 12/26/2020     Lab Results - Last 18 Months   Lab Units 11/10/20  1017 09/24/20  1118 04/10/20  0841   INR  1.04 0.99 1.02   APTT seconds 24.6 25.4 24.5     Lab Results   Component Value Date    IRON 30 (L) 11/10/2020    TIBC 489 11/10/2020    FERRITIN 15.58 11/10/2020       Lab Results   Component Value Date    FOLATE 11.20 11/23/2019     No results found for: OCCULTBLD    Lab Results   Component Value Date    RETICCTPCT 3.13 (H) 12/01/2019     Lab Results   Component Value Date    HCWGNPPG45 461 11/23/2019     No results found for: SPEP, UPEP  LDH   Date Value Ref Range Status   11/23/2019 123 (L) 135 - 214 U/L Final     Lab Results   Component Value Date    JOSE Negative 11/23/2019     Lab Results   Component Value Date    HAPTOGLOBIN 136 11/23/2019     Lab Results   Component Value Date    PTT 24.6 11/10/2020    INR 1.04 11/10/2020     No results found for:   No results found for: CEA  No components found for: CA-19-9  No results found for: PSA    Assessment/Plan     Assessment:      1. Refractory idiopathic thrombocytopenic purpura ITP. : Refractory to steroids: She was diagnosed on 11/18/19  by mouth.  S/p bone marrow biopsy 11/18/19 - hypocellular for age bone marrow (50%),  adequate numbers of unremarkable megakaryocytes,  absent iron stores,  negative for involvement by malignant lymphoma.  Flow cytometry-CD 56 expression on granulocytes and monocytes.  . Normal karyotype 46XX.  HIV testing negative. TSH normal.   She does have a transient responses to IVIG.  Received 4 weekly doses of Rituximab starting 11/29/2019 to 12/20/2019.  She did not respond to rituximab right away and still awaiting response.  Started on Nplate  12/27/2019 at 1 mcg/kg.  Dose was escalated and patient seem to have erratic treatment response.  She continues to require IVIG occasionally while on Nplate.  Nplate was discontinued by end of May 2020 due to treatment failure and she was switched to fostamatinib which was started on 6/18/2020.  However she has not had enough response to therapy yet.  IVIG requirements are increasing.  Patient failed fostamatinib and was DCed at the end of July 2020.  She resumed Nplate on 8/5/2020 and had response.  Patient started CellCept on 9/14/2020.  Repeat bone marrow biopsy 9/24/2020 unremarkable.  Continued Nplate 9/25/2020, 10/6/2020, but platelets dropped to 0 as of 10/23/2020. On 11/11/2020 patient underwent laparoscopic splenectomy with the good response.  Post splenectomy day 1 platelet counts went up to 205.  On the day 3 they were up to 550 K.  Platelets are now at 329  2. Status post splenectomy 11/11/2020  3. Leukopenia/neutropenia: Seem to have resolved with splenectomy.  4. Anemia:   Bone marrow biopsy on 11/18/19 showed absent iron stores.  Patient was given iron replacement.  Repeat bone marrow biopsy shows absent iron stores.  Repeat bone marrow biopsy 9/24/2020 shows absent iron stores.  Hemoglobin seem to improve after splenectomy.  5. Iron deficiency- status post IV iron.  Currently on oral iron.  6. Left renal stone: 3 mm kidney stone visualized on CT A/P on 10/7/19.  Asymptomatic.  7. Anxiety/weight loss:  management per Primary team   8. Hx of Menstrual bleeding: LMPD 11/23/19 -On Megace  to suppress menstrual cycle . Also on Lysteda (tranexamic acid)     PLAN  1. May hold off aspirin completely.  2. Continue iron supplement  3. Advised to get flu shot.  4. Currently off all her other treatments for ITP.   5.  Continue iron sulfate p.o.  6. CBC monthly and follow-up with me in 3 months.          Immune thrombocytopenic purpura (CMS/HCC)  - CBC & Differential    Orders Placed This Encounter   Procedures    • CBC & Differential     Standing Status:   Future     Number of Occurrences:   1          I have reviewed and confirmed the accuracy of the patient's history: Chief complaint, HPI, ROS and Subjective as entered by the MA/LPN/RN. Forrest Llanes MD 01/15/21                 Electronically signed by Forrest Llanes MD, 01/15/21, 9:38 AM EST.

## 2021-01-15 ENCOUNTER — OFFICE VISIT (OUTPATIENT)
Dept: ONCOLOGY | Facility: CLINIC | Age: 36
End: 2021-01-15

## 2021-01-15 ENCOUNTER — LAB (OUTPATIENT)
Dept: LAB | Facility: HOSPITAL | Age: 36
End: 2021-01-15

## 2021-01-15 VITALS — BODY MASS INDEX: 23.74 KG/M2 | TEMPERATURE: 97.1 F | HEIGHT: 62 IN | WEIGHT: 129 LBS | RESPIRATION RATE: 18 BRPM

## 2021-01-15 DIAGNOSIS — D69.3 IMMUNE THROMBOCYTOPENIC PURPURA (HCC): Primary | ICD-10-CM

## 2021-01-15 DIAGNOSIS — D69.3 IMMUNE THROMBOCYTOPENIC PURPURA (HCC): ICD-10-CM

## 2021-01-15 LAB
BASOPHILS # BLD AUTO: 0.04 10*3/MM3 (ref 0–0.2)
BASOPHILS NFR BLD AUTO: 0.5 % (ref 0–1.5)
DEPRECATED RDW RBC AUTO: 64.6 FL (ref 37–54)
EOSINOPHIL # BLD AUTO: 0.16 10*3/MM3 (ref 0–0.4)
EOSINOPHIL NFR BLD AUTO: 2.1 % (ref 0.3–6.2)
ERYTHROCYTE [DISTWIDTH] IN BLOOD BY AUTOMATED COUNT: 21.4 % (ref 12.3–15.4)
HCT VFR BLD AUTO: 39.6 % (ref 34–46.6)
HGB BLD-MCNC: 12.6 G/DL (ref 12–15.9)
LYMPHOCYTES # BLD AUTO: 3.21 10*3/MM3 (ref 0.7–3.1)
LYMPHOCYTES NFR BLD AUTO: 41.7 % (ref 19.6–45.3)
MCH RBC QN AUTO: 27.8 PG (ref 26.6–33)
MCHC RBC AUTO-ENTMCNC: 31.8 G/DL (ref 31.5–35.7)
MCV RBC AUTO: 87.4 FL (ref 79–97)
MONOCYTES # BLD AUTO: 0.9 10*3/MM3 (ref 0.1–0.9)
MONOCYTES NFR BLD AUTO: 11.7 % (ref 5–12)
NEUTROPHILS NFR BLD AUTO: 3.38 10*3/MM3 (ref 1.7–7)
NEUTROPHILS NFR BLD AUTO: 44 % (ref 42.7–76)
PLATELET # BLD AUTO: 329 10*3/MM3 (ref 140–450)
PMV BLD AUTO: 10 FL (ref 6–12)
RBC # BLD AUTO: 4.53 10*6/MM3 (ref 3.77–5.28)
WBC # BLD AUTO: 7.69 10*3/MM3 (ref 3.4–10.8)

## 2021-01-15 PROCEDURE — 36415 COLL VENOUS BLD VENIPUNCTURE: CPT

## 2021-01-15 PROCEDURE — 85025 COMPLETE CBC W/AUTO DIFF WBC: CPT

## 2021-01-15 PROCEDURE — 99214 OFFICE O/P EST MOD 30 MIN: CPT | Performed by: INTERNAL MEDICINE

## 2021-03-23 ENCOUNTER — HOSPITAL ENCOUNTER (EMERGENCY)
Facility: HOSPITAL | Age: 36
Discharge: HOME | End: 2021-03-24
Admitting: EMERGENCY MEDICINE

## 2021-03-23 DIAGNOSIS — S61.012A LACERATION OF LEFT THUMB WITHOUT FOREIGN BODY WITHOUT DAMAGE TO NAIL, INITIAL ENCOUNTER: Primary | ICD-10-CM

## 2021-03-23 PROCEDURE — 99283 EMERGENCY DEPT VISIT LOW MDM: CPT

## 2021-03-23 RX ORDER — HYDROCODONE BITARTRATE AND ACETAMINOPHEN 5; 325 MG/1; MG/1
1 TABLET ORAL ONCE AS NEEDED
Status: COMPLETED | OUTPATIENT
Start: 2021-03-23 | End: 2021-03-23

## 2021-03-23 RX ADMIN — HYDROCODONE BITARTRATE AND ACETAMINOPHEN 1 TABLET: 5; 325 TABLET ORAL at 23:12

## 2021-03-24 VITALS
DIASTOLIC BLOOD PRESSURE: 79 MMHG | HEART RATE: 78 BPM | HEIGHT: 62 IN | RESPIRATION RATE: 17 BRPM | OXYGEN SATURATION: 99 % | WEIGHT: 123.9 LBS | SYSTOLIC BLOOD PRESSURE: 127 MMHG | BODY MASS INDEX: 22.8 KG/M2 | TEMPERATURE: 98.1 F

## 2021-03-24 NOTE — DISCHARGE INSTRUCTIONS
Rest and keep clean, dry and covered.  If shows signs or symptoms of infection-increased pain, swelling, redness or oozing pus-like drainage, see your PCP immediately for further evaluation and care.

## 2021-03-24 NOTE — ED PROVIDER NOTES
Subjective   35-year-old  female presents to the emergency room with complaint of left thumb pain status post accidentally cut her thumb with a knife while peeling sweet potatoes.  Patient states this episode occurred about 7 PM tonight and it was hard to get it to stop bleeding but wrapped it up with a dressing at home and came to the emergency room to have it evaluated.  Patient states that she is up-to-date with her tetanus within the last 4 years.  Onset: 7 PM tonight  Location: Left thumb  Duration: Past 3-1/2 hours but stopped bleeding about an hour ago  Character: Persistent bleeding from a left thumb avulsion laceration  Aggravating/Alleviating Factors: Palpation and movement/compression dressing  Radiation: None  Severity: Moderate to severe            Review of Systems   Constitutional: Negative for activity change, appetite change, fatigue and fever.   HENT: Negative.    Respiratory: Negative.    Cardiovascular: Negative.    Gastrointestinal: Negative.    Genitourinary: Negative.    Musculoskeletal: Negative for arthralgias, joint swelling and myalgias.   Skin: Positive for wound.   Neurological: Negative.    Hematological: Bruises/bleeds easily.   Psychiatric/Behavioral: Negative.        Past Medical History:   Diagnosis Date   • Anxiety    • Immune thrombocytopenic purpura (CMS/HCC) 11/18/2019   • Iron deficiency anemia    • Kidney stone 10/2019   • Thrombocytopenia (CMS/HCC) 11/29/2019       No Known Allergies    Past Surgical History:   Procedure Laterality Date   • SPLENECTOMY N/A 11/11/2020    Procedure: SPLENECTOMY LAPAROSCOPIC;  Surgeon: Erick Akhtar MD;  Location: [facility] OR;  Service: General;  Laterality: N/A;       Family History   Problem Relation Age of Onset   • Thrombocytopenia Nephew         ITP   • Cancer Paternal Grandmother    • Heart failure Maternal Grandmother    • Diabetes Maternal Great-Grandmother    • Heart failure Maternal Great-Grandmother        Social  History     Socioeconomic History   • Marital status: Single     Spouse name: Not on file   • Number of children: Not on file   • Years of education: Not on file   • Highest education level: Not on file   Tobacco Use   • Smoking status: Never Smoker   • Smokeless tobacco: Never Used   Vaping Use   • Vaping Use: Never used   Substance and Sexual Activity   • Alcohol use: No   • Drug use: No   • Sexual activity: Defer           Objective   Physical Exam  Constitutional:       General: She is not in acute distress.     Appearance: Normal appearance. She is not ill-appearing, toxic-appearing or diaphoretic.   HENT:      Head: Normocephalic and atraumatic.   Skin:     Capillary Refill: Capillary refill takes less than 2 seconds.      Findings: Wound present.             Comments: Left distal thumb avulsion laceration with clotted dried blood distally.  No active bleeding   Neurological:      Mental Status: She is alert.         Procedures           ED Course      Removed only dressing and evaluated left distal thumb laceration.  Extensive wound cleaning performed by nursing staff.  Placed bacitracin over laceration and redressed.  DC home after one 5 mg Norco given for acute pain.  Discharged home in care of patient's .    Medications   HYDROcodone-acetaminophen (NORCO) 5-325 MG per tablet 1 tablet (1 tablet Oral Given 3/23/21 2312)       Labs Reviewed - No data to display     No radiology results for the last day                                     MDM      Final diagnoses:   Laceration of left thumb without foreign body without damage to nail, initial encounter       ED Disposition  ED Disposition     ED Disposition Condition Comment    Discharge Stable           Jose Ingram, APRN  [address] [place] [address]  [phone]    Schedule an appointment as soon as possible for a visit   As needed, If symptoms worsen         Medication List      No changes were made to your prescriptions  during this visit.          Sun Maxwell, APRN  03/23/21 [id number]

## 2021-04-09 ENCOUNTER — OFFICE VISIT (OUTPATIENT)
Dept: ONCOLOGY | Facility: CLINIC | Age: 36
End: 2021-04-09

## 2021-04-09 ENCOUNTER — LAB (OUTPATIENT)
Dept: LAB | Facility: HOSPITAL | Age: 36
End: 2021-04-09

## 2021-04-09 VITALS
SYSTOLIC BLOOD PRESSURE: 115 MMHG | BODY MASS INDEX: 22.93 KG/M2 | RESPIRATION RATE: 18 BRPM | HEART RATE: 66 BPM | TEMPERATURE: 98.4 F | HEIGHT: 62 IN | DIASTOLIC BLOOD PRESSURE: 75 MMHG | WEIGHT: 124.6 LBS

## 2021-04-09 DIAGNOSIS — D69.3 IMMUNE THROMBOCYTOPENIC PURPURA (HCC): ICD-10-CM

## 2021-04-09 DIAGNOSIS — D69.3 IMMUNE THROMBOCYTOPENIC PURPURA (HCC): Primary | ICD-10-CM

## 2021-04-09 LAB
BASOPHILS # BLD AUTO: 0.04 10*3/MM3 (ref 0–0.2)
BASOPHILS NFR BLD AUTO: 0.6 % (ref 0–1.5)
DEPRECATED RDW RBC AUTO: 47.9 FL (ref 37–54)
EOSINOPHIL # BLD AUTO: 0.08 10*3/MM3 (ref 0–0.4)
EOSINOPHIL NFR BLD AUTO: 1.2 % (ref 0.3–6.2)
ERYTHROCYTE [DISTWIDTH] IN BLOOD BY AUTOMATED COUNT: 14 % (ref 12.3–15.4)
HCT VFR BLD AUTO: 34.6 % (ref 34–46.6)
HGB BLD-MCNC: 11.1 G/DL (ref 12–15.9)
LYMPHOCYTES # BLD AUTO: 2.63 10*3/MM3 (ref 0.7–3.1)
LYMPHOCYTES NFR BLD AUTO: 41 % (ref 19.6–45.3)
MCH RBC QN AUTO: 31.1 PG (ref 26.6–33)
MCHC RBC AUTO-ENTMCNC: 32.1 G/DL (ref 31.5–35.7)
MCV RBC AUTO: 96.9 FL (ref 79–97)
MONOCYTES # BLD AUTO: 0.67 10*3/MM3 (ref 0.1–0.9)
MONOCYTES NFR BLD AUTO: 10.4 % (ref 5–12)
NEUTROPHILS NFR BLD AUTO: 3 10*3/MM3 (ref 1.7–7)
NEUTROPHILS NFR BLD AUTO: 46.8 % (ref 42.7–76)
PLATELET # BLD AUTO: 457 10*3/MM3 (ref 140–450)
PMV BLD AUTO: 9.4 FL (ref 6–12)
RBC # BLD AUTO: 3.57 10*6/MM3 (ref 3.77–5.28)
WBC # BLD AUTO: 6.42 10*3/MM3 (ref 3.4–10.8)

## 2021-04-09 PROCEDURE — 36415 COLL VENOUS BLD VENIPUNCTURE: CPT

## 2021-04-09 PROCEDURE — 85025 COMPLETE CBC W/AUTO DIFF WBC: CPT

## 2021-04-09 PROCEDURE — 99214 OFFICE O/P EST MOD 30 MIN: CPT | Performed by: INTERNAL MEDICINE

## 2021-08-12 ENCOUNTER — TELEPHONE (OUTPATIENT)
Dept: ONCOLOGY | Facility: CLINIC | Age: 36
End: 2021-08-12

## 2021-08-12 NOTE — TELEPHONE ENCOUNTER
Left v/m asking Pt to call direct # to r/s 8/13 appt w/ Shraddha due to him not being in office. 8/13 appt was cxl'd

## 2021-08-13 ENCOUNTER — APPOINTMENT (OUTPATIENT)
Dept: LAB | Facility: HOSPITAL | Age: 36
End: 2021-08-13

## 2021-09-01 ENCOUNTER — TELEPHONE (OUTPATIENT)
Dept: ONCOLOGY | Facility: CLINIC | Age: 36
End: 2021-09-01

## 2021-09-01 NOTE — TELEPHONE ENCOUNTER
Spoke with Dr. Mcfarland-He states he recommends the vaccine and could not find any contraindications regarding her proceeding with the vaccination.    Spoke with patient and relayed information. She verbalized understanding and had no further questions.

## 2021-09-01 NOTE — TELEPHONE ENCOUNTER
Caller: Tiny Akhtar    Relationship: Self    Best call back number: [phone]    What is the best time to reach you:   ANYTIME     Who are you requesting to speak with (clinical staff, provider,  specific staff member):  OFFICE    Do you know the name of the person who called: TINY    What was the call regarding:   WAS A PT OF DR BUCKLEY AND GOT LETTER IN MAIL STATING HAD TRANSFERRED TO DIFFERENT FACILITY  AND WANTING TO KNOW IF THERE WAS ANY INFORMATION ON WHERE DR BUCKLEY HAD MOVED TO    Do you require a callback: YES

## 2021-09-01 NOTE — TELEPHONE ENCOUNTER
Caller: Tiny Akhtar    Relationship: Self    Best call back number: [phone]    What form or medical record are you requesting: PATIENT IS REQUESTING A LETTER STATING THAT DR. BUCKLEY WANTS PATIENT TO WAIT LONGER BEFORE GETTING VACCINATED.  PATIENT STATED THEY HAD DISCUSSED THIS.  HER DEADLINE FOR GETTING THIS LETTER TO HER EMPLOYER IS TODAY.    Who is requesting this form or medical record from you:  PATIENTS EMPLOYER    How would you like to receive the form or medical records (pick-up, mail, fax): If fax, what is the fax number: [phone]    Timeframe paperwork needed: ASAP    Additional notes: PATIENT STATES SHE CALLED THE OFFICE & LEFT A VM ABOUT PRISCILLA. HER 8/13/2021 APPT. & NO ONE CALLED HER BACK.

## 2021-09-02 ENCOUNTER — TELEPHONE (OUTPATIENT)
Dept: ONCOLOGY | Facility: CLINIC | Age: 36
End: 2021-09-02

## 2021-09-02 NOTE — TELEPHONE ENCOUNTER
Attempted to contact patient regarding message.  No answer.  LMV asking patient to call back.      Caller: Tiny Akhtar     Relationship: Self     Best call back number: [phone]     What is the best time to reach you:   ANYTIME      Who are you requesting to speak with (clinical staff, provider,  specific staff member):  OFFICE     Do you know the name of the person who called: TINY     What was the call regarding:   WAS A PT OF DR BUCKLEY AND GOT LETTER IN MAIL STATING HAD TRANSFERRED TO DIFFERENT FACILITY  AND WANTING TO KNOW IF THERE WAS ANY INFORMATION ON WHERE DR BUCKLEY HAD MOVED TO     Do you require a callback: YES

## 2022-09-09 ENCOUNTER — APPOINTMENT (OUTPATIENT)
Dept: ULTRASOUND IMAGING | Facility: HOSPITAL | Age: 37
End: 2022-09-09

## 2022-09-09 ENCOUNTER — HOSPITAL ENCOUNTER (EMERGENCY)
Facility: HOSPITAL | Age: 37
Discharge: HOME | End: 2022-09-09
Attending: EMERGENCY MEDICINE | Admitting: EMERGENCY MEDICINE

## 2022-09-09 VITALS
HEART RATE: 105 BPM | HEIGHT: 62 IN | SYSTOLIC BLOOD PRESSURE: 128 MMHG | RESPIRATION RATE: 20 BRPM | DIASTOLIC BLOOD PRESSURE: 68 MMHG | TEMPERATURE: 98.8 F | WEIGHT: 147.27 LBS | OXYGEN SATURATION: 100 % | BODY MASS INDEX: 27.1 KG/M2

## 2022-09-09 DIAGNOSIS — O20.9 VAGINAL BLEEDING IN PREGNANCY, FIRST TRIMESTER: ICD-10-CM

## 2022-09-09 DIAGNOSIS — O20.0 BLOODY SHOW AND CRAMPING IN EARLY PREGNANCY: ICD-10-CM

## 2022-09-09 DIAGNOSIS — O20.0 THREATENED ABORTION IN FIRST TRIMESTER: Primary | ICD-10-CM

## 2022-09-09 LAB
ABO GROUP BLD: NORMAL
BASOPHILS # BLD AUTO: 0.1 10*3/MM3 (ref 0–0.2)
BASOPHILS NFR BLD AUTO: 0.8 % (ref 0–1.5)
BILIRUB UR QL STRIP: NEGATIVE
C TRACH RRNA CVX QL NAA+PROBE: NOT DETECTED
CLARITY UR: CLEAR
CLUE CELLS SPEC QL WET PREP: ABNORMAL
COLOR UR: YELLOW
DEPRECATED RDW RBC AUTO: 44.6 FL (ref 37–54)
EOSINOPHIL # BLD AUTO: 0.1 10*3/MM3 (ref 0–0.4)
EOSINOPHIL NFR BLD AUTO: 0.8 % (ref 0.3–6.2)
ERYTHROCYTE [DISTWIDTH] IN BLOOD BY AUTOMATED COUNT: 14.3 % (ref 12.3–15.4)
GLUCOSE UR STRIP-MCNC: NEGATIVE MG/DL
HCG INTACT+B SERPL-ACNC: 6387 MIU/ML
HCT VFR BLD AUTO: 37 % (ref 34–46.6)
HGB BLD-MCNC: 11.9 G/DL (ref 12–15.9)
HGB UR QL STRIP.AUTO: NEGATIVE
HOLD SPECIMEN: NORMAL
HOLD SPECIMEN: NORMAL
HYDATID CYST SPEC WET PREP: ABNORMAL
INR PPP: 0.97 (ref 0.93–1.1)
KETONES UR QL STRIP: NEGATIVE
LEUKOCYTE ESTERASE UR QL STRIP.AUTO: NEGATIVE
LYMPHOCYTES # BLD AUTO: 4.1 10*3/MM3 (ref 0.7–3.1)
LYMPHOCYTES NFR BLD AUTO: 25.4 % (ref 19.6–45.3)
MCH RBC QN AUTO: 28.9 PG (ref 26.6–33)
MCHC RBC AUTO-ENTMCNC: 32.1 G/DL (ref 31.5–35.7)
MCV RBC AUTO: 89.9 FL (ref 79–97)
MONOCYTES # BLD AUTO: 1.3 10*3/MM3 (ref 0.1–0.9)
MONOCYTES NFR BLD AUTO: 8.4 % (ref 5–12)
N GONORRHOEA RRNA SPEC QL NAA+PROBE: NOT DETECTED
NEUTROPHILS NFR BLD AUTO: 10.4 10*3/MM3 (ref 1.7–7)
NEUTROPHILS NFR BLD AUTO: 64.6 % (ref 42.7–76)
NITRITE UR QL STRIP: NEGATIVE
NRBC BLD AUTO-RTO: 0 /100 WBC (ref 0–0.2)
PH UR STRIP.AUTO: 7 [PH] (ref 5–8)
PLATELET # BLD AUTO: 613 10*3/MM3 (ref 140–450)
PMV BLD AUTO: 7.1 FL (ref 6–12)
PROT UR QL STRIP: NEGATIVE
PROTHROMBIN TIME: 10 SECONDS (ref 9.6–11.7)
RBC # BLD AUTO: 4.11 10*6/MM3 (ref 3.77–5.28)
RH BLD: POSITIVE
SP GR UR STRIP: 1.01 (ref 1–1.03)
T VAGINALIS SPEC QL WET PREP: ABNORMAL
UROBILINOGEN UR QL STRIP: NORMAL
WBC NRBC COR # BLD: 16.1 10*3/MM3 (ref 3.4–10.8)
WBC SPEC QL WET PREP: ABNORMAL
WHOLE BLOOD HOLD COAG: NORMAL
WHOLE BLOOD HOLD SPECIMEN: NORMAL
YEAST GENITAL QL WET PREP: ABNORMAL

## 2022-09-09 PROCEDURE — 85610 PROTHROMBIN TIME: CPT | Performed by: NURSE PRACTITIONER

## 2022-09-09 PROCEDURE — 81003 URINALYSIS AUTO W/O SCOPE: CPT | Performed by: NURSE PRACTITIONER

## 2022-09-09 PROCEDURE — 87210 SMEAR WET MOUNT SALINE/INK: CPT | Performed by: NURSE PRACTITIONER

## 2022-09-09 PROCEDURE — 76801 OB US < 14 WKS SINGLE FETUS: CPT

## 2022-09-09 PROCEDURE — 36415 COLL VENOUS BLD VENIPUNCTURE: CPT | Performed by: NURSE PRACTITIONER

## 2022-09-09 PROCEDURE — 86900 BLOOD TYPING SEROLOGIC ABO: CPT | Performed by: NURSE PRACTITIONER

## 2022-09-09 PROCEDURE — 87086 URINE CULTURE/COLONY COUNT: CPT | Performed by: NURSE PRACTITIONER

## 2022-09-09 PROCEDURE — 87491 CHLMYD TRACH DNA AMP PROBE: CPT | Performed by: NURSE PRACTITIONER

## 2022-09-09 PROCEDURE — 99283 EMERGENCY DEPT VISIT LOW MDM: CPT

## 2022-09-09 PROCEDURE — 84702 CHORIONIC GONADOTROPIN TEST: CPT | Performed by: NURSE PRACTITIONER

## 2022-09-09 PROCEDURE — 76817 TRANSVAGINAL US OBSTETRIC: CPT

## 2022-09-09 PROCEDURE — 87591 N.GONORRHOEAE DNA AMP PROB: CPT | Performed by: NURSE PRACTITIONER

## 2022-09-09 PROCEDURE — 85025 COMPLETE CBC W/AUTO DIFF WBC: CPT | Performed by: NURSE PRACTITIONER

## 2022-09-09 PROCEDURE — 93976 VASCULAR STUDY: CPT

## 2022-09-09 PROCEDURE — 86901 BLOOD TYPING SEROLOGIC RH(D): CPT | Performed by: NURSE PRACTITIONER

## 2022-09-09 RX ORDER — SODIUM CHLORIDE 0.9 % (FLUSH) 0.9 %
10 SYRINGE (ML) INJECTION AS NEEDED
Status: DISCONTINUED | OUTPATIENT
Start: 2022-09-09 | End: 2022-09-09 | Stop reason: HOSPADM

## 2022-09-09 NOTE — ED PROVIDER NOTES
"Subjective   PIT    36-year-old female presents with a 1 week history of vaginal bleeding.  She reports 1 week ago she had \"a gush of red blood\", and then \"brown\" spotting since.  She reports today that the blood changed color back to bright red and she has had passage of clots and lower abdominal cramping.  She reports that she was seen by her OB/GYN this past Friday, 9/2/2022 for vaginal bleeding.  She is instructed to come to the ER for any pain or dizziness.    1. Location: Lower pelvis  2. Quality: Cramping  3. Severity: Mild  4. Worsening factors: Palpation, vaginal bleeding  5. Alleviating factors: Denies  6. Onset: 1 week, worse today  7. Radiation: Diffuse  8. Frequency: Intermittent  9. Co-morbidities: Past Medical History:  No date: Anxiety  11/18/2019: Immune thrombocytopenic purpura (HCC)  No date: Iron deficiency anemia  10/2019: Kidney stone  11/29/2019: Thrombocytopenia (HCC)        History provided by:  Patient   used: No        Review of Systems   Constitutional: Negative for appetite change, chills, diaphoresis and fever.   Gastrointestinal: Negative for abdominal pain, blood in stool, constipation, diarrhea, nausea and vomiting.   Genitourinary: Positive for pelvic pain and vaginal bleeding. Negative for decreased urine volume, difficulty urinating, flank pain and hematuria.   Skin: Negative for color change, pallor and rash.   Neurological: Negative for dizziness and syncope.   Hematological: Negative for adenopathy.   Psychiatric/Behavioral: Negative for confusion.   All other systems reviewed and are negative.      Past Medical History:   Diagnosis Date   • Anxiety    • Immune thrombocytopenic purpura (HCC) 11/18/2019   • Iron deficiency anemia    • Kidney stone 10/2019   • Thrombocytopenia (HCC) 11/29/2019       No Known Allergies    Past Surgical History:   Procedure Laterality Date   • SPLENECTOMY N/A 11/11/2020    Procedure: SPLENECTOMY LAPAROSCOPIC;  Surgeon: Hermilo" Erick KAPLAN MD;  Location: [facility] MAIN OR;  Service: General;  Laterality: N/A;       Family History   Problem Relation Age of Onset   • Thrombocytopenia Nephew         ITP   • Cancer Paternal Grandmother    • Heart failure Maternal Grandmother    • Diabetes Maternal Great-Grandmother    • Heart failure Maternal Great-Grandmother        Social History     Socioeconomic History   • Marital status: Single   Tobacco Use   • Smoking status: Never Smoker   • Smokeless tobacco: Never Used   Vaping Use   • Vaping Use: Never used   Substance and Sexual Activity   • Alcohol use: No   • Drug use: No   • Sexual activity: Defer           Objective   Physical Exam  Vitals and nursing note reviewed. Exam conducted with a chaperone present (ABBY Chowdhury present at bedside for pelvic exam).   Constitutional:       General: She is awake. She is not in acute distress.     Appearance: Normal appearance. She is well-developed and normal weight. She is not ill-appearing, toxic-appearing or diaphoretic.   HENT:      Mouth/Throat:      Mouth: Mucous membranes are moist.   Eyes:      General: No scleral icterus.  Cardiovascular:      Rate and Rhythm: Regular rhythm. Tachycardia present.      Pulses: Normal pulses.      Heart sounds: Normal heart sounds, S1 normal and S2 normal. Heart sounds not distant. No murmur heard.    No friction rub. No gallop.   Pulmonary:      Effort: Pulmonary effort is normal.      Breath sounds: Normal breath sounds and air entry.   Abdominal:      General: Abdomen is flat. Bowel sounds are normal. There is no distension.      Palpations: Abdomen is soft. There is no mass.      Tenderness: There is abdominal tenderness in the suprapubic area. There is no right CVA tenderness, left CVA tenderness, guarding or rebound.      Hernia: No hernia is present.       Genitourinary:     Exam position: Supine.      Vagina: Normal.      Cervix: Cervical bleeding present. No friability, lesion or erythema.      Uterus:  Enlarged. Not tender.       Adnexa: Right adnexa normal and left adnexa normal.      Comments: Small amount of dark red blood noted in the vaginal vault.   Skin:     General: Skin is warm and dry.      Capillary Refill: Capillary refill takes less than 2 seconds.      Coloration: Skin is not jaundiced or pale.      Findings: No rash.   Neurological:      Mental Status: She is alert and oriented to person, place, and time.      GCS: GCS eye subscore is 4. GCS verbal subscore is 5. GCS motor subscore is 6.   Psychiatric:         Mood and Affect: Mood normal.         Behavior: Behavior normal. Behavior is cooperative.         Thought Content: Thought content normal.         Judgment: Judgment normal.         Procedures           ED Course  ED Course as of 09/10/22 0129   Fri Sep 09, 2022   1815 Awaiting patient to go to ultrasound. [AL]   1848 Awaiting ultrasound results. [AL]      ED Course User Index  [AL] Dee Guevara, FARZAD    US Ob < 14 Weeks Single or First Gestation    Result Date: 9/9/2022  1. Abnormal appearance of the suspected gestational sac within the endometrial canal with multiple low-level linear echoes and elongated oval yolk sac. No visualized fetal pole is identified. Findings are concerning for failed pregnancy in progress given patient's vaginal bleeding. 2. Probable 2.0 cm right-sided corpus luteum cyst.  Electronically Signed By-Jer Guzmán MD On:9/9/2022 7:12 PM This report was finalized on [id number] by  Jer Guzmán MD.    US Ob Transvaginal    Result Date: 9/9/2022  1. Abnormal appearance of the suspected gestational sac within the endometrial canal with multiple low-level linear echoes and elongated oval yolk sac. No visualized fetal pole is identified. Findings are concerning for failed pregnancy in progress given patient's vaginal bleeding. 2. Probable 2.0 cm right-sided corpus luteum cyst.  Electronically Signed By-Jer Guzmán MD On:9/9/2022 7:12 PM This report was  finalized on [id number] by  Jer Guzmán MD.    Medications - No data to display  Labs Reviewed   WET PREP, GENITAL - Abnormal; Notable for the following components:       Result Value    WBC'S 1+ WBC's seen (*)     All other components within normal limits   CBC WITH AUTO DIFFERENTIAL - Abnormal; Notable for the following components:    WBC 16.10 (*)     Hemoglobin 11.9 (*)     Platelets 613 (*)     Neutrophils, Absolute 10.40 (*)     Lymphocytes, Absolute 4.10 (*)     Monocytes, Absolute 1.30 (*)     All other components within normal limits   CHLAMYDIA TRACHOMATIS, NEISSERIA GONORRHOEAE, PCR - Normal   URINALYSIS W/ CULTURE IF INDICATED - Normal    Narrative:     In absence of clinical symptoms, the presence of pyuria, bacteria, and/or nitrites on the urinalysis result does not correlate with infection.  Urine microscopic not indicated.   PROTIME-INR - Normal   URINE CULTURE   RAINBOW DRAW    Narrative:     The following orders were created for panel order [place] Draw.  Procedure                               Abnormality         Status                     ---------                               -----------         ------                     Green Top (Gel)[[id number]]                                  Final result               Lavender Top[[id number]]                                     Final result               Gold Top - SST[[id number]]                                   Final result               Light Blue Top[[id number]]                                   Final result                 Please view results for these tests on the individual orders.   HCG, QUANTITATIVE, PREGNANCY    Narrative:     HCG Ranges by Gestational Age    Females - non-pregnant premenopausal   </= 1mIU/mL HCG  Females - postmenopausal               </= 7mIU/mL HCG    3 Weeks       5.4   -      72 mIU/mL  4 Weeks      10.2   -     708 mIU/mL  5 Weeks       217   -   8,245 mIU/mL  6 Weeks       152   -  32,177 mIU/mL  7 Weeks     4,059    - 153,767 mIU/mL  8 Weeks    31,366   - 149,094 mIU/mL  9 Weeks    59,109   - 135,901 mIU/mL  10 Weeks   44,186   - 170,409 mIU/mL  12 Weeks   27,107   - 201,615 mIU/mL  14 Weeks   24,302   -  93,646 mIU/mL  15 Weeks   12,540   -  69,747 mIU/mL  16 Weeks    8,904   -  55,332 mIU/mL  17 Weeks    8,240   -  51,793 mIU/mL  18 Weeks    9,649   -  55,271 mIU/mL     ABO/RH   CBC AND DIFFERENTIAL    Narrative:     The following orders were created for panel order CBC & Differential.  Procedure                               Abnormality         Status                     ---------                               -----------         ------                     CBC Auto Differential[[id number]]        Abnormal            Final result                 Please view results for these tests on the individual orders.   GREEN TOP   LAVENDER TOP   GOLD TOP - SST   LIGHT BLUE TOP                                            MDM  Number of Diagnoses or Management Options  Bloody show and cramping in early pregnancy  Threatened  in first trimester  Vaginal bleeding in pregnancy, first trimester  Diagnosis management comments: Chart Review: 2022 patient was seen by pediatrician's office for immunizations.  Comorbidity: Past Medical History:  No date: Anxiety  2019: Immune thrombocytopenic purpura (HCC)  No date: Iron deficiency anemia  10/2019: Kidney stone  2019: Thrombocytopenia (HCC)  Imaging: Was interpreted by physician and reviewed by myself: US Ob Transvaginal   Final Result    1. Abnormal appearance of the suspected gestational sac within the    endometrial canal with multiple low-level linear echoes and elongated    oval yolk sac. No visualized fetal pole is identified. Findings are    concerning for failed pregnancy in progress given patient's vaginal    bleeding.    2. Probable 2.0 cm right-sided corpus luteum cyst.         Electronically Signed By-Jer Guzmán MD On:2022 7:12 PM    This report was  finalized on  by  Jer Guzmán MD.     US Ob < 14 Weeks Single or First Gestation   Final Result    1. Abnormal appearance of the suspected gestational sac within the    endometrial canal with multiple low-level linear echoes and elongated    oval yolk sac. No visualized fetal pole is identified. Findings are    concerning for failed pregnancy in progress given patient's vaginal    bleeding.    2. Probable 2.0 cm right-sided corpus luteum cyst.         Electronically Signed By-Jer Guzmán MD On:2022 7:12 PM    This report was finalized on  by  Jer Guzmán MD.    Patient undressed and placed in gown for exam.  Appropriate PPE worn during patient exam.  Patient is alert and oriented x3.  She is in no acute distress.  She is noted to have mild tenderness to the lower pelvis upon palpation.  IV established and labs obtained.  Cath UA was obtained during pelvic exam.  OB ultrasound obtained with the above findings. White blood cell count was elevated at 16,100 no shift platelets 613-patient has a history of thrombocytopenia hemoglobin 11.9 hematocrit 37 urine is unremarkable wet prep was significant for white cells, no clue cells nor trichomonas.  hCG was 6387.  ABO Rh a positive GC chlamydia negative.  Ultrasound is concerning for threatened .  Patient was given an order for repeat hCG this coming , 2022.  Upon reassessment she is pink warm and dry no distress noted her vital signs are stable.    Disposition/Treatment: Discussed results with patient, verbalized understanding.  Discussed reasons to return to the ER, patient verbalized understanding.  Agreeable with plan of care.  Patient was stable upon discharge.       Part of this note may be an electronic transcription/translation of spoken language to printed text using the Dragon Dictation System.            Amount and/or Complexity of Data Reviewed  Clinical lab tests: reviewed  Tests in the radiology  section of CPT®: reviewed    Risk of Complications, Morbidity, and/or Mortality  General comments: This patient was discussed with my attending, Dr. Barrientos who independently evaluated imaging.    Patient Progress  Patient progress: stable      Final diagnoses:   Threatened  in first trimester   Vaginal bleeding in pregnancy, first trimester   Bloody show and cramping in early pregnancy       ED Disposition  ED Disposition     ED Disposition   Discharge    Condition   Stable    Comment   --             Jose Ingram, APRN  [address] [facility] [place] [address] [phone]    Schedule an appointment as soon as possible for a visit   As needed    Rocky Driscoll MD  [address] [facility] [place] [address] [phone]    Go to   As scheduled    [facility] EMERGENCY DEPARTMENT  [address] [facility] [address] [phone]  Go to   If symptoms worsen         Medication List      No changes were made to your prescriptions during this visit.          Dee Guevara, APRN  09/10/22 0129

## 2022-09-09 NOTE — DISCHARGE INSTRUCTIONS
Nothing in the vagina until follow-up with OB/GYN.  As discussed, come Sunday between 8 AM and 10 AM to have repeat hCG.  Your previous level was 6387.  Keep her scheduled follow-up with OB/GYN.  Return to the ER for any new or worsening symptoms, including but not limited to: Worsening pain, heavy vaginal bleeding saturating at least 1 pad per hour, dizziness, syncope.

## 2022-09-10 LAB — BACTERIA SPEC AEROBE CULT: NO GROWTH

## 2023-12-04 LAB
EXTERNAL HEPATITIS B SURFACE ANTIGEN: NEGATIVE
EXTERNAL HEPATITIS C AB: NORMAL
EXTERNAL RUBELLA QUALITATIVE: NORMAL
EXTERNAL SYPHILIS RPR SCREEN: NORMAL
HIV1 P24 AG SERPL QL IA: NORMAL

## 2023-12-29 ENCOUNTER — HOSPITAL ENCOUNTER (EMERGENCY)
Facility: HOSPITAL | Age: 38
Discharge: LEFT BEFORE DISCHARGE | End: 2023-12-29
Attending: EMERGENCY MEDICINE
Payer: MEDICAID

## 2023-12-29 VITALS
SYSTOLIC BLOOD PRESSURE: 132 MMHG | HEIGHT: 62 IN | OXYGEN SATURATION: 100 % | RESPIRATION RATE: 16 BRPM | WEIGHT: 153.66 LBS | DIASTOLIC BLOOD PRESSURE: 66 MMHG | TEMPERATURE: 98.9 F | BODY MASS INDEX: 28.28 KG/M2 | HEART RATE: 100 BPM

## 2023-12-29 PROCEDURE — 99211 OFF/OP EST MAY X REQ PHY/QHP: CPT | Performed by: EMERGENCY MEDICINE

## 2024-05-22 ENCOUNTER — HOSPITAL ENCOUNTER (OUTPATIENT)
Facility: HOSPITAL | Age: 39
Discharge: HOME | End: 2024-05-22
Attending: OBSTETRICS & GYNECOLOGY | Admitting: OBSTETRICS & GYNECOLOGY
Payer: MEDICAID

## 2024-05-22 VITALS
HEART RATE: 90 BPM | TEMPERATURE: 99.1 F | SYSTOLIC BLOOD PRESSURE: 110 MMHG | HEIGHT: 62 IN | OXYGEN SATURATION: 98 % | BODY MASS INDEX: 31.12 KG/M2 | WEIGHT: 169.09 LBS | RESPIRATION RATE: 16 BRPM | DIASTOLIC BLOOD PRESSURE: 55 MMHG

## 2024-05-22 PROBLEM — Z34.90 PREGNANT AND NOT YET DELIVERED: Status: ACTIVE | Noted: 2024-05-22

## 2024-05-22 LAB — A1 MICROGLOB PLACENTAL VAG QL: NEGATIVE

## 2024-05-22 PROCEDURE — G0463 HOSPITAL OUTPT CLINIC VISIT: HCPCS

## 2024-05-22 PROCEDURE — 84112 EVAL AMNIOTIC FLUID PROTEIN: CPT | Performed by: OBSTETRICS & GYNECOLOGY

## 2024-05-22 RX ORDER — FLUOXETINE HYDROCHLORIDE 20 MG/1
20 CAPSULE ORAL DAILY
COMMUNITY

## 2024-05-22 NOTE — SIGNIFICANT NOTE
Notified MD that  presents at 32w /c c/o LOF. Pt reports a large gush of clear fluid at approx 1545 that saturated through her underwear to her pants. Pt denies vag bldg of LOF. Pt reports having one previous  delivery. Pt denies pregnancy complications. SVE FT/thick. ROM plus pending.

## 2024-06-15 ENCOUNTER — HOSPITAL ENCOUNTER (OUTPATIENT)
Facility: HOSPITAL | Age: 39
Discharge: HOME | End: 2024-06-16
Attending: OBSTETRICS & GYNECOLOGY | Admitting: OBSTETRICS & GYNECOLOGY
Payer: MEDICAID

## 2024-06-15 PROBLEM — Z34.90 CURRENTLY PREGNANT: Status: ACTIVE | Noted: 2024-06-15

## 2024-06-15 LAB
A1 MICROGLOB PLACENTAL VAG QL: NEGATIVE
ABO GROUP BLD: NORMAL
BLD GP AB SCN SERPL QL: NEGATIVE
DEPRECATED RDW RBC AUTO: 58.2 FL (ref 37–54)
ERYTHROCYTE [DISTWIDTH] IN BLOOD BY AUTOMATED COUNT: 17.3 % (ref 12.3–15.4)
HCT VFR BLD AUTO: 37.1 % (ref 34–46.6)
HGB BLD-MCNC: 12.2 G/DL (ref 12–15.9)
MCH RBC QN AUTO: 29.8 PG (ref 26.6–33)
MCHC RBC AUTO-ENTMCNC: 32.9 G/DL (ref 31.5–35.7)
MCV RBC AUTO: 90.7 FL (ref 79–97)
PLATELET # BLD AUTO: 420 10*3/MM3 (ref 140–450)
PMV BLD AUTO: 11.6 FL (ref 6–12)
RBC # BLD AUTO: 4.09 10*6/MM3 (ref 3.77–5.28)
RH BLD: POSITIVE
T&S EXPIRATION DATE: NORMAL
WBC NRBC COR # BLD AUTO: 16.36 10*3/MM3 (ref 3.4–10.8)

## 2024-06-15 PROCEDURE — 96361 HYDRATE IV INFUSION ADD-ON: CPT

## 2024-06-15 PROCEDURE — G0463 HOSPITAL OUTPT CLINIC VISIT: HCPCS

## 2024-06-15 PROCEDURE — 25810000003 LACTATED RINGERS SOLUTION: Performed by: OBSTETRICS & GYNECOLOGY

## 2024-06-15 PROCEDURE — 96374 THER/PROPH/DIAG INJ IV PUSH: CPT

## 2024-06-15 PROCEDURE — 86901 BLOOD TYPING SEROLOGIC RH(D): CPT | Performed by: OBSTETRICS & GYNECOLOGY

## 2024-06-15 PROCEDURE — 85027 COMPLETE CBC AUTOMATED: CPT | Performed by: OBSTETRICS & GYNECOLOGY

## 2024-06-15 PROCEDURE — 86850 RBC ANTIBODY SCREEN: CPT | Performed by: OBSTETRICS & GYNECOLOGY

## 2024-06-15 PROCEDURE — 86900 BLOOD TYPING SEROLOGIC ABO: CPT | Performed by: OBSTETRICS & GYNECOLOGY

## 2024-06-15 PROCEDURE — 86780 TREPONEMA PALLIDUM: CPT | Performed by: OBSTETRICS & GYNECOLOGY

## 2024-06-15 PROCEDURE — 84112 EVAL AMNIOTIC FLUID PROTEIN: CPT | Performed by: OBSTETRICS & GYNECOLOGY

## 2024-06-15 PROCEDURE — 25010000002 MORPHINE PER 10 MG: Performed by: OBSTETRICS & GYNECOLOGY

## 2024-06-15 PROCEDURE — 25810000003 LACTATED RINGERS PER 1000 ML: Performed by: OBSTETRICS & GYNECOLOGY

## 2024-06-15 RX ORDER — CALCIUM CARBONATE 500 MG/1
2 TABLET, CHEWABLE ORAL 3 TIMES DAILY PRN
Status: DISCONTINUED | OUTPATIENT
Start: 2024-06-15 | End: 2024-06-16 | Stop reason: HOSPADM

## 2024-06-15 RX ORDER — SODIUM CHLORIDE 0.9 % (FLUSH) 0.9 %
10 SYRINGE (ML) INJECTION EVERY 12 HOURS SCHEDULED
Status: DISCONTINUED | OUTPATIENT
Start: 2024-06-15 | End: 2024-06-16 | Stop reason: HOSPADM

## 2024-06-15 RX ORDER — SODIUM CHLORIDE, SODIUM LACTATE, POTASSIUM CHLORIDE, CALCIUM CHLORIDE 600; 310; 30; 20 MG/100ML; MG/100ML; MG/100ML; MG/100ML
125 INJECTION, SOLUTION INTRAVENOUS CONTINUOUS
Status: DISCONTINUED | OUTPATIENT
Start: 2024-06-15 | End: 2024-06-16 | Stop reason: HOSPADM

## 2024-06-15 RX ORDER — ONDANSETRON 2 MG/ML
4 INJECTION INTRAMUSCULAR; INTRAVENOUS EVERY 6 HOURS PRN
Status: DISCONTINUED | OUTPATIENT
Start: 2024-06-15 | End: 2024-06-16 | Stop reason: HOSPADM

## 2024-06-15 RX ADMIN — MORPHINE SULFATE 4 MG: 4 INJECTION, SOLUTION INTRAMUSCULAR; INTRAVENOUS at 22:49

## 2024-06-15 RX ADMIN — SODIUM CHLORIDE, SODIUM LACTATE, POTASSIUM CHLORIDE, AND CALCIUM CHLORIDE 125 ML/HR: .6; .31; .03; .02 INJECTION, SOLUTION INTRAVENOUS at 23:00

## 2024-06-15 RX ADMIN — SODIUM CHLORIDE, SODIUM LACTATE, POTASSIUM CHLORIDE, AND CALCIUM CHLORIDE 1000 ML: .6; .31; .03; .02 INJECTION, SOLUTION INTRAVENOUS at 21:56

## 2024-06-16 VITALS
SYSTOLIC BLOOD PRESSURE: 111 MMHG | HEART RATE: 68 BPM | WEIGHT: 171.74 LBS | TEMPERATURE: 98.4 F | BODY MASS INDEX: 31.6 KG/M2 | HEIGHT: 62 IN | OXYGEN SATURATION: 99 % | DIASTOLIC BLOOD PRESSURE: 56 MMHG

## 2024-06-16 LAB
EXTERNAL GROUP B STREP ANTIGEN: NORMAL
T PALLIDUM IGG SER QL: NORMAL

## 2024-06-16 PROCEDURE — 96361 HYDRATE IV INFUSION ADD-ON: CPT

## 2024-06-16 PROCEDURE — 25010000002 PENICILLIN G POTASSIUM PER 600000 UNITS: Performed by: OBSTETRICS & GYNECOLOGY

## 2024-06-16 PROCEDURE — 25010000002 MORPHINE PER 10 MG: Performed by: OBSTETRICS & GYNECOLOGY

## 2024-06-16 PROCEDURE — 96376 TX/PRO/DX INJ SAME DRUG ADON: CPT

## 2024-06-16 RX ADMIN — PENICILLIN G POTASSIUM 2.5 MILLION UNITS: 20000000 INJECTION, POWDER, FOR SOLUTION INTRAVENOUS at 04:05

## 2024-06-16 RX ADMIN — SODIUM CHLORIDE 5 MILLION UNITS: 900 INJECTION INTRAVENOUS at 00:02

## 2024-06-16 RX ADMIN — MORPHINE SULFATE 4 MG: 4 INJECTION, SOLUTION INTRAMUSCULAR; INTRAVENOUS at 05:31

## 2024-06-16 RX ADMIN — Medication 400 MG: at 08:21

## 2024-06-16 NOTE — PLAN OF CARE
Goal Outcome Evaluation:  Plan of Care Reviewed With: patient        Progress: improving  Outcome Evaluation: Patient has been jerry all night. Pain has increased but SVE has not changed.

## 2024-06-16 NOTE — DISCHARGE SUMMARY
Date of Discharge:  2024    Discharge Diagnosis:  contractions    Presenting Problem/History of Present Illness:  Active Hospital Problems    Diagnosis  POA    **Currently pregnant [Z34.90]  Not Applicable      Resolved Hospital Problems   No resolved problems to display.      Hospital Course:  Patient is a 38 y.o.  presented with contractions. She reports painful contractions getting closer together. No bleeding. She was also concerned about LOF. Neg ROM plus. No LOF noted during her observation. She was observed for approx 12 hours with no cervical change.      Pertinent Test Results:   Lab Results (last 72 hours)       Procedure Component Value Units Date/Time    HIV-1 Antibody, EIA [[id number]] Resulted: 23    Specimen: Blood Updated: 24     External HIV Antibody Non-Reactive    Hepatitis C Antibody [[id number]] Resulted: 23    Specimen: Blood Updated: 24 001     External Hepatitis C Ab NR    Hepatitis B Surface Antigen [[id number]] Resulted: 23    Specimen: Blood Updated: 24 001     External Hepatitis B Surface Ag Negative    RPR [[id number]] Resulted: 23    Specimen: Blood Updated: 24 001     External RPR Non-Reactive    Rubella Antibody, IgG [[id number]] Resulted: 23    Specimen: Blood Updated: 24 0018     External Rubella Qual Immune    Group B Streptococcus Culture - Swab, Vaginal/Rectum [[id number]] Resulted: 24    Specimen: Swab from Vaginal/Rectum Updated: 24     External Strep Group B Ag Unknown    CBC (No Diff) [[id number]]  (Abnormal) Collected: 06/15/24 2152    Specimen: Blood Updated: 06/15/24 2225     WBC 16.36 10*3/mm3      RBC 4.09 10*6/mm3      Hemoglobin 12.2 g/dL      Hematocrit 37.1 %      MCV 90.7 fL      MCH 29.8 pg      MCHC 32.9 g/dL      RDW 17.3 %      RDW-SD 58.2 fl      MPV 11.6 fL      Platelets 420 10*3/mm3     T Pallidum Antibody w/ reflex RPR (Syphilis) [[id number]] Collected:  06/15/24 2152    Specimen: Blood Updated: 06/15/24 2223    Rapid Assay For ROM - Amniotic Fluid, Amniotic Sac [[id number]]  (Normal) Collected: 06/15/24 2103    Specimen: Amniotic Fluid from Amniotic Sac Updated: 06/15/24 2139     Rupture of Membranes Negative          Imaging Results (Last 72 Hours)       ** No results found for the last 72 hours. **            Condition on Discharge:  Good    Vital Signs:  Vitals:    06/15/24 2331 06/16/24 0201 06/16/24 0536 06/16/24 0601   BP: 120/62 115/62 115/53 91/40   Pulse: 79 84 74 72   Temp:       TempSrc:       SpO2:       Weight:       Height:         FHT-120, mod variability  [place]-every 1-3 min    Physical Exam:     General Appearance:    Alert, cooperative, in no acute distress   Abdomen:     Soft, non-tender, non-distended, no guarding, no rebound        tenderness   Genitalia:    Cx 1/30/-3, posterior, unchanged   Extremities:   Moves all extremities well, no edema, no cyanosis, no             redness       Discharge Disposition:  Home    Discharge Medications:     Discharge Medications        New Medications        Instructions Start Date   magnesium oxide 400 tablet tablet  Commonly known as: MAG-OX   400 mg, Oral, 2 Times Daily             Continue These Medications        Instructions Start Date   acetaminophen 325 MG tablet  Commonly known as: TYLENOL   650 mg, Oral, Every 6 Hours PRN      ferrous gluconate 324 MG tablet  Commonly known as: FERGON   324 mg, Oral, Daily With Breakfast      FLUoxetine 20 MG capsule  Commonly known as: PROzac   20 mg, Oral, Daily      multivitamin with minerals tablet tablet   1 tablet, Oral, Daily      ondansetron 8 MG tablet  Commonly known as: ZOFRAN   8 mg, Oral, 3 Times Daily PRN               Activity at Discharge:   Activity Instructions       Bathing Restrictions      Type of Restriction: Bathing    Bathing Restrictions: No Tub Bath    Driving Restrictions      Type of Restriction: Driving    Driving Restrictions: No Driving  While Taking Narcotics    Lifting Restrictions      Type of Restriction: Lifting    Lifting Restrictions: Lifting Restriction (Indicate Limit)    Weight Limit (Pounds): 10    Length of Lifting Restriction: 2 weeks    Sexual Activity Restrictions      Type of Restriction: Sex    Explain Sexual Activity Restrictions: Pelvic rest for 6 weeks.            Follow-up Appointments  No future appointments.  Additional Instructions for the Follow-ups that You Need to Schedule       Call MD With Problems / Concerns   As directed      Instructions: Temperature >100.4  Bleeding >1 pad per hour  Depressed mood  Pain not controlled by pain medications.    Order Comments: Instructions: Temperature >100.4 Bleeding >1 pad per hour Depressed mood Pain not controlled by pain medications.         Discharge Follow-up with Specialty: OBGYN; 4 Days   As directed      Specialty: OBGYN   Follow Up: 4 Days                Test Results Pending at Discharge  Pending Labs       Order Current Status    T Pallidum Antibody w/ reflex RPR (Syphilis) In process             Mandie Chau MD  06/16/24  08:05 EDT

## 2024-06-17 NOTE — SIGNIFICANT NOTE
Case Management Discharge Note                Selected Continued Care - Discharged on 6/16/2024 Admission date: 6/15/2024 - Discharge disposition: Home or Self Care              Transportation Services  Private: Car    Final Discharge Disposition Code: (P) 01 - home or self-care

## 2024-06-19 ENCOUNTER — APPOINTMENT (OUTPATIENT)
Dept: ULTRASOUND IMAGING | Facility: HOSPITAL | Age: 39
End: 2024-06-19
Payer: MEDICAID

## 2024-06-19 ENCOUNTER — HOSPITAL ENCOUNTER (OUTPATIENT)
Facility: HOSPITAL | Age: 39
Discharge: HOME | End: 2024-06-19
Attending: OBSTETRICS & GYNECOLOGY | Admitting: OBSTETRICS & GYNECOLOGY
Payer: MEDICAID

## 2024-06-19 VITALS
WEIGHT: 170.86 LBS | HEART RATE: 72 BPM | BODY MASS INDEX: 31.44 KG/M2 | OXYGEN SATURATION: 98 % | HEIGHT: 62 IN | SYSTOLIC BLOOD PRESSURE: 102 MMHG | DIASTOLIC BLOOD PRESSURE: 58 MMHG

## 2024-06-19 LAB
BACTERIA UR QL AUTO: ABNORMAL /HPF
BASOPHILS # BLD AUTO: 0.04 10*3/MM3 (ref 0–0.2)
BASOPHILS NFR BLD AUTO: 0.3 % (ref 0–1.5)
BILIRUB UR QL STRIP: NEGATIVE
CLARITY UR: CLEAR
COLOR UR: YELLOW
DEPRECATED RDW RBC AUTO: 55.6 FL (ref 37–54)
EOSINOPHIL # BLD AUTO: 0.19 10*3/MM3 (ref 0–0.4)
EOSINOPHIL NFR BLD AUTO: 1.5 % (ref 0.3–6.2)
ERYTHROCYTE [DISTWIDTH] IN BLOOD BY AUTOMATED COUNT: 16.8 % (ref 12.3–15.4)
GLUCOSE UR STRIP-MCNC: NEGATIVE MG/DL
HCT VFR BLD AUTO: 35.5 % (ref 34–46.6)
HGB BLD-MCNC: 11.7 G/DL (ref 12–15.9)
HGB UR QL STRIP.AUTO: NEGATIVE
HYALINE CASTS UR QL AUTO: ABNORMAL /LPF
IMM GRANULOCYTES # BLD AUTO: 0.1 10*3/MM3 (ref 0–0.05)
IMM GRANULOCYTES NFR BLD AUTO: 0.8 % (ref 0–0.5)
KETONES UR QL STRIP: NEGATIVE
LEUKOCYTE ESTERASE UR QL STRIP.AUTO: ABNORMAL
LYMPHOCYTES # BLD AUTO: 2.87 10*3/MM3 (ref 0.7–3.1)
LYMPHOCYTES NFR BLD AUTO: 23.3 % (ref 19.6–45.3)
MCH RBC QN AUTO: 29.8 PG (ref 26.6–33)
MCHC RBC AUTO-ENTMCNC: 33 G/DL (ref 31.5–35.7)
MCV RBC AUTO: 90.3 FL (ref 79–97)
MONOCYTES # BLD AUTO: 1.76 10*3/MM3 (ref 0.1–0.9)
MONOCYTES NFR BLD AUTO: 14.3 % (ref 5–12)
NEUTROPHILS NFR BLD AUTO: 59.8 % (ref 42.7–76)
NEUTROPHILS NFR BLD AUTO: 7.35 10*3/MM3 (ref 1.7–7)
NITRITE UR QL STRIP: NEGATIVE
NRBC BLD AUTO-RTO: 0 /100 WBC (ref 0–0.2)
PH UR STRIP.AUTO: 7.5 [PH] (ref 5–8)
PLATELET # BLD AUTO: 398 10*3/MM3 (ref 140–450)
PMV BLD AUTO: 11 FL (ref 6–12)
PROT UR QL STRIP: NEGATIVE
RBC # BLD AUTO: 3.93 10*6/MM3 (ref 3.77–5.28)
RBC # UR STRIP: ABNORMAL /HPF
REF LAB TEST METHOD: ABNORMAL
SP GR UR STRIP: 1.01 (ref 1–1.03)
SQUAMOUS #/AREA URNS HPF: ABNORMAL /HPF
UROBILINOGEN UR QL STRIP: ABNORMAL
WBC # UR STRIP: ABNORMAL /HPF
WBC NRBC COR # BLD AUTO: 12.31 10*3/MM3 (ref 3.4–10.8)

## 2024-06-19 PROCEDURE — 85025 COMPLETE CBC W/AUTO DIFF WBC: CPT | Performed by: OBSTETRICS & GYNECOLOGY

## 2024-06-19 PROCEDURE — 25010000002 TERBUTALINE PER 1 MG: Performed by: OBSTETRICS & GYNECOLOGY

## 2024-06-19 PROCEDURE — 76775 US EXAM ABDO BACK WALL LIM: CPT

## 2024-06-19 PROCEDURE — G0463 HOSPITAL OUTPT CLINIC VISIT: HCPCS

## 2024-06-19 PROCEDURE — 87086 URINE CULTURE/COLONY COUNT: CPT | Performed by: OBSTETRICS & GYNECOLOGY

## 2024-06-19 PROCEDURE — 81001 URINALYSIS AUTO W/SCOPE: CPT | Performed by: OBSTETRICS & GYNECOLOGY

## 2024-06-19 PROCEDURE — 96372 THER/PROPH/DIAG INJ SC/IM: CPT

## 2024-06-19 RX ORDER — ACETAMINOPHEN 325 MG/1
650 TABLET ORAL EVERY 6 HOURS PRN
Status: DISCONTINUED | OUTPATIENT
Start: 2024-06-19 | End: 2024-06-19 | Stop reason: HOSPADM

## 2024-06-19 RX ORDER — TERBUTALINE SULFATE 1 MG/ML
0.25 INJECTION, SOLUTION SUBCUTANEOUS ONCE
Status: DISCONTINUED | OUTPATIENT
Start: 2024-06-19 | End: 2024-06-19 | Stop reason: SDUPTHER

## 2024-06-19 RX ORDER — TERBUTALINE SULFATE 1 MG/ML
0.25 INJECTION, SOLUTION SUBCUTANEOUS ONCE
Status: COMPLETED | OUTPATIENT
Start: 2024-06-19 | End: 2024-06-19

## 2024-06-19 RX ADMIN — TERBUTALINE SULFATE 0.25 MG: 1 INJECTION, SOLUTION SUBCUTANEOUS at 07:41

## 2024-06-19 NOTE — H&P
[facility]  Obstetric History and Physical     Chief Complaint: Contractions.    Contractions    Subjective     Patient is a 38 y.o. female  currently at 36w0d by dates and ultrasound, who presents with flank pain with contractions.    She was in for similar complaints earlier this week.  Her evaluation was negative at that time.    Her prenatal care is uncomplicated.  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        Prenatal Information:  Prenatal Results       Initial Prenatal Labs       Test Value Reference Range Date Time    Hemoglobin ^ 9.8 g/dL 12.0 - 16.0 24 0921      ^ 11.0 g/dL 12.0 - 16.0 01/10/24 0951    Hematocrit ^ 30.3 % 36.0 - 46.0 24 09      ^ 33.4 % 36.0 - 46.0 01/10/24 0951    Platelets ^ 636 10*3/uL 140 - 440 24 09      ^ 760 10*3/uL 140 - 440 01/10/24 0951    Rubella IgG ^ Immune   23     Hepatitis B SAg ^ Negative   23     Hepatitis C Ab ^ NR   23     RPR ^ Non-Reactive   23     T. Pallidum Ab   Non-Reactive  Non-Reactive 06/15/24 2152    ABO  A   06/15/24 2152    Rh  Positive   06/15/24 2152    Antibody Screen        HIV ^ Non-Reactive   23     Urine Culture        Gonorrhea        Chlamydia        TSH        HgB A1c         Varicella IgG        Hemoglobinopathy Fractionation        Hemoglobinopathy (genetic testing)        Cystic fibrosis                   Fetal testing        Test Value Reference Range Date Time    NIPT        MSAFP        AFP-4                  2nd and 3rd Trimester       Test Value Reference Range Date Time    Hemoglobin (repeated)  11.7 g/dL 12.0 - 15.9 240       12.2 g/dL 12.0 - 15.9 06/15/24 2152      ^ 11.7 g/dL 12.0 - 16.0 24 0943    Hematocrit (repeated)  35.5 % 34.0 - 46.6 240       37.1 % 34.0 - 46.6 06/15/24 2152      ^ 34.9 % 36.0 - 46.0 24 0943    Platelets   398 10*3/mm3 140 - 450 24 0330       420 10*3/mm3 140 - 450  06/15/24 2152      ^ 394 10*3/uL 140 - 440 06/12/24 0943      ^ 636 10*3/uL 140 - 440 02/29/24 0921      ^ 760 10*3/uL 140 - 440 01/10/24 0951    1 hour GTT         Antibody Screen (repeated)  Negative   06/15/24 2152    3rd TM syphilis scrn (repeated)  RPR         3rd TM syphilis scrn (repeated) FTA        GTT Fasting        GTT 1 Hr        GTT 2 Hr        GTT 3 Hr        Group B Strep ^ Unknown   06/16/24               Other testing        Test Value Reference Range Date Time    Parvo IgG         CMV IgG                   Drug Screening       Test Value Reference Range Date Time    Amphetamine Screen        Barbiturate Screen        Benzodiazepine Screen        Methadone Screen        Phencyclidine Screen        Opiates Screen        THC Screen        Cocaine Screen        Propoxyphene Screen        Buprenorphine Screen        Methamphetamine Screen        Oxycodone Screen        Tricyclic Antidepressants Screen                  Legend    ^: Historical                          External Prenatal Results       Pregnancy Outside Results - Transcribed From Office Records - See Scanned Records For Details       Test Value Date Time    ABO  A  06/15/24 2152    Rh  Positive  06/15/24 2152    Antibody Screen  Negative  06/15/24 2152    Varicella IgG       Rubella ^ Immune  12/04/23     Hgb  11.7 g/dL 06/19/24 0330       12.2 g/dL 06/15/24 2152      ^ 11.7 g/dL 06/12/24 0943      ^ 9.8 g/dL 02/29/24 0921      ^ 11.0 g/dL 01/10/24 0951    Hct  35.5 % 06/19/24 0330       37.1 % 06/15/24 2152      ^ 34.9 % 06/12/24 0943      ^ 30.3 % 02/29/24 0921      ^ 33.4 % 01/10/24 0951    HgB A1c        1h GTT       3h GTT Fasting       3h GTT 1 hour       3h GTT 2 hour       3h GTT 3 hour        Gonorrhea (discrete)       Chlamydia (discrete)       RPR ^ Non-Reactive  12/04/23     Syphilis Antibody       HBsAg ^ Negative  12/04/23     Herpes Simplex Virus PCR       Herpes Simplex VIrus Culture       HIV ^ Non-Reactive  12/04/23     Hep  C RNA Quant PCR       Hep C Antibody ^ NR  12/04/23     AFP       NIPT       Cystic Fibroisis        Group B Strep ^ Unknown  06/16/24     GBS Susceptibility to Clindamycin       GBS Susceptibility to Erythromycin       Fetal Fibronectin       Genetic Testing, Maternal Blood                 Drug Screening       Test Value Date Time    Urine Drug Screen       Amphetamine Screen       Barbiturate Screen       Benzodiazepine Screen       Methadone Screen       Phencyclidine Screen       Opiates Screen       THC Screen       Cocaine Screen       Propoxyphene Screen       Buprenorphine Screen       Methamphetamine Screen       Oxycodone Screen       Tricyclic Antidepressants Screen                 Legend    ^: Historical                             Past OB History: Multiple prior first trimester pregnancy losses.  Several uncomplicated vaginal births       Past Medical History: Past Medical History:   Diagnosis Date    Anxiety     Immune thrombocytopenic purpura 11/18/2019    Iron deficiency anemia     Kidney stone 10/2019    Thrombocytopenia 11/29/2019         Past Surgical History Past Surgical History:   Procedure Laterality Date    SPLENECTOMY N/A 11/11/2020    Procedure: SPLENECTOMY LAPAROSCOPIC;  Surgeon: Erick Akhtar MD;  Location: [facility] MAIN OR;  Service: General;  Laterality: N/A;         Family History: Family History   Problem Relation Age of Onset    Thrombocytopenia Nephew         ITP    Cancer Paternal Grandmother     Heart failure Maternal Grandmother     Diabetes Maternal Great-Grandmother     Heart failure Maternal Great-Grandmother       Social History:  reports that she has never smoked. She has never been exposed to tobacco smoke. She has never used smokeless tobacco.   reports no history of alcohol use.   reports no history of drug use.        General ROS: Pertinent items are noted in HPI    Objective      Vitals:     Vitals:    06/19/24 0530 06/19/24 0600 06/19/24 0630 06/19/24 0700   BP:  110/65 112/58 104/56 102/58   Pulse: 75 74 68 72   SpO2: 97% 96% 98% 98%   Weight:       Height:           Fetal Heart Rate Assessment:   Category 1    [place]:   External monitor reveals regular contractions every 2 to 3 minutes     Physical Exam:     General Appearance:    Alert, cooperative, in no acute distress   Lungs:     Clear to auscultation,respirations regular.    Heart:    Regular rhythm and normal rate.   Breast Exam:    Deferred   Abdomen:     Normal bowel sounds, no masses, soft non-tender,          non-distended, no guarding, no rebound tenderness   Pelvic Exam:  Per MD    Presentation: Vertex    Cervix: Fingertip, uneffaced, ballotable vertex   Extremities:   Moves all extremities well, no edema, no cyanosis, no              redness   Skin:   No bleeding, bruising or rash   Neurologic:   No focal neurologic defect          Laboratory Results:   Lab Results (last 48 hours)       Procedure Component Value Units Date/Time    CBC & Differential [[id number]]  (Abnormal) Collected: 06/19/24 0330    Specimen: Blood from Arm, Right Updated: 06/19/24 0343    Narrative:      The following orders were created for panel order CBC & Differential.  Procedure                               Abnormality         Status                     ---------                               -----------         ------                     CBC Auto Differential[[id number]]        Abnormal            Final result                 Please view results for these tests on the individual orders.    CBC Auto Differential [[id number]]  (Abnormal) Collected: 06/19/24 0330    Specimen: Blood from Arm, Right Updated: 06/19/24 0343     WBC 12.31 10*3/mm3      RBC 3.93 10*6/mm3      Hemoglobin 11.7 g/dL      Hematocrit 35.5 %      MCV 90.3 fL      MCH 29.8 pg      MCHC 33.0 g/dL      RDW 16.8 %      RDW-SD 55.6 fl      MPV 11.0 fL      Platelets 398 10*3/mm3      Neutrophil % 59.8 %      Lymphocyte % 23.3 %      Monocyte % 14.3 %      Eosinophil % 1.5 %       Basophil % 0.3 %      Immature Grans % 0.8 %      Neutrophils, Absolute 7.35 10*3/mm3      Lymphocytes, Absolute 2.87 10*3/mm3      Monocytes, Absolute 1.76 10*3/mm3      Eosinophils, Absolute 0.19 10*3/mm3      Basophils, Absolute 0.04 10*3/mm3      Immature Grans, Absolute 0.10 10*3/mm3      nRBC 0.0 /100 WBC     Urinalysis With Culture If Indicated - Urine, Clean Catch [[id number]]  (Abnormal) Collected: 24    Specimen: Urine, Clean Catch Updated: 24     Color, UA Yellow     Appearance, UA Clear     pH, UA 7.5     Specific Gravity, UA 1.007     Glucose, UA Negative     Ketones, UA Negative     Bilirubin, UA Negative     Blood, UA Negative     Protein, UA Negative     Leuk Esterase, UA Trace     Nitrite, UA Negative     Urobilinogen, UA 0.2 E.U./dL    Narrative:      In absence of clinical symptoms, the presence of pyuria, bacteria, and/or nitrites on the urinalysis result does not correlate with infection.    Urinalysis, Microscopic Only - Urine, Clean Catch [[id number]]  (Abnormal) Collected: 24    Specimen: Urine, Clean Catch Updated: 24     RBC, UA 0-2 /HPF      WBC, UA 0-2 /HPF      Bacteria, UA None Seen /HPF      Squamous Epithelial Cells, UA 3-6 /HPF      Hyaline Casts, UA None Seen /LPF      Methodology Automated Microscopy    Urine Culture - Urine, Urine, Clean Catch [[id number]] Collected: 24    Specimen: Urine, Clean Catch Updated: 24 0259            Other Studies: Ultrasound the kidneys preliminary report was no evidence of stone or hydronephrosis     Assessment & Plan     Active Problems:    * No active hospital problems. *         Assessment:  1.  Intrauterine pregnancy at 36w0d gestation with reactive fetal status.    2.   labor  without ROM and pain in her left flank    3.  Obstetrical history significant for is non-contributory.  4.  GBS status:   External Strep Group B Ag   Date Value Ref Range Status   2024 Unknown   Final       Plan:  1. fetal and uterine monitoring  continuously  2. Plan of care has been reviewed with patient.  3.  Risks, benefits of treatment plan have been discussed.  4.  All questions have been answered.  5.  Will give a single dose of Brethine 0.25 subcu..  Betamethasone if continues to contract           Rocky Driscoll MD   6/19/2024   07:17 EDT

## 2024-06-20 LAB
BACTERIA SPEC AEROBE CULT: NO GROWTH
EXTERNAL GROUP B STREP ANTIGEN: NEGATIVE

## 2024-06-25 ENCOUNTER — HOSPITAL ENCOUNTER (INPATIENT)
Facility: HOSPITAL | Age: 39
LOS: 3 days | Discharge: HOME | End: 2024-06-28
Attending: OBSTETRICS & GYNECOLOGY | Admitting: OBSTETRICS & GYNECOLOGY
Payer: MEDICAID

## 2024-06-25 ENCOUNTER — APPOINTMENT (OUTPATIENT)
Dept: ULTRASOUND IMAGING | Facility: HOSPITAL | Age: 39
End: 2024-06-25
Payer: MEDICAID

## 2024-06-25 ENCOUNTER — ANESTHESIA EVENT (OUTPATIENT)
Dept: LABOR AND DELIVERY | Facility: HOSPITAL | Age: 39
End: 2024-06-25
Payer: MEDICAID

## 2024-06-25 ENCOUNTER — ANESTHESIA (OUTPATIENT)
Dept: LABOR AND DELIVERY | Facility: HOSPITAL | Age: 39
End: 2024-06-25
Payer: MEDICAID

## 2024-06-25 LAB
ABO GROUP BLD: NORMAL
BLD GP AB SCN SERPL QL: NEGATIVE
DEPRECATED RDW RBC AUTO: 54.3 FL (ref 37–54)
ERYTHROCYTE [DISTWIDTH] IN BLOOD BY AUTOMATED COUNT: 16.2 % (ref 12.3–15.4)
HCT VFR BLD AUTO: 37.7 % (ref 34–46.6)
HGB BLD-MCNC: 12.4 G/DL (ref 12–15.9)
MCH RBC QN AUTO: 29.7 PG (ref 26.6–33)
MCHC RBC AUTO-ENTMCNC: 32.9 G/DL (ref 31.5–35.7)
MCV RBC AUTO: 90.4 FL (ref 79–97)
PLATELET # BLD AUTO: 423 10*3/MM3 (ref 140–450)
PMV BLD AUTO: 11 FL (ref 6–12)
RBC # BLD AUTO: 4.17 10*6/MM3 (ref 3.77–5.28)
RH BLD: POSITIVE
RPR SER QL: NORMAL
T&S EXPIRATION DATE: NORMAL
WBC NRBC COR # BLD AUTO: 13.83 10*3/MM3 (ref 3.4–10.8)

## 2024-06-25 PROCEDURE — 76815 OB US LIMITED FETUS(S): CPT

## 2024-06-25 PROCEDURE — 85027 COMPLETE CBC AUTOMATED: CPT | Performed by: OBSTETRICS & GYNECOLOGY

## 2024-06-25 PROCEDURE — 86592 SYPHILIS TEST NON-TREP QUAL: CPT | Performed by: OBSTETRICS & GYNECOLOGY

## 2024-06-25 PROCEDURE — 86900 BLOOD TYPING SEROLOGIC ABO: CPT | Performed by: OBSTETRICS & GYNECOLOGY

## 2024-06-25 PROCEDURE — C1755 CATHETER, INTRASPINAL: HCPCS | Performed by: ANESTHESIOLOGY

## 2024-06-25 PROCEDURE — 86850 RBC ANTIBODY SCREEN: CPT | Performed by: OBSTETRICS & GYNECOLOGY

## 2024-06-25 PROCEDURE — 25010000002 MORPHINE PER 10 MG: Performed by: OBSTETRICS & GYNECOLOGY

## 2024-06-25 PROCEDURE — 3E033VJ INTRODUCTION OF OTHER HORMONE INTO PERIPHERAL VEIN, PERCUTANEOUS APPROACH: ICD-10-PCS | Performed by: OBSTETRICS & GYNECOLOGY

## 2024-06-25 PROCEDURE — 86901 BLOOD TYPING SEROLOGIC RH(D): CPT | Performed by: OBSTETRICS & GYNECOLOGY

## 2024-06-25 PROCEDURE — 25810000003 LACTATED RINGERS PER 1000 ML: Performed by: OBSTETRICS & GYNECOLOGY

## 2024-06-25 RX ORDER — ONDANSETRON 2 MG/ML
4 INJECTION INTRAMUSCULAR; INTRAVENOUS EVERY 6 HOURS PRN
Status: DISCONTINUED | OUTPATIENT
Start: 2024-06-25 | End: 2024-06-26 | Stop reason: HOSPADM

## 2024-06-25 RX ORDER — FENTANYL/BUPIVACAINE/NS/PF 2-1250MCG
PLASTIC BAG, INJECTION (ML) INJECTION CONTINUOUS
Status: DISCONTINUED | OUTPATIENT
Start: 2024-06-25 | End: 2024-06-26

## 2024-06-25 RX ORDER — SODIUM CHLORIDE 9 MG/ML
40 INJECTION, SOLUTION INTRAVENOUS AS NEEDED
Status: DISCONTINUED | OUTPATIENT
Start: 2024-06-25 | End: 2024-06-26 | Stop reason: HOSPADM

## 2024-06-25 RX ORDER — DOCUSATE SODIUM 100 MG/1
100 CAPSULE, LIQUID FILLED ORAL 2 TIMES DAILY
COMMUNITY

## 2024-06-25 RX ORDER — SODIUM CHLORIDE 0.9 % (FLUSH) 0.9 %
10 SYRINGE (ML) INJECTION AS NEEDED
Status: DISCONTINUED | OUTPATIENT
Start: 2024-06-25 | End: 2024-06-26 | Stop reason: HOSPADM

## 2024-06-25 RX ORDER — SODIUM CHLORIDE 0.9 % (FLUSH) 0.9 %
10 SYRINGE (ML) INJECTION EVERY 12 HOURS SCHEDULED
Status: DISCONTINUED | OUTPATIENT
Start: 2024-06-25 | End: 2024-06-26 | Stop reason: HOSPADM

## 2024-06-25 RX ORDER — ONDANSETRON 4 MG/1
4 TABLET, ORALLY DISINTEGRATING ORAL EVERY 6 HOURS PRN
Status: DISCONTINUED | OUTPATIENT
Start: 2024-06-25 | End: 2024-06-26 | Stop reason: HOSPADM

## 2024-06-25 RX ORDER — ASPIRIN 81 MG/1
81 TABLET ORAL DAILY
COMMUNITY
End: 2024-06-28 | Stop reason: HOSPADM

## 2024-06-25 RX ORDER — FAMOTIDINE 20 MG/1
20 TABLET, FILM COATED ORAL EVERY 12 HOURS PRN
Status: DISCONTINUED | OUTPATIENT
Start: 2024-06-25 | End: 2024-06-26 | Stop reason: HOSPADM

## 2024-06-25 RX ORDER — EPHEDRINE SULFATE 5 MG/ML
10 INJECTION INTRAVENOUS
Status: DISCONTINUED | OUTPATIENT
Start: 2024-06-25 | End: 2024-06-26 | Stop reason: HOSPADM

## 2024-06-25 RX ORDER — SODIUM CHLORIDE, SODIUM LACTATE, POTASSIUM CHLORIDE, CALCIUM CHLORIDE 600; 310; 30; 20 MG/100ML; MG/100ML; MG/100ML; MG/100ML
125 INJECTION, SOLUTION INTRAVENOUS CONTINUOUS
Status: DISCONTINUED | OUTPATIENT
Start: 2024-06-25 | End: 2024-06-26

## 2024-06-25 RX ORDER — FAMOTIDINE 10 MG/ML
20 INJECTION, SOLUTION INTRAVENOUS EVERY 12 HOURS PRN
Status: DISCONTINUED | OUTPATIENT
Start: 2024-06-25 | End: 2024-06-26 | Stop reason: HOSPADM

## 2024-06-25 RX ORDER — FENTANYL/BUPIVACAINE/NS/PF 2-1250MCG
PLASTIC BAG, INJECTION (ML) INJECTION
Status: COMPLETED
Start: 2024-06-25 | End: 2024-06-25

## 2024-06-25 RX ORDER — ACETAMINOPHEN 325 MG/1
650 TABLET ORAL EVERY 4 HOURS PRN
Status: DISCONTINUED | OUTPATIENT
Start: 2024-06-25 | End: 2024-06-26 | Stop reason: HOSPADM

## 2024-06-25 RX ORDER — OXYTOCIN/0.9 % SODIUM CHLORIDE 30/500 ML
2-20 PLASTIC BAG, INJECTION (ML) INTRAVENOUS
Status: DISCONTINUED | OUTPATIENT
Start: 2024-06-25 | End: 2024-06-26 | Stop reason: HOSPADM

## 2024-06-25 RX ORDER — LIDOCAINE HYDROCHLORIDE AND EPINEPHRINE 15; 5 MG/ML; UG/ML
INJECTION, SOLUTION EPIDURAL
Status: ACTIVE
Start: 2024-06-25 | End: 2024-06-26

## 2024-06-25 RX ADMIN — ACETAMINOPHEN 650 MG: 325 TABLET, FILM COATED ORAL at 21:07

## 2024-06-25 RX ADMIN — SODIUM CHLORIDE, SODIUM LACTATE, POTASSIUM CHLORIDE, AND CALCIUM CHLORIDE 125 ML/HR: .6; .31; .03; .02 INJECTION, SOLUTION INTRAVENOUS at 08:59

## 2024-06-25 RX ADMIN — MORPHINE SULFATE 4 MG: 4 INJECTION, SOLUTION INTRAMUSCULAR; INTRAVENOUS at 12:23

## 2024-06-25 RX ADMIN — SODIUM CHLORIDE, SODIUM LACTATE, POTASSIUM CHLORIDE, AND CALCIUM CHLORIDE 125 ML/HR: .6; .31; .03; .02 INJECTION, SOLUTION INTRAVENOUS at 16:30

## 2024-06-25 RX ADMIN — Medication 2 MILLI-UNITS/MIN: at 08:59

## 2024-06-25 RX ADMIN — SODIUM CHLORIDE, SODIUM LACTATE, POTASSIUM CHLORIDE, AND CALCIUM CHLORIDE 999 ML/HR: .6; .31; .03; .02 INJECTION, SOLUTION INTRAVENOUS at 20:23

## 2024-06-25 RX ADMIN — Medication 6 ML/HR: at 20:13

## 2024-06-25 NOTE — ANESTHESIA PREPROCEDURE EVALUATION
Anesthesia Evaluation     Patient summary reviewed, Nursing notes reviewed and pregnancy test completed   NPO Solid Status: > 2 hours  NPO Liquid Status: > 2 hours           Airway   Mallampati: II  TM distance: >3 FB  Neck ROM: full  Dental    (+) poor dentition    Pulmonary     breath sounds clear to auscultation  Cardiovascular - normal exam    ECG reviewed        Neuro/Psych  GI/Hepatic/Renal/Endo    (+) renal disease-    Musculoskeletal     Abdominal  - normal exam   Substance History      OB/GYN    (+) Pregnant        Other   blood dyscrasia thrombocytopenia,     ROS/Med Hx Other: 06/25/24 08:37  WBC: 13.83 (H)  RBC: 4.17  Hemoglobin: 12.4  Hematocrit: 37.7  Platelets: 423      (H): Data is abnormally high                Anesthesia Plan    ASA 2     epidural       Anesthetic plan, risks, benefits, and alternatives have been provided, discussed and informed consent has been obtained with: patient.  Pre-procedure education provided  Use of blood products discussed with patient .      CODE STATUS:    Level Of Support Discussed With: Patient  Code Status (Patient has no pulse and is not breathing): CPR (Attempt to Resuscitate)  Medical Interventions (Patient has pulse or is breathing): Full Support

## 2024-06-25 NOTE — PROGRESS NOTES
" [facility]  Obstetric Progress Note    Subjective   No complaints.     Objective     Vitals:  Vitals:    06/25/24 1431 06/25/24 1500 06/25/24 1501 06/25/24 1515   BP: 100/58  98/59    Pulse:  78     Temp:    98.6 °F (37 °C)   TempSrc:       SpO2:  97%     Weight:       Height:         Flowsheet Rows      Flowsheet Row First Filed Value   Admission Height 160 cm (63\") Documented at 06/25/2024 0634   Admission Weight 76.8 kg (169 lb 5 oz) Documented at 06/25/2024 0634          No intake or output data in the 24 hours ending 06/25/24 1641    Fetal Heart Rate Assessment:   Category 1  [place]:  irregular    Physical Exam:  General: Patient is in no acute distress    Pelvic Exam: was checked (by me): 2-3 cm / 50% % / -2            Assessment & Plan     Principal Problem:    Pregnant and not yet delivered         Assessment:  1.  Intrauterine pregnancy at 36w6d gestation.    2.   PPROM @ 36+6  3.  GBS status: Negative    Plan:  1. Augmentation c pitocin.   2. Plan of care has been reviewed with patient.  3.  Risks, benefits of treatment plan have been discussed.  4.  All questions have been answered.        Nubia Frank MD  6/25/2024  16:41 EDT    "

## 2024-06-25 NOTE — H&P
"[facility]  Obstetric History and Physical     Chief Complaint: SROM    Subjective     Patient is a 38 y.o. female  currently at 36+6 , who presents with SROM at 0500 this am, good FM, no VB.    Her prenatal care is complicated by abnl 1hr, nl 3hr, AMA, as well as h/o ITP for which she has had a splenectomy.  Her previous obstetric/gynecological history is noted for is non-contributory.      Prenatal Information:  See office H&P for full details and labs.      Past OB History:       OB History    Para Term  AB Living   10 4 3 1 5 4   SAB IAB Ectopic Molar Multiple Live Births   5 0 0 0 0 4               Past Medical History: Past Medical History:   Diagnosis Date    Anxiety     Immune thrombocytopenic purpura 2019    Iron deficiency anemia     Kidney stone 10/2019    Thrombocytopenia 2019        Past Surgical History Past Surgical History:   Procedure Laterality Date    SPLENECTOMY N/A 2020    Procedure: SPLENECTOMY LAPAROSCOPIC;  Surgeon: Erick Akhtar MD;  Location: [facility];  Service: General;  Laterality: N/A;        Family History: Family History   Problem Relation Age of Onset    Thrombocytopenia Nephew         ITP    Cancer Paternal Grandmother     Heart failure Maternal Grandmother     Diabetes Maternal Great-Grandmother     Heart failure Maternal Great-Grandmother       Social History:  reports that she has never smoked. She has never been exposed to tobacco smoke. She has never used smokeless tobacco.   reports no history of alcohol use.   reports no history of drug use.        General ROS: Pertinent items are noted in HPI    Objective      Vitals:     Vitals:    24 0634   Weight: 76.8 kg (169 lb 5 oz)   Height: 160 cm (63\")       Fetal Heart Rate Assessment:   Category 1    [place]:   irritable     Physical Exam:     General Appearance:    Alert, cooperative, in no acute distress   Abdomen:     Soft, non-tender, no guarding, no rebound tenderness,       " EFW 6 1/2#   Pelvic Exam:    Pelvis adequate.    Presentation: Vertex by bedside US    Cervix: was checked (by RN): 1-2 cm / 50% % / -2, grossly ruptured   Extremities:   Moves all extremities well, no edema, no cyanosis, no              redness   Skin:   No bleeding, bruising or rash   Neurologic:   No focal neurologic defect          Laboratory Results:   Lab Results (last 48 hours)       Procedure Component Value Units Date/Time    CBC (No Diff) [[id number]] Collected: 06/25/24 0837    Specimen: Blood Updated: 06/25/24 0852    RPR [[id number]] Collected: 06/25/24 0837    Specimen: Blood Updated: 06/25/24 0852               Assessment & Plan     Principal Problem:    Pregnant and not yet delivered         Assessment:  1.  Intrauterine pregnancy at 36+6 wks gestation.    2.   SROM  3.  GBS status:Negative    Plan:  1. Expectant management and augmentation c pitocin if indicated.  2. Plan of care has been reviewed with patient.  3.  Risks, benefits of treatment plan have been discussed.  4.  All questions have been answered.         Nubia Frank MD   6/25/2024   08:54 EDT

## 2024-06-25 NOTE — PLAN OF CARE
Goal Outcome Evaluation:      Patient , 36w6d, SROM at home at 0530 this AM, clear fluid noted. Patient augmented with pitocin, patient jerry every 1.5-3mins that palpate strong. Infant category 1. Patient given morphine once for pain, eventually wanting epidural. Most recent vaginal exam 2-3cm/50%/-2.

## 2024-06-26 PROBLEM — Z34.90 PREGNANT AND NOT YET DELIVERED: Status: RESOLVED | Noted: 2024-05-22 | Resolved: 2024-06-26

## 2024-06-26 PROCEDURE — 97161 PT EVAL LOW COMPLEX 20 MIN: CPT | Performed by: PHYSICAL THERAPIST

## 2024-06-26 PROCEDURE — 0KQM0ZZ REPAIR PERINEUM MUSCLE, OPEN APPROACH: ICD-10-PCS | Performed by: OBSTETRICS & GYNECOLOGY

## 2024-06-26 RX ORDER — PRENATAL VIT/IRON FUM/FOLIC AC 27MG-0.8MG
1 TABLET ORAL DAILY
Status: DISCONTINUED | OUTPATIENT
Start: 2024-06-26 | End: 2024-06-28 | Stop reason: HOSPADM

## 2024-06-26 RX ORDER — FERROUS SULFATE 324(65)MG
324 TABLET, DELAYED RELEASE (ENTERIC COATED) ORAL 2 TIMES DAILY WITH MEALS
Status: DISCONTINUED | OUTPATIENT
Start: 2024-06-26 | End: 2024-06-28 | Stop reason: HOSPADM

## 2024-06-26 RX ORDER — IBUPROFEN 600 MG/1
600 TABLET ORAL EVERY 6 HOURS PRN
Status: DISCONTINUED | OUTPATIENT
Start: 2024-06-26 | End: 2024-06-28 | Stop reason: HOSPADM

## 2024-06-26 RX ORDER — HYDROCORTISONE ACETATE PRAMOXINE HCL 2.5; 1 G/100G; G/100G
1 CREAM TOPICAL AS NEEDED
Status: DISCONTINUED | OUTPATIENT
Start: 2024-06-26 | End: 2024-06-28 | Stop reason: HOSPADM

## 2024-06-26 RX ORDER — OXYTOCIN/0.9 % SODIUM CHLORIDE 30/500 ML
999 PLASTIC BAG, INJECTION (ML) INTRAVENOUS ONCE
Status: DISCONTINUED | OUTPATIENT
Start: 2024-06-26 | End: 2024-06-28 | Stop reason: HOSPADM

## 2024-06-26 RX ORDER — DOCUSATE SODIUM 100 MG/1
100 CAPSULE, LIQUID FILLED ORAL 2 TIMES DAILY
Status: DISCONTINUED | OUTPATIENT
Start: 2024-06-26 | End: 2024-06-28 | Stop reason: HOSPADM

## 2024-06-26 RX ORDER — FLUOXETINE HYDROCHLORIDE 20 MG/1
20 CAPSULE ORAL DAILY
Status: DISCONTINUED | OUTPATIENT
Start: 2024-06-26 | End: 2024-06-28 | Stop reason: HOSPADM

## 2024-06-26 RX ORDER — ACETAMINOPHEN 325 MG/1
650 TABLET ORAL EVERY 6 HOURS PRN
Status: DISCONTINUED | OUTPATIENT
Start: 2024-06-26 | End: 2024-06-28 | Stop reason: HOSPADM

## 2024-06-26 RX ORDER — MISOPROSTOL 200 UG/1
800 TABLET ORAL ONCE AS NEEDED
Status: DISCONTINUED | OUTPATIENT
Start: 2024-06-26 | End: 2024-06-26 | Stop reason: HOSPADM

## 2024-06-26 RX ORDER — BISACODYL 10 MG
10 SUPPOSITORY, RECTAL RECTAL DAILY PRN
Status: DISCONTINUED | OUTPATIENT
Start: 2024-06-27 | End: 2024-06-28 | Stop reason: HOSPADM

## 2024-06-26 RX ORDER — OXYTOCIN/0.9 % SODIUM CHLORIDE 30/500 ML
250 PLASTIC BAG, INJECTION (ML) INTRAVENOUS CONTINUOUS
Status: ACTIVE | OUTPATIENT
Start: 2024-06-26 | End: 2024-06-26

## 2024-06-26 RX ORDER — CARBOPROST TROMETHAMINE 250 UG/ML
250 INJECTION, SOLUTION INTRAMUSCULAR
Status: DISCONTINUED | OUTPATIENT
Start: 2024-06-26 | End: 2024-06-26 | Stop reason: HOSPADM

## 2024-06-26 RX ORDER — OXYTOCIN-SODIUM CHLORIDE 0.9% IV SOLN 30 UNIT/500ML 30-0.9/5 UT/ML-%
125 SOLUTION INTRAVENOUS CONTINUOUS PRN
Status: DISCONTINUED | OUTPATIENT
Start: 2024-06-26 | End: 2024-06-28 | Stop reason: HOSPADM

## 2024-06-26 RX ORDER — ONDANSETRON 4 MG/1
4 TABLET, ORALLY DISINTEGRATING ORAL EVERY 8 HOURS PRN
Status: DISCONTINUED | OUTPATIENT
Start: 2024-06-26 | End: 2024-06-28 | Stop reason: HOSPADM

## 2024-06-26 RX ORDER — METHYLERGONOVINE MALEATE 0.2 MG/ML
200 INJECTION INTRAVENOUS ONCE AS NEEDED
Status: DISCONTINUED | OUTPATIENT
Start: 2024-06-26 | End: 2024-06-26 | Stop reason: HOSPADM

## 2024-06-26 RX ORDER — SODIUM CHLORIDE 0.9 % (FLUSH) 0.9 %
1-10 SYRINGE (ML) INJECTION AS NEEDED
Status: DISCONTINUED | OUTPATIENT
Start: 2024-06-26 | End: 2024-06-28 | Stop reason: HOSPADM

## 2024-06-26 RX ADMIN — WITCH HAZEL: 500 SOLUTION RECTAL; TOPICAL at 03:02

## 2024-06-26 RX ADMIN — PRENATAL VITAMINS-IRON FUMARATE 27 MG IRON-FOLIC ACID 0.8 MG TABLET 1 TABLET: at 08:10

## 2024-06-26 RX ADMIN — DOCUSATE SODIUM 100 MG: 100 CAPSULE, LIQUID FILLED ORAL at 08:10

## 2024-06-26 RX ADMIN — FERROUS SULFATE TAB EC 324 MG (65 MG FE EQUIVALENT) 324 MG: 324 (65 FE) TABLET DELAYED RESPONSE at 17:17

## 2024-06-26 RX ADMIN — IBUPROFEN 600 MG: 600 TABLET, FILM COATED ORAL at 16:19

## 2024-06-26 RX ADMIN — FERROUS SULFATE TAB EC 324 MG (65 MG FE EQUIVALENT) 324 MG: 324 (65 FE) TABLET DELAYED RESPONSE at 07:26

## 2024-06-26 RX ADMIN — FLUOXETINE HYDROCHLORIDE 20 MG: 20 CAPSULE ORAL at 08:10

## 2024-06-26 RX ADMIN — IBUPROFEN 600 MG: 600 TABLET, FILM COATED ORAL at 23:25

## 2024-06-26 RX ADMIN — IBUPROFEN 600 MG: 600 TABLET, FILM COATED ORAL at 07:26

## 2024-06-26 RX ADMIN — Medication 1 G: at 03:02

## 2024-06-26 RX ADMIN — DOCUSATE SODIUM 100 MG: 100 CAPSULE, LIQUID FILLED ORAL at 21:43

## 2024-06-26 NOTE — LACTATION NOTE
This note was copied from a baby's chart.  Pt visited, family & children visiting, reports baby has been nursing well, Info packet & Medela gel patches provided, instructed on use. Requested to check in on her tomorrow.

## 2024-06-26 NOTE — PROGRESS NOTES
"[facility]  Obstetric Progress Note    Subjective   Comfortable c epidural.     Objective     Vitals:  Vitals:    06/25/24 2130 06/25/24 2200 06/25/24 2230 06/25/24 2300   BP: 101/49 97/44 109/61 100/58   BP Location: Right arm Right arm Right arm Right arm   Patient Position: Sitting Sitting Lying Lying   Pulse: 64 69 64 66   Resp:       Temp:  98.4 °F (36.9 °C)     TempSrc:  Oral     SpO2: 99% 100% 100% 100%   Weight:       Height:         Flowsheet Rows      Flowsheet Row First Filed Value   Admission Height 160 cm (63\") Documented at 06/25/2024 0634   Admission Weight 76.8 kg (169 lb 5 oz) Documented at 06/25/2024 0634            Intake/Output Summary (Last 24 hours) at 6/25/2024 2307  Last data filed at 6/25/2024 2230  Gross per 24 hour   Intake 480 ml   Output 500 ml   Net -20 ml       Fetal Heart Rate Assessment:   Category 1 (has rare variable deceleration, nothing consistent and category 1 overall)  [place]:  q 3    Physical Exam:  General: Patient is in no acute distress    Pelvic Exam: was checked (by me): 7 cm / 90% % / -1            Assessment & Plan     Principal Problem:    Pregnant and not yet delivered         Assessment:  1.  Intrauterine pregnancy at 36w6d gestation.    2.   PPROM  3.  GBS status: Negative    Plan:  1. Augmentation c pitocin.   2. Plan of care has been reviewed with patient.  3.  Risks, benefits of treatment plan have been discussed.  4.  All questions have been answered.        Nubia Frank MD  6/25/2024  23:07 EDT    "

## 2024-06-26 NOTE — ANESTHESIA PROCEDURE NOTES
Labor Epidural    Pre-sedation assessment completed: 6/25/2024 7:55 PM    Patient reassessed immediately prior to procedure    Patient location during procedure: OB  Start Time: 6/25/2024 7:55 PM  Stop Time: 6/25/2024 8:12 PM  Performed By  Anesthesiologist: Mitch Poon MD  Preanesthetic Checklist  Completed: patient identified, IV checked, site marked, risks and benefits discussed, surgical consent, monitors and equipment checked, pre-op evaluation and timeout performed  Prep:  Pt Position:sitting  Sterile Tech:gloves, cap, sterile barrier and mask  Prep:chlorhexidine gluconate and isopropyl alcohol  Monitoring:continuous pulse oximetry, EKG and blood pressure monitoring  Epidural Block Procedure:  Approach:midline  Guidance:palpation technique  Needle Type:Tuohy  Needle Gauge:17 G  Loss of Resistance Medium: air  Loss of Resistance: 6cm  Cath Depth at skin:11 cm  Paresthesia: none  Aspiration:negative  Test Dose:negative  Number of Attempts: 1  Post Assessment:  Dressing:secured with tape, occlusive dressing applied and biopatch applied  Pt Tolerance:patient tolerated the procedure well with no apparent complications  Complications:no

## 2024-06-26 NOTE — L&D DELIVERY NOTE
[facility]  Vaginal Delivery Note    Diagnosis     Patient is a 38 y.o. female  currently at 37w0d, who presents with PPROM.      Delivery     Delivery:  Spontaneous Vaginal Delivery    Date of Delivery:  2024   Anesthesia:  Epidural   Delivering clinician: Nubia Frank MD      Delivery narrative:  Pt presented c PPROM ot 0500 am on .  She was in early labor but augmented c pitocin. She progressed on a normal labor curve with a category 1 tracing throughout.  She pushed with excellent effort for less than 5 minutes to deliver a vigorous infant without difficulty.  Loose nuchal x 1 reduced at delivery.      Infant    Findings: VMI     Apgars: 9 & 9 at 1 and 5 minutes.      Placenta, Cord, and Fluid     Delayed cord clamping performed per routine.       Placenta delivered spontaneous, intact  3VC      Bimanual massage and Pitocin 30 units in 500 mL bolus given after placental delivery.  There was good hemostasis and a firm uterine tone.        Lacerations       had a 2nd degree laceration, repaired c 3.0 vicryl rapide in the usual fashion.    Quantitiative Blood Loss  200  mL     Sponge and needle counts correct x 2.     Disposition  Mother to Mother Baby/Postpartum  in stable condition currently.  Baby to remains with mom  in stable condition currently.      Nubia Frank MD  24  02:04 EDT

## 2024-06-26 NOTE — PLAN OF CARE
Goal Outcome Evaluation:  Plan of Care Reviewed With: patient        Progress: improving Breastfeeding without difficulty. Bonding well with baby. Support at bedside.

## 2024-06-26 NOTE — PLAN OF CARE
Goal Outcome Evaluation:  Plan of Care Reviewed With: patient           Outcome Evaluation: Patient is a 37 y/o F,, who came to [facility] 37w0d gestation.  She had a vaginal birth 24, with a 2nd degree laceration.  During today's evaluation PT provided pt with handout regarding postpartum healing post vaginal birth.  She demonstrated good understanding of material after education.  Provided pt with handout to keep, and contact information is present if pt has any additional PT needs/questions while in the hospital or post discharge.    Vaginal Patient education provided on:   -Body changes after pregnancy, grade of tear and what structures involved.   -Pelvic floor musculature, Transversus abdominus muscle  -Diaphragmatic breathing  -Proper kegal performance, as well as importance of relaxation   -ARMANDO  -Body mechanics   -Proper breathing/pressure management   -Toileting posture   -Benefits of exercise  -Basic/beginning exercise 0-2 weeks, 2-6 weeks, and after 6 weeks  -Postpartum safe stretches   -UI  -Perineal scar massage   -When it may be beneficial to see a Pelvic PT

## 2024-06-26 NOTE — PLAN OF CARE
Problem: Adult Inpatient Plan of Care  Goal: Plan of Care Review  Outcome: Ongoing, Progressing  Flowsheets (Taken 6/26/2024 0442)  Progress: improving  Plan of Care Reviewed With: patient  Goal: Patient-Specific Goal (Individualized)  Outcome: Ongoing, Progressing  Goal: Absence of Hospital-Acquired Illness or Injury  Outcome: Ongoing, Progressing  Intervention: Identify and Manage Fall Risk  Description: Perform standard risk assessment on admission using a validated tool or comprehensive approach appropriate to the patient; reassess fall risk frequently, with change in status or transfer to another level of care.  Communicate fall injury risk to interprofessional healthcare team.  Determine need for increased observation, equipment and environmental modification, such as low bed, signage and supportive, nonskid footwear.  Adjust safety measures to individual developmental age, stage and identified risk factors.  Reinforce the importance of safety and physical activity with patient and family.  Perform regular intentional rounding to assess need for position change, pain assessment and personal needs, including assistance with toileting.  Recent Flowsheet Documentation  Taken 6/26/2024 0400 by Donna Null RN  Safety Promotion/Fall Prevention: safety round/check completed  Intervention: Prevent and Manage VTE (Venous Thromboembolism) Risk  Description: Assess for VTE (venous thromboembolism) risk.  Encourage and assist with early ambulation.  Initiate and maintain compression or other therapy, as indicated, based on identified risk in accordance with organizational protocol and provider order.  Encourage both active and passive leg exercises while in bed, if unable to ambulate.  Recent Flowsheet Documentation  Taken 6/26/2024 0400 by Donna Null RN  Activity Management: up ad chandrika  Taken 6/26/2024 0320 by Donna Null, RN  Activity Management: (pt leg still numb from epidural) unable to complete activity  (see comments)  Taken 6/26/2024 0315 by Donna Null RN  Activity Management: bedrest  Taken 6/26/2024 0300 by Donna Null RN  Activity Management: bedrest  Taken 6/26/2024 0245 by Donna Null RN  Activity Management: bedrest  Taken 6/26/2024 0230 by Donna Null RN  Activity Management: bedrest  Taken 6/26/2024 0215 by Donna Null RN  Activity Management: bedrest  Taken 6/26/2024 0200 by Donna Null RN  Activity Management: bedrest  Goal: Optimal Comfort and Wellbeing  Outcome: Ongoing, Progressing  Intervention: Monitor Pain and Promote Comfort  Description: Assess pain level, treatment efficacy and patient response at regular intervals using a consistent pain scale.  Consider the presence and impact of preexisting chronic pain.  Encourage patient and caregiver involvement in pain assessment, interventions and safety measures.  Recent Flowsheet Documentation  Taken 6/25/2024 2107 by Donna Null RN  Pain Management Interventions: see MAR  Intervention: Provide Person-Centered Care  Description: Use a family-focused approach to care.  Develop trust and rapport by proactively providing information, encouraging questions, addressing concerns and offering reassurance.  Acknowledge emotional response to hospitalization.  Recognize and utilize personal coping strategies.  Honor spiritual and cultural preferences.  Recent Flowsheet Documentation  Taken 6/26/2024 0400 by Donna Null RN  Trust Relationship/Rapport:   care explained   choices provided   emotional support provided   empathic listening provided   questions answered   questions encouraged   reassurance provided   thoughts/feelings acknowledged  Taken 6/26/2024 0200 by Donna Null RN  Trust Relationship/Rapport:   care explained   choices provided   emotional support provided   empathic listening provided   questions answered   questions encouraged   reassurance provided   thoughts/feelings acknowledged  Goal: Readiness for Transition of  Care  Outcome: Ongoing, Progressing     Problem: Adjustment to Role Transition (Postpartum Vaginal Delivery)  Goal: Successful Maternal Role Transition  Outcome: Ongoing, Progressing  Intervention: Support Maternal Role Transition  Description: Develop trust, relationship and rapport.  Use a family-focused approach; promote involvement of support system in infant care.  Incorporate cultural beliefs, rituals and practices in family-centered care.  Encourage and support breastfeeding, frequent holding and skin-to-skin contact with .  Encourage attachment behaviors; promote involvement in infant care.  Monitor maternal emotional state, as well as maternal-infant interaction.  Encourage and answer questions; share resources for support following discharge.  Recent Flowsheet Documentation  Taken 2024 0400 by Donna Null RN  Parent/Child Attachment Promotion:   caring behavior modeled   cue recognition promoted   face-to-face positioning promoted   interaction encouraged   parent/caregiver presence encouraged   participation in care promoted   rooming-in promoted   skin-to-skin contact encouraged   strengths emphasized   positive reinforcement provided     Problem: Bleeding (Postpartum Vaginal Delivery)  Goal: Hemostasis  Outcome: Ongoing, Progressing     Problem: Infection (Postpartum Vaginal Delivery)  Goal: Absence of Infection Signs/Symptoms  Outcome: Ongoing, Progressing  Intervention: Prevent or Manage Infection  Description: Encourage perineal care; if present, monitor episiotomy site for swelling, redness and drainage.  Implement transmission-based precautions and isolation, as indicated, to prevent spread of infection.  Obtain cultures prior to initiating antimicrobial therapy. Do not delay treatment for laboratory results with presence of high suspicion. Note: If endometritis is suspected, treatment may be initiated without obtaining cultures.  Administer ordered antimicrobial therapy promptly;  reassess need regularly.  Identify and manage signs of early sepsis, such as increased heart rate and decreased blood pressure, as well as changes in mental state, respiratory pattern or peripheral perfusion.  If perineal wound infection is identified, anticipate need for suture removal, debridement and cleansing.  Notify infant’s care provider of maternal infection.  Recent Flowsheet Documentation  Taken 6/26/2024 0400 by Donna Null, RN  Perineal Care: perineal hygiene encouraged  Taken 6/26/2024 0333 by Donna Null, RN  Perineal Care:   absorbent brief/pad changed   medicated pads applied   perineum cleansed   topical anesthetic preparation applied     Problem: Pain (Postpartum Vaginal Delivery)  Goal: Acceptable Pain Control  Outcome: Ongoing, Progressing  Intervention: Prevent or Manage Pain  Description: Determine pain management plan with patient and caregiver; review plan regularly.  Use a consistent, validated tool for pain assessment; evaluate pain level, effect of treatment and patient’s response at regular intervals.  Monitor perineal condition; note presence of hematoma, hemorrhoids and episiotomy appearance (if applicable).  Use cold application, as culturally-appropriate, to the perineal area for the first 24 to 48 hours following delivery for comfort.  Verify correct infant latch when breastfeeding to prevent nipple pain.  Consider the presence and impact of preexisting chronic pain.  Encourage patient and caregiver involvement in pain assessment, interventions and safety measures.  Individualize pharmacologic pain management plan; titrate medication to patient response.  Monitor and address medication-induced side effects, such as constipation, nausea, vomiting.  Initiate individualized nonpharmacologic pain management measures.  Consider and address emotional response to pain.  If engorgement occurs, encourage more frequent breastfeeding or pumping and storing additional milk to ease  discomfort.  Note: Cold compresses, as culturally-appropriate, may be used if bottle-feeding.  If post-dural puncture headache identified, encourage adequate hydration and anticipate the need for epidural blood patch.  If hemorrhoids are present and painful, offer topical pain relief and sitz baths for comfort.  Recent Flowsheet Documentation  Taken 6/25/2024 2107 by Donna Null RN  Pain Management Interventions: see MAR     Problem: Urinary Retention (Postpartum Vaginal Delivery)  Goal: Effective Urinary Elimination  Outcome: Ongoing, Progressing     Problem: Breastfeeding  Goal: Effective Breastfeeding  Outcome: Ongoing, Progressing  Intervention: Support Exclusive Breastfeeding Success  Description: Discuss and reinforce benefits for infant and mother.  Initiate uninterrupted breastfeeding within 1 hour of birth; promote skin-to-skin contact.  Advocate for patience and persistence; encourage breastfeeding in the presence of difficult circumstances; eliminate or minimize separation of infant-mother and encourage rooming-in.  Initiate pumping of breast milk within 6 hours of birth if unable to nurse.  Assist with use of breast pump, proper handling and storage of breast milk.  Advocate for the use of alternative breast milk feeding methods when breastfeeding is medically not possible (e.g., supplemental feeding device at the breast, cup, finger).  Discourage use of a pacifier until breastfeeding is well-established.  Assess support system integrity; emphasize the importance of father/partner/support person involvement and use of available resources.  Discuss future feeding plans and assist with barrier resolution (e.g., return to work, birth control).  Recent Flowsheet Documentation  Taken 6/26/2024 0400 by Donna Null RN  Breastfeeding Support:   diary/feeding log utilized   encouragement provided   infant-mother separation minimized   maternal hydration promoted   maternal nutrition promoted   maternal rest  encouraged  Intervention: Promote Effective Breastfeeding  Description: Mutually develop a feeding plan; include father/partner/support person.  Promote cue-based and infant-led feeding; goal of 8 to 12 times per 24-hour period.  Provide ongoing support during feeding sessions to enhance maternal infant experience.  Adjust environment to provide calm surroundings and privacy; limit distractions.  Assist with recognition of and response to infant feeding readiness and satiety cues; demonstrate infant rooting and sucking reflexes.  Demonstrate techniques to maintain infant’s feeding readiness state, such as unwrapping and gentle tactile or vocal stimulation.  Assist with effective positioning, latch and breast attachment techniques (e.g., move infant to breast versus breast to infant, stimulate suck with drop of colostrum or breast milk).  Observe feedings regularly, assessing infant’s ability to latch, suck and swallow, mother-infant positioning and presence of milk transfer; evaluate infant for signs of adequate intake (e.g., number of wet diapers and stools).  Recent Flowsheet Documentation  Taken 6/26/2024 0400 by Donna Null, RN  Breastfeeding Assistance: support offered  Parent/Child Attachment Promotion:   caring behavior modeled   cue recognition promoted   face-to-face positioning promoted   interaction encouraged   parent/caregiver presence encouraged   participation in care promoted   rooming-in promoted   skin-to-skin contact encouraged   strengths emphasized   positive reinforcement provided   Goal Outcome Evaluation:  Plan of Care Reviewed With: patient        Progress: improving

## 2024-06-26 NOTE — THERAPY EVALUATION
Patient Name: Tiny Akhtar  : 1985    MRN: [id number]                              Today's Date: 2024       Admit Date: 2024    Visit Dx: No diagnosis found.  Patient Active Problem List   Diagnosis    Immune thrombocytopenic purpura    Iron deficiency anemia    Thrombocytopenia    Currently pregnant     (normal spontaneous vaginal delivery)     Past Medical History:   Diagnosis Date    Anxiety     Immune thrombocytopenic purpura 2019    Iron deficiency anemia     Kidney stone 10/2019    Thrombocytopenia 2019     Past Surgical History:   Procedure Laterality Date    SPLENECTOMY N/A 2020    Procedure: SPLENECTOMY LAPAROSCOPIC;  Surgeon: Erick Akhtar MD;  Location: [facility] MAIN OR;  Service: General;  Laterality: N/A;    TONSILLECTOMY      WISDOM TOOTH EXTRACTION        General Information       Row Name 24 1347          Physical Therapy Time and Intention    Document Type evaluation  -EJ     Mode of Treatment physical therapy  -EJ       Row Name 24 1347          General Information    Patient Profile Reviewed yes  -EJ     Prior Level of Function independent:  -EJ     Existing Precautions/Restrictions lifting  -EJ     Barriers to Rehab none identified  -EJ       Row Name 24 1347          Living Environment    People in Home significant other;child(rosette), dependent  -EJ     Name(s) of People in Home [place] ([place]), and 4 dependent children  -EJ       Row Name 24 134          Cognition    Orientation Status (Cognition) oriented x 4  -EJ               User Key  (r) = Recorded By, (t) = Taken By, (c) = Cosigned By      Initials Name Provider Type    EJ Amanda Shepherd, PT Physical Therapist                   Mobility       Row Name 24 1348          Bed Mobility    Bed Mobility bed mobility (all) activities  -EJ     All Activities, [place] (Bed Mobility) independent  -EJ       Row Name 24 1348          Sit-Stand Transfer    Sit-Stand  [place] (Transfers) independent  -[facility] Name 24 1348          Gait/Stairs (Locomotion)    [place] Level (Gait) independent  -     Distance in Feet (Gait) --  Pt is up ad chandrika in her room and hallway as tolerated.  -EJ               User Key  (r) = Recorded By, (t) = Taken By, (c) = Cosigned By      Initials Name Provider Type    Amanda Bustillos, PT Physical Therapist                   Obj/Interventions       Row Name 24 1348          Range of Motion Comprehensive    General Range of Motion no range of motion deficits identified  -EJ       Row Name 24 1348          Strength Comprehensive (MMT)    Comment, General Manual Muscle Testing (MMT) Assessment Pt strength NT this date due to recent delivery.  Pt will likely benefit from pelvic floor/core strengthening/assessment once able.  -               User Key  (r) = Recorded By, (t) = Taken By, (c) = Cosigned By      Initials Name Provider Type    Amanda Bustillos, PT Physical Therapist                   Goals/Plan    No documentation.                  Clinical Impression       Row Name 24 134          Pain    Additional Documentation Pain Scale: FACES Pre/Post-Treatment (Group)  -EJ       Row Name 24 1348          Pain Scale: FACES Pre/Post-Treatment    Pain: FACES Scale, Pretreatment 0-->no hurt  -     Posttreatment Pain Rating 0-->no hurt  -EJ       Row Name 24 1341          Plan of Care Review    Plan of Care Reviewed With patient  -     Outcome Evaluation Patient is a 37 y/o F,, who came to [facility] 37w0d gestation.  She had a vaginal birth 24, with a 2nd degree laceration.  During today's evaluation PT provided pt with handout regarding postpartum healing post vaginal birth.  She demonstrated good understanding of material after education.  Provided pt with handout to keep, and contact information is present if pt has any additional PT needs/questions while in the hospital or post discharge.   -EJ       Row Name 06/26/24 1348          Therapy Assessment/Plan (PT)    Criteria for Skilled Interventions Met (PT) no  -EJ     Therapy Frequency (PT) evaluation only  -EJ     Predicted Duration of Therapy Intervention (PT) discharge  -EJ       Row Name 06/26/24 1348          Positioning and Restraints    Pre-Treatment Position in bed  -EJ     Post Treatment Position bed  -EJ     In Bed fowlers;call light within reach;with family/caregiver  -EJ               User Key  (r) = Recorded By, (t) = Taken By, (c) = Cosigned By      Initials Name Provider Type    Amanda Bustillos, PT Physical Therapist                   Outcome Measures       Row Name 06/26/24 1350 06/26/24 0900       How much help from another person do you currently need...    Turning from your back to your side while in flat bed without using bedrails? 4  -EJ 4  -KR    Moving from lying on back to sitting on the side of a flat bed without bedrails? 4  -EJ 4  -KR    Moving to and from a bed to a chair (including a wheelchair)? 4  -EJ 4  -KR    Standing up from a chair using your arms (e.g., wheelchair, bedside chair)? 4  -EJ 4  -KR    Climbing 3-5 steps with a railing? 4  -EJ 3  -KR    To walk in hospital room? 4  -EJ 4  -KR    AM-PAC 6 Clicks Score (PT) 24  -EJ 23  -KR    Highest Level of Mobility Goal 8 --> Walked 250 feet or more  -EJ 7 --> Walk 25 feet or more  -KR      Row Name 06/26/24 1350          Functional Assessment    Outcome Measure Options AM-PAC 6 Clicks Basic Mobility (PT)  -               User Key  (r) = Recorded By, (t) = Taken By, (c) = Cosigned By      Initials Name Provider Type    Amanda Bustillos, PT Physical Therapist    Milagro Hart, RN Registered Nurse                                 Physical Therapy Education       Title: PT OT SLP Therapies (Done)       Topic: Physical Therapy (Done)       Point: Mobility training (Done)       Learning Progress Summary             Patient Acceptance, E,H, VU by ABHI at 6/26/2024  1350                         Point: Home exercise program (Done)       Learning Progress Summary             Patient Acceptance, E,H, VU by  at 2024 1350                         Point: Body mechanics (Done)       Learning Progress Summary             Patient Acceptance, E,H, VU by  at 2024 1350                         Point: Precautions (Done)       Learning Progress Summary             Patient Acceptance, E,H, VU by  at 2024 1350                                         User Key       Initials Effective Dates Name Provider Type Discipline     24 -  Amanda Shepherd, PT Physical Therapist PT                  PT Recommendation and Plan     Plan of Care Reviewed With: patient  Outcome Evaluation: Patient is a 39 y/o F,, who came to [facility] 37w0d gestation.  She had a vaginal birth 24, with a 2nd degree laceration.  During today's evaluation PT provided pt with handout regarding postpartum healing post vaginal birth.  She demonstrated good understanding of material after education.  Provided pt with handout to keep, and contact information is present if pt has any additional PT needs/questions while in the hospital or post discharge.    Vaginal Patient education provided on:   -Body changes after pregnancy, grade of tear and what structures involved.   -Pelvic floor musculature, Transversus abdominus muscle  -Diaphragmatic breathing  -Proper kegal performance, as well as importance of relaxation   -ARMANDO  -Body mechanics   -Proper breathing/pressure management   -Toileting posture   -Benefits of exercise  -Basic/beginning exercise 0-2 weeks, 2-6 weeks, and after 6 weeks  -Postpartum safe stretches   -UI  -Perineal scar massage   -When it may be beneficial to see a Pelvic PT     Time Calculation:         PT Charges       Row Name 24 1351             Time Calculation    Start Time 1310  -EJ      Stop Time 1318  -EJ      Time Calculation (min) 8 min  -EJ      PT Received On 24   -EJ         Time Calculation- PT    Total Timed Code Minutes- PT 0 minute(s)  -EJ                User Key  (r) = Recorded By, (t) = Taken By, (c) = Cosigned By      Initials Name Provider Type    Amanda Bustillos, PT Physical Therapist                  Therapy Charges for Today       Code Description Service Date Service Provider Modifiers Qty    44436931698 HC PT EVAL LOW COMPLEXITY 3 6/26/2024 Amanda Shepherd, PT GP 1            PT G-Codes  Outcome Measure Options: AM-PAC 6 Clicks Basic Mobility (PT)  AM-PAC 6 Clicks Score (PT): 24  PT Discharge Summary  Anticipated Discharge Disposition (PT): home    Amanda Shepherd, PT  6/26/2024

## 2024-06-27 LAB
BASOPHILS # BLD AUTO: 0.09 10*3/MM3 (ref 0–0.2)
BASOPHILS NFR BLD AUTO: 0.7 % (ref 0–1.5)
DEPRECATED RDW RBC AUTO: 54.3 FL (ref 37–54)
EOSINOPHIL # BLD AUTO: 0.46 10*3/MM3 (ref 0–0.4)
EOSINOPHIL NFR BLD AUTO: 3.5 % (ref 0.3–6.2)
ERYTHROCYTE [DISTWIDTH] IN BLOOD BY AUTOMATED COUNT: 16.3 % (ref 12.3–15.4)
HCT VFR BLD AUTO: 31.4 % (ref 34–46.6)
HGB BLD-MCNC: 10.4 G/DL (ref 12–15.9)
IMM GRANULOCYTES # BLD AUTO: 0.09 10*3/MM3 (ref 0–0.05)
IMM GRANULOCYTES NFR BLD AUTO: 0.7 % (ref 0–0.5)
LYMPHOCYTES # BLD AUTO: 4.56 10*3/MM3 (ref 0.7–3.1)
LYMPHOCYTES NFR BLD AUTO: 34.4 % (ref 19.6–45.3)
MCH RBC QN AUTO: 30.1 PG (ref 26.6–33)
MCHC RBC AUTO-ENTMCNC: 33.1 G/DL (ref 31.5–35.7)
MCV RBC AUTO: 91 FL (ref 79–97)
MONOCYTES # BLD AUTO: 1.66 10*3/MM3 (ref 0.1–0.9)
MONOCYTES NFR BLD AUTO: 12.5 % (ref 5–12)
NEUTROPHILS NFR BLD AUTO: 48.2 % (ref 42.7–76)
NEUTROPHILS NFR BLD AUTO: 6.41 10*3/MM3 (ref 1.7–7)
NRBC BLD AUTO-RTO: 0 /100 WBC (ref 0–0.2)
PLATELET # BLD AUTO: 335 10*3/MM3 (ref 140–450)
PMV BLD AUTO: 11.1 FL (ref 6–12)
RBC # BLD AUTO: 3.45 10*6/MM3 (ref 3.77–5.28)
WBC NRBC COR # BLD AUTO: 13.27 10*3/MM3 (ref 3.4–10.8)

## 2024-06-27 PROCEDURE — 85025 COMPLETE CBC W/AUTO DIFF WBC: CPT | Performed by: OBSTETRICS & GYNECOLOGY

## 2024-06-27 RX ADMIN — FERROUS SULFATE TAB EC 324 MG (65 MG FE EQUIVALENT) 324 MG: 324 (65 FE) TABLET DELAYED RESPONSE at 09:39

## 2024-06-27 RX ADMIN — IBUPROFEN 600 MG: 600 TABLET, FILM COATED ORAL at 14:14

## 2024-06-27 RX ADMIN — IBUPROFEN 600 MG: 600 TABLET, FILM COATED ORAL at 23:01

## 2024-06-27 RX ADMIN — FLUOXETINE HYDROCHLORIDE 20 MG: 20 CAPSULE ORAL at 09:39

## 2024-06-27 RX ADMIN — DOCUSATE SODIUM 100 MG: 100 CAPSULE, LIQUID FILLED ORAL at 09:39

## 2024-06-27 RX ADMIN — FERROUS SULFATE TAB EC 324 MG (65 MG FE EQUIVALENT) 324 MG: 324 (65 FE) TABLET DELAYED RESPONSE at 18:13

## 2024-06-27 RX ADMIN — Medication 1 G: at 09:40

## 2024-06-27 RX ADMIN — WITCH HAZEL: 500 SOLUTION RECTAL; TOPICAL at 09:40

## 2024-06-27 RX ADMIN — DOCUSATE SODIUM 100 MG: 100 CAPSULE, LIQUID FILLED ORAL at 20:32

## 2024-06-27 RX ADMIN — PRENATAL VITAMINS-IRON FUMARATE 27 MG IRON-FOLIC ACID 0.8 MG TABLET 1 TABLET: at 09:39

## 2024-06-27 NOTE — PROGRESS NOTES
EL[facility] [place]  Postpartum Note    Subjective   Postpartum Day 1:  Spontaneous Vaginal Delivery    Patient without complaints. Her pain is moderately controlled with prescribed pain medications. She is ambulating well.  Patient describes her bleeding as thin lochia.    Breastfeeding: infant latching without difficulty.    Objective     Vitals:  Vitals:    24 1953 24 2146 24 2340 24 0734   BP: 103/63  105/82 112/76   BP Location:       Patient Position:       Pulse: 70  79 61   Resp: 16  16 16   Temp: 98.4 °F (36.9 °C)  98.2 °F (36.8 °C) 98.1 °F (36.7 °C)   TempSrc: Oral  Oral Oral   SpO2: 96%  96% 97%   Weight:  73.8 kg (162 lb 9.6 oz)     Height:           Physical Exam:  General:  Alert and oriented x3. No acute distress.  Abdomen: abdomen is soft without significant tenderness, masses, organomegaly or guarding. Fundus: appropriate, firm, non tender  Incision: N/A  Skin: Warm, Dry  Extremities: Normal,  trace edema. Nontender     Labs:  Results from last 7 days   Lab Units 24  0547 24  0837   WBC 10*3/mm3 13.27* 13.83*   HEMOGLOBIN g/dL 10.4* 12.4   HEMATOCRIT % 31.4* 37.7   PLATELETS 10*3/mm3 335 423            Feeding method: Breastfeeding Status: Yes     Blood Type: RH Positive        Assessment & Plan     Active Problems:     (normal spontaneous vaginal delivery)      Tiny Akhtar is Day 1  post-partum from a  Spontaneous Vaginal Delivery      Plan:  routine, continue present management, encourage ambulation, and plan d/c tomorrow.       FARZAD Timmons  2024  11:04 EDT

## 2024-06-27 NOTE — PLAN OF CARE
Goal Outcome Evaluation:                 Pt bonding with and breastfeeding infant well. Up ambulating and taking care of infant appropriately. Fundus firm, lochia scant.

## 2024-06-27 NOTE — LACTATION NOTE
This note was copied from a baby's chart.  Pt denies hx of breast surgery, no allergy to wool or foods. Medela gel patches provided, instructed on use.   States she has bf all her children without difficulties, her youngest to almost two years.   States she has a double electric breast pump at home. Plans to return to work after maternity leave. Takes prenatal vitamins.   Assessed lactation needs, states no questions, she is confident bf continues to improve, baby recently nursed well, encouraged to call for help as needed or bf obs

## 2024-06-28 VITALS
SYSTOLIC BLOOD PRESSURE: 97 MMHG | HEART RATE: 63 BPM | TEMPERATURE: 98.4 F | HEIGHT: 63 IN | BODY MASS INDEX: 28.67 KG/M2 | OXYGEN SATURATION: 96 % | RESPIRATION RATE: 16 BRPM | DIASTOLIC BLOOD PRESSURE: 60 MMHG | WEIGHT: 161.8 LBS

## 2024-06-28 RX ORDER — IBUPROFEN 600 MG/1
600 TABLET ORAL EVERY 6 HOURS PRN
Qty: 30 TABLET | Refills: 0 | Status: SHIPPED | OUTPATIENT
Start: 2024-06-28

## 2024-06-28 RX ADMIN — PRENATAL VITAMINS-IRON FUMARATE 27 MG IRON-FOLIC ACID 0.8 MG TABLET 1 TABLET: at 09:20

## 2024-06-28 RX ADMIN — DOCUSATE SODIUM 100 MG: 100 CAPSULE, LIQUID FILLED ORAL at 09:20

## 2024-06-28 RX ADMIN — FERROUS SULFATE TAB EC 324 MG (65 MG FE EQUIVALENT) 324 MG: 324 (65 FE) TABLET DELAYED RESPONSE at 09:20

## 2024-06-28 RX ADMIN — FLUOXETINE HYDROCHLORIDE 20 MG: 20 CAPSULE ORAL at 09:20

## 2024-06-28 NOTE — PLAN OF CARE
Goal Outcome Evaluation:         Patient being discharged home.  Vitals stable, bleeding scant to light.  Pain controlled with Ibuprofen and Tylenol.

## 2024-06-28 NOTE — LACTATION NOTE
This note was copied from a baby's chart.  Mother reports that feedings are going well. Milk has come in. Declines feeding observation. Nipples slightly tender, reinforced nipple care and nipple care products. Plans for discharge today. Discussed first night at home. Provided with  discharge weight ticket and lactation contact card.

## 2024-06-28 NOTE — PLAN OF CARE
Goal Outcome Evaluation:  Plan of Care Reviewed With: patient        Progress: improving       Pt applies ice pack to perineum for soreness, pt has utilized her prn pain medicine for cramping. Pt's fundus is wnl and bleeding is scant, pt is voiding appropriately

## 2024-06-28 NOTE — DISCHARGE SUMMARY
[facility]  Delivery Discharge Summary    Primary OB Clinician: Rocky Driscoll MD    Admission Diagnosis: TIUP; PPROM  Active Problems:     (normal spontaneous vaginal delivery)      Discharge Diagnosis:  delivered    Gestational Age: 37w0d    Date of Delivery: 2024     Delivered By:  Nubia Frank     Delivery Type: Vaginal, Spontaneous      Tubal Ligation: n/a    Intrapartum Course: Uncomplicated delivery.     Postpartum Course:  Pt was admitted and underwent  Spontaneous Vaginal Delivery. Pt was transferred to PP where she had an uncomplicated course. Pt remained AFVSS, had scant lochia and pain was well controlled. Pt d/c home in stable condition and will f/u in office for PP visit as scheduled or PRN. Currently breastfeeding. Plans on condoms  for contraception.     Physical Exam:    Vitals:   Vitals:    24 0734 24 1514 24 2346 24 0758   BP: 112/76 118/83 112/68 97/60   BP Location:  Left arm  Right arm   Patient Position:  Sitting  Sitting   Pulse: 61 65 62 63   Resp: 16 16 16 16   Temp: 98.1 °F (36.7 °C) 98.5 °F (36.9 °C) 98.5 °F (36.9 °C) 98.4 °F (36.9 °C)   TempSrc: Oral Oral Oral Oral   SpO2: 97% 95% 97% 96%   Weight:   73.4 kg (161 lb 12.8 oz)    Height:         Temp (24hrs), Av.5 °F (36.9 °C), Min:98.4 °F (36.9 °C), Max:98.5 °F (36.9 °C)      General Appearance:    Alert, cooperative, in no acute distress   Abdomen:     Soft non-tender, non-distended, no guarding, no rebound         tenderness.   Extremities:   Moves all extremities well, no edema, no cyanosis, no              Redness.   Incision:  N/A   Fundus:   Firm, below umbilicus     Feeding method: Breastfeeding Status: Yes    Labs:  Results from last 7 days   Lab Units 24  0547 24  0837   WBC 10*3/mm3 13.27* 13.83*   HEMOGLOBIN g/dL 10.4* 12.4   HEMATOCRIT % 31.4* 37.7   PLATELETS 10*3/mm3 335 423           Blood Type: RH Positive      Plan:  Discharge to home.    Follow-up appointment with  Dr Driscoll in 6 weeks.   All discharge instructions were reviewed with pt including bleeding warnings, s/sx of PP depression, and warning signs in the PP period for which to seek medical attention including but not limited to s/sx of hypertension and thromboembolism.     Suha Sanchez, FARZAD  6/28/2024  10:49 EDT

## 2024-07-01 NOTE — SIGNIFICANT NOTE
Case Management Discharge Note                Selected Continued Care - Discharged on 6/28/2024 Admission date: 6/25/2024 - Discharge disposition: Home or Self Care              Transportation Services  Private: Car    Final Discharge Disposition Code: (P) 01 - home or self-care

## 2024-07-01 NOTE — PAYOR COMM NOTE
"This is discharge notification for Tiny Akhtar   Reference/Auth # [id number]   Pt discharged routine to home on 6/28/24.    JOJO Heart, RN, [facility]  Utilization Review Nurse  [facility]  Direct & confidential phone # [phone]  Fax # [phone]      Tiny Akhtar (38 y.o. Female)       Date of Birth   1985    Social Security Number       Address   [address] [place] [address] [place] [address]    Home Phone   [phone]    MRN   [id number]       Rastafari   None    Marital Status   Significant Other                            Admission Date   6/25/24    Admission Type   Elective    Admitting Provider   Rocky Driscoll MD    Attending Provider       Department, Room/Bed   [facility] MOTHER BABY, M411/1       Discharge Date   6/28/2024    Discharge Disposition   Home or Self Care    Discharge Destination   Home                              Attending Provider: (none)   Allergies: No Known Allergies    Isolation: None   Infection: None   Code Status: Prior    Ht: 160 cm (63\")   Wt: 73.4 kg (161 lb 12.8 oz)    Admission Cmt: None   Principal Problem: Pregnant and not yet delivered [Z34.90]                   Active Insurance as of 6/25/2024       Primary Coverage       Payor Plan Insurance Group Employer/Plan Group    [employer] [place] [id number]       Payor Plan Address Payor Plan Phone Number Payor Plan Fax Number Effective Dates    MAIL STOP:   9/1/2020 - None Entered    [address] [address]       [facility] [address]         Subscriber Name Subscriber Birth Date Member ID       TINY AKHTAR 1985 [id number]                     Emergency Contacts        (Rel.) Home Phone Work Phone Mobile Phone    SHELBI DAVEY (Significant Other) [phone] -- [phone]                 Discharge Summary        Suha Sanchez APRN at 06/28/24 1049          [facility]  Delivery Discharge Summary    Primary OB Clinician: Rocky SHUKLA" MD Americo    Admission Diagnosis: TIUP; PPROM  Active Problems:     (normal spontaneous vaginal delivery)      Discharge Diagnosis:  delivered    Gestational Age: 37w0d    Date of Delivery: 2024     Delivered By:  Nubia Frank     Delivery Type: Vaginal, Spontaneous      Tubal Ligation: n/a    Intrapartum Course: Uncomplicated delivery.     Postpartum Course:  Pt was admitted and underwent  Spontaneous Vaginal Delivery. Pt was transferred to PP where she had an uncomplicated course. Pt remained AFVSS, had scant lochia and pain was well controlled. Pt d/c home in stable condition and will f/u in office for PP visit as scheduled or PRN. Currently breastfeeding. Plans on condoms  for contraception.     Physical Exam:    Vitals:   Vitals:    24 0734 24 1514 24 2346 24 0758   BP: 112/76 118/83 112/68 97/60   BP Location:  Left arm  Right arm   Patient Position:  Sitting  Sitting   Pulse: 61 65 62 63   Resp: 16 16 16 16   Temp: 98.1 °F (36.7 °C) 98.5 °F (36.9 °C) 98.5 °F (36.9 °C) 98.4 °F (36.9 °C)   TempSrc: Oral Oral Oral Oral   SpO2: 97% 95% 97% 96%   Weight:   73.4 kg (161 lb 12.8 oz)    Height:         Temp (24hrs), Av.5 °F (36.9 °C), Min:98.4 °F (36.9 °C), Max:98.5 °F (36.9 °C)      General Appearance:    Alert, cooperative, in no acute distress   Abdomen:     Soft non-tender, non-distended, no guarding, no rebound         tenderness.   Extremities:   Moves all extremities well, no edema, no cyanosis, no              Redness.   Incision:  N/A   Fundus:   Firm, below umbilicus     Feeding method: Breastfeeding Status: Yes    Labs:  Results from last 7 days   Lab Units 24  0547 24  0837   WBC 10*3/mm3 13.27* 13.83*   HEMOGLOBIN g/dL 10.4* 12.4   HEMATOCRIT % 31.4* 37.7   PLATELETS 10*3/mm3 335 423           Blood Type: RH Positive      Plan:  Discharge to home.    Follow-up appointment with Dr Driscoll in 6 weeks.   All discharge instructions were reviewed with  pt including bleeding warnings, s/sx of PP depression, and warning signs in the PP period for which to seek medical attention including but not limited to s/sx of hypertension and thromboembolism.     FARZAD Timmons  6/28/2024  10:49 EDT      Electronically signed by Suha Sanchez APRN at 06/28/24 0062

## 2025-07-03 NOTE — TELEPHONE ENCOUNTER
Rx Refill Note  Requested Prescriptions      No prescriptions requested or ordered in this encounter        Last office visit with prescribing clinician: 11/23/2020     Next office visit with prescribing clinician: 1/15/2021         Caden Nagy MA  07/03/25, 09:33 EDT

## (undated) DEVICE — PASS SUT PRO BARIATRIC XL W/TROC SWABS

## (undated) DEVICE — BLAKE SILICONE DRAIN, 15 FR ROUND, HUBLESS: Brand: BLAKE

## (undated) DEVICE — SOL NACL 0.9PCT 1000ML

## (undated) DEVICE — SUT SILK 2/0 FS BLK 18IN 685G

## (undated) DEVICE — LAPAROSCOPIC GAS CONDITIONING DEVICE.: Brand: INSUFLOW

## (undated) DEVICE — 3M™ PATIENT PLATE, CORDED, SPLIT, LARGE, 40 PER CASE, 1179: Brand: 3M™

## (undated) DEVICE — SUT VIC 0/0 UR6 27IN DYED J603H

## (undated) DEVICE — ENDOPATH XCEL BLADELESS TROCARS WITH STABILITY SLEEVES: Brand: ENDOPATH XCEL

## (undated) DEVICE — KT SURG TURNOVER 050

## (undated) DEVICE — GLV SURG BIOGEL LTX PF 7

## (undated) DEVICE — CUFF SCD HEMOFORCE SEQ CALF STD MD

## (undated) DEVICE — SOL IRRIG H2O 1000ML STRL

## (undated) DEVICE — ENDOPATH XCEL BLUNT TIP TROCARS WITH SMOOTH SLEEVES: Brand: ENDOPATH XCEL

## (undated) DEVICE — ENDOPATH XCEL WITH OPTIVIEW TECHNOLOGY BLADELESS TROCARS WITH STABILITY SLEEVES: Brand: ENDOPATH XCEL OPTIVIEW

## (undated) DEVICE — UNDERGLV SURG BIOGEL INDICATOR LTX PF 7

## (undated) DEVICE — SUT VIC FS2 4/0 27IN J422H

## (undated) DEVICE — 3M™ STERI-DRAPE™ INSTRUMENT POUCH 9097: Brand: 3M™ STERI-DRAPE™

## (undated) DEVICE — SUT VIC 0 SUTUPAK TIES 18IN J906G

## (undated) DEVICE — DRN WND EVAC BULB 100CC

## (undated) DEVICE — ENDOPATH XCEL WITH OPTIVIEW TECHNOLOGY UNIVERSAL TROCAR STABILITY SLEEVES: Brand: ENDOPATH XCEL OPTIVIEW

## (undated) DEVICE — UNIVERSAL STAPLER: Brand: ENDO GIA ULTRA

## (undated) DEVICE — 2, DISPOSABLE SUCTION/IRRIGATOR WITH DISPOSABLE TIP: Brand: STRYKEFLOW

## (undated) DEVICE — TOTAL TRAY, DB, 100% SILI FOLEY, 16FR 10: Brand: MEDLINE

## (undated) DEVICE — SPECIMEN RETRIEVAL POUCH: Brand: ENDO CATCH II

## (undated) DEVICE — GENERAL LAPAROSCOPY CDS: Brand: MEDLINE INDUSTRIES, INC.